# Patient Record
Sex: FEMALE | Race: WHITE | NOT HISPANIC OR LATINO | Employment: OTHER | URBAN - METROPOLITAN AREA
[De-identification: names, ages, dates, MRNs, and addresses within clinical notes are randomized per-mention and may not be internally consistent; named-entity substitution may affect disease eponyms.]

---

## 2017-01-27 ENCOUNTER — COMMUNICATION - HEALTHEAST (OUTPATIENT)
Dept: INTERNAL MEDICINE | Facility: CLINIC | Age: 82
End: 2017-01-27

## 2017-02-09 ENCOUNTER — RECORDS - HEALTHEAST (OUTPATIENT)
Dept: ADMINISTRATIVE | Facility: OTHER | Age: 82
End: 2017-02-09

## 2017-02-27 ENCOUNTER — COMMUNICATION - HEALTHEAST (OUTPATIENT)
Dept: INTERNAL MEDICINE | Facility: CLINIC | Age: 82
End: 2017-02-27

## 2017-03-21 ENCOUNTER — COMMUNICATION - HEALTHEAST (OUTPATIENT)
Dept: INTERNAL MEDICINE | Facility: CLINIC | Age: 82
End: 2017-03-21

## 2017-04-27 ENCOUNTER — COMMUNICATION - HEALTHEAST (OUTPATIENT)
Dept: INTERNAL MEDICINE | Facility: CLINIC | Age: 82
End: 2017-04-27

## 2017-06-29 ENCOUNTER — COMMUNICATION - HEALTHEAST (OUTPATIENT)
Dept: INTERNAL MEDICINE | Facility: CLINIC | Age: 82
End: 2017-06-29

## 2017-07-03 ENCOUNTER — OFFICE VISIT - HEALTHEAST (OUTPATIENT)
Dept: INTERNAL MEDICINE | Facility: CLINIC | Age: 82
End: 2017-07-03

## 2017-07-03 ENCOUNTER — COMMUNICATION - HEALTHEAST (OUTPATIENT)
Dept: INTERNAL MEDICINE | Facility: CLINIC | Age: 82
End: 2017-07-03

## 2017-07-03 DIAGNOSIS — R35.0 URINARY FREQUENCY: ICD-10-CM

## 2017-07-03 DIAGNOSIS — Z00.01 ENCOUNTER FOR GENERAL ADULT MEDICAL EXAMINATION WITH ABNORMAL FINDINGS: ICD-10-CM

## 2017-07-03 DIAGNOSIS — M81.0 OSTEOPOROSIS: ICD-10-CM

## 2017-07-03 DIAGNOSIS — K21.9 GASTROESOPHAGEAL REFLUX DISEASE WITHOUT ESOPHAGITIS: ICD-10-CM

## 2017-07-03 DIAGNOSIS — Z00.00 ROUTINE GENERAL MEDICAL EXAMINATION AT A HEALTH CARE FACILITY: ICD-10-CM

## 2017-07-03 DIAGNOSIS — F41.9 ANXIETY: ICD-10-CM

## 2017-07-03 DIAGNOSIS — Z79.899 MEDICATION MANAGEMENT: ICD-10-CM

## 2017-07-03 DIAGNOSIS — I10 SYSTOLIC HYPERTENSION: ICD-10-CM

## 2017-07-03 ASSESSMENT — MIFFLIN-ST. JEOR: SCORE: 809.99

## 2017-07-07 ENCOUNTER — AMBULATORY - HEALTHEAST (OUTPATIENT)
Dept: LAB | Facility: CLINIC | Age: 82
End: 2017-07-07

## 2017-07-07 DIAGNOSIS — Z00.00 ROUTINE GENERAL MEDICAL EXAMINATION AT A HEALTH CARE FACILITY: ICD-10-CM

## 2017-07-07 DIAGNOSIS — M81.0 OSTEOPOROSIS: ICD-10-CM

## 2017-07-07 DIAGNOSIS — Z79.899 MEDICATION MANAGEMENT: ICD-10-CM

## 2017-07-07 LAB
CHOLEST SERPL-MCNC: 178 MG/DL
FASTING STATUS PATIENT QL REPORTED: YES
HDLC SERPL-MCNC: 68 MG/DL
LDLC SERPL CALC-MCNC: 96 MG/DL
TRIGL SERPL-MCNC: 71 MG/DL

## 2017-07-12 ENCOUNTER — RECORDS - HEALTHEAST (OUTPATIENT)
Dept: ADMINISTRATIVE | Facility: OTHER | Age: 82
End: 2017-07-12

## 2017-07-18 ENCOUNTER — COMMUNICATION - HEALTHEAST (OUTPATIENT)
Dept: INTERNAL MEDICINE | Facility: CLINIC | Age: 82
End: 2017-07-18

## 2017-07-24 ENCOUNTER — COMMUNICATION - HEALTHEAST (OUTPATIENT)
Dept: INTERNAL MEDICINE | Facility: CLINIC | Age: 82
End: 2017-07-24

## 2017-08-10 ENCOUNTER — RECORDS - HEALTHEAST (OUTPATIENT)
Dept: ADMINISTRATIVE | Facility: OTHER | Age: 82
End: 2017-08-10

## 2017-08-19 ENCOUNTER — COMMUNICATION - HEALTHEAST (OUTPATIENT)
Dept: INTERNAL MEDICINE | Facility: CLINIC | Age: 82
End: 2017-08-19

## 2017-08-19 DIAGNOSIS — K21.9 GERD (GASTROESOPHAGEAL REFLUX DISEASE): ICD-10-CM

## 2017-09-18 ENCOUNTER — COMMUNICATION - HEALTHEAST (OUTPATIENT)
Dept: INTERNAL MEDICINE | Facility: CLINIC | Age: 82
End: 2017-09-18

## 2017-10-05 ENCOUNTER — RECORDS - HEALTHEAST (OUTPATIENT)
Dept: ADMINISTRATIVE | Facility: OTHER | Age: 82
End: 2017-10-05

## 2017-10-22 ENCOUNTER — RECORDS - HEALTHEAST (OUTPATIENT)
Dept: ADMINISTRATIVE | Facility: OTHER | Age: 82
End: 2017-10-22

## 2017-11-20 ENCOUNTER — COMMUNICATION - HEALTHEAST (OUTPATIENT)
Dept: INTERNAL MEDICINE | Facility: CLINIC | Age: 82
End: 2017-11-20

## 2017-11-20 DIAGNOSIS — I10 SYSTOLIC HYPERTENSION: ICD-10-CM

## 2018-01-22 ENCOUNTER — COMMUNICATION - HEALTHEAST (OUTPATIENT)
Dept: INTERNAL MEDICINE | Facility: CLINIC | Age: 83
End: 2018-01-22

## 2018-02-07 ENCOUNTER — COMMUNICATION - HEALTHEAST (OUTPATIENT)
Dept: INTERNAL MEDICINE | Facility: CLINIC | Age: 83
End: 2018-02-07

## 2018-02-08 ENCOUNTER — RECORDS - HEALTHEAST (OUTPATIENT)
Dept: ADMINISTRATIVE | Facility: OTHER | Age: 83
End: 2018-02-08

## 2018-02-08 ENCOUNTER — OFFICE VISIT - HEALTHEAST (OUTPATIENT)
Dept: INTERNAL MEDICINE | Facility: CLINIC | Age: 83
End: 2018-02-08

## 2018-02-08 ENCOUNTER — AMBULATORY - HEALTHEAST (OUTPATIENT)
Dept: INTERNAL MEDICINE | Facility: CLINIC | Age: 83
End: 2018-02-08

## 2018-02-08 ENCOUNTER — RECORDS - HEALTHEAST (OUTPATIENT)
Dept: GENERAL RADIOLOGY | Facility: CLINIC | Age: 83
End: 2018-02-08

## 2018-02-08 DIAGNOSIS — J43.9 MILD EMPHYSEMA (H): ICD-10-CM

## 2018-02-08 DIAGNOSIS — R06.00 DYSPNEA: ICD-10-CM

## 2018-02-08 DIAGNOSIS — I10 SYSTOLIC HYPERTENSION: ICD-10-CM

## 2018-02-08 DIAGNOSIS — R60.9 EDEMA: ICD-10-CM

## 2018-02-08 DIAGNOSIS — J43.9 EMPHYSEMA, UNSPECIFIED (H): ICD-10-CM

## 2018-02-08 LAB
ALBUMIN SERPL-MCNC: 3.7 G/DL (ref 3.5–5)
ALP SERPL-CCNC: 98 U/L (ref 45–120)
ALT SERPL W P-5'-P-CCNC: 12 U/L (ref 0–45)
ANION GAP SERPL CALCULATED.3IONS-SCNC: 9 MMOL/L (ref 5–18)
AST SERPL W P-5'-P-CCNC: 21 U/L (ref 0–40)
ATRIAL RATE - MUSE: 89 BPM
BASOPHILS # BLD AUTO: 0 THOU/UL (ref 0–0.2)
BASOPHILS NFR BLD AUTO: 1 % (ref 0–2)
BILIRUB DIRECT SERPL-MCNC: 0.1 MG/DL
BILIRUB SERPL-MCNC: 0.4 MG/DL (ref 0–1)
BNP SERPL-MCNC: 196 PG/ML (ref 0–167)
BUN SERPL-MCNC: 20 MG/DL (ref 8–28)
CALCIUM SERPL-MCNC: 9.7 MG/DL (ref 8.5–10.5)
CHLORIDE BLD-SCNC: 104 MMOL/L (ref 98–107)
CO2 SERPL-SCNC: 25 MMOL/L (ref 22–31)
CREAT SERPL-MCNC: 0.76 MG/DL (ref 0.6–1.1)
DIASTOLIC BLOOD PRESSURE - MUSE: NORMAL MMHG
EOSINOPHIL # BLD AUTO: 0.1 THOU/UL (ref 0–0.4)
EOSINOPHIL NFR BLD AUTO: 2 % (ref 0–6)
ERYTHROCYTE [DISTWIDTH] IN BLOOD BY AUTOMATED COUNT: 12 % (ref 11–14.5)
GFR SERPL CREATININE-BSD FRML MDRD: >60 ML/MIN/1.73M2
GLUCOSE BLD-MCNC: 89 MG/DL (ref 70–125)
HCT VFR BLD AUTO: 37.8 % (ref 35–47)
HGB BLD-MCNC: 12.6 G/DL (ref 12–16)
INTERPRETATION ECG - MUSE: NORMAL
LYMPHOCYTES # BLD AUTO: 1.9 THOU/UL (ref 0.8–4.4)
LYMPHOCYTES NFR BLD AUTO: 33 % (ref 20–40)
MCH RBC QN AUTO: 31.5 PG (ref 27–34)
MCHC RBC AUTO-ENTMCNC: 33.4 G/DL (ref 32–36)
MCV RBC AUTO: 94 FL (ref 80–100)
MONOCYTES # BLD AUTO: 0.7 THOU/UL (ref 0–0.9)
MONOCYTES NFR BLD AUTO: 12 % (ref 2–10)
NEUTROPHILS # BLD AUTO: 2.9 THOU/UL (ref 2–7.7)
NEUTROPHILS NFR BLD AUTO: 52 % (ref 50–70)
P AXIS - MUSE: 57 DEGREES
PLATELET # BLD AUTO: 151 THOU/UL (ref 140–440)
PMV BLD AUTO: 8.2 FL (ref 7–10)
POTASSIUM BLD-SCNC: 4.5 MMOL/L (ref 3.5–5)
PR INTERVAL - MUSE: 196 MS
PROT SERPL-MCNC: 7.1 G/DL (ref 6–8)
QRS DURATION - MUSE: 136 MS
QT - MUSE: 398 MS
QTC - MUSE: 484 MS
R AXIS - MUSE: -20 DEGREES
RBC # BLD AUTO: 4 MILL/UL (ref 3.8–5.4)
SODIUM SERPL-SCNC: 138 MMOL/L (ref 136–145)
SYSTOLIC BLOOD PRESSURE - MUSE: NORMAL MMHG
T AXIS - MUSE: 83 DEGREES
TSH SERPL DL<=0.005 MIU/L-ACNC: 3.34 UIU/ML (ref 0.3–5)
VENTRICULAR RATE- MUSE: 89 BPM
WBC: 5.6 THOU/UL (ref 4–11)

## 2018-02-08 ASSESSMENT — MIFFLIN-ST. JEOR: SCORE: 819.06

## 2018-02-21 ENCOUNTER — OFFICE VISIT - HEALTHEAST (OUTPATIENT)
Dept: INTERNAL MEDICINE | Facility: CLINIC | Age: 83
End: 2018-02-21

## 2018-02-21 DIAGNOSIS — I50.32 CHRONIC DIASTOLIC CONGESTIVE HEART FAILURE (H): ICD-10-CM

## 2018-02-21 DIAGNOSIS — I10 SYSTOLIC HYPERTENSION: ICD-10-CM

## 2018-02-21 ASSESSMENT — MIFFLIN-ST. JEOR: SCORE: 819.06

## 2018-03-16 ENCOUNTER — OFFICE VISIT - HEALTHEAST (OUTPATIENT)
Dept: INTERNAL MEDICINE | Facility: CLINIC | Age: 83
End: 2018-03-16

## 2018-03-16 DIAGNOSIS — F41.9 ANXIETY: ICD-10-CM

## 2018-03-16 DIAGNOSIS — I10 SYSTOLIC HYPERTENSION: ICD-10-CM

## 2018-03-16 DIAGNOSIS — I50.32 CHRONIC DIASTOLIC CONGESTIVE HEART FAILURE (H): ICD-10-CM

## 2018-03-16 ASSESSMENT — MIFFLIN-ST. JEOR: SCORE: 814.53

## 2018-03-22 ENCOUNTER — COMMUNICATION - HEALTHEAST (OUTPATIENT)
Dept: INTERNAL MEDICINE | Facility: CLINIC | Age: 83
End: 2018-03-22

## 2018-05-18 ENCOUNTER — COMMUNICATION - HEALTHEAST (OUTPATIENT)
Dept: INTERNAL MEDICINE | Facility: CLINIC | Age: 83
End: 2018-05-18

## 2018-05-18 DIAGNOSIS — I10 SYSTOLIC HYPERTENSION: ICD-10-CM

## 2018-06-06 ENCOUNTER — COMMUNICATION - HEALTHEAST (OUTPATIENT)
Dept: CARE COORDINATION | Facility: CLINIC | Age: 83
End: 2018-06-06

## 2018-06-22 ENCOUNTER — COMMUNICATION - HEALTHEAST (OUTPATIENT)
Dept: CARE COORDINATION | Facility: CLINIC | Age: 83
End: 2018-06-22

## 2018-06-22 DIAGNOSIS — I50.30 DIASTOLIC CONGESTIVE HEART FAILURE (H): ICD-10-CM

## 2018-07-16 ENCOUNTER — OFFICE VISIT - HEALTHEAST (OUTPATIENT)
Dept: INTERNAL MEDICINE | Facility: CLINIC | Age: 83
End: 2018-07-16

## 2018-07-16 DIAGNOSIS — R10.13 ABDOMINAL PAIN, EPIGASTRIC: ICD-10-CM

## 2018-07-16 DIAGNOSIS — M81.0 OSTEOPOROSIS: ICD-10-CM

## 2018-07-16 DIAGNOSIS — I10 SYSTOLIC HYPERTENSION: ICD-10-CM

## 2018-07-16 LAB
ANION GAP SERPL CALCULATED.3IONS-SCNC: 10 MMOL/L (ref 5–18)
BUN SERPL-MCNC: 17 MG/DL (ref 8–28)
CALCIUM SERPL-MCNC: 9.9 MG/DL (ref 8.5–10.5)
CHLORIDE BLD-SCNC: 104 MMOL/L (ref 98–107)
CO2 SERPL-SCNC: 24 MMOL/L (ref 22–31)
CREAT SERPL-MCNC: 0.72 MG/DL (ref 0.6–1.1)
ERYTHROCYTE [DISTWIDTH] IN BLOOD BY AUTOMATED COUNT: 11.8 % (ref 11–14.5)
GFR SERPL CREATININE-BSD FRML MDRD: >60 ML/MIN/1.73M2
GLUCOSE BLD-MCNC: 73 MG/DL (ref 70–125)
HCT VFR BLD AUTO: 38.9 % (ref 35–47)
HGB BLD-MCNC: 13.3 G/DL (ref 12–16)
MCH RBC QN AUTO: 32.4 PG (ref 27–34)
MCHC RBC AUTO-ENTMCNC: 34.2 G/DL (ref 32–36)
MCV RBC AUTO: 95 FL (ref 80–100)
PLATELET # BLD AUTO: 168 THOU/UL (ref 140–440)
PMV BLD AUTO: 8.3 FL (ref 7–10)
POTASSIUM BLD-SCNC: 5 MMOL/L (ref 3.5–5)
RBC # BLD AUTO: 4.1 MILL/UL (ref 3.8–5.4)
SODIUM SERPL-SCNC: 138 MMOL/L (ref 136–145)
WBC: 4.8 THOU/UL (ref 4–11)

## 2018-07-16 ASSESSMENT — MIFFLIN-ST. JEOR: SCORE: 819.06

## 2018-07-17 ENCOUNTER — COMMUNICATION - HEALTHEAST (OUTPATIENT)
Dept: INTERNAL MEDICINE | Facility: CLINIC | Age: 83
End: 2018-07-17

## 2018-07-18 ENCOUNTER — COMMUNICATION - HEALTHEAST (OUTPATIENT)
Dept: INTERNAL MEDICINE | Facility: CLINIC | Age: 83
End: 2018-07-18

## 2018-07-30 ENCOUNTER — RECORDS - HEALTHEAST (OUTPATIENT)
Dept: ADMINISTRATIVE | Facility: OTHER | Age: 83
End: 2018-07-30

## 2018-08-16 ENCOUNTER — RECORDS - HEALTHEAST (OUTPATIENT)
Dept: ADMINISTRATIVE | Facility: OTHER | Age: 83
End: 2018-08-16

## 2018-08-20 ENCOUNTER — COMMUNICATION - HEALTHEAST (OUTPATIENT)
Dept: INTERNAL MEDICINE | Facility: CLINIC | Age: 83
End: 2018-08-20

## 2018-08-20 DIAGNOSIS — I10 SYSTOLIC HYPERTENSION: ICD-10-CM

## 2018-08-23 ENCOUNTER — RECORDS - HEALTHEAST (OUTPATIENT)
Dept: ADMINISTRATIVE | Facility: OTHER | Age: 83
End: 2018-08-23

## 2018-08-24 ENCOUNTER — RECORDS - HEALTHEAST (OUTPATIENT)
Dept: ADMINISTRATIVE | Facility: OTHER | Age: 83
End: 2018-08-24

## 2018-08-27 ENCOUNTER — RECORDS - HEALTHEAST (OUTPATIENT)
Dept: ADMINISTRATIVE | Facility: OTHER | Age: 83
End: 2018-08-27

## 2018-08-28 ENCOUNTER — RECORDS - HEALTHEAST (OUTPATIENT)
Dept: ADMINISTRATIVE | Facility: OTHER | Age: 83
End: 2018-08-28

## 2018-08-29 ENCOUNTER — RECORDS - HEALTHEAST (OUTPATIENT)
Dept: ADMINISTRATIVE | Facility: OTHER | Age: 83
End: 2018-08-29

## 2018-08-30 ENCOUNTER — RECORDS - HEALTHEAST (OUTPATIENT)
Dept: ADMINISTRATIVE | Facility: OTHER | Age: 83
End: 2018-08-30

## 2018-08-31 ENCOUNTER — RECORDS - HEALTHEAST (OUTPATIENT)
Dept: ADMINISTRATIVE | Facility: OTHER | Age: 83
End: 2018-08-31

## 2018-09-04 ENCOUNTER — RECORDS - HEALTHEAST (OUTPATIENT)
Dept: ADMINISTRATIVE | Facility: OTHER | Age: 83
End: 2018-09-04

## 2018-09-05 ENCOUNTER — RECORDS - HEALTHEAST (OUTPATIENT)
Dept: ADMINISTRATIVE | Facility: OTHER | Age: 83
End: 2018-09-05

## 2018-09-07 ENCOUNTER — RECORDS - HEALTHEAST (OUTPATIENT)
Dept: ADMINISTRATIVE | Facility: OTHER | Age: 83
End: 2018-09-07

## 2018-09-14 ENCOUNTER — RECORDS - HEALTHEAST (OUTPATIENT)
Dept: ADMINISTRATIVE | Facility: OTHER | Age: 83
End: 2018-09-14

## 2018-09-18 ENCOUNTER — COMMUNICATION - HEALTHEAST (OUTPATIENT)
Dept: INTERNAL MEDICINE | Facility: CLINIC | Age: 83
End: 2018-09-18

## 2018-09-21 ENCOUNTER — RECORDS - HEALTHEAST (OUTPATIENT)
Dept: ADMINISTRATIVE | Facility: OTHER | Age: 83
End: 2018-09-21

## 2018-09-28 ENCOUNTER — RECORDS - HEALTHEAST (OUTPATIENT)
Dept: ADMINISTRATIVE | Facility: OTHER | Age: 83
End: 2018-09-28

## 2018-10-19 ENCOUNTER — COMMUNICATION - HEALTHEAST (OUTPATIENT)
Dept: CARE COORDINATION | Facility: CLINIC | Age: 83
End: 2018-10-19

## 2018-10-22 ENCOUNTER — RECORDS - HEALTHEAST (OUTPATIENT)
Dept: ADMINISTRATIVE | Facility: OTHER | Age: 83
End: 2018-10-22

## 2018-11-13 ENCOUNTER — COMMUNICATION - HEALTHEAST (OUTPATIENT)
Dept: INTERNAL MEDICINE | Facility: CLINIC | Age: 83
End: 2018-11-13

## 2018-11-26 ENCOUNTER — OFFICE VISIT - HEALTHEAST (OUTPATIENT)
Dept: INTERNAL MEDICINE | Facility: CLINIC | Age: 83
End: 2018-11-26

## 2018-11-26 DIAGNOSIS — M81.0 AGE-RELATED OSTEOPOROSIS WITHOUT CURRENT PATHOLOGICAL FRACTURE: ICD-10-CM

## 2018-11-26 DIAGNOSIS — M54.6 CHRONIC MIDLINE THORACIC BACK PAIN: ICD-10-CM

## 2018-11-26 DIAGNOSIS — I10 SYSTOLIC HYPERTENSION: ICD-10-CM

## 2018-11-26 DIAGNOSIS — G89.29 CHRONIC MIDLINE THORACIC BACK PAIN: ICD-10-CM

## 2018-11-26 ASSESSMENT — MIFFLIN-ST. JEOR: SCORE: 809.99

## 2019-01-11 ENCOUNTER — COMMUNICATION - HEALTHEAST (OUTPATIENT)
Dept: INTERNAL MEDICINE | Facility: CLINIC | Age: 84
End: 2019-01-11

## 2019-01-16 ENCOUNTER — AMBULATORY - HEALTHEAST (OUTPATIENT)
Dept: CARE COORDINATION | Facility: CLINIC | Age: 84
End: 2019-01-16

## 2019-03-14 ENCOUNTER — COMMUNICATION - HEALTHEAST (OUTPATIENT)
Dept: INTERNAL MEDICINE | Facility: CLINIC | Age: 84
End: 2019-03-14

## 2019-03-17 ENCOUNTER — COMMUNICATION - HEALTHEAST (OUTPATIENT)
Dept: INTERNAL MEDICINE | Facility: CLINIC | Age: 84
End: 2019-03-17

## 2019-04-29 ENCOUNTER — OFFICE VISIT - HEALTHEAST (OUTPATIENT)
Dept: INTERNAL MEDICINE | Facility: CLINIC | Age: 84
End: 2019-04-29

## 2019-04-29 DIAGNOSIS — I10 SYSTOLIC HYPERTENSION: ICD-10-CM

## 2019-04-29 DIAGNOSIS — F41.9 CHRONIC ANXIETY: ICD-10-CM

## 2019-04-29 LAB
ALBUMIN SERPL-MCNC: 3.8 G/DL (ref 3.5–5)
ALP SERPL-CCNC: 80 U/L (ref 45–120)
ALT SERPL W P-5'-P-CCNC: 15 U/L (ref 0–45)
ANION GAP SERPL CALCULATED.3IONS-SCNC: 12 MMOL/L (ref 5–18)
AST SERPL W P-5'-P-CCNC: 25 U/L (ref 0–40)
BASOPHILS # BLD AUTO: 0 THOU/UL (ref 0–0.2)
BASOPHILS NFR BLD AUTO: 1 % (ref 0–2)
BILIRUB SERPL-MCNC: 0.4 MG/DL (ref 0–1)
BUN SERPL-MCNC: 21 MG/DL (ref 8–28)
CALCIUM SERPL-MCNC: 9.9 MG/DL (ref 8.5–10.5)
CHLORIDE BLD-SCNC: 104 MMOL/L (ref 98–107)
CO2 SERPL-SCNC: 21 MMOL/L (ref 22–31)
CREAT SERPL-MCNC: 0.78 MG/DL (ref 0.6–1.1)
EOSINOPHIL # BLD AUTO: 0.1 THOU/UL (ref 0–0.4)
EOSINOPHIL NFR BLD AUTO: 2 % (ref 0–6)
ERYTHROCYTE [DISTWIDTH] IN BLOOD BY AUTOMATED COUNT: 12.4 % (ref 11–14.5)
GFR SERPL CREATININE-BSD FRML MDRD: >60 ML/MIN/1.73M2
GLUCOSE BLD-MCNC: 95 MG/DL (ref 70–125)
HCT VFR BLD AUTO: 39.2 % (ref 35–47)
HGB BLD-MCNC: 13.2 G/DL (ref 12–16)
LYMPHOCYTES # BLD AUTO: 1.4 THOU/UL (ref 0.8–4.4)
LYMPHOCYTES NFR BLD AUTO: 29 % (ref 20–40)
MCH RBC QN AUTO: 31.7 PG (ref 27–34)
MCHC RBC AUTO-ENTMCNC: 33.8 G/DL (ref 32–36)
MCV RBC AUTO: 94 FL (ref 80–100)
MONOCYTES # BLD AUTO: 0.6 THOU/UL (ref 0–0.9)
MONOCYTES NFR BLD AUTO: 12 % (ref 2–10)
NEUTROPHILS # BLD AUTO: 2.7 THOU/UL (ref 2–7.7)
NEUTROPHILS NFR BLD AUTO: 57 % (ref 50–70)
PLATELET # BLD AUTO: 145 THOU/UL (ref 140–440)
PMV BLD AUTO: 8.5 FL (ref 7–10)
POTASSIUM BLD-SCNC: 4.7 MMOL/L (ref 3.5–5)
PROT SERPL-MCNC: 6.7 G/DL (ref 6–8)
RBC # BLD AUTO: 4.18 MILL/UL (ref 3.8–5.4)
SODIUM SERPL-SCNC: 137 MMOL/L (ref 136–145)
TSH SERPL DL<=0.005 MIU/L-ACNC: 2.69 UIU/ML (ref 0.3–5)
WBC: 4.8 THOU/UL (ref 4–11)

## 2019-04-29 ASSESSMENT — MIFFLIN-ST. JEOR: SCORE: 789.58

## 2019-04-30 ENCOUNTER — COMMUNICATION - HEALTHEAST (OUTPATIENT)
Dept: INTERNAL MEDICINE | Facility: CLINIC | Age: 84
End: 2019-04-30

## 2019-05-20 ENCOUNTER — COMMUNICATION - HEALTHEAST (OUTPATIENT)
Dept: INTERNAL MEDICINE | Facility: CLINIC | Age: 84
End: 2019-05-20

## 2019-05-20 DIAGNOSIS — F41.9 CHRONIC ANXIETY: ICD-10-CM

## 2019-07-08 ENCOUNTER — OFFICE VISIT - HEALTHEAST (OUTPATIENT)
Dept: GERIATRICS | Facility: CLINIC | Age: 84
End: 2019-07-08

## 2019-07-08 DIAGNOSIS — R53.81 PHYSICAL DECONDITIONING: ICD-10-CM

## 2019-07-08 DIAGNOSIS — K59.00 CONSTIPATION, UNSPECIFIED CONSTIPATION TYPE: ICD-10-CM

## 2019-07-08 DIAGNOSIS — I10 SYSTOLIC HYPERTENSION: ICD-10-CM
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
## 2019-07-08 DIAGNOSIS — F41.9 CHRONIC ANXIETY: ICD-10-CM

## 2019-07-08 DIAGNOSIS — K21.9 GASTROESOPHAGEAL REFLUX DISEASE WITHOUT ESOPHAGITIS: ICD-10-CM

## 2019-07-08 DIAGNOSIS — S42.201A CLOSED FRACTURE OF PROXIMAL END OF RIGHT HUMERUS, UNSPECIFIED FRACTURE MORPHOLOGY, INITIAL ENCOUNTER: ICD-10-CM

## 2019-07-08 DIAGNOSIS — G47.00 INSOMNIA, UNSPECIFIED TYPE: ICD-10-CM

## 2019-07-08 DIAGNOSIS — I50.30 DIASTOLIC CONGESTIVE HEART FAILURE, UNSPECIFIED HF CHRONICITY (H): ICD-10-CM

## 2019-07-09 ENCOUNTER — RECORDS - HEALTHEAST (OUTPATIENT)
Dept: LAB | Facility: CLINIC | Age: 84
End: 2019-07-09

## 2019-07-10 LAB
25(OH)D3 SERPL-MCNC: 45 NG/ML (ref 30–80)
ANION GAP SERPL CALCULATED.3IONS-SCNC: 11 MMOL/L (ref 5–18)
BASOPHILS # BLD AUTO: 0 THOU/UL (ref 0–0.2)
BASOPHILS NFR BLD AUTO: 1 % (ref 0–2)
BUN SERPL-MCNC: 21 MG/DL (ref 8–28)
CALCIUM SERPL-MCNC: 9.4 MG/DL (ref 8.5–10.5)
CHLORIDE BLD-SCNC: 99 MMOL/L (ref 98–107)
CO2 SERPL-SCNC: 26 MMOL/L (ref 22–31)
CREAT SERPL-MCNC: 0.78 MG/DL (ref 0.6–1.1)
EOSINOPHIL # BLD AUTO: 0.1 THOU/UL (ref 0–0.4)
EOSINOPHIL NFR BLD AUTO: 3 % (ref 0–6)
ERYTHROCYTE [DISTWIDTH] IN BLOOD BY AUTOMATED COUNT: 13.2 % (ref 11–14.5)
GFR SERPL CREATININE-BSD FRML MDRD: >60 ML/MIN/1.73M2
GLUCOSE BLD-MCNC: 83 MG/DL (ref 70–125)
HCT VFR BLD AUTO: 31.1 % (ref 35–47)
HGB BLD-MCNC: 10.6 G/DL (ref 12–16)
LYMPHOCYTES # BLD AUTO: 1.4 THOU/UL (ref 0.8–4.4)
LYMPHOCYTES NFR BLD AUTO: 32 % (ref 20–40)
MCH RBC QN AUTO: 32.5 PG (ref 27–34)
MCHC RBC AUTO-ENTMCNC: 34.1 G/DL (ref 32–36)
MCV RBC AUTO: 95 FL (ref 80–100)
MONOCYTES # BLD AUTO: 0.8 THOU/UL (ref 0–0.9)
MONOCYTES NFR BLD AUTO: 17 % (ref 2–10)
NEUTROPHILS # BLD AUTO: 2.1 THOU/UL (ref 2–7.7)
NEUTROPHILS NFR BLD AUTO: 47 % (ref 50–70)
PLATELET # BLD AUTO: 177 THOU/UL (ref 140–440)
PMV BLD AUTO: 10.8 FL (ref 8.5–12.5)
POTASSIUM BLD-SCNC: 3.6 MMOL/L (ref 3.5–5)
RBC # BLD AUTO: 3.26 MILL/UL (ref 3.8–5.4)
SODIUM SERPL-SCNC: 136 MMOL/L (ref 136–145)
WBC: 4.4 THOU/UL (ref 4–11)

## 2019-07-11 ENCOUNTER — OFFICE VISIT - HEALTHEAST (OUTPATIENT)
Dept: GERIATRICS | Facility: CLINIC | Age: 84
End: 2019-07-11

## 2019-07-11 DIAGNOSIS — F41.9 ANXIETY: ICD-10-CM

## 2019-07-11 DIAGNOSIS — I10 SYSTOLIC HYPERTENSION: ICD-10-CM

## 2019-07-11 DIAGNOSIS — R53.81 PHYSICAL DECONDITIONING: ICD-10-CM

## 2019-07-11 DIAGNOSIS — I50.30 DIASTOLIC CONGESTIVE HEART FAILURE, UNSPECIFIED HF CHRONICITY (H): ICD-10-CM

## 2019-07-11 DIAGNOSIS — K21.9 GASTROESOPHAGEAL REFLUX DISEASE WITHOUT ESOPHAGITIS: ICD-10-CM

## 2019-07-11 DIAGNOSIS — F41.9 CHRONIC ANXIETY: ICD-10-CM

## 2019-07-15 ENCOUNTER — OFFICE VISIT - HEALTHEAST (OUTPATIENT)
Dept: GERIATRICS | Facility: CLINIC | Age: 84
End: 2019-07-15

## 2019-07-15 ENCOUNTER — RECORDS - HEALTHEAST (OUTPATIENT)
Dept: LAB | Facility: CLINIC | Age: 84
End: 2019-07-15

## 2019-07-15 DIAGNOSIS — K21.9 GASTROESOPHAGEAL REFLUX DISEASE WITHOUT ESOPHAGITIS: ICD-10-CM

## 2019-07-15 DIAGNOSIS — F41.9 CHRONIC ANXIETY: ICD-10-CM

## 2019-07-15 DIAGNOSIS — M48.50XA VERTEBRAL COMPRESSION FRACTURE (H): ICD-10-CM

## 2019-07-15 DIAGNOSIS — S42.201A CLOSED FRACTURE OF PROXIMAL END OF RIGHT HUMERUS, UNSPECIFIED FRACTURE MORPHOLOGY, INITIAL ENCOUNTER: ICD-10-CM
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
## 2019-07-15 DIAGNOSIS — I50.30 DIASTOLIC CONGESTIVE HEART FAILURE, UNSPECIFIED HF CHRONICITY (H): ICD-10-CM

## 2019-07-15 DIAGNOSIS — R53.81 PHYSICAL DECONDITIONING: ICD-10-CM

## 2019-07-15 DIAGNOSIS — N30.00 ACUTE CYSTITIS WITHOUT HEMATURIA: ICD-10-CM

## 2019-07-15 DIAGNOSIS — I10 SYSTOLIC HYPERTENSION: ICD-10-CM

## 2019-07-15 LAB
ALBUMIN UR-MCNC: NEGATIVE MG/DL
APPEARANCE UR: CLEAR
BILIRUB UR QL STRIP: NEGATIVE
COLOR UR AUTO: COLORLESS
GLUCOSE UR STRIP-MCNC: NEGATIVE MG/DL
HGB UR QL STRIP: NEGATIVE
KETONES UR STRIP-MCNC: NEGATIVE MG/DL
LEUKOCYTE ESTERASE UR QL STRIP: NEGATIVE
NITRATE UR QL: NEGATIVE
PH UR STRIP: 6.5 [PH] (ref 4.5–8)
SP GR UR STRIP: 1 (ref 1–1.03)
UROBILINOGEN UR STRIP-ACNC: NORMAL

## 2019-07-16 ENCOUNTER — OFFICE VISIT - HEALTHEAST (OUTPATIENT)
Dept: GERIATRICS | Facility: CLINIC | Age: 84
End: 2019-07-16

## 2019-07-16 DIAGNOSIS — M81.0 AGE-RELATED OSTEOPOROSIS WITHOUT CURRENT PATHOLOGICAL FRACTURE: ICD-10-CM
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
## 2019-07-16 DIAGNOSIS — S42.201D CLOSED FRACTURE OF PROXIMAL END OF RIGHT HUMERUS WITH ROUTINE HEALING, UNSPECIFIED FRACTURE MORPHOLOGY, SUBSEQUENT ENCOUNTER: ICD-10-CM

## 2019-07-16 DIAGNOSIS — R53.81 PHYSICAL DECONDITIONING: ICD-10-CM

## 2019-07-16 LAB — BACTERIA SPEC CULT: NO GROWTH

## 2019-07-19 ENCOUNTER — RECORDS - HEALTHEAST (OUTPATIENT)
Dept: ADMINISTRATIVE | Facility: OTHER | Age: 84
End: 2019-07-19

## 2019-07-19 ENCOUNTER — OFFICE VISIT - HEALTHEAST (OUTPATIENT)
Dept: GERIATRICS | Facility: CLINIC | Age: 84
End: 2019-07-19

## 2019-07-19 DIAGNOSIS — N32.81 OAB (OVERACTIVE BLADDER): ICD-10-CM

## 2019-07-19 DIAGNOSIS — F41.9 CHRONIC ANXIETY: ICD-10-CM

## 2019-07-19 DIAGNOSIS — I10 SYSTOLIC HYPERTENSION: ICD-10-CM

## 2019-07-19 DIAGNOSIS — I50.30 DIASTOLIC CONGESTIVE HEART FAILURE, UNSPECIFIED HF CHRONICITY (H): ICD-10-CM

## 2019-07-19 DIAGNOSIS — R53.81 PHYSICAL DECONDITIONING: ICD-10-CM
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
## 2019-07-19 DIAGNOSIS — S42.201A CLOSED FRACTURE OF PROXIMAL END OF RIGHT HUMERUS, UNSPECIFIED FRACTURE MORPHOLOGY, INITIAL ENCOUNTER: ICD-10-CM

## 2019-07-22 ENCOUNTER — OFFICE VISIT - HEALTHEAST (OUTPATIENT)
Dept: GERIATRICS | Facility: CLINIC | Age: 84
End: 2019-07-22

## 2019-07-22 DIAGNOSIS — K59.00 CONSTIPATION, UNSPECIFIED CONSTIPATION TYPE: ICD-10-CM
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
## 2019-07-22 DIAGNOSIS — I50.30 DIASTOLIC CONGESTIVE HEART FAILURE, UNSPECIFIED HF CHRONICITY (H): ICD-10-CM

## 2019-07-22 DIAGNOSIS — F41.9 CHRONIC ANXIETY: ICD-10-CM

## 2019-07-22 DIAGNOSIS — S42.201A CLOSED FRACTURE OF PROXIMAL END OF RIGHT HUMERUS, UNSPECIFIED FRACTURE MORPHOLOGY, INITIAL ENCOUNTER: ICD-10-CM

## 2019-07-22 DIAGNOSIS — I10 SYSTOLIC HYPERTENSION: ICD-10-CM

## 2019-07-22 DIAGNOSIS — R53.81 PHYSICAL DECONDITIONING: ICD-10-CM

## 2019-07-25 ENCOUNTER — OFFICE VISIT - HEALTHEAST (OUTPATIENT)
Dept: GERIATRICS | Facility: CLINIC | Age: 84
End: 2019-07-25
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
## 2019-07-25 DIAGNOSIS — F41.9 ANXIETY: ICD-10-CM

## 2019-07-25 DIAGNOSIS — I50.30 DIASTOLIC CONGESTIVE HEART FAILURE, UNSPECIFIED HF CHRONICITY (H): ICD-10-CM

## 2019-07-25 DIAGNOSIS — I10 SYSTOLIC HYPERTENSION: ICD-10-CM

## 2019-07-25 DIAGNOSIS — N32.81 OAB (OVERACTIVE BLADDER): ICD-10-CM

## 2019-07-25 DIAGNOSIS — R53.81 PHYSICAL DECONDITIONING: ICD-10-CM

## 2019-07-25 DIAGNOSIS — K59.00 CONSTIPATION, UNSPECIFIED CONSTIPATION TYPE: ICD-10-CM

## 2019-07-25 DIAGNOSIS — S42.201A CLOSED FRACTURE OF PROXIMAL END OF RIGHT HUMERUS, UNSPECIFIED FRACTURE MORPHOLOGY, INITIAL ENCOUNTER: ICD-10-CM

## 2019-07-25 DIAGNOSIS — K21.9 GASTROESOPHAGEAL REFLUX DISEASE WITHOUT ESOPHAGITIS: ICD-10-CM

## 2019-07-29 ENCOUNTER — OFFICE VISIT - HEALTHEAST (OUTPATIENT)
Dept: GERIATRICS | Facility: CLINIC | Age: 84
End: 2019-07-29

## 2019-07-29 DIAGNOSIS — M48.50XA VERTEBRAL COMPRESSION FRACTURE (H): ICD-10-CM

## 2019-07-29 DIAGNOSIS — K59.00 CONSTIPATION, UNSPECIFIED CONSTIPATION TYPE: ICD-10-CM

## 2019-07-29 DIAGNOSIS — S42.201A CLOSED FRACTURE OF PROXIMAL END OF RIGHT HUMERUS, UNSPECIFIED FRACTURE MORPHOLOGY, INITIAL ENCOUNTER: ICD-10-CM

## 2019-07-29 DIAGNOSIS — I50.30 DIASTOLIC CONGESTIVE HEART FAILURE, UNSPECIFIED HF CHRONICITY (H): ICD-10-CM
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
## 2019-07-29 DIAGNOSIS — F41.9 CHRONIC ANXIETY: ICD-10-CM

## 2019-07-30 ENCOUNTER — OFFICE VISIT - HEALTHEAST (OUTPATIENT)
Dept: GERIATRICS | Facility: CLINIC | Age: 84
End: 2019-07-30

## 2019-07-30 DIAGNOSIS — I50.30 DIASTOLIC CONGESTIVE HEART FAILURE, UNSPECIFIED HF CHRONICITY (H): ICD-10-CM
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
## 2019-07-30 DIAGNOSIS — S42.201D CLOSED FRACTURE OF PROXIMAL END OF RIGHT HUMERUS WITH ROUTINE HEALING, UNSPECIFIED FRACTURE MORPHOLOGY, SUBSEQUENT ENCOUNTER: ICD-10-CM

## 2019-07-30 DIAGNOSIS — R53.81 PHYSICAL DECONDITIONING: ICD-10-CM

## 2019-08-01 ENCOUNTER — OFFICE VISIT - HEALTHEAST (OUTPATIENT)
Dept: GERIATRICS | Facility: CLINIC | Age: 84
End: 2019-08-01

## 2019-08-01 DIAGNOSIS — R53.81 PHYSICAL DECONDITIONING: ICD-10-CM

## 2019-08-01 DIAGNOSIS — F41.9 ANXIETY: ICD-10-CM

## 2019-08-01 DIAGNOSIS — S42.201A CLOSED FRACTURE OF PROXIMAL END OF RIGHT HUMERUS, UNSPECIFIED FRACTURE MORPHOLOGY, INITIAL ENCOUNTER: ICD-10-CM

## 2019-08-01 DIAGNOSIS — G47.00 INSOMNIA, UNSPECIFIED TYPE: ICD-10-CM

## 2019-08-01 DIAGNOSIS — N32.81 OAB (OVERACTIVE BLADDER): ICD-10-CM

## 2019-08-01 DIAGNOSIS — K59.00 CONSTIPATION, UNSPECIFIED CONSTIPATION TYPE: ICD-10-CM

## 2019-08-01 DIAGNOSIS — I10 SYSTOLIC HYPERTENSION: ICD-10-CM

## 2019-08-05 ENCOUNTER — OFFICE VISIT - HEALTHEAST (OUTPATIENT)
Dept: GERIATRICS | Facility: CLINIC | Age: 84
End: 2019-08-05

## 2019-08-05 DIAGNOSIS — S42.201A CLOSED FRACTURE OF PROXIMAL END OF RIGHT HUMERUS, UNSPECIFIED FRACTURE MORPHOLOGY, INITIAL ENCOUNTER: ICD-10-CM

## 2019-08-05 DIAGNOSIS — F41.9 CHRONIC ANXIETY: ICD-10-CM

## 2019-08-05 DIAGNOSIS — M81.0 AGE-RELATED OSTEOPOROSIS WITHOUT CURRENT PATHOLOGICAL FRACTURE: ICD-10-CM

## 2019-08-05 DIAGNOSIS — G47.00 INSOMNIA, UNSPECIFIED TYPE: ICD-10-CM

## 2019-08-05 DIAGNOSIS — R53.81 PHYSICAL DECONDITIONING: ICD-10-CM

## 2019-08-05 DIAGNOSIS — K59.00 CONSTIPATION, UNSPECIFIED CONSTIPATION TYPE: ICD-10-CM

## 2019-08-05 DIAGNOSIS — I50.30 DIASTOLIC CONGESTIVE HEART FAILURE, UNSPECIFIED HF CHRONICITY (H): ICD-10-CM

## 2019-08-08 ENCOUNTER — OFFICE VISIT - HEALTHEAST (OUTPATIENT)
Dept: GERIATRICS | Facility: CLINIC | Age: 84
End: 2019-08-08

## 2019-08-08 DIAGNOSIS — I50.30 DIASTOLIC CONGESTIVE HEART FAILURE, UNSPECIFIED HF CHRONICITY (H): ICD-10-CM

## 2019-08-08 DIAGNOSIS — I10 SYSTOLIC HYPERTENSION: ICD-10-CM

## 2019-08-08 DIAGNOSIS — K59.00 CONSTIPATION, UNSPECIFIED CONSTIPATION TYPE: ICD-10-CM

## 2019-08-08 DIAGNOSIS — S42.201A CLOSED FRACTURE OF PROXIMAL END OF RIGHT HUMERUS, UNSPECIFIED FRACTURE MORPHOLOGY, INITIAL ENCOUNTER: ICD-10-CM

## 2019-08-08 DIAGNOSIS — N32.81 OAB (OVERACTIVE BLADDER): ICD-10-CM

## 2019-08-08 DIAGNOSIS — F41.9 CHRONIC ANXIETY: ICD-10-CM

## 2019-08-08 DIAGNOSIS — L65.9 ALOPECIA: ICD-10-CM

## 2019-08-12 ENCOUNTER — OFFICE VISIT - HEALTHEAST (OUTPATIENT)
Dept: GERIATRICS | Facility: CLINIC | Age: 84
End: 2019-08-12

## 2019-08-12 ENCOUNTER — RECORDS - HEALTHEAST (OUTPATIENT)
Dept: LAB | Facility: CLINIC | Age: 84
End: 2019-08-12

## 2019-08-12 DIAGNOSIS — I50.30 DIASTOLIC CONGESTIVE HEART FAILURE, UNSPECIFIED HF CHRONICITY (H): ICD-10-CM

## 2019-08-12 DIAGNOSIS — K59.00 CONSTIPATION, UNSPECIFIED CONSTIPATION TYPE: ICD-10-CM

## 2019-08-12 DIAGNOSIS — K21.9 GASTROESOPHAGEAL REFLUX DISEASE WITHOUT ESOPHAGITIS: ICD-10-CM

## 2019-08-12 DIAGNOSIS — I10 SYSTOLIC HYPERTENSION: ICD-10-CM

## 2019-08-12 DIAGNOSIS — R53.81 PHYSICAL DECONDITIONING: ICD-10-CM

## 2019-08-12 DIAGNOSIS — M81.0 AGE-RELATED OSTEOPOROSIS WITHOUT CURRENT PATHOLOGICAL FRACTURE: ICD-10-CM

## 2019-08-12 DIAGNOSIS — F41.9 ANXIETY: ICD-10-CM

## 2019-08-12 DIAGNOSIS — S42.201A CLOSED FRACTURE OF PROXIMAL END OF RIGHT HUMERUS, UNSPECIFIED FRACTURE MORPHOLOGY, INITIAL ENCOUNTER: ICD-10-CM

## 2019-08-12 LAB
25(OH)D3 SERPL-MCNC: 49 NG/ML (ref 30–80)
ALBUMIN SERPL-MCNC: 3.1 G/DL (ref 3.5–5)
ALP SERPL-CCNC: 82 U/L (ref 45–120)
ALT SERPL W P-5'-P-CCNC: 11 U/L (ref 0–45)
ANION GAP SERPL CALCULATED.3IONS-SCNC: 5 MMOL/L (ref 5–18)
AST SERPL W P-5'-P-CCNC: 17 U/L (ref 0–40)
BASOPHILS # BLD AUTO: 0.1 THOU/UL (ref 0–0.2)
BASOPHILS NFR BLD AUTO: 1 % (ref 0–2)
BILIRUB SERPL-MCNC: 0.4 MG/DL (ref 0–1)
BUN SERPL-MCNC: 18 MG/DL (ref 8–28)
CALCIUM SERPL-MCNC: 9.5 MG/DL (ref 8.5–10.5)
CHLORIDE BLD-SCNC: 106 MMOL/L (ref 98–107)
CO2 SERPL-SCNC: 27 MMOL/L (ref 22–31)
CREAT SERPL-MCNC: 0.74 MG/DL (ref 0.6–1.1)
EOSINOPHIL # BLD AUTO: 0.2 THOU/UL (ref 0–0.4)
EOSINOPHIL NFR BLD AUTO: 3 % (ref 0–6)
ERYTHROCYTE [DISTWIDTH] IN BLOOD BY AUTOMATED COUNT: 13.9 % (ref 11–14.5)
FERRITIN SERPL-MCNC: 136 NG/ML (ref 10–130)
GFR SERPL CREATININE-BSD FRML MDRD: >60 ML/MIN/1.73M2
GLUCOSE BLD-MCNC: 79 MG/DL (ref 70–125)
HCT VFR BLD AUTO: 37 % (ref 35–47)
HGB BLD-MCNC: 12 G/DL (ref 12–16)
IRON SERPL-MCNC: 61 UG/DL (ref 42–175)
LYMPHOCYTES # BLD AUTO: 1.4 THOU/UL (ref 0.8–4.4)
LYMPHOCYTES NFR BLD AUTO: 27 % (ref 20–40)
MCH RBC QN AUTO: 32.3 PG (ref 27–34)
MCHC RBC AUTO-ENTMCNC: 32.4 G/DL (ref 32–36)
MCV RBC AUTO: 100 FL (ref 80–100)
MONOCYTES # BLD AUTO: 0.7 THOU/UL (ref 0–0.9)
MONOCYTES NFR BLD AUTO: 12 % (ref 2–10)
NEUTROPHILS # BLD AUTO: 3.1 THOU/UL (ref 2–7.7)
NEUTROPHILS NFR BLD AUTO: 57 % (ref 50–70)
PLATELET # BLD AUTO: 171 THOU/UL (ref 140–440)
PMV BLD AUTO: 11.4 FL (ref 8.5–12.5)
POTASSIUM BLD-SCNC: 4 MMOL/L (ref 3.5–5)
PROT SERPL-MCNC: 5.8 G/DL (ref 6–8)
RBC # BLD AUTO: 3.72 MILL/UL (ref 3.8–5.4)
SODIUM SERPL-SCNC: 138 MMOL/L (ref 136–145)
TSH SERPL DL<=0.005 MIU/L-ACNC: 2.4 UIU/ML (ref 0.3–5)
VIT B12 SERPL-MCNC: 320 PG/ML (ref 213–816)
WBC: 5.3 THOU/UL (ref 4–11)

## 2019-08-13 ENCOUNTER — OFFICE VISIT - HEALTHEAST (OUTPATIENT)
Dept: GERIATRICS | Facility: CLINIC | Age: 84
End: 2019-08-13

## 2019-08-13 DIAGNOSIS — I10 SYSTOLIC HYPERTENSION: ICD-10-CM

## 2019-08-13 DIAGNOSIS — F41.9 ANXIETY: ICD-10-CM

## 2019-08-13 DIAGNOSIS — R53.81 PHYSICAL DECONDITIONING: ICD-10-CM

## 2019-08-13 DIAGNOSIS — S42.201A CLOSED FRACTURE OF PROXIMAL END OF RIGHT HUMERUS, UNSPECIFIED FRACTURE MORPHOLOGY, INITIAL ENCOUNTER: ICD-10-CM

## 2019-08-13 DIAGNOSIS — F41.9 CHRONIC ANXIETY: ICD-10-CM

## 2019-08-13 DIAGNOSIS — I50.30 DIASTOLIC CONGESTIVE HEART FAILURE, UNSPECIFIED HF CHRONICITY (H): ICD-10-CM

## 2019-08-13 DIAGNOSIS — R21 MACULAR RASH: ICD-10-CM

## 2019-08-13 DIAGNOSIS — M81.0 AGE-RELATED OSTEOPOROSIS WITHOUT CURRENT PATHOLOGICAL FRACTURE: ICD-10-CM

## 2019-08-13 DIAGNOSIS — G47.00 INSOMNIA, UNSPECIFIED TYPE: ICD-10-CM

## 2019-08-15 ENCOUNTER — OFFICE VISIT - HEALTHEAST (OUTPATIENT)
Dept: GERIATRICS | Facility: CLINIC | Age: 84
End: 2019-08-15

## 2019-08-15 DIAGNOSIS — L29.9 PRURITUS: ICD-10-CM

## 2019-08-15 DIAGNOSIS — F41.9 CHRONIC ANXIETY: ICD-10-CM

## 2019-08-15 DIAGNOSIS — I10 SYSTOLIC HYPERTENSION: ICD-10-CM

## 2019-08-15 DIAGNOSIS — K59.00 CONSTIPATION, UNSPECIFIED CONSTIPATION TYPE: ICD-10-CM

## 2019-08-15 DIAGNOSIS — R21 MACULAR RASH: ICD-10-CM

## 2019-08-15 DIAGNOSIS — S42.201A CLOSED FRACTURE OF PROXIMAL END OF RIGHT HUMERUS, UNSPECIFIED FRACTURE MORPHOLOGY, INITIAL ENCOUNTER: ICD-10-CM

## 2019-08-15 DIAGNOSIS — I50.30 DIASTOLIC CONGESTIVE HEART FAILURE, UNSPECIFIED HF CHRONICITY (H): ICD-10-CM

## 2019-08-15 DIAGNOSIS — H10.11 ALLERGIC CONJUNCTIVITIS OF RIGHT EYE: ICD-10-CM

## 2019-08-19 ENCOUNTER — OFFICE VISIT - HEALTHEAST (OUTPATIENT)
Dept: GERIATRICS | Facility: CLINIC | Age: 84
End: 2019-08-19

## 2019-08-19 DIAGNOSIS — R19.5 LOOSE STOOLS: ICD-10-CM

## 2019-08-19 DIAGNOSIS — H10.11 ALLERGIC CONJUNCTIVITIS OF RIGHT EYE: ICD-10-CM

## 2019-08-19 DIAGNOSIS — R53.81 PHYSICAL DECONDITIONING: ICD-10-CM

## 2019-08-19 DIAGNOSIS — I50.30 DIASTOLIC CONGESTIVE HEART FAILURE, UNSPECIFIED HF CHRONICITY (H): ICD-10-CM

## 2019-08-19 DIAGNOSIS — S42.201A CLOSED FRACTURE OF PROXIMAL END OF RIGHT HUMERUS, UNSPECIFIED FRACTURE MORPHOLOGY, INITIAL ENCOUNTER: ICD-10-CM

## 2019-08-19 DIAGNOSIS — K21.9 GASTROESOPHAGEAL REFLUX DISEASE WITHOUT ESOPHAGITIS: ICD-10-CM

## 2019-08-19 DIAGNOSIS — I10 SYSTOLIC HYPERTENSION: ICD-10-CM

## 2019-08-22 ENCOUNTER — OFFICE VISIT - HEALTHEAST (OUTPATIENT)
Dept: GERIATRICS | Facility: CLINIC | Age: 84
End: 2019-08-22

## 2019-08-22 DIAGNOSIS — I50.30 DIASTOLIC CONGESTIVE HEART FAILURE, UNSPECIFIED HF CHRONICITY (H): ICD-10-CM

## 2019-08-22 DIAGNOSIS — L65.9 ALOPECIA: ICD-10-CM

## 2019-08-22 DIAGNOSIS — R53.81 PHYSICAL DECONDITIONING: ICD-10-CM

## 2019-08-22 DIAGNOSIS — F41.9 CHRONIC ANXIETY: ICD-10-CM

## 2019-08-22 DIAGNOSIS — I10 SYSTOLIC HYPERTENSION: ICD-10-CM

## 2019-08-22 DIAGNOSIS — K21.9 GASTROESOPHAGEAL REFLUX DISEASE WITHOUT ESOPHAGITIS: ICD-10-CM

## 2019-08-22 DIAGNOSIS — S42.201A CLOSED FRACTURE OF PROXIMAL END OF RIGHT HUMERUS, UNSPECIFIED FRACTURE MORPHOLOGY, INITIAL ENCOUNTER: ICD-10-CM

## 2019-08-22 DIAGNOSIS — F41.9 ANXIETY: ICD-10-CM

## 2019-08-22 DIAGNOSIS — G47.00 INSOMNIA, UNSPECIFIED TYPE: ICD-10-CM

## 2019-08-29 ENCOUNTER — OFFICE VISIT - HEALTHEAST (OUTPATIENT)
Dept: GERIATRICS | Facility: CLINIC | Age: 84
End: 2019-08-29

## 2019-08-29 DIAGNOSIS — R53.81 PHYSICAL DECONDITIONING: ICD-10-CM

## 2019-08-29 DIAGNOSIS — I50.30 DIASTOLIC CONGESTIVE HEART FAILURE, UNSPECIFIED HF CHRONICITY (H): ICD-10-CM

## 2019-08-29 DIAGNOSIS — I10 SYSTOLIC HYPERTENSION: ICD-10-CM

## 2019-08-29 DIAGNOSIS — K59.00 CONSTIPATION, UNSPECIFIED CONSTIPATION TYPE: ICD-10-CM

## 2019-08-29 DIAGNOSIS — F41.9 ANXIETY: ICD-10-CM

## 2019-08-29 DIAGNOSIS — S42.201A CLOSED FRACTURE OF PROXIMAL END OF RIGHT HUMERUS, UNSPECIFIED FRACTURE MORPHOLOGY, INITIAL ENCOUNTER: ICD-10-CM

## 2019-08-29 DIAGNOSIS — K21.9 GASTROESOPHAGEAL REFLUX DISEASE WITHOUT ESOPHAGITIS: ICD-10-CM

## 2019-08-30 ENCOUNTER — OFFICE VISIT - HEALTHEAST (OUTPATIENT)
Dept: GERIATRICS | Facility: CLINIC | Age: 84
End: 2019-08-30

## 2019-08-30 DIAGNOSIS — I10 SYSTOLIC HYPERTENSION: ICD-10-CM

## 2019-08-30 DIAGNOSIS — L65.9 ALOPECIA: ICD-10-CM

## 2019-08-30 DIAGNOSIS — F41.9 CHRONIC ANXIETY: ICD-10-CM

## 2019-08-30 DIAGNOSIS — R53.81 PHYSICAL DECONDITIONING: ICD-10-CM

## 2019-08-30 DIAGNOSIS — I50.30 DIASTOLIC CONGESTIVE HEART FAILURE, UNSPECIFIED HF CHRONICITY (H): ICD-10-CM

## 2019-08-30 DIAGNOSIS — S42.201A CLOSED FRACTURE OF PROXIMAL END OF RIGHT HUMERUS, UNSPECIFIED FRACTURE MORPHOLOGY, INITIAL ENCOUNTER: ICD-10-CM

## 2019-09-03 ENCOUNTER — COMMUNICATION - HEALTHEAST (OUTPATIENT)
Dept: INTERNAL MEDICINE | Facility: CLINIC | Age: 84
End: 2019-09-03

## 2019-09-03 ENCOUNTER — AMBULATORY - HEALTHEAST (OUTPATIENT)
Dept: GERIATRICS | Facility: CLINIC | Age: 84
End: 2019-09-03

## 2019-09-03 ENCOUNTER — COMMUNICATION - HEALTHEAST (OUTPATIENT)
Dept: GERIATRICS | Facility: CLINIC | Age: 84
End: 2019-09-03

## 2019-09-04 ENCOUNTER — RECORDS - HEALTHEAST (OUTPATIENT)
Dept: ADMINISTRATIVE | Facility: OTHER | Age: 84
End: 2019-09-04

## 2019-09-06 ENCOUNTER — OFFICE VISIT - HEALTHEAST (OUTPATIENT)
Dept: INTERNAL MEDICINE | Facility: CLINIC | Age: 84
End: 2019-09-06

## 2019-09-06 DIAGNOSIS — G47.00 INSOMNIA, UNSPECIFIED TYPE: ICD-10-CM

## 2019-09-06 DIAGNOSIS — I10 SYSTOLIC HYPERTENSION: ICD-10-CM

## 2019-09-06 DIAGNOSIS — S42.201A CLOSED FRACTURE OF PROXIMAL END OF RIGHT HUMERUS, UNSPECIFIED FRACTURE MORPHOLOGY, INITIAL ENCOUNTER: ICD-10-CM

## 2019-09-06 DIAGNOSIS — K59.01 SLOW TRANSIT CONSTIPATION: ICD-10-CM

## 2019-09-06 RX ORDER — LANOLIN ALCOHOL/MO/W.PET/CERES
3-6 CREAM (GRAM) TOPICAL
Status: SHIPPED | COMMUNITY
Start: 2019-09-06

## 2019-09-06 ASSESSMENT — MIFFLIN-ST. JEOR: SCORE: 764.63

## 2019-09-16 ENCOUNTER — RECORDS - HEALTHEAST (OUTPATIENT)
Dept: ADMINISTRATIVE | Facility: OTHER | Age: 84
End: 2019-09-16

## 2019-11-22 ENCOUNTER — HOME CARE/HOSPICE - HEALTHEAST (OUTPATIENT)
Dept: HOME HEALTH SERVICES | Facility: HOME HEALTH | Age: 84
End: 2019-11-22

## 2019-11-27 ENCOUNTER — OFFICE VISIT - HEALTHEAST (OUTPATIENT)
Dept: GERIATRICS | Facility: CLINIC | Age: 84
End: 2019-11-27

## 2019-11-27 DIAGNOSIS — I49.3 PVC'S (PREMATURE VENTRICULAR CONTRACTIONS): ICD-10-CM

## 2019-11-27 DIAGNOSIS — S92.115D CLOSED NONDISPLACED FRACTURE OF NECK OF LEFT TALUS WITH ROUTINE HEALING, SUBSEQUENT ENCOUNTER: ICD-10-CM

## 2019-11-27 DIAGNOSIS — I21.4 NSTEMI (NON-ST ELEVATED MYOCARDIAL INFARCTION) (H): ICD-10-CM

## 2019-11-27 DIAGNOSIS — K59.00 CONSTIPATION, UNSPECIFIED CONSTIPATION TYPE: ICD-10-CM

## 2019-11-29 ENCOUNTER — OFFICE VISIT - HEALTHEAST (OUTPATIENT)
Dept: GERIATRICS | Facility: CLINIC | Age: 84
End: 2019-11-29

## 2019-11-29 DIAGNOSIS — I47.29 NSVT (NONSUSTAINED VENTRICULAR TACHYCARDIA) (H): ICD-10-CM

## 2019-11-29 DIAGNOSIS — S92.115A CLOSED NONDISPLACED FRACTURE OF NECK OF LEFT TALUS, INITIAL ENCOUNTER: ICD-10-CM

## 2019-11-29 DIAGNOSIS — M81.0 AGE-RELATED OSTEOPOROSIS WITHOUT CURRENT PATHOLOGICAL FRACTURE: ICD-10-CM

## 2019-11-29 DIAGNOSIS — I10 SYSTOLIC HYPERTENSION: ICD-10-CM

## 2019-11-29 DIAGNOSIS — I21.4 NSTEMI (NON-ST ELEVATED MYOCARDIAL INFARCTION) (H): ICD-10-CM

## 2019-11-29 DIAGNOSIS — S82.892A ANKLE FRACTURE, LEFT, CLOSED, INITIAL ENCOUNTER: ICD-10-CM

## 2019-11-29 DIAGNOSIS — E78.5 HYPERLIPIDEMIA LDL GOAL <100: ICD-10-CM
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
## 2019-12-02 ENCOUNTER — OFFICE VISIT - HEALTHEAST (OUTPATIENT)
Dept: GERIATRICS | Facility: CLINIC | Age: 84
End: 2019-12-02

## 2019-12-02 ENCOUNTER — RECORDS - HEALTHEAST (OUTPATIENT)
Dept: LAB | Facility: CLINIC | Age: 84
End: 2019-12-02

## 2019-12-02 DIAGNOSIS — I10 BENIGN ESSENTIAL HYPERTENSION: ICD-10-CM

## 2019-12-02 DIAGNOSIS — S92.115D CLOSED NONDISPLACED FRACTURE OF NECK OF LEFT TALUS WITH ROUTINE HEALING, SUBSEQUENT ENCOUNTER: ICD-10-CM

## 2019-12-02 DIAGNOSIS — I49.3 PVC'S (PREMATURE VENTRICULAR CONTRACTIONS): ICD-10-CM

## 2019-12-02 DIAGNOSIS — I21.4 NSTEMI (NON-ST ELEVATED MYOCARDIAL INFARCTION) (H): ICD-10-CM

## 2019-12-02 LAB
CREAT SERPL-MCNC: 0.77 MG/DL (ref 0.6–1.1)
GFR SERPL CREATININE-BSD FRML MDRD: >60 ML/MIN/1.73M2
MAGNESIUM SERPL-MCNC: 1.8 MG/DL (ref 1.8–2.6)
POTASSIUM BLD-SCNC: 4.2 MMOL/L (ref 3.5–5)

## 2019-12-03 ENCOUNTER — RECORDS - HEALTHEAST (OUTPATIENT)
Dept: LAB | Facility: CLINIC | Age: 84
End: 2019-12-03

## 2019-12-03 ENCOUNTER — COMMUNICATION - HEALTHEAST (OUTPATIENT)
Dept: GERIATRICS | Facility: CLINIC | Age: 84
End: 2019-12-03

## 2019-12-03 LAB
ALBUMIN UR-MCNC: NEGATIVE MG/DL
AMORPH CRY #/AREA URNS HPF: ABNORMAL /[HPF]
APPEARANCE UR: CLEAR
BACTERIA #/AREA URNS HPF: ABNORMAL HPF
BILIRUB UR QL STRIP: NEGATIVE
COLOR UR AUTO: YELLOW
GLUCOSE UR STRIP-MCNC: NEGATIVE MG/DL
HGB UR QL STRIP: NEGATIVE
KETONES UR STRIP-MCNC: NEGATIVE MG/DL
LEUKOCYTE ESTERASE UR QL STRIP: ABNORMAL
MUCOUS THREADS #/AREA URNS LPF: ABNORMAL LPF
NITRATE UR QL: NEGATIVE
PH UR STRIP: 5.5 [PH] (ref 4.5–8)
RBC #/AREA URNS AUTO: ABNORMAL HPF
SP GR UR STRIP: 1.01 (ref 1–1.03)
SQUAMOUS #/AREA URNS AUTO: ABNORMAL LPF
TRANS CELLS #/AREA URNS HPF: ABNORMAL LPF
UROBILINOGEN UR STRIP-ACNC: ABNORMAL
WBC #/AREA URNS AUTO: ABNORMAL HPF

## 2019-12-04 ENCOUNTER — OFFICE VISIT - HEALTHEAST (OUTPATIENT)
Dept: GERIATRICS | Facility: CLINIC | Age: 84
End: 2019-12-04

## 2019-12-04 DIAGNOSIS — I10 BENIGN ESSENTIAL HYPERTENSION: ICD-10-CM

## 2019-12-04 DIAGNOSIS — R53.81 PHYSICAL DECONDITIONING: ICD-10-CM

## 2019-12-04 DIAGNOSIS — M81.0 AGE-RELATED OSTEOPOROSIS WITHOUT CURRENT PATHOLOGICAL FRACTURE: ICD-10-CM

## 2019-12-04 DIAGNOSIS — S92.115D CLOSED NONDISPLACED FRACTURE OF NECK OF LEFT TALUS WITH ROUTINE HEALING, SUBSEQUENT ENCOUNTER: ICD-10-CM

## 2019-12-06 ENCOUNTER — AMBULATORY - HEALTHEAST (OUTPATIENT)
Dept: GERIATRICS | Facility: CLINIC | Age: 84
End: 2019-12-06

## 2019-12-06 LAB — BACTERIA SPEC CULT: ABNORMAL

## 2019-12-09 ENCOUNTER — OFFICE VISIT - HEALTHEAST (OUTPATIENT)
Dept: PODIATRY | Facility: CLINIC | Age: 84
End: 2019-12-09

## 2019-12-09 DIAGNOSIS — S82.892D AVULSION FRACTURE OF ANKLE, LEFT, CLOSED, WITH ROUTINE HEALING, SUBSEQUENT ENCOUNTER: ICD-10-CM

## 2019-12-09 ASSESSMENT — MIFFLIN-ST. JEOR: SCORE: 805.45

## 2019-12-12 ENCOUNTER — COMMUNICATION - HEALTHEAST (OUTPATIENT)
Dept: GERIATRICS | Facility: CLINIC | Age: 84
End: 2019-12-12

## 2019-12-12 ENCOUNTER — OFFICE VISIT - HEALTHEAST (OUTPATIENT)
Dept: GERIATRICS | Facility: CLINIC | Age: 84
End: 2019-12-12

## 2019-12-12 DIAGNOSIS — R63.0 POOR APPETITE: ICD-10-CM

## 2019-12-12 DIAGNOSIS — M81.0 AGE-RELATED OSTEOPOROSIS WITHOUT CURRENT PATHOLOGICAL FRACTURE: ICD-10-CM

## 2019-12-12 DIAGNOSIS — S92.115D CLOSED NONDISPLACED FRACTURE OF NECK OF LEFT TALUS WITH ROUTINE HEALING, SUBSEQUENT ENCOUNTER: ICD-10-CM

## 2019-12-12 DIAGNOSIS — I10 BENIGN ESSENTIAL HYPERTENSION: ICD-10-CM

## 2019-12-16 ENCOUNTER — OFFICE VISIT - HEALTHEAST (OUTPATIENT)
Dept: GERIATRICS | Facility: CLINIC | Age: 84
End: 2019-12-16

## 2019-12-16 DIAGNOSIS — I10 BENIGN ESSENTIAL HYPERTENSION: ICD-10-CM

## 2019-12-16 DIAGNOSIS — S92.115D CLOSED NONDISPLACED FRACTURE OF NECK OF LEFT TALUS WITH ROUTINE HEALING, SUBSEQUENT ENCOUNTER: ICD-10-CM

## 2019-12-16 DIAGNOSIS — G47.01 INSOMNIA DUE TO MEDICAL CONDITION: ICD-10-CM

## 2019-12-16 DIAGNOSIS — R63.0 POOR APPETITE: ICD-10-CM

## 2019-12-16 DIAGNOSIS — R53.81 PHYSICAL DECONDITIONING: ICD-10-CM

## 2019-12-22 ENCOUNTER — RECORDS - HEALTHEAST (OUTPATIENT)
Dept: ADMINISTRATIVE | Facility: OTHER | Age: 84
End: 2019-12-22

## 2019-12-23 ENCOUNTER — COMMUNICATION - HEALTHEAST (OUTPATIENT)
Dept: INTERNAL MEDICINE | Facility: CLINIC | Age: 84
End: 2019-12-23

## 2019-12-23 ENCOUNTER — COMMUNICATION - HEALTHEAST (OUTPATIENT)
Dept: GERIATRICS | Facility: CLINIC | Age: 84
End: 2019-12-23

## 2019-12-23 ENCOUNTER — AMBULATORY - HEALTHEAST (OUTPATIENT)
Dept: GERIATRICS | Facility: CLINIC | Age: 84
End: 2019-12-23

## 2019-12-27 ENCOUNTER — OFFICE VISIT - HEALTHEAST (OUTPATIENT)
Dept: INTERNAL MEDICINE | Facility: CLINIC | Age: 84
End: 2019-12-27

## 2019-12-27 DIAGNOSIS — I10 BENIGN ESSENTIAL HYPERTENSION: ICD-10-CM

## 2019-12-27 DIAGNOSIS — D64.9 ANEMIA, UNSPECIFIED TYPE: ICD-10-CM

## 2019-12-27 DIAGNOSIS — I49.3 PVC'S (PREMATURE VENTRICULAR CONTRACTIONS): ICD-10-CM

## 2019-12-27 LAB
ALBUMIN SERPL-MCNC: 4 G/DL (ref 3.5–5)
ALP SERPL-CCNC: 107 U/L (ref 45–120)
ALT SERPL W P-5'-P-CCNC: 23 U/L (ref 0–45)
ANION GAP SERPL CALCULATED.3IONS-SCNC: 9 MMOL/L (ref 5–18)
AST SERPL W P-5'-P-CCNC: 28 U/L (ref 0–40)
BILIRUB SERPL-MCNC: 0.5 MG/DL (ref 0–1)
BUN SERPL-MCNC: 15 MG/DL (ref 8–28)
CALCIUM SERPL-MCNC: 9.6 MG/DL (ref 8.5–10.5)
CHLORIDE BLD-SCNC: 101 MMOL/L (ref 98–107)
CO2 SERPL-SCNC: 26 MMOL/L (ref 22–31)
CREAT SERPL-MCNC: 0.83 MG/DL (ref 0.6–1.1)
ERYTHROCYTE [DISTWIDTH] IN BLOOD BY AUTOMATED COUNT: 12.3 % (ref 11–14.5)
FERRITIN SERPL-MCNC: 77 NG/ML (ref 10–130)
GFR SERPL CREATININE-BSD FRML MDRD: >60 ML/MIN/1.73M2
GLUCOSE BLD-MCNC: 98 MG/DL (ref 70–125)
HCT VFR BLD AUTO: 36.7 % (ref 35–47)
HGB BLD-MCNC: 12.4 G/DL (ref 12–16)
MAGNESIUM SERPL-MCNC: 1.9 MG/DL (ref 1.8–2.6)
MCH RBC QN AUTO: 32.1 PG (ref 27–34)
MCHC RBC AUTO-ENTMCNC: 33.7 G/DL (ref 32–36)
MCV RBC AUTO: 95 FL (ref 80–100)
PLATELET # BLD AUTO: 172 THOU/UL (ref 140–440)
PMV BLD AUTO: 7.9 FL (ref 7–10)
POTASSIUM BLD-SCNC: 4.2 MMOL/L (ref 3.5–5)
PROT SERPL-MCNC: 7.1 G/DL (ref 6–8)
RBC # BLD AUTO: 3.85 MILL/UL (ref 3.8–5.4)
SODIUM SERPL-SCNC: 136 MMOL/L (ref 136–145)
VIT B12 SERPL-MCNC: 685 PG/ML (ref 213–816)
WBC: 6.6 THOU/UL (ref 4–11)

## 2019-12-27 ASSESSMENT — MIFFLIN-ST. JEOR: SCORE: 791.85

## 2020-01-06 ENCOUNTER — RECORDS - HEALTHEAST (OUTPATIENT)
Dept: ADMINISTRATIVE | Facility: OTHER | Age: 85
End: 2020-01-06

## 2020-01-07 ENCOUNTER — OFFICE VISIT - HEALTHEAST (OUTPATIENT)
Dept: CARDIOLOGY | Facility: CLINIC | Age: 85
End: 2020-01-07

## 2020-01-07 DIAGNOSIS — I10 BENIGN ESSENTIAL HYPERTENSION: ICD-10-CM

## 2020-01-07 DIAGNOSIS — I49.3 PVC'S (PREMATURE VENTRICULAR CONTRACTIONS): ICD-10-CM

## 2020-01-07 RX ORDER — CALCIUM CARBONATE 500(1250)
1 TABLET ORAL DAILY
Status: SHIPPED | COMMUNITY
Start: 2020-01-07 | End: 2021-07-21

## 2020-01-07 ASSESSMENT — MIFFLIN-ST. JEOR: SCORE: 791.85

## 2020-01-13 ENCOUNTER — COMMUNICATION - HEALTHEAST (OUTPATIENT)
Dept: CARDIOLOGY | Facility: CLINIC | Age: 85
End: 2020-01-13

## 2020-01-13 DIAGNOSIS — I21.4 NSTEMI (NON-ST ELEVATED MYOCARDIAL INFARCTION) (H): ICD-10-CM

## 2020-01-13 DIAGNOSIS — I49.3 PVC'S (PREMATURE VENTRICULAR CONTRACTIONS): ICD-10-CM

## 2020-01-30 ENCOUNTER — OFFICE VISIT - HEALTHEAST (OUTPATIENT)
Dept: INTERNAL MEDICINE | Facility: CLINIC | Age: 85
End: 2020-01-30

## 2020-01-30 DIAGNOSIS — I49.3 PVC'S (PREMATURE VENTRICULAR CONTRACTIONS): ICD-10-CM

## 2020-01-30 DIAGNOSIS — I10 BENIGN ESSENTIAL HYPERTENSION: ICD-10-CM

## 2020-01-30 ASSESSMENT — MIFFLIN-ST. JEOR: SCORE: 800.92

## 2020-02-27 ENCOUNTER — OFFICE VISIT - HEALTHEAST (OUTPATIENT)
Dept: FAMILY MEDICINE | Facility: CLINIC | Age: 85
End: 2020-02-27

## 2020-02-27 ENCOUNTER — COMMUNICATION - HEALTHEAST (OUTPATIENT)
Dept: FAMILY MEDICINE | Facility: CLINIC | Age: 85
End: 2020-02-27

## 2020-02-27 ENCOUNTER — COMMUNICATION - HEALTHEAST (OUTPATIENT)
Dept: SCHEDULING | Facility: CLINIC | Age: 85
End: 2020-02-27

## 2020-02-27 DIAGNOSIS — M54.9 ACUTE BILATERAL BACK PAIN, UNSPECIFIED BACK LOCATION: ICD-10-CM

## 2020-02-27 DIAGNOSIS — N30.01 ACUTE CYSTITIS WITH HEMATURIA: ICD-10-CM

## 2020-02-27 DIAGNOSIS — M81.0 SENILE OSTEOPOROSIS: ICD-10-CM

## 2020-02-27 LAB
ALBUMIN UR-MCNC: ABNORMAL MG/DL
APPEARANCE UR: CLEAR
BACTERIA #/AREA URNS HPF: ABNORMAL /[HPF]
BILIRUB UR QL STRIP: NEGATIVE
COLOR UR AUTO: YELLOW
GLUCOSE UR STRIP-MCNC: NEGATIVE MG/DL
HGB UR QL STRIP: ABNORMAL
KETONES UR STRIP-MCNC: NEGATIVE MG/DL
LEUKOCYTE ESTERASE UR QL STRIP: ABNORMAL
NITRATE UR QL: NEGATIVE
PH UR STRIP: 5.5 [PH] (ref 5–8)
RBC #/AREA URNS AUTO: ABNORMAL /[HPF]
SP GR UR STRIP: 1.02 (ref 1–1.03)
SQUAMOUS #/AREA URNS AUTO: ABNORMAL /[HPF]
UROBILINOGEN UR STRIP-ACNC: ABNORMAL
WBC #/AREA URNS AUTO: ABNORMAL /[HPF]

## 2020-02-27 ASSESSMENT — MIFFLIN-ST. JEOR: SCORE: 801.77

## 2020-03-02 ENCOUNTER — COMMUNICATION - HEALTHEAST (OUTPATIENT)
Dept: SCHEDULING | Facility: CLINIC | Age: 85
End: 2020-03-02

## 2020-03-02 ENCOUNTER — OFFICE VISIT - HEALTHEAST (OUTPATIENT)
Dept: FAMILY MEDICINE | Facility: CLINIC | Age: 85
End: 2020-03-02

## 2020-03-02 DIAGNOSIS — R10.84 ABDOMINAL PAIN, GENERALIZED: ICD-10-CM

## 2020-03-02 DIAGNOSIS — K59.01 SLOW TRANSIT CONSTIPATION: ICD-10-CM

## 2020-03-02 DIAGNOSIS — M54.50 ACUTE LEFT-SIDED LOW BACK PAIN WITHOUT SCIATICA: ICD-10-CM

## 2020-03-02 LAB
ALBUMIN UR-MCNC: NEGATIVE MG/DL
APPEARANCE UR: CLEAR
BACTERIA #/AREA URNS HPF: ABNORMAL HPF
BASOPHILS # BLD AUTO: 0.1 THOU/UL (ref 0–0.2)
BASOPHILS NFR BLD AUTO: 1 % (ref 0–2)
BILIRUB UR QL STRIP: NEGATIVE
COLOR UR AUTO: YELLOW
EOSINOPHIL # BLD AUTO: 0.2 THOU/UL (ref 0–0.4)
EOSINOPHIL NFR BLD AUTO: 2 % (ref 0–6)
ERYTHROCYTE [DISTWIDTH] IN BLOOD BY AUTOMATED COUNT: 11.8 % (ref 11–14.5)
GLUCOSE UR STRIP-MCNC: NEGATIVE MG/DL
HCT VFR BLD AUTO: 37.8 % (ref 35–47)
HGB BLD-MCNC: 12.8 G/DL (ref 12–16)
HGB UR QL STRIP: ABNORMAL
KETONES UR STRIP-MCNC: NEGATIVE MG/DL
LEUKOCYTE ESTERASE UR QL STRIP: NEGATIVE
LYMPHOCYTES # BLD AUTO: 1.5 THOU/UL (ref 0.8–4.4)
LYMPHOCYTES NFR BLD AUTO: 17 % (ref 20–40)
MCH RBC QN AUTO: 32.4 PG (ref 27–34)
MCHC RBC AUTO-ENTMCNC: 33.9 G/DL (ref 32–36)
MCV RBC AUTO: 96 FL (ref 80–100)
MONOCYTES # BLD AUTO: 0.8 THOU/UL (ref 0–0.9)
MONOCYTES NFR BLD AUTO: 9 % (ref 2–10)
NEUTROPHILS # BLD AUTO: 6.2 THOU/UL (ref 2–7.7)
NEUTROPHILS NFR BLD AUTO: 71 % (ref 50–70)
NITRATE UR QL: NEGATIVE
PH UR STRIP: 6 [PH] (ref 5–8)
PLATELET # BLD AUTO: 221 THOU/UL (ref 140–440)
PMV BLD AUTO: 8.1 FL (ref 7–10)
RBC # BLD AUTO: 3.95 MILL/UL (ref 3.8–5.4)
RBC #/AREA URNS AUTO: ABNORMAL HPF
SP GR UR STRIP: 1.02 (ref 1–1.03)
SQUAMOUS #/AREA URNS AUTO: ABNORMAL LPF
UROBILINOGEN UR STRIP-ACNC: ABNORMAL
WBC #/AREA URNS AUTO: ABNORMAL HPF
WBC: 8.7 THOU/UL (ref 4–11)

## 2020-03-05 ENCOUNTER — COMMUNICATION - HEALTHEAST (OUTPATIENT)
Dept: INTERNAL MEDICINE | Facility: CLINIC | Age: 85
End: 2020-03-05

## 2020-03-05 ENCOUNTER — OFFICE VISIT - HEALTHEAST (OUTPATIENT)
Dept: INTERNAL MEDICINE | Facility: CLINIC | Age: 85
End: 2020-03-05

## 2020-03-05 DIAGNOSIS — K59.01 SLOW TRANSIT CONSTIPATION: ICD-10-CM

## 2020-03-05 DIAGNOSIS — I10 BENIGN ESSENTIAL HYPERTENSION: ICD-10-CM

## 2020-03-05 DIAGNOSIS — R60.9 EDEMA, UNSPECIFIED TYPE: ICD-10-CM

## 2020-03-05 DIAGNOSIS — S22.000S COMPRESSION FRACTURE OF THORACIC VERTEBRA, UNSPECIFIED THORACIC VERTEBRAL LEVEL, SEQUELA: ICD-10-CM

## 2020-03-05 ASSESSMENT — MIFFLIN-ST. JEOR: SCORE: 828.13

## 2020-03-06 ENCOUNTER — AMBULATORY - HEALTHEAST (OUTPATIENT)
Dept: INTERNAL MEDICINE | Facility: CLINIC | Age: 85
End: 2020-03-06

## 2020-03-06 DIAGNOSIS — K59.01 SLOW TRANSIT CONSTIPATION: ICD-10-CM

## 2020-03-06 DIAGNOSIS — R60.9 EDEMA, UNSPECIFIED TYPE: ICD-10-CM

## 2020-03-06 DIAGNOSIS — I50.32 CHRONIC DIASTOLIC CONGESTIVE HEART FAILURE (H): ICD-10-CM

## 2020-03-06 DIAGNOSIS — S22.000S COMPRESSION FRACTURE OF THORACIC VERTEBRA, UNSPECIFIED THORACIC VERTEBRAL LEVEL, SEQUELA: ICD-10-CM

## 2020-03-09 ENCOUNTER — COMMUNICATION - HEALTHEAST (OUTPATIENT)
Dept: INTERNAL MEDICINE | Facility: CLINIC | Age: 85
End: 2020-03-09

## 2020-03-13 ENCOUNTER — COMMUNICATION - HEALTHEAST (OUTPATIENT)
Dept: INTERNAL MEDICINE | Facility: CLINIC | Age: 85
End: 2020-03-13

## 2020-03-13 ENCOUNTER — OFFICE VISIT - HEALTHEAST (OUTPATIENT)
Dept: INTERNAL MEDICINE | Facility: CLINIC | Age: 85
End: 2020-03-13

## 2020-03-13 DIAGNOSIS — R14.0 ABDOMINAL BLOATING: ICD-10-CM

## 2020-03-13 DIAGNOSIS — I50.21 ACUTE SYSTOLIC HEART FAILURE (H): ICD-10-CM

## 2020-03-13 DIAGNOSIS — R60.9 EDEMA, UNSPECIFIED TYPE: ICD-10-CM

## 2020-03-13 LAB
ALBUMIN SERPL-MCNC: 3.6 G/DL (ref 3.5–5)
ALBUMIN UR-MCNC: NEGATIVE MG/DL
ALP SERPL-CCNC: 170 U/L (ref 45–120)
ALT SERPL W P-5'-P-CCNC: 33 U/L (ref 0–45)
ANION GAP SERPL CALCULATED.3IONS-SCNC: 7 MMOL/L (ref 5–18)
APPEARANCE UR: CLEAR
AST SERPL W P-5'-P-CCNC: 39 U/L (ref 0–40)
BACTERIA #/AREA URNS HPF: ABNORMAL HPF
BILIRUB SERPL-MCNC: 0.3 MG/DL (ref 0–1)
BILIRUB UR QL STRIP: NEGATIVE
BUN SERPL-MCNC: 23 MG/DL (ref 8–28)
CALCIUM SERPL-MCNC: 9.1 MG/DL (ref 8.5–10.5)
CHLORIDE BLD-SCNC: 98 MMOL/L (ref 98–107)
CO2 SERPL-SCNC: 28 MMOL/L (ref 22–31)
COLOR UR AUTO: YELLOW
CREAT SERPL-MCNC: 0.98 MG/DL (ref 0.6–1.1)
ERYTHROCYTE [DISTWIDTH] IN BLOOD BY AUTOMATED COUNT: 13.1 % (ref 11–14.5)
GFR SERPL CREATININE-BSD FRML MDRD: 53 ML/MIN/1.73M2
GLUCOSE BLD-MCNC: 104 MG/DL (ref 70–125)
GLUCOSE UR STRIP-MCNC: NEGATIVE MG/DL
HCT VFR BLD AUTO: 35.7 % (ref 35–47)
HGB BLD-MCNC: 11.9 G/DL (ref 12–16)
HGB UR QL STRIP: ABNORMAL
HYALINE CASTS #/AREA URNS LPF: ABNORMAL LPF
KETONES UR STRIP-MCNC: NEGATIVE MG/DL
LEUKOCYTE ESTERASE UR QL STRIP: NEGATIVE
MCH RBC QN AUTO: 31.8 PG (ref 27–34)
MCHC RBC AUTO-ENTMCNC: 33.3 G/DL (ref 32–36)
MCV RBC AUTO: 95 FL (ref 80–100)
NITRATE UR QL: NEGATIVE
PH UR STRIP: 5.5 [PH] (ref 5–8)
PLATELET # BLD AUTO: 159 THOU/UL (ref 140–440)
PMV BLD AUTO: 8.8 FL (ref 7–10)
POTASSIUM BLD-SCNC: 4.2 MMOL/L (ref 3.5–5)
PROT SERPL-MCNC: 7 G/DL (ref 6–8)
RBC # BLD AUTO: 3.74 MILL/UL (ref 3.8–5.4)
RBC #/AREA URNS AUTO: ABNORMAL HPF
SODIUM SERPL-SCNC: 133 MMOL/L (ref 136–145)
SP GR UR STRIP: 1.01 (ref 1–1.03)
SQUAMOUS #/AREA URNS AUTO: ABNORMAL LPF
UROBILINOGEN UR STRIP-ACNC: ABNORMAL
WBC #/AREA URNS AUTO: ABNORMAL HPF
WBC: 6.1 THOU/UL (ref 4–11)

## 2020-03-13 ASSESSMENT — MIFFLIN-ST. JEOR: SCORE: 832.67

## 2020-03-16 LAB — BNP SERPL-MCNC: 372 PG/ML (ref 0–167)

## 2020-03-17 ENCOUNTER — RECORDS - HEALTHEAST (OUTPATIENT)
Dept: ADMINISTRATIVE | Facility: OTHER | Age: 85
End: 2020-03-17

## 2020-03-18 ENCOUNTER — COMMUNICATION - HEALTHEAST (OUTPATIENT)
Dept: INTERNAL MEDICINE | Facility: CLINIC | Age: 85
End: 2020-03-18

## 2020-03-19 ENCOUNTER — HOSPITAL ENCOUNTER (OUTPATIENT)
Dept: CT IMAGING | Facility: CLINIC | Age: 85
Discharge: HOME | End: 2020-03-19

## 2020-03-19 DIAGNOSIS — R60.9 EDEMA, UNSPECIFIED TYPE: ICD-10-CM

## 2020-03-19 DIAGNOSIS — R14.0 ABDOMINAL BLOATING: ICD-10-CM

## 2020-03-20 ENCOUNTER — RECORDS - HEALTHEAST (OUTPATIENT)
Dept: ADMINISTRATIVE | Facility: OTHER | Age: 85
End: 2020-03-20

## 2020-03-23 ENCOUNTER — RECORDS - HEALTHEAST (OUTPATIENT)
Dept: ADMINISTRATIVE | Facility: OTHER | Age: 85
End: 2020-03-23

## 2020-03-26 ENCOUNTER — OFFICE VISIT - HEALTHEAST (OUTPATIENT)
Dept: INTERNAL MEDICINE | Facility: CLINIC | Age: 85
End: 2020-03-26

## 2020-03-26 DIAGNOSIS — R60.9 EDEMA, UNSPECIFIED TYPE: ICD-10-CM

## 2020-04-01 ENCOUNTER — AMBULATORY - HEALTHEAST (OUTPATIENT)
Dept: INTERNAL MEDICINE | Facility: CLINIC | Age: 85
End: 2020-04-01

## 2020-04-01 ENCOUNTER — COMMUNICATION - HEALTHEAST (OUTPATIENT)
Dept: INTERNAL MEDICINE | Facility: CLINIC | Age: 85
End: 2020-04-01

## 2020-04-02 ENCOUNTER — COMMUNICATION - HEALTHEAST (OUTPATIENT)
Dept: INTERNAL MEDICINE | Facility: CLINIC | Age: 85
End: 2020-04-02

## 2020-04-02 ENCOUNTER — COMMUNICATION - HEALTHEAST (OUTPATIENT)
Dept: SCHEDULING | Facility: CLINIC | Age: 85
End: 2020-04-02

## 2020-04-03 ENCOUNTER — AMBULATORY - HEALTHEAST (OUTPATIENT)
Dept: INTERNAL MEDICINE | Facility: CLINIC | Age: 85
End: 2020-04-03

## 2020-04-03 DIAGNOSIS — M48.50XA VERTEBRAL COMPRESSION FRACTURE (H): ICD-10-CM

## 2020-04-03 DIAGNOSIS — I10 BENIGN ESSENTIAL HYPERTENSION: ICD-10-CM

## 2020-04-03 DIAGNOSIS — M81.0 OSTEOPOROSIS: ICD-10-CM

## 2020-04-04 ENCOUNTER — RECORDS - HEALTHEAST (OUTPATIENT)
Dept: ADMINISTRATIVE | Facility: OTHER | Age: 85
End: 2020-04-04

## 2020-04-09 ENCOUNTER — AMBULATORY - HEALTHEAST (OUTPATIENT)
Dept: OTHER | Facility: CLINIC | Age: 85
End: 2020-04-09

## 2020-04-09 ENCOUNTER — AMBULATORY - HEALTHEAST (OUTPATIENT)
Dept: INTERNAL MEDICINE | Facility: CLINIC | Age: 85
End: 2020-04-09

## 2020-04-09 ENCOUNTER — RECORDS - HEALTHEAST (OUTPATIENT)
Dept: ADMINISTRATIVE | Facility: OTHER | Age: 85
End: 2020-04-09

## 2020-04-09 DIAGNOSIS — R60.0 LOWER EXTREMITY EDEMA: ICD-10-CM

## 2020-04-09 DIAGNOSIS — R60.9 EDEMA, UNSPECIFIED TYPE: ICD-10-CM

## 2020-04-13 ENCOUNTER — RECORDS - HEALTHEAST (OUTPATIENT)
Dept: ADMINISTRATIVE | Facility: OTHER | Age: 85
End: 2020-04-13

## 2020-04-16 ENCOUNTER — RECORDS - HEALTHEAST (OUTPATIENT)
Dept: ADMINISTRATIVE | Facility: OTHER | Age: 85
End: 2020-04-16

## 2020-04-16 ENCOUNTER — COMMUNICATION - HEALTHEAST (OUTPATIENT)
Dept: INTERNAL MEDICINE | Facility: CLINIC | Age: 85
End: 2020-04-16

## 2020-04-17 ENCOUNTER — RECORDS - HEALTHEAST (OUTPATIENT)
Dept: ADMINISTRATIVE | Facility: OTHER | Age: 85
End: 2020-04-17

## 2020-04-23 ENCOUNTER — OFFICE VISIT - HEALTHEAST (OUTPATIENT)
Dept: INTERNAL MEDICINE | Facility: CLINIC | Age: 85
End: 2020-04-23

## 2020-04-23 DIAGNOSIS — I10 BENIGN ESSENTIAL HYPERTENSION: ICD-10-CM

## 2020-04-23 DIAGNOSIS — R60.0 LEG EDEMA: ICD-10-CM

## 2020-04-29 ENCOUNTER — COMMUNICATION - HEALTHEAST (OUTPATIENT)
Dept: INTERNAL MEDICINE | Facility: CLINIC | Age: 85
End: 2020-04-29

## 2020-05-05 ENCOUNTER — RECORDS - HEALTHEAST (OUTPATIENT)
Dept: ADMINISTRATIVE | Facility: OTHER | Age: 85
End: 2020-05-05

## 2020-05-07 ENCOUNTER — AMBULATORY - HEALTHEAST (OUTPATIENT)
Dept: CARE COORDINATION | Facility: CLINIC | Age: 85
End: 2020-05-07

## 2020-05-07 DIAGNOSIS — I10 BENIGN ESSENTIAL HYPERTENSION: ICD-10-CM

## 2020-05-08 ENCOUNTER — COMMUNICATION - HEALTHEAST (OUTPATIENT)
Dept: NURSING | Facility: CLINIC | Age: 85
End: 2020-05-08

## 2020-05-08 ENCOUNTER — COMMUNICATION - HEALTHEAST (OUTPATIENT)
Dept: INTERNAL MEDICINE | Facility: CLINIC | Age: 85
End: 2020-05-08

## 2020-05-13 ENCOUNTER — COMMUNICATION - HEALTHEAST (OUTPATIENT)
Dept: NURSING | Facility: CLINIC | Age: 85
End: 2020-05-13

## 2020-05-14 ENCOUNTER — COMMUNICATION - HEALTHEAST (OUTPATIENT)
Dept: CARE COORDINATION | Facility: CLINIC | Age: 85
End: 2020-05-14

## 2020-05-15 ENCOUNTER — COMMUNICATION - HEALTHEAST (OUTPATIENT)
Dept: CARE COORDINATION | Facility: CLINIC | Age: 85
End: 2020-05-15

## 2020-05-18 ENCOUNTER — COMMUNICATION - HEALTHEAST (OUTPATIENT)
Dept: NURSING | Facility: CLINIC | Age: 85
End: 2020-05-18

## 2020-05-26 ENCOUNTER — COMMUNICATION - HEALTHEAST (OUTPATIENT)
Dept: NURSING | Facility: CLINIC | Age: 85
End: 2020-05-26

## 2020-06-03 ENCOUNTER — AMBULATORY - HEALTHEAST (OUTPATIENT)
Dept: INTERNAL MEDICINE | Facility: CLINIC | Age: 85
End: 2020-06-03

## 2020-06-03 ENCOUNTER — RECORDS - HEALTHEAST (OUTPATIENT)
Dept: ADMINISTRATIVE | Facility: OTHER | Age: 85
End: 2020-06-03

## 2020-06-03 DIAGNOSIS — I50.30 DIASTOLIC CONGESTIVE HEART FAILURE, UNSPECIFIED HF CHRONICITY (H): ICD-10-CM

## 2020-06-03 DIAGNOSIS — R60.0 LEG EDEMA: ICD-10-CM

## 2020-06-03 DIAGNOSIS — M48.50XA VERTEBRAL COMPRESSION FRACTURE (H): ICD-10-CM

## 2020-06-03 DIAGNOSIS — I50.21 ACUTE SYSTOLIC HEART FAILURE (H): ICD-10-CM

## 2020-06-12 ENCOUNTER — COMMUNICATION - HEALTHEAST (OUTPATIENT)
Dept: NURSING | Facility: CLINIC | Age: 85
End: 2020-06-12

## 2020-06-30 ENCOUNTER — COMMUNICATION - HEALTHEAST (OUTPATIENT)
Dept: CARDIOLOGY | Facility: CLINIC | Age: 85
End: 2020-06-30

## 2020-06-30 DIAGNOSIS — I49.3 PVC'S (PREMATURE VENTRICULAR CONTRACTIONS): ICD-10-CM

## 2020-07-01 ENCOUNTER — AMBULATORY - HEALTHEAST (OUTPATIENT)
Dept: CARDIOLOGY | Facility: CLINIC | Age: 85
End: 2020-07-01

## 2020-07-01 ENCOUNTER — COMMUNICATION - HEALTHEAST (OUTPATIENT)
Dept: INTERNAL MEDICINE | Facility: CLINIC | Age: 85
End: 2020-07-01

## 2020-07-01 DIAGNOSIS — Z79.899 LONG TERM USE OF DRUG: ICD-10-CM

## 2020-07-02 ENCOUNTER — COMMUNICATION - HEALTHEAST (OUTPATIENT)
Dept: INTERNAL MEDICINE | Facility: CLINIC | Age: 85
End: 2020-07-02

## 2020-07-13 ENCOUNTER — COMMUNICATION - HEALTHEAST (OUTPATIENT)
Dept: NURSING | Facility: CLINIC | Age: 85
End: 2020-07-13

## 2020-07-13 ENCOUNTER — OFFICE VISIT - HEALTHEAST (OUTPATIENT)
Dept: INTERNAL MEDICINE | Facility: CLINIC | Age: 85
End: 2020-07-13

## 2020-07-13 DIAGNOSIS — Z00.00 WELLNESS EXAMINATION: ICD-10-CM

## 2020-07-13 DIAGNOSIS — I10 SYSTOLIC HYPERTENSION: ICD-10-CM

## 2020-07-13 DIAGNOSIS — Z79.899 LONG TERM USE OF DRUG: ICD-10-CM

## 2020-07-13 DIAGNOSIS — I49.3 PVC'S (PREMATURE VENTRICULAR CONTRACTIONS): ICD-10-CM

## 2020-07-13 LAB
ALBUMIN SERPL-MCNC: 3.9 G/DL (ref 3.5–5)
ALP SERPL-CCNC: 115 U/L (ref 45–120)
ALT SERPL W P-5'-P-CCNC: 20 U/L (ref 0–45)
ANION GAP SERPL CALCULATED.3IONS-SCNC: 7 MMOL/L (ref 5–18)
AST SERPL W P-5'-P-CCNC: 27 U/L (ref 0–40)
BILIRUB SERPL-MCNC: 0.5 MG/DL (ref 0–1)
BUN SERPL-MCNC: 23 MG/DL (ref 8–28)
CALCIUM SERPL-MCNC: 9.4 MG/DL (ref 8.5–10.5)
CHLORIDE BLD-SCNC: 99 MMOL/L (ref 98–107)
CO2 SERPL-SCNC: 31 MMOL/L (ref 22–31)
CREAT SERPL-MCNC: 0.92 MG/DL (ref 0.6–1.1)
ERYTHROCYTE [DISTWIDTH] IN BLOOD BY AUTOMATED COUNT: 11.5 % (ref 11–14.5)
GFR SERPL CREATININE-BSD FRML MDRD: 57 ML/MIN/1.73M2
GLUCOSE BLD-MCNC: 84 MG/DL (ref 70–125)
HCT VFR BLD AUTO: 36.2 % (ref 35–47)
HGB BLD-MCNC: 12.3 G/DL (ref 12–16)
MAGNESIUM SERPL-MCNC: 1.9 MG/DL (ref 1.8–2.6)
MCH RBC QN AUTO: 33.6 PG (ref 27–34)
MCHC RBC AUTO-ENTMCNC: 34.1 G/DL (ref 32–36)
MCV RBC AUTO: 99 FL (ref 80–100)
PLATELET # BLD AUTO: 172 THOU/UL (ref 140–440)
PMV BLD AUTO: 7.6 FL (ref 7–10)
POTASSIUM BLD-SCNC: 4.4 MMOL/L (ref 3.5–5)
PROT SERPL-MCNC: 6.9 G/DL (ref 6–8)
RBC # BLD AUTO: 3.67 MILL/UL (ref 3.8–5.4)
SODIUM SERPL-SCNC: 137 MMOL/L (ref 136–145)
TSH SERPL DL<=0.005 MIU/L-ACNC: 10.41 UIU/ML (ref 0.3–5)
WBC: 5.4 THOU/UL (ref 4–11)

## 2020-07-13 ASSESSMENT — MIFFLIN-ST. JEOR: SCORE: 814.53

## 2020-07-14 ENCOUNTER — AMBULATORY - HEALTHEAST (OUTPATIENT)
Dept: CARDIOLOGY | Facility: CLINIC | Age: 85
End: 2020-07-14

## 2020-07-15 ENCOUNTER — COMMUNICATION - HEALTHEAST (OUTPATIENT)
Dept: CARE COORDINATION | Facility: CLINIC | Age: 85
End: 2020-07-15

## 2020-07-15 ENCOUNTER — COMMUNICATION - HEALTHEAST (OUTPATIENT)
Dept: INTERNAL MEDICINE | Facility: CLINIC | Age: 85
End: 2020-07-15

## 2020-07-15 ENCOUNTER — COMMUNICATION - HEALTHEAST (OUTPATIENT)
Dept: CARDIOLOGY | Facility: CLINIC | Age: 85
End: 2020-07-15

## 2020-07-15 DIAGNOSIS — R79.89 ABNORMAL TSH: ICD-10-CM

## 2020-07-15 DIAGNOSIS — E78.5 HYPERLIPIDEMIA LDL GOAL <100: ICD-10-CM

## 2020-07-16 LAB
AMIODARONE SERPL-MCNC: 0.6 UG/ML (ref 0.5–2)
DESETHYLAMIODARONE SERPL-MCNC: 0.6 UG/ML

## 2020-07-18 ENCOUNTER — COMMUNICATION - HEALTHEAST (OUTPATIENT)
Dept: CARDIOLOGY | Facility: CLINIC | Age: 85
End: 2020-07-18

## 2020-07-18 DIAGNOSIS — I21.4 NSTEMI (NON-ST ELEVATED MYOCARDIAL INFARCTION) (H): ICD-10-CM

## 2020-07-20 ENCOUNTER — RECORDS - HEALTHEAST (OUTPATIENT)
Dept: ADMINISTRATIVE | Facility: OTHER | Age: 85
End: 2020-07-20

## 2020-07-20 ENCOUNTER — COMMUNICATION - HEALTHEAST (OUTPATIENT)
Dept: INTERNAL MEDICINE | Facility: CLINIC | Age: 85
End: 2020-07-20

## 2020-07-22 ENCOUNTER — OFFICE VISIT - HEALTHEAST (OUTPATIENT)
Dept: CARDIOLOGY | Facility: CLINIC | Age: 85
End: 2020-07-22

## 2020-07-22 DIAGNOSIS — I89.0 LYMPHEDEMA: ICD-10-CM

## 2020-07-22 DIAGNOSIS — E03.9 ACQUIRED HYPOTHYROIDISM: ICD-10-CM

## 2020-07-22 DIAGNOSIS — I49.3 PVC'S (PREMATURE VENTRICULAR CONTRACTIONS): ICD-10-CM

## 2020-07-22 DIAGNOSIS — R79.89 ELEVATED TSH: ICD-10-CM

## 2020-07-23 ENCOUNTER — AMBULATORY - HEALTHEAST (OUTPATIENT)
Dept: LAB | Facility: CLINIC | Age: 85
End: 2020-07-23

## 2020-07-23 DIAGNOSIS — R79.89 ABNORMAL TSH: ICD-10-CM

## 2020-07-23 DIAGNOSIS — E78.5 HYPERLIPIDEMIA LDL GOAL <100: ICD-10-CM

## 2020-07-23 LAB
CHOLEST SERPL-MCNC: 153 MG/DL
FASTING STATUS PATIENT QL REPORTED: NO
HDLC SERPL-MCNC: 60 MG/DL
LDLC SERPL CALC-MCNC: 82 MG/DL
T4 FREE SERPL-MCNC: 0.6 NG/DL (ref 0.7–1.8)
TRIGL SERPL-MCNC: 53 MG/DL
TSH SERPL DL<=0.005 MIU/L-ACNC: 14.13 UIU/ML (ref 0.3–5)

## 2020-08-04 ENCOUNTER — COMMUNICATION - HEALTHEAST (OUTPATIENT)
Dept: INTERNAL MEDICINE | Facility: CLINIC | Age: 85
End: 2020-08-04

## 2020-08-11 ENCOUNTER — COMMUNICATION - HEALTHEAST (OUTPATIENT)
Dept: INTERNAL MEDICINE | Facility: CLINIC | Age: 85
End: 2020-08-11

## 2020-08-11 DIAGNOSIS — B35.1 ONYCHOMYCOSIS: ICD-10-CM

## 2020-08-20 ENCOUNTER — COMMUNICATION - HEALTHEAST (OUTPATIENT)
Dept: NURSING | Facility: CLINIC | Age: 85
End: 2020-08-20

## 2020-10-05 ENCOUNTER — COMMUNICATION - HEALTHEAST (OUTPATIENT)
Dept: NURSING | Facility: CLINIC | Age: 85
End: 2020-10-05

## 2020-10-08 ENCOUNTER — COMMUNICATION - HEALTHEAST (OUTPATIENT)
Dept: CARDIOLOGY | Facility: CLINIC | Age: 85
End: 2020-10-08

## 2020-10-08 DIAGNOSIS — I21.4 NSTEMI (NON-ST ELEVATED MYOCARDIAL INFARCTION) (H): ICD-10-CM

## 2020-10-12 ENCOUNTER — HOSPITAL ENCOUNTER (OUTPATIENT)
Dept: CARDIOLOGY | Facility: HOSPITAL | Age: 85
Discharge: HOME | End: 2020-10-12
Attending: INTERNAL MEDICINE

## 2020-10-12 DIAGNOSIS — I49.3 PVC'S (PREMATURE VENTRICULAR CONTRACTIONS): ICD-10-CM

## 2020-10-15 ENCOUNTER — RECORDS - HEALTHEAST (OUTPATIENT)
Dept: ADMINISTRATIVE | Facility: OTHER | Age: 85
End: 2020-10-15

## 2020-10-15 ENCOUNTER — OFFICE VISIT - HEALTHEAST (OUTPATIENT)
Dept: INTERNAL MEDICINE | Facility: CLINIC | Age: 85
End: 2020-10-15

## 2020-10-15 DIAGNOSIS — F41.9 ANXIETY: ICD-10-CM

## 2020-10-15 DIAGNOSIS — I10 BENIGN ESSENTIAL HYPERTENSION: ICD-10-CM

## 2020-10-15 LAB
T4 FREE SERPL-MCNC: 0.6 NG/DL (ref 0.7–1.8)
TSH SERPL DL<=0.005 MIU/L-ACNC: 10.34 UIU/ML (ref 0.3–5)

## 2020-10-15 ASSESSMENT — MIFFLIN-ST. JEOR: SCORE: 805.45

## 2020-10-16 ENCOUNTER — COMMUNICATION - HEALTHEAST (OUTPATIENT)
Dept: INTERNAL MEDICINE | Facility: CLINIC | Age: 85
End: 2020-10-16

## 2020-10-16 ENCOUNTER — COMMUNICATION - HEALTHEAST (OUTPATIENT)
Dept: NURSING | Facility: CLINIC | Age: 85
End: 2020-10-16

## 2020-10-16 DIAGNOSIS — E03.9 HYPOTHYROIDISM, UNSPECIFIED TYPE: ICD-10-CM

## 2020-10-16 RX ORDER — LEVOTHYROXINE SODIUM 50 UG/1
50 TABLET ORAL DAILY
Qty: 90 TABLET | Refills: 3 | Status: SHIPPED | OUTPATIENT
Start: 2020-10-16 | End: 2021-01-01

## 2020-10-21 ENCOUNTER — COMMUNICATION - HEALTHEAST (OUTPATIENT)
Dept: CARDIOLOGY | Facility: CLINIC | Age: 85
End: 2020-10-21

## 2020-10-22 ENCOUNTER — OFFICE VISIT - HEALTHEAST (OUTPATIENT)
Dept: CARDIOLOGY | Facility: CLINIC | Age: 85
End: 2020-10-22

## 2020-10-22 DIAGNOSIS — F41.9 ANXIETY: ICD-10-CM

## 2020-10-22 DIAGNOSIS — E03.9 ACQUIRED HYPOTHYROIDISM: ICD-10-CM

## 2020-10-22 DIAGNOSIS — I49.3 PVC'S (PREMATURE VENTRICULAR CONTRACTIONS): ICD-10-CM

## 2020-10-22 ASSESSMENT — MIFFLIN-ST. JEOR: SCORE: 814.53

## 2020-10-27 ENCOUNTER — RECORDS - HEALTHEAST (OUTPATIENT)
Dept: ADMINISTRATIVE | Facility: OTHER | Age: 85
End: 2020-10-27

## 2020-10-27 ENCOUNTER — AMBULATORY - HEALTHEAST (OUTPATIENT)
Dept: CARDIOLOGY | Facility: CLINIC | Age: 85
End: 2020-10-27

## 2020-11-04 ENCOUNTER — COMMUNICATION - HEALTHEAST (OUTPATIENT)
Dept: SCHEDULING | Facility: CLINIC | Age: 85
End: 2020-11-04

## 2020-11-16 ENCOUNTER — OFFICE VISIT - HEALTHEAST (OUTPATIENT)
Dept: INTERNAL MEDICINE | Facility: CLINIC | Age: 85
End: 2020-11-16

## 2020-11-16 DIAGNOSIS — E03.9 ACQUIRED HYPOTHYROIDISM: ICD-10-CM

## 2020-11-16 DIAGNOSIS — F41.9 ANXIETY: ICD-10-CM

## 2020-11-16 ASSESSMENT — MIFFLIN-ST. JEOR: SCORE: 819.06

## 2020-11-23 ENCOUNTER — COMMUNICATION - HEALTHEAST (OUTPATIENT)
Dept: NURSING | Facility: CLINIC | Age: 85
End: 2020-11-23

## 2020-11-27 ENCOUNTER — COMMUNICATION - HEALTHEAST (OUTPATIENT)
Dept: NURSING | Facility: CLINIC | Age: 85
End: 2020-11-27

## 2020-12-28 ENCOUNTER — OFFICE VISIT - HEALTHEAST (OUTPATIENT)
Dept: INTERNAL MEDICINE | Facility: CLINIC | Age: 85
End: 2020-12-28

## 2020-12-28 DIAGNOSIS — R10.13 DYSPEPSIA: ICD-10-CM

## 2020-12-28 DIAGNOSIS — E03.9 ACQUIRED HYPOTHYROIDISM: ICD-10-CM

## 2020-12-28 DIAGNOSIS — I10 SYSTOLIC HYPERTENSION: ICD-10-CM

## 2020-12-28 LAB — TSH SERPL DL<=0.005 MIU/L-ACNC: 1.41 UIU/ML (ref 0.3–5)

## 2020-12-28 RX ORDER — FAMOTIDINE 20 MG/1
20 TABLET, FILM COATED ORAL 2 TIMES DAILY PRN
Qty: 60 TABLET | Refills: 10 | Status: SHIPPED
Start: 2020-12-28 | End: 2021-07-21

## 2020-12-28 RX ORDER — LISINOPRIL 20 MG/1
20 TABLET ORAL DAILY
Qty: 90 TABLET | Refills: 3 | Status: SHIPPED | OUTPATIENT
Start: 2020-12-28 | End: 2021-01-01

## 2020-12-28 ASSESSMENT — MIFFLIN-ST. JEOR: SCORE: 814.53

## 2021-01-01 ENCOUNTER — MEDICAL CORRESPONDENCE (OUTPATIENT)
Dept: HEALTH INFORMATION MANAGEMENT | Facility: CLINIC | Age: 86
End: 2021-01-01
Payer: MEDICARE

## 2021-01-01 ENCOUNTER — TELEPHONE (OUTPATIENT)
Dept: INTERNAL MEDICINE | Facility: CLINIC | Age: 86
End: 2021-01-01

## 2021-01-01 ENCOUNTER — TELEPHONE (OUTPATIENT)
Dept: INTERNAL MEDICINE | Facility: CLINIC | Age: 86
End: 2021-01-01
Payer: MEDICARE

## 2021-01-01 ENCOUNTER — OFFICE VISIT (OUTPATIENT)
Dept: INTERNAL MEDICINE | Facility: CLINIC | Age: 86
End: 2021-01-01
Payer: MEDICARE

## 2021-01-01 ENCOUNTER — MYC MEDICAL ADVICE (OUTPATIENT)
Dept: INTERNAL MEDICINE | Facility: CLINIC | Age: 86
End: 2021-01-01

## 2021-01-01 ENCOUNTER — E-VISIT (OUTPATIENT)
Dept: INTERNAL MEDICINE | Facility: CLINIC | Age: 86
End: 2021-01-01
Payer: COMMERCIAL

## 2021-01-01 ENCOUNTER — MEDICAL CORRESPONDENCE (OUTPATIENT)
Dept: HEALTH INFORMATION MANAGEMENT | Facility: CLINIC | Age: 86
End: 2021-01-01

## 2021-01-01 VITALS
HEART RATE: 77 BPM | BODY MASS INDEX: 17.44 KG/M2 | OXYGEN SATURATION: 97 % | DIASTOLIC BLOOD PRESSURE: 68 MMHG | WEIGHT: 101.6 LBS | SYSTOLIC BLOOD PRESSURE: 128 MMHG

## 2021-01-01 VITALS
HEART RATE: 83 BPM | BODY MASS INDEX: 18.71 KG/M2 | DIASTOLIC BLOOD PRESSURE: 66 MMHG | OXYGEN SATURATION: 97 % | WEIGHT: 109 LBS | SYSTOLIC BLOOD PRESSURE: 148 MMHG

## 2021-01-01 VITALS
BODY MASS INDEX: 18.19 KG/M2 | OXYGEN SATURATION: 98 % | HEART RATE: 76 BPM | DIASTOLIC BLOOD PRESSURE: 70 MMHG | WEIGHT: 106 LBS | SYSTOLIC BLOOD PRESSURE: 150 MMHG

## 2021-01-01 VITALS
DIASTOLIC BLOOD PRESSURE: 60 MMHG | HEART RATE: 74 BPM | OXYGEN SATURATION: 97 % | WEIGHT: 99 LBS | BODY MASS INDEX: 16.99 KG/M2 | SYSTOLIC BLOOD PRESSURE: 170 MMHG

## 2021-01-01 DIAGNOSIS — E03.9 HYPOTHYROIDISM, UNSPECIFIED TYPE: ICD-10-CM

## 2021-01-01 DIAGNOSIS — D50.0 IRON DEFICIENCY ANEMIA DUE TO CHRONIC BLOOD LOSS: ICD-10-CM

## 2021-01-01 DIAGNOSIS — I10 ESSENTIAL HYPERTENSION, BENIGN: ICD-10-CM

## 2021-01-01 DIAGNOSIS — M54.50 ACUTE MIDLINE LOW BACK PAIN WITHOUT SCIATICA: Primary | ICD-10-CM

## 2021-01-01 DIAGNOSIS — N18.30 STAGE 3 CHRONIC KIDNEY DISEASE, UNSPECIFIED WHETHER STAGE 3A OR 3B CKD (H): ICD-10-CM

## 2021-01-01 DIAGNOSIS — I89.0 LYMPHEDEMA OF BOTH LOWER EXTREMITIES: Primary | ICD-10-CM

## 2021-01-01 DIAGNOSIS — I10 SYSTOLIC HYPERTENSION: Primary | ICD-10-CM

## 2021-01-01 DIAGNOSIS — E03.9 ACQUIRED HYPOTHYROIDISM: ICD-10-CM

## 2021-01-01 DIAGNOSIS — N18.31 STAGE 3A CHRONIC KIDNEY DISEASE (H): ICD-10-CM

## 2021-01-01 DIAGNOSIS — K59.01 SLOW TRANSIT CONSTIPATION: ICD-10-CM

## 2021-01-01 DIAGNOSIS — I89.0 LYMPHEDEMA OF BOTH LOWER EXTREMITIES: ICD-10-CM

## 2021-01-01 DIAGNOSIS — Z23 NEED FOR VACCINATION: ICD-10-CM

## 2021-01-01 DIAGNOSIS — I10 SYSTOLIC HYPERTENSION: ICD-10-CM

## 2021-01-01 DIAGNOSIS — E87.1 HYPONATREMIA: ICD-10-CM

## 2021-01-01 LAB
ALBUMIN SERPL-MCNC: 4.1 G/DL (ref 3.5–5)
ALP SERPL-CCNC: 119 U/L (ref 45–120)
ALT SERPL W P-5'-P-CCNC: 12 U/L (ref 0–45)
ANION GAP SERPL CALCULATED.3IONS-SCNC: 12 MMOL/L (ref 5–18)
ANION GAP SERPL CALCULATED.3IONS-SCNC: 13 MMOL/L (ref 5–18)
ANION GAP SERPL CALCULATED.3IONS-SCNC: 13 MMOL/L (ref 5–18)
AST SERPL W P-5'-P-CCNC: 23 U/L (ref 0–40)
BASOPHILS # BLD AUTO: 0 10E3/UL (ref 0–0.2)
BASOPHILS # BLD AUTO: 0 10E3/UL (ref 0–0.2)
BASOPHILS NFR BLD AUTO: 1 %
BASOPHILS NFR BLD AUTO: 1 %
BILIRUB SERPL-MCNC: 0.4 MG/DL (ref 0–1)
BUN SERPL-MCNC: 24 MG/DL (ref 8–28)
BUN SERPL-MCNC: 25 MG/DL (ref 8–28)
BUN SERPL-MCNC: 25 MG/DL (ref 8–28)
CALCIUM SERPL-MCNC: 10 MG/DL (ref 8.5–10.5)
CALCIUM SERPL-MCNC: 9.9 MG/DL (ref 8.5–10.5)
CALCIUM SERPL-MCNC: 9.9 MG/DL (ref 8.5–10.5)
CHLORIDE BLD-SCNC: 100 MMOL/L (ref 98–107)
CHLORIDE BLD-SCNC: 99 MMOL/L (ref 98–107)
CHLORIDE BLD-SCNC: 99 MMOL/L (ref 98–107)
CO2 SERPL-SCNC: 23 MMOL/L (ref 22–31)
CO2 SERPL-SCNC: 23 MMOL/L (ref 22–31)
CO2 SERPL-SCNC: 25 MMOL/L (ref 22–31)
CREAT SERPL-MCNC: 0.85 MG/DL (ref 0.6–1.1)
CREAT SERPL-MCNC: 0.89 MG/DL (ref 0.6–1.1)
CREAT SERPL-MCNC: 0.89 MG/DL (ref 0.6–1.1)
EOSINOPHIL # BLD AUTO: 0.1 10E3/UL (ref 0–0.7)
EOSINOPHIL # BLD AUTO: 0.1 10E3/UL (ref 0–0.7)
EOSINOPHIL NFR BLD AUTO: 2 %
EOSINOPHIL NFR BLD AUTO: 2 %
ERYTHROCYTE [DISTWIDTH] IN BLOOD BY AUTOMATED COUNT: 12.8 % (ref 10–15)
ERYTHROCYTE [DISTWIDTH] IN BLOOD BY AUTOMATED COUNT: 14.3 % (ref 10–15)
GFR SERPL CREATININE-BSD FRML MDRD: 54 ML/MIN/1.73M2
GFR SERPL CREATININE-BSD FRML MDRD: 54 ML/MIN/1.73M2
GFR SERPL CREATININE-BSD FRML MDRD: 58 ML/MIN/1.73M2
GLUCOSE BLD-MCNC: 72 MG/DL (ref 70–125)
GLUCOSE BLD-MCNC: 74 MG/DL (ref 70–125)
GLUCOSE BLD-MCNC: 83 MG/DL (ref 70–125)
HCT VFR BLD AUTO: 33.6 % (ref 35–47)
HCT VFR BLD AUTO: 35.5 % (ref 35–47)
HGB BLD-MCNC: 10.8 G/DL (ref 11.7–15.7)
HGB BLD-MCNC: 11.6 G/DL (ref 11.7–15.7)
IMM GRANULOCYTES # BLD: 0 10E3/UL
IMM GRANULOCYTES # BLD: 0 10E3/UL
IMM GRANULOCYTES NFR BLD: 0 %
IMM GRANULOCYTES NFR BLD: 0 %
LYMPHOCYTES # BLD AUTO: 1.4 10E3/UL (ref 0.8–5.3)
LYMPHOCYTES # BLD AUTO: 1.6 10E3/UL (ref 0.8–5.3)
LYMPHOCYTES NFR BLD AUTO: 26 %
LYMPHOCYTES NFR BLD AUTO: 28 %
MCH RBC QN AUTO: 32.5 PG (ref 26.5–33)
MCH RBC QN AUTO: 33.6 PG (ref 26.5–33)
MCHC RBC AUTO-ENTMCNC: 32.1 G/DL (ref 31.5–36.5)
MCHC RBC AUTO-ENTMCNC: 32.7 G/DL (ref 31.5–36.5)
MCV RBC AUTO: 101 FL (ref 78–100)
MCV RBC AUTO: 103 FL (ref 78–100)
MONOCYTES # BLD AUTO: 0.7 10E3/UL (ref 0–1.3)
MONOCYTES # BLD AUTO: 0.7 10E3/UL (ref 0–1.3)
MONOCYTES NFR BLD AUTO: 11 %
MONOCYTES NFR BLD AUTO: 13 %
NEUTROPHILS # BLD AUTO: 3.2 10E3/UL (ref 1.6–8.3)
NEUTROPHILS # BLD AUTO: 3.4 10E3/UL (ref 1.6–8.3)
NEUTROPHILS NFR BLD AUTO: 58 %
NEUTROPHILS NFR BLD AUTO: 59 %
PLATELET # BLD AUTO: 160 10E3/UL (ref 150–450)
PLATELET # BLD AUTO: 178 10E3/UL (ref 150–450)
POTASSIUM BLD-SCNC: 4.3 MMOL/L (ref 3.5–5)
POTASSIUM BLD-SCNC: 4.3 MMOL/L (ref 3.5–5)
POTASSIUM BLD-SCNC: 4.4 MMOL/L (ref 3.5–5)
PROT SERPL-MCNC: 7 G/DL (ref 6–8)
RBC # BLD AUTO: 3.32 10E6/UL (ref 3.8–5.2)
RBC # BLD AUTO: 3.45 10E6/UL (ref 3.8–5.2)
SODIUM SERPL-SCNC: 135 MMOL/L (ref 136–145)
SODIUM SERPL-SCNC: 136 MMOL/L (ref 136–145)
SODIUM SERPL-SCNC: 136 MMOL/L (ref 136–145)
TSH SERPL DL<=0.005 MIU/L-ACNC: 1.18 UIU/ML (ref 0.3–5)
WBC # BLD AUTO: 5.5 10E3/UL (ref 4–11)
WBC # BLD AUTO: 5.8 10E3/UL (ref 4–11)

## 2021-01-01 PROCEDURE — 0004A COVID-19,PF,PFIZER (12+ YRS): CPT | Performed by: INTERNAL MEDICINE

## 2021-01-01 PROCEDURE — 36415 COLL VENOUS BLD VENIPUNCTURE: CPT | Performed by: NURSE PRACTITIONER

## 2021-01-01 PROCEDURE — 80048 BASIC METABOLIC PNL TOTAL CA: CPT | Performed by: INTERNAL MEDICINE

## 2021-01-01 PROCEDURE — 91300 COVID-19,PF,PFIZER (12+ YRS): CPT | Performed by: INTERNAL MEDICINE

## 2021-01-01 PROCEDURE — 80053 COMPREHEN METABOLIC PANEL: CPT | Performed by: NURSE PRACTITIONER

## 2021-01-01 PROCEDURE — 85025 COMPLETE CBC W/AUTO DIFF WBC: CPT | Performed by: NURSE PRACTITIONER

## 2021-01-01 PROCEDURE — 84443 ASSAY THYROID STIM HORMONE: CPT | Performed by: NURSE PRACTITIONER

## 2021-01-01 PROCEDURE — 99421 OL DIG E/M SVC 5-10 MIN: CPT | Performed by: NURSE PRACTITIONER

## 2021-01-01 PROCEDURE — 99214 OFFICE O/P EST MOD 30 MIN: CPT | Mod: 25 | Performed by: NURSE PRACTITIONER

## 2021-01-01 PROCEDURE — 85025 COMPLETE CBC W/AUTO DIFF WBC: CPT | Performed by: INTERNAL MEDICINE

## 2021-01-01 PROCEDURE — 99214 OFFICE O/P EST MOD 30 MIN: CPT | Performed by: NURSE PRACTITIONER

## 2021-01-01 PROCEDURE — 90686 IIV4 VACC NO PRSV 0.5 ML IM: CPT | Performed by: NURSE PRACTITIONER

## 2021-01-01 PROCEDURE — G0008 ADMIN INFLUENZA VIRUS VAC: HCPCS | Performed by: NURSE PRACTITIONER

## 2021-01-01 PROCEDURE — 36415 COLL VENOUS BLD VENIPUNCTURE: CPT | Performed by: INTERNAL MEDICINE

## 2021-01-01 PROCEDURE — 80048 BASIC METABOLIC PNL TOTAL CA: CPT | Performed by: NURSE PRACTITIONER

## 2021-01-01 PROCEDURE — 99215 OFFICE O/P EST HI 40 MIN: CPT | Mod: 25 | Performed by: INTERNAL MEDICINE

## 2021-01-01 RX ORDER — LISINOPRIL 20 MG/1
20 TABLET ORAL DAILY
Qty: 90 TABLET | Refills: 3 | Status: ON HOLD | OUTPATIENT
Start: 2021-01-01 | End: 2022-01-01

## 2021-01-01 RX ORDER — DOCUSATE SODIUM 100 MG/1
100 CAPSULE, LIQUID FILLED ORAL DAILY
Qty: 90 CAPSULE | Refills: 1 | Status: SHIPPED | OUTPATIENT
Start: 2021-01-01 | End: 2022-01-01

## 2021-01-01 RX ORDER — TRAMADOL HYDROCHLORIDE 50 MG/1
50 TABLET ORAL EVERY 6 HOURS PRN
Qty: 21 TABLET | Refills: 0 | Status: SHIPPED | OUTPATIENT
Start: 2021-01-01 | End: 2021-01-01

## 2021-01-01 RX ORDER — LEVOTHYROXINE SODIUM 50 UG/1
50 TABLET ORAL DAILY
Qty: 90 TABLET | Refills: 3 | Status: SHIPPED | OUTPATIENT
Start: 2021-01-01 | End: 2022-01-01

## 2021-01-05 ENCOUNTER — COMMUNICATION - HEALTHEAST (OUTPATIENT)
Dept: NURSING | Facility: CLINIC | Age: 86
End: 2021-01-05

## 2021-01-09 ENCOUNTER — COMMUNICATION - HEALTHEAST (OUTPATIENT)
Dept: CARDIOLOGY | Facility: CLINIC | Age: 86
End: 2021-01-09

## 2021-01-09 DIAGNOSIS — I21.4 NSTEMI (NON-ST ELEVATED MYOCARDIAL INFARCTION) (H): ICD-10-CM

## 2021-01-11 RX ORDER — METOPROLOL SUCCINATE 25 MG/1
TABLET, EXTENDED RELEASE ORAL
Qty: 90 TABLET | Refills: 1 | Status: SHIPPED | OUTPATIENT
Start: 2021-01-11 | End: 2021-07-07

## 2021-01-20 ENCOUNTER — COMMUNICATION - HEALTHEAST (OUTPATIENT)
Dept: NURSING | Facility: CLINIC | Age: 86
End: 2021-01-20

## 2021-02-01 ENCOUNTER — COMMUNICATION - HEALTHEAST (OUTPATIENT)
Dept: INTERNAL MEDICINE | Facility: CLINIC | Age: 86
End: 2021-02-01

## 2021-02-04 ENCOUNTER — COMMUNICATION - HEALTHEAST (OUTPATIENT)
Dept: NURSING | Facility: CLINIC | Age: 86
End: 2021-02-04

## 2021-02-21 ENCOUNTER — OFFICE VISIT - HEALTHEAST (OUTPATIENT)
Dept: INTERNAL MEDICINE | Facility: CLINIC | Age: 86
End: 2021-02-21

## 2021-02-21 DIAGNOSIS — M54.9 BACK PAIN: ICD-10-CM

## 2021-02-22 ENCOUNTER — AMBULATORY - HEALTHEAST (OUTPATIENT)
Dept: INTERNAL MEDICINE | Facility: CLINIC | Age: 86
End: 2021-02-22

## 2021-02-22 DIAGNOSIS — M81.0 AGE-RELATED OSTEOPOROSIS WITHOUT CURRENT PATHOLOGICAL FRACTURE: ICD-10-CM

## 2021-02-22 DIAGNOSIS — M54.50 ACUTE LOW BACK PAIN WITHOUT SCIATICA, UNSPECIFIED BACK PAIN LATERALITY: ICD-10-CM

## 2021-03-04 ENCOUNTER — HOME CARE/HOSPICE - HEALTHEAST (OUTPATIENT)
Dept: HOME HEALTH SERVICES | Facility: HOME HEALTH | Age: 86
End: 2021-03-04

## 2021-03-04 ENCOUNTER — COMMUNICATION - HEALTHEAST (OUTPATIENT)
Dept: INTERNAL MEDICINE | Facility: CLINIC | Age: 86
End: 2021-03-04

## 2021-03-04 ENCOUNTER — COMMUNICATION - HEALTHEAST (OUTPATIENT)
Dept: FAMILY MEDICINE | Facility: CLINIC | Age: 86
End: 2021-03-04

## 2021-03-04 DIAGNOSIS — M81.0 AGE-RELATED OSTEOPOROSIS WITHOUT CURRENT PATHOLOGICAL FRACTURE: ICD-10-CM

## 2021-03-04 DIAGNOSIS — M54.50 ACUTE LOW BACK PAIN: ICD-10-CM

## 2021-03-11 ENCOUNTER — COMMUNICATION - HEALTHEAST (OUTPATIENT)
Dept: NURSING | Facility: CLINIC | Age: 86
End: 2021-03-11

## 2021-03-12 ENCOUNTER — COMMUNICATION - HEALTHEAST (OUTPATIENT)
Dept: INTERNAL MEDICINE | Facility: CLINIC | Age: 86
End: 2021-03-12

## 2021-03-12 ENCOUNTER — COMMUNICATION - HEALTHEAST (OUTPATIENT)
Dept: NURSING | Facility: CLINIC | Age: 86
End: 2021-03-12

## 2021-03-19 ENCOUNTER — OFFICE VISIT - HEALTHEAST (OUTPATIENT)
Dept: INTERNAL MEDICINE | Facility: CLINIC | Age: 86
End: 2021-03-19

## 2021-03-19 DIAGNOSIS — E03.9 ACQUIRED HYPOTHYROIDISM: ICD-10-CM

## 2021-03-19 DIAGNOSIS — R10.9 ABDOMINAL DISCOMFORT: ICD-10-CM

## 2021-03-19 DIAGNOSIS — I10 BENIGN ESSENTIAL HYPERTENSION: ICD-10-CM

## 2021-03-19 LAB
ALBUMIN SERPL-MCNC: 4.2 G/DL (ref 3.5–5)
ALBUMIN UR-MCNC: NEGATIVE G/DL
ALP SERPL-CCNC: 106 U/L (ref 45–120)
ALT SERPL W P-5'-P-CCNC: 13 U/L (ref 0–45)
ANION GAP SERPL CALCULATED.3IONS-SCNC: 10 MMOL/L (ref 5–18)
APPEARANCE UR: CLEAR
AST SERPL W P-5'-P-CCNC: 24 U/L (ref 0–40)
BACTERIA #/AREA URNS HPF: ABNORMAL /[HPF]
BILIRUB SERPL-MCNC: 0.5 MG/DL (ref 0–1)
BILIRUB UR QL STRIP: NEGATIVE
BUN SERPL-MCNC: 24 MG/DL (ref 8–28)
CALCIUM SERPL-MCNC: 9.7 MG/DL (ref 8.5–10.5)
CHLORIDE BLD-SCNC: 96 MMOL/L (ref 98–107)
CO2 SERPL-SCNC: 28 MMOL/L (ref 22–31)
COLOR UR AUTO: YELLOW
CREAT SERPL-MCNC: 1.16 MG/DL (ref 0.6–1.1)
ERYTHROCYTE [DISTWIDTH] IN BLOOD BY AUTOMATED COUNT: 12.7 % (ref 11–14.5)
GFR SERPL CREATININE-BSD FRML MDRD: 43 ML/MIN/1.73M2
GLUCOSE BLD-MCNC: 96 MG/DL (ref 70–125)
GLUCOSE UR STRIP-MCNC: NEGATIVE MG/DL
HCT VFR BLD AUTO: 34.4 % (ref 35–47)
HGB BLD-MCNC: 11.1 G/DL (ref 12–16)
HGB UR QL STRIP: ABNORMAL
KETONES UR STRIP-MCNC: NEGATIVE MG/DL
LEUKOCYTE ESTERASE UR QL STRIP: NEGATIVE
MCH RBC QN AUTO: 31.9 PG (ref 27–34)
MCHC RBC AUTO-ENTMCNC: 32.3 G/DL (ref 32–36)
MCV RBC AUTO: 99 FL (ref 80–100)
NITRATE UR QL: NEGATIVE
PH UR STRIP: 7 [PH] (ref 5–8)
PLATELET # BLD AUTO: 201 THOU/UL (ref 140–440)
PMV BLD AUTO: 9.6 FL (ref 7–10)
POTASSIUM BLD-SCNC: 4.5 MMOL/L (ref 3.5–5)
PROT SERPL-MCNC: 7.1 G/DL (ref 6–8)
RBC # BLD AUTO: 3.48 MILL/UL (ref 3.8–5.4)
RBC #/AREA URNS AUTO: ABNORMAL HPF
SODIUM SERPL-SCNC: 134 MMOL/L (ref 136–145)
SP GR UR STRIP: 1.01 (ref 1–1.03)
SQUAMOUS #/AREA URNS AUTO: ABNORMAL LPF
TSH SERPL DL<=0.005 MIU/L-ACNC: 0.53 UIU/ML (ref 0.3–5)
UROBILINOGEN UR STRIP-ACNC: ABNORMAL
WBC #/AREA URNS AUTO: ABNORMAL HPF
WBC: 5.8 THOU/UL (ref 4–11)

## 2021-03-19 ASSESSMENT — MIFFLIN-ST. JEOR: SCORE: 799.56

## 2021-03-26 ENCOUNTER — COMMUNICATION - HEALTHEAST (OUTPATIENT)
Dept: ADMINISTRATIVE | Facility: CLINIC | Age: 86
End: 2021-03-26

## 2021-03-29 ENCOUNTER — COMMUNICATION - HEALTHEAST (OUTPATIENT)
Dept: INTERNAL MEDICINE | Facility: CLINIC | Age: 86
End: 2021-03-29

## 2021-03-31 ENCOUNTER — COMMUNICATION - HEALTHEAST (OUTPATIENT)
Dept: INTERNAL MEDICINE | Facility: CLINIC | Age: 86
End: 2021-03-31

## 2021-04-06 ENCOUNTER — OFFICE VISIT - HEALTHEAST (OUTPATIENT)
Dept: INTERNAL MEDICINE | Facility: CLINIC | Age: 86
End: 2021-04-06

## 2021-04-06 DIAGNOSIS — R07.81 RIB PAIN: ICD-10-CM

## 2021-04-06 DIAGNOSIS — I10 BENIGN ESSENTIAL HYPERTENSION: ICD-10-CM

## 2021-04-06 LAB
ALBUMIN SERPL-MCNC: 3.9 G/DL (ref 3.5–5)
ALP SERPL-CCNC: 98 U/L (ref 45–120)
ALT SERPL W P-5'-P-CCNC: 15 U/L (ref 0–45)
ANION GAP SERPL CALCULATED.3IONS-SCNC: 8 MMOL/L (ref 5–18)
AST SERPL W P-5'-P-CCNC: 26 U/L (ref 0–40)
BILIRUB SERPL-MCNC: 0.5 MG/DL (ref 0–1)
BUN SERPL-MCNC: 22 MG/DL (ref 8–28)
CALCIUM SERPL-MCNC: 9.2 MG/DL (ref 8.5–10.5)
CHLORIDE BLD-SCNC: 96 MMOL/L (ref 98–107)
CO2 SERPL-SCNC: 27 MMOL/L (ref 22–31)
CREAT SERPL-MCNC: 1.07 MG/DL (ref 0.6–1.1)
ERYTHROCYTE [DISTWIDTH] IN BLOOD BY AUTOMATED COUNT: 13.2 % (ref 11–14.5)
GFR SERPL CREATININE-BSD FRML MDRD: 47 ML/MIN/1.73M2
GLUCOSE BLD-MCNC: 96 MG/DL (ref 70–125)
HCT VFR BLD AUTO: 30.7 % (ref 35–47)
HGB BLD-MCNC: 10 G/DL (ref 12–16)
MCH RBC QN AUTO: 32.2 PG (ref 27–34)
MCHC RBC AUTO-ENTMCNC: 32.6 G/DL (ref 32–36)
MCV RBC AUTO: 99 FL (ref 80–100)
PLATELET # BLD AUTO: 186 THOU/UL (ref 140–440)
PMV BLD AUTO: 9.4 FL (ref 7–10)
POTASSIUM BLD-SCNC: 4.3 MMOL/L (ref 3.5–5)
PROT SERPL-MCNC: 6.7 G/DL (ref 6–8)
RBC # BLD AUTO: 3.11 MILL/UL (ref 3.8–5.4)
SODIUM SERPL-SCNC: 131 MMOL/L (ref 136–145)
WBC: 5.8 THOU/UL (ref 4–11)

## 2021-04-14 ENCOUNTER — AMBULATORY - HEALTHEAST (OUTPATIENT)
Dept: NURSING | Facility: CLINIC | Age: 86
End: 2021-04-14

## 2021-04-19 ENCOUNTER — COMMUNICATION - HEALTHEAST (OUTPATIENT)
Dept: CARDIOLOGY | Facility: CLINIC | Age: 86
End: 2021-04-19

## 2021-04-22 ENCOUNTER — COMMUNICATION - HEALTHEAST (OUTPATIENT)
Dept: NURSING | Facility: CLINIC | Age: 86
End: 2021-04-22

## 2021-05-03 ENCOUNTER — RECORDS - HEALTHEAST (OUTPATIENT)
Dept: ADMINISTRATIVE | Facility: OTHER | Age: 86
End: 2021-05-03

## 2021-05-05 ENCOUNTER — AMBULATORY - HEALTHEAST (OUTPATIENT)
Dept: NURSING | Facility: CLINIC | Age: 86
End: 2021-05-05

## 2021-05-19 ENCOUNTER — OFFICE VISIT - HEALTHEAST (OUTPATIENT)
Dept: INTERNAL MEDICINE | Facility: CLINIC | Age: 86
End: 2021-05-19

## 2021-05-19 ENCOUNTER — RECORDS - HEALTHEAST (OUTPATIENT)
Dept: ADMINISTRATIVE | Facility: OTHER | Age: 86
End: 2021-05-19

## 2021-05-19 DIAGNOSIS — E78.5 HYPERLIPIDEMIA LDL GOAL <100: ICD-10-CM

## 2021-05-19 DIAGNOSIS — E03.9 ACQUIRED HYPOTHYROIDISM: ICD-10-CM

## 2021-05-19 DIAGNOSIS — I89.0 LYMPHEDEMA OF BOTH LOWER EXTREMITIES: ICD-10-CM

## 2021-05-19 DIAGNOSIS — R79.9 ABNORMAL FINDING OF BLOOD CHEMISTRY, UNSPECIFIED: ICD-10-CM

## 2021-05-19 DIAGNOSIS — M81.0 AGE-RELATED OSTEOPOROSIS WITHOUT CURRENT PATHOLOGICAL FRACTURE: ICD-10-CM

## 2021-05-19 DIAGNOSIS — D64.89 ANEMIA DUE TO OTHER CAUSE, NOT CLASSIFIED: ICD-10-CM

## 2021-05-19 DIAGNOSIS — I10 BENIGN ESSENTIAL HYPERTENSION: ICD-10-CM

## 2021-05-19 DIAGNOSIS — K59.01 SLOW TRANSIT CONSTIPATION: ICD-10-CM

## 2021-05-19 DIAGNOSIS — R60.9 EDEMA, UNSPECIFIED TYPE: ICD-10-CM

## 2021-05-19 DIAGNOSIS — K74.69 OTHER CIRRHOSIS OF LIVER (H): ICD-10-CM

## 2021-05-19 DIAGNOSIS — R07.89 RIGHT-SIDED CHEST WALL PAIN: ICD-10-CM

## 2021-05-19 DIAGNOSIS — E87.1 HYPONATREMIA: ICD-10-CM

## 2021-05-19 DIAGNOSIS — R18.8 OTHER ASCITES: ICD-10-CM

## 2021-05-19 DIAGNOSIS — R19.00 ABDOMINAL SWELLING: ICD-10-CM

## 2021-05-19 DIAGNOSIS — S22.000S COMPRESSION FRACTURE OF THORACIC VERTEBRA, UNSPECIFIED THORACIC VERTEBRAL LEVEL, SEQUELA: ICD-10-CM

## 2021-05-19 LAB
ALBUMIN SERPL-MCNC: 4 G/DL (ref 3.5–5)
ALP SERPL-CCNC: 101 U/L (ref 45–120)
ALT SERPL W P-5'-P-CCNC: 11 U/L (ref 0–45)
ANION GAP SERPL CALCULATED.3IONS-SCNC: 12 MMOL/L (ref 5–18)
AST SERPL W P-5'-P-CCNC: 23 U/L (ref 0–40)
BASOPHILS # BLD AUTO: 0.1 THOU/UL (ref 0–0.2)
BASOPHILS NFR BLD AUTO: 1 % (ref 0–2)
BILIRUB DIRECT SERPL-MCNC: 0.2 MG/DL
BILIRUB SERPL-MCNC: 0.4 MG/DL (ref 0–1)
BUN SERPL-MCNC: 32 MG/DL (ref 8–28)
CALCIUM SERPL-MCNC: 9.3 MG/DL (ref 8.5–10.5)
CHLORIDE BLD-SCNC: 99 MMOL/L (ref 98–107)
CO2 SERPL-SCNC: 23 MMOL/L (ref 22–31)
CREAT SERPL-MCNC: 0.97 MG/DL (ref 0.6–1.1)
EOSINOPHIL # BLD AUTO: 0.1 THOU/UL (ref 0–0.4)
EOSINOPHIL NFR BLD AUTO: 2 % (ref 0–6)
ERYTHROCYTE [DISTWIDTH] IN BLOOD BY AUTOMATED COUNT: 12.7 % (ref 11–14.5)
FERRITIN SERPL-MCNC: 18 NG/ML (ref 10–130)
FOLATE SERPL-MCNC: 13.4 NG/ML
GFR SERPL CREATININE-BSD FRML MDRD: 53 ML/MIN/1.73M2
GLUCOSE BLD-MCNC: 95 MG/DL (ref 70–125)
HCT VFR BLD AUTO: 26.2 % (ref 35–47)
HGB BLD-MCNC: 8.6 G/DL (ref 12–16)
IMM GRANULOCYTES # BLD: 0 THOU/UL
IMM GRANULOCYTES NFR BLD: 0 %
IRON SATN MFR SERPL: 5 % (ref 20–50)
IRON SERPL-MCNC: 20 UG/DL (ref 42–175)
LYMPHOCYTES # BLD AUTO: 2.1 THOU/UL (ref 0.8–4.4)
LYMPHOCYTES NFR BLD AUTO: 29 % (ref 20–40)
MCH RBC QN AUTO: 31.6 PG (ref 27–34)
MCHC RBC AUTO-ENTMCNC: 32.8 G/DL (ref 32–36)
MCV RBC AUTO: 96 FL (ref 80–100)
MONOCYTES # BLD AUTO: 0.9 THOU/UL (ref 0–0.9)
MONOCYTES NFR BLD AUTO: 13 % (ref 2–10)
NEUTROPHILS # BLD AUTO: 4 THOU/UL (ref 2–7.7)
NEUTROPHILS NFR BLD AUTO: 55 % (ref 50–70)
OSMOLALITY SERPL: 278 MOSM/KG (ref 270–300)
OSMOLALITY UR: 223 MOSM/KG (ref 300–900)
PLATELET # BLD AUTO: 221 THOU/UL (ref 140–440)
PMV BLD AUTO: 9.6 FL (ref 7–10)
POTASSIUM BLD-SCNC: 4.6 MMOL/L (ref 3.5–5)
PROT SERPL-MCNC: 6.8 G/DL (ref 6–8)
RBC # BLD AUTO: 2.72 MILL/UL (ref 3.8–5.4)
SODIUM SERPL-SCNC: 134 MMOL/L (ref 136–145)
SODIUM UR-SCNC: 46 MMOL/L
TIBC SERPL-MCNC: 425 UG/DL (ref 313–563)
TRANSFERRIN SERPL-MCNC: 340 MG/DL (ref 212–360)
TRANSFERRIN SERPL-MCNC: 340 MG/DL (ref 212–360)
TSH SERPL DL<=0.005 MIU/L-ACNC: 0.7 UIU/ML (ref 0.3–5)
VIT B12 SERPL-MCNC: 361 PG/ML (ref 213–816)
WBC: 7.3 THOU/UL (ref 4–11)

## 2021-05-19 RX ORDER — ACETAMINOPHEN 500 MG
1000 TABLET ORAL 2 TIMES DAILY
Refills: 0 | Status: SHIPPED | COMMUNITY
Start: 2021-05-19

## 2021-05-19 RX ORDER — POLYETHYLENE GLYCOL 3350 17 G/17G
17 POWDER, FOR SOLUTION ORAL DAILY PRN
Qty: 235 G | Refills: 11 | Status: ON HOLD
Start: 2021-05-19 | End: 2022-01-01

## 2021-05-19 RX ORDER — FUROSEMIDE 20 MG
20 TABLET ORAL DAILY
Qty: 90 TABLET | Refills: 1 | Status: SHIPPED
Start: 2021-05-19 | End: 2021-07-07

## 2021-05-19 ASSESSMENT — MIFFLIN-ST. JEOR: SCORE: 739.69

## 2021-05-20 LAB
RETICS # AUTO: 0.04 MILL/UL (ref 0.01–0.11)
RETICS/RBC NFR AUTO: 1.52 % (ref 0.8–2.7)

## 2021-05-21 ENCOUNTER — COMMUNICATION - HEALTHEAST (OUTPATIENT)
Dept: INTERNAL MEDICINE | Facility: CLINIC | Age: 86
End: 2021-05-21

## 2021-05-24 ENCOUNTER — MEDICAL CORRESPONDENCE (OUTPATIENT)
Dept: HEALTH INFORMATION MANAGEMENT | Facility: CLINIC | Age: 86
End: 2021-05-24

## 2021-05-24 ENCOUNTER — RECORDS - HEALTHEAST (OUTPATIENT)
Dept: ADMINISTRATIVE | Facility: CLINIC | Age: 86
End: 2021-05-24

## 2021-05-24 LAB
ALBUMIN PERCENT: 63.5 % (ref 51–67)
ALBUMIN SERPL ELPH-MCNC: 4.3 G/DL (ref 3.2–4.7)
ALPHA 1 PERCENT: 2.9 % (ref 2–4)
ALPHA 2 PERCENT: 9.8 % (ref 5–13)
ALPHA1 GLOB SERPL ELPH-MCNC: 0.2 G/DL (ref 0.1–0.3)
ALPHA2 GLOB SERPL ELPH-MCNC: 0.7 G/DL (ref 0.4–0.9)
B-GLOBULIN SERPL ELPH-MCNC: 0.8 G/DL (ref 0.7–1.2)
BETA PERCENT: 12.2 % (ref 10–17)
GAMMA GLOB SERPL ELPH-MCNC: 0.8 G/DL (ref 0.6–1.4)
GAMMA GLOBULIN PERCENT: 11.6 % (ref 9–20)
PATH ICD:: NORMAL
PROT PATTERN SERPL ELPH-IMP: NORMAL
PROT SERPL-MCNC: 6.8 G/DL (ref 6–8)
REVIEWING PATHOLOGIST: NORMAL

## 2021-05-25 ENCOUNTER — RECORDS - HEALTHEAST (OUTPATIENT)
Dept: ADMINISTRATIVE | Facility: CLINIC | Age: 86
End: 2021-05-25

## 2021-05-26 ENCOUNTER — RECORDS - HEALTHEAST (OUTPATIENT)
Dept: ADMINISTRATIVE | Facility: CLINIC | Age: 86
End: 2021-05-26

## 2021-05-27 ENCOUNTER — RECORDS - HEALTHEAST (OUTPATIENT)
Dept: ADMINISTRATIVE | Facility: CLINIC | Age: 86
End: 2021-05-27

## 2021-05-27 ENCOUNTER — COMMUNICATION - HEALTHEAST (OUTPATIENT)
Dept: ADMINISTRATIVE | Facility: CLINIC | Age: 86
End: 2021-05-27

## 2021-05-27 VITALS — BODY MASS INDEX: 20 KG/M2 | WEIGHT: 99 LBS

## 2021-05-27 VITALS
HEART RATE: 72 BPM | TEMPERATURE: 98.2 F | DIASTOLIC BLOOD PRESSURE: 60 MMHG | SYSTOLIC BLOOD PRESSURE: 120 MMHG | RESPIRATION RATE: 14 BRPM

## 2021-05-27 VITALS — HEIGHT: 59 IN

## 2021-05-28 ENCOUNTER — RECORDS - HEALTHEAST (OUTPATIENT)
Dept: ADMINISTRATIVE | Facility: CLINIC | Age: 86
End: 2021-05-28

## 2021-05-28 ENCOUNTER — OFFICE VISIT - HEALTHEAST (OUTPATIENT)
Dept: CARDIOLOGY | Facility: CLINIC | Age: 86
End: 2021-05-28

## 2021-05-28 DIAGNOSIS — I49.3 PVC'S (PREMATURE VENTRICULAR CONTRACTIONS): ICD-10-CM

## 2021-05-28 DIAGNOSIS — E44.1 MILD MALNUTRITION (H): ICD-10-CM

## 2021-05-28 DIAGNOSIS — I89.0 LYMPHEDEMA: ICD-10-CM

## 2021-05-28 ASSESSMENT — MIFFLIN-ST. JEOR: SCORE: 730.62

## 2021-05-28 NOTE — PROGRESS NOTES
OFFICE VISIT NOTE    Subjective:   Chief Complaint:  No chief complaint on file.    89-year-old woman with a history of hypertension, osteoporosis, osteoarthritis.  Also is bothered by chronic anxiety and some minor depression.  She continues to have difficulty with insomnia and feeling anxious.  She has an irritable bowel with frequent complaints of abdominal discomfort and bloating.  No melena.  No significant weight loss.  Appetite is fair to good.    Current Outpatient Medications   Medication Sig     cholecalciferol, vitamin D3, 1,000 unit tablet Take 2,000 Units by mouth daily.     diazePAM (VALIUM) 2 MG tablet TAKE 1 TABLET(2 MG) BY MOUTH TWICE DAILY     furosemide (LASIX) 20 MG tablet 1 tablet by mouth every Monday Wednesday and Friday morning     lisinopril (PRINIVIL,ZESTRIL) 10 MG tablet TAKE 1 TABLET(10 MG) BY MOUTH DAILY     ranitidine (ZANTAC) 150 MG tablet Take 150 mg by mouth as needed for heartburn.       PSFHx: Tobacco Status:  She  reports that she has never smoked. She has never used smokeless tobacco.    Review of Systems:  A comprehensive review of systems is negative except for the comments above    Objective:    There were no vitals taken for this visit.  GENERAL: No acute distress.  Weight is stable.  Today's blood pressure 148/78.  Pulse is 84.  Oxygen saturations 96%.  No jaundice.  No JVD.  Still has 1-2+ edema of her feet.  No significant leg edema.  Lungs seem clear.  Heart shows a sinus rhythm.  There is a grade 2/6 midsystolic murmur sounded like mild mitral regurgitation.  Neurologic exam is normal for age 89.    Assessment & Plan   Kristal Cox is a 89 y.o. female.    She thinks furosemide is too harsh on her.  I will discontinue that medication.  Switch her to hydrochlorothiazide, 25 mg daily.  Check electrolytes BUN/creatinine.  Check thyroid function since she is so anxious.  She can continue to take diazepam 2 mg p.o. twice daily.  She does not sleep well;  avoid a  sleeping pill in this 89-year-old.    Diagnoses and all orders for this visit:    Systolic hypertension  -     HM1(CBC and Differential)  -     Comprehensive Metabolic Panel  -     HM1 (CBC with Diff)    Chronic anxiety  -     Thyroid [place]            Tera Iglesias MD  Transcription using voice recognition software, may contain typographical errors.

## 2021-05-29 ENCOUNTER — RECORDS - HEALTHEAST (OUTPATIENT)
Dept: ADMINISTRATIVE | Facility: CLINIC | Age: 86
End: 2021-05-29

## 2021-05-29 NOTE — TELEPHONE ENCOUNTER
Controlled Substance Refill Request  Medication:   Requested Prescriptions     Pending Prescriptions Disp Refills     diazePAM (VALIUM) 2 MG tablet [Pharmacy Med Name: DIAZEPAM 2MG TABLETS] 60 tablet 0     Sig: TAKE 1 TABLET(2 MG) BY MOUTH TWICE DAILY     Date Last Fill: 3/18/19  Ph[facility]: [facility]   Submit electronically to pharmacy    Controlled Substance Agreement on File:   Encounter-Level CSA Scan Date:    There are no encounter-level csa scan date.       Last office visit with primary: 4/29/2019

## 2021-05-30 ENCOUNTER — RECORDS - HEALTHEAST (OUTPATIENT)
Dept: ADMINISTRATIVE | Facility: CLINIC | Age: 86
End: 2021-05-30

## 2021-05-30 NOTE — PROGRESS NOTES
[facility]    DATE: 2019    NAME:  Kristal Cox             :  1924  MRN: [id number]  CODE STATUS:  DNR    VISIT TYPE: Review Of Multiple Medical Conditions     FACILITY:  [facility] [[id number]]       CHIEF COMPLAIN/REASON FOR VISIT:    Chief Complaint   Patient presents with     Review Of Multiple Medical Conditions               HISTORY OF PRESENT ILLNESS: Kristal Cox is a 89 y.o. female who was admitted - for fall, right humerus fracture. She tripped and fell while ambulating to the bathroom at home. Ortho recommended non operative treatment. She was place din sling and made NWB. She was discharged to TCU for further rehab. She has PMH of HTN, osteoporosis, anxiety, HLD, IBS. Prior to this she lived at home in a townhouse alone.     Today Ms. Cox states she is not always sleeping well. She is tired today and asked to lay down after her therapies. She does not want anything to help her sleep but is not sure if she is on something. She says her bowels are moving fine but they keep trying to give her more stool softeners. She does not want them and has been refusing. She thinks they are moving fine without them. She thinks her pain is doing fine and not asking for pain meds that she can recall. On review of chart she has not used tylenol at all recently other than scheduled dose at bedtime. She says the swelling in her arm and legs is much better today. She is not having to urinate as often being off the water pill and she appreciates this. She denies any dizziness or other concerns today. Per staff has been consistently refusing bowel meds last day or two.     REVIEW OF SYSTEMS:  PROBLEMS AND REVIEW OF SYSTEMS:   Today on ROS:   Currently, no fever, chills, or rigors. Decreased vision and hearing. Denies any chest pain, headaches, palpitations, lightheadedness, dizziness, shortness of breath, or cough. Appetite is  good. Denies any GERD symptoms. Denies any difficulty with swallowing, nausea, or vomiting. No insomnia. Positive for right arm pain, right arm sling, leg swelling, difficulty swallowing pills, urinary incontinence, hesitancy improved      Allergies   Allergen Reactions     Evista [Raloxifene]      Caused blood clot     Fosamax [Alendronate] Nausea And Vomiting     Current Outpatient Medications   Medication Sig     acetaminophen (TYLENOL) 500 MG tablet Take 1,000 mg by mouth at bedtime. And three times a day prn     cholecalciferol, vitamin D3, 1,000 unit tablet Take 2,000 Units by mouth daily.     dorzolamide (TRUSOPT) 2 % ophthalmic solution Administer 1 drop to both eyes 2 (two) times a day.            lisinopril (PRINIVIL,ZESTRIL) 10 MG tablet Take 15 mg by mouth daily.            melatonin 3 mg Tab tablet Take 1 tablet (3 mg total) by mouth at bedtime as needed. (Patient taking differently: Take 3 mg by mouth at bedtime.       )     polyethylene glycol (MIRALAX) 17 gram packet Take 1 packet (17 g total) by mouth daily. (Patient taking differently: Take 17 g by mouth daily as needed.       )     ranitidine (ZANTAC) 150 MG tablet Take 150 mg by mouth as needed for heartburn.     senna-docusate (SENNOSIDES-DOCUSATE SODIUM) 8.6-50 mg tablet Take 1 tablet by mouth 2 (two) times a day as needed. And at bedtime prn            Past Medical History:    Past Medical History:   Diagnosis Date     Anxiety      Hyperlipidemia      IBS (irritable bowel syndrome)      Osteoporosis      Other abnormal clinical finding     evidence of old septal scar on EKG     Systolic hypertension            PHYSICAL EXAMINATION  Vitals:    07/10/19 2216   BP: 118/67   Pulse: 89   Resp: 16   Temp: 97.7  F (36.5  C)   SpO2: 96%   Weight: (!) 98 lb (44.5 kg)       Today on physical exam:     GENERAL: Awake, Alert, oriented x3, not in any form of acute distress, answers questions appropriately, follows simple commands, conversant  HEENT: Head  is normocephalic with normal hair distribution. No evidence of trauma. Ears: No acute purulent discharge. Eyes: Conjunctivae pink with no scleral jaundice. Nose: Normal mucosa and septum. NECK: Supple with no cervical or supraclavicular lymphadenopathy. Trachea is midline. Glasses, [place]  CHEST: No tenderness or deformity, no crepitus  LUNG: dim to auscultation with good chest expansion. There are no crackles or wheezes, normal AP diameter.  BACK: No kyphosis of the thoracic spine. Symmetric, no curvature, ROM normal, no CVA tenderness, no spinal tenderness   CVS: There is good S1  S2, regular rhythm, there are no murmurs, rubs, gallops, or heaves,  2+ pulses symmetric in all extremities.  ABDOMEN: Rounded and soft, nontender to palpation, non distended, no masses, no organomegaly, good bowel sounds, no rebound or guarding, no peritoneal signs.   EXTREMITIES: Right arm sling, right shoulder trace edema, trace in fingers, no numb/tingling, cap refill <3 sec, trace ble nonpitting edema  SKIN: Warm and dry, no erythema noted.  Skin color, texture, no rashes or lesions.  NEUROLOGICAL: The patient is oriented to person, place and time. Anxious at times but calm and pleasant today            LABS:   Recent Results (from the past 168 hour(s))   Basic Metabolic Panel   Result Value Ref Range    Sodium 136 136 - 145 mmol/L    Potassium 3.6 3.5 - 5.0 mmol/L    Chloride 99 98 - 107 mmol/L    CO2 26 22 - 31 mmol/L    Anion Gap, Calculation 11 5 - 18 mmol/L    Glucose 83 70 - 125 mg/dL    Calcium 9.4 8.5 - 10.5 mg/dL    BUN 21 8 - 28 mg/dL    Creatinine 0.78 0.60 - 1.10 mg/dL    GFR MDRD Af Amer >60 >60 mL/min/1.73m2    GFR MDRD Non Af Amer >60 >60 mL/min/1.73m2   Vitamin D, Total (25-Hydroxy)   Result Value Ref Range    Vitamin D, Total (25-Hydroxy) 45.0 30.0 - 80.0 ng/mL   HM1 (CBC with Diff)   Result Value Ref Range    WBC 4.4 4.0 - 11.0 thou/uL    RBC 3.26 (L) 3.80 - 5.40 mill/uL    Hemoglobin 10.6 (L) 12.0 - 16.0 g/dL     Hematocrit 31.1 (L) 35.0 - 47.0 %    MCV 95 80 - 100 fL    MCH 32.5 27.0 - 34.0 pg    MCHC 34.1 32.0 - 36.0 g/dL    RDW 13.2 11.0 - 14.5 %    Platelets 177 140 - 440 thou/uL    MPV 10.8 8.5 - 12.5 fL    Neutrophils % 47 (L) 50 - 70 %    Lymphocytes % 32 20 - 40 %    Monocytes % 17 (H) 2 - 10 %    Eosinophils % 3 0 - 6 %    Basophils % 1 0 - 2 %    Neutrophils Absolute 2.1 2.0 - 7.7 thou/uL    Lymphocytes Absolute 1.4 0.8 - 4.4 thou/uL    Monocytes Absolute 0.8 0.0 - 0.9 thou/uL    Eosinophils Absolute 0.1 0.0 - 0.4 thou/uL    Basophils Absolute 0.0 0.0 - 0.2 thou/uL     Results for orders placed or performed in visit on 07/10/19   Basic Metabolic Panel   Result Value Ref Range    Sodium 136 136 - 145 mmol/L    Potassium 3.6 3.5 - 5.0 mmol/L    Chloride 99 98 - 107 mmol/L    CO2 26 22 - 31 mmol/L    Anion Gap, Calculation 11 5 - 18 mmol/L    Glucose 83 70 - 125 mg/dL    Calcium 9.4 8.5 - 10.5 mg/dL    BUN 21 8 - 28 mg/dL    Creatinine 0.78 0.60 - 1.10 mg/dL    GFR MDRD Af Amer >60 >60 mL/min/1.73m2    GFR MDRD Non Af Amer >60 >60 mL/min/1.73m2         Lab Results   Component Value Date    WBC 4.4 07/10/2019    HGB 10.6 (L) 07/10/2019    HCT 31.1 (L) 07/10/2019    MCV 95 07/10/2019     07/10/2019       No results found for: [id number]  No results found for: HGBA1C  Lab Results   Component Value Date    INR 1.01 03/04/2016     Vitamin D, Total (25-Hydroxy)   Date Value Ref Range Status   07/10/2019 45.0 30.0 - 80.0 ng/mL Final     Lab Results   Component Value Date    TSH 2.69 04/29/2019           ASSESSMENT/PLAN:    1. Fall, Right humerus fracture: Nonoperative treatment. Right shoulder trace edema, trace in fingers, no numb/tingling, cap refill < 3 sec. Sling in place, NWB. F/u with ortho in 2 weeks. Minimal to no pain, on tylenol 1000 at bedtime and three times a day prn. Not using prn dose.   2. HTN: SBP 110s. On lisinopril 15mg at bedtime. Hold parameters.  Stable off hctz.   3. Constipation: On miralax  daily, senna doc daily and at bedtime prn. Will change miralax and senna to prn.    4. GERD: On ranitidine daily prn. Not recently used.   5. Insomnia: melatonin at bedtime  6. Vitamin d deficiency: on vitamin d. Vit d 45 on 7/10.   7. Diastolic CHF: weights 3 times weekly-104-104-98lbs. trace ble, analilia casas. dc'd hctz per her request. Will monitor. No shortness of breath today. Unsure if weight accurate, significant weight loss. Stopped hctz and edema is improved today.       Per therapy eval this week    Electronically signed by: Su Scott NP

## 2021-05-30 NOTE — PROGRESS NOTES
[facility]    DATE: 2019    NAME:  Kristal Cox             :  1924  MRN: [id number]  CODE STATUS:  DNR    VISIT TYPE: Review Of Multiple Medical Conditions     FACILITY:  [facility] [[id number]]       CHIEF COMPLAIN/REASON FOR VISIT:    Chief Complaint   Patient presents with     Review Of Multiple Medical Conditions               HISTORY OF PRESENT ILLNESS: Kristal Cox is a 89 y.o. female who was admitted - for fall, right humerus fracture. She tripped and fell while ambulating to the bathroom at home. Ortho recommended non operative treatment. She was place din sling and made NWB. She was discharged to TCU for further rehab. She has PMH of HTN, osteoporosis, anxiety, HLD, IBS. Prior to this she lived at home in a townhouse alone.     Today Ms. Cox is seen for review of systems. She reports having some issues with phlegm. She doesn't really feel like she is having allergy issues but this cough and mucous started a few days ago. She is not really able to blow her nose and feels like it drains some into her throat and then she tries to cough it up but has a hard time doing this. She does not feel short of breath and has no sore throat or headache. She denies fevers, chills, sneezing, or other associated symptoms. She says other than this she started having some aching pain when she takes a deep breath on her left side. She told therapy about it and they were working on it today. She just noticed this yesterday. She is not sure what it is or what it is from. She denies any issues with bowels and had one yesterday. She is not having any chest pain or stomach upset. She denies nausea or other concerns. Her appetite is about the same.     REVIEW OF SYSTEMS:  PROBLEMS AND REVIEW OF SYSTEMS:   Today on ROS:   Currently, no fever, chills, or rigors. Decreased vision and hearing. Denies any chest pain, headaches, palpitations,  lightheadedness, dizziness, shortness of breath. Appetite is good. Denies any GERD symptoms. Denies any difficulty with swallowing, nausea, or vomiting. No insomnia. Positive for minimal right arm pain, right arm sling, leg swelling stable, difficulty swallowing pills, urinary incontinence at times, congested cough      Allergies   Allergen Reactions     Evista [Raloxifene]      Caused blood clot     Fosamax [Alendronate] Nausea And Vomiting     Current Outpatient Medications   Medication Sig     acetaminophen (TYLENOL) 500 MG tablet Take 1,000 mg by mouth 3 (three) times a day as needed.            cholecalciferol, vitamin D3, 1,000 unit tablet Take 2,000 Units by mouth daily.     dorzolamide (TRUSOPT) 2 % ophthalmic solution Administer 1 drop to both eyes 2 (two) times a day.            lisinopril (PRINIVIL,ZESTRIL) 10 MG tablet Take 20 mg by mouth daily.            melatonin 3 mg Tab tablet Take 1 tablet (3 mg total) by mouth at bedtime as needed. (Patient taking differently: Take 6 mg by mouth at bedtime.       )     senna-docusate (SENNOSIDES-DOCUSATE SODIUM) 8.6-50 mg tablet Take 1 tablet by mouth 2 (two) times a day as needed. And at bedtime prn            Past Medical History:    Past Medical History:   Diagnosis Date     Anxiety      Hyperlipidemia      IBS (irritable bowel syndrome)      Osteoporosis      Other abnormal clinical finding     evidence of old septal scar on EKG     Systolic hypertension            PHYSICAL EXAMINATION  Vitals:    07/24/19 2242   BP: 117/63   Pulse: (!) 104   Resp: 18   Temp: 98.5  F (36.9  C)   SpO2: 95%   Weight: (!) 95 lb (43.1 kg)       Today on physical exam:     GENERAL: Awake, Alert, oriented x3, not in any form of acute distress, answers questions appropriately, follows simple commands, conversant  HEENT: Head is normocephalic with normal hair distribution. No evidence of trauma. Ears: No acute purulent discharge. Eyes: Conjunctivae pink with no scleral jaundice. Nose:  Normal mucosa and septum. NECK: Supple with no cervical or supraclavicular lymphadenopathy. Trachea is midline. Glasses, [place]  CHEST: No tenderness or deformity, no crepitus  LUNG: dim to auscultation with good chest expansion. There are no crackles or wheezes, normal AP diameter. Congested cough at times, white to clear phlegm at times, tender to touch over left flank/intercostal spaces with deep breathing, reproducible on exam to palpation  BACK: No kyphosis of the thoracic spine. Symmetric, no curvature, ROM normal, no CVA tenderness, no spinal tenderness   CVS: There is good S1  S2, regular rhythm, there are no murmurs, rubs, gallops, or heaves,  2+ pulses symmetric in all extremities.  ABDOMEN: Rounded and soft, nontender to palpation, non distended, no masses, no organomegaly, good bowel sounds, no rebound or guarding, no peritoneal signs.   EXTREMITIES: Right arm sling, right shoulder trace edema, no edema in fingers, no numb/tingling, cap refill <3 sec, trace ble nonpitting edema  SKIN: Warm and dry, no erythema noted.  Skin color, texture, no rashes or lesions.  NEUROLOGICAL: The patient is oriented to person, place and time. Anxious at times but calm and pleasant today            LABS:   No results found for this or any previous visit (from the past 168 hour(s)).  Results for orders placed or performed in visit on 07/10/19   Basic Metabolic Panel   Result Value Ref Range    Sodium 136 136 - 145 mmol/L    Potassium 3.6 3.5 - 5.0 mmol/L    Chloride 99 98 - 107 mmol/L    CO2 26 22 - 31 mmol/L    Anion Gap, Calculation 11 5 - 18 mmol/L    Glucose 83 70 - 125 mg/dL    Calcium 9.4 8.5 - 10.5 mg/dL    BUN 21 8 - 28 mg/dL    Creatinine 0.78 0.60 - 1.10 mg/dL    GFR MDRD Af Amer >60 >60 mL/min/1.73m2    GFR MDRD Non Af Amer >60 >60 mL/min/1.73m2         Lab Results   Component Value Date    WBC 4.4 07/10/2019    HGB 10.6 (L) 07/10/2019    HCT 31.1 (L) 07/10/2019    MCV 95 07/10/2019     07/10/2019       No  results found for: [id number]  No results found for: HGBA1C  Lab Results   Component Value Date    INR 1.01 03/04/2016     Vitamin D, Total (25-Hydroxy)   Date Value Ref Range Status   07/10/2019 45.0 30.0 - 80.0 ng/mL Final     Lab Results   Component Value Date    TSH 2.69 04/29/2019           ASSESSMENT/PLAN:    1. Fall, Right humerus fracture: Nonoperative treatment. Right shoulder trace edema, no edema in fingers, no numb/tingling, cap refill < 3 sec. Sling in place, NWB. F/u with ortho on 7/19-PROM, work towards AROM, wean from sling, hold off on pressure for 2 weeks, f/u in 6 weeks. Minimal to no pain, on tylenol 1000 three times a day prn. No concerns today.   2. HTN: SBP 110s. On lisinopril 20mg at bedtime. Hold parameters.  Stable today, previously increased lisinopril dose.   3. Constipation: On miralax daily prn, senna doc prn.   Stable. bm yesterday.   4. OAB: improved, urinary incontinence at times.   5. Insomnia: melatonin at bedtime, improved since increasing dose.   6. Vitamin d deficiency: on vitamin d. Vit d 45 on 7/10.   7. Diastolic CHF: weights 3 times weekly-104-104-93-04--95lbs. trace ble, analilia hose. dc'd hctz. No shortness of breath today. Appetite good, stable.   8. Viral uri v allergic rhinitis v sinusitis: No shortness of breath, sore throat, fevers, chills, sneezing, other symptoms. Only having some nasal drainage and cough. Will order mucinex two times a day x 5 days. Declines allergy medicine at this time.       Per therapy PT - iliana 8/28, SEC 100ft min A LOB x 2, t/f's SBA/Malinda, bed mob mod A , stairs Min A OT -  UB min A, LB CGA, toileting Min A. Recommending 1-2 weeks, red tag, Lives alone in town home with 1 step    Electronically signed by: Su Scott NP

## 2021-05-30 NOTE — PROGRESS NOTES
[facility]    DATE: 7/15/2019    NAME:  Kristal Cox             :  1924  MRN: [id number]  CODE STATUS:  DNR    VISIT TYPE: Problem Visit (hypertension)     FACILITY:  [facility] [[id number]]       CHIEF COMPLAIN/REASON FOR VISIT:    Chief Complaint   Patient presents with     Problem Visit     hypertension               HISTORY OF PRESENT ILLNESS: Kristal Cox is a 89 y.o. female who was admitted - for fall, right humerus fracture. She tripped and fell while ambulating to the bathroom at home. Ortho recommended non operative treatment. She was place din sling and made NWB. She was discharged to TCU for further rehab. She has PMH of HTN, osteoporosis, anxiety, HLD, IBS. Prior to this she lived at home in a townhouse alone.     Today Ms. Cox is seen for concerns of hypertension. She is concerned about this because her blood pressure is usually well controlled but she does not want to go back on the water pill. She is ok with increasing her lisinopril. She is also asking if she should be on a low salt diet. She generally tries to eat this way and did not eat the holder at breakfast today but is wondering if her diet could be changed so they will not give her things high in salt. She says they were concerned she had some weight loss but she does not think the initial weight here was accurate as she usually runs 98lbs at home. She is sleeping well and has no pain, even if she does have pain it is pretty minimal. She did not ask for anything for pain today was just going to try an ice pack. She is not having any fevers but does have chills at times because she tends to feel cold. She is concerned because she is having a lot of urinary frequency, urgency, and some discomfort when she urinates. She says it was bothersome yesterday and overnight but then she thought she was better this morning but she just went again and nearly had an  accident. She says this is abnormal for her and wondering if she has a urinary tract infection. She is also only going very small amounts and not sure she is completely emptying or not. She denies any dizziness or concerns with bowels today. She does not want diarrhea because that wipes her out but she is not strong enough to strain. She did go a little today so she thinks she is ok right now.     REVIEW OF SYSTEMS:  PROBLEMS AND REVIEW OF SYSTEMS:   Today on ROS:   Currently, no fever, chills, or rigors. Decreased vision and hearing. Denies any chest pain, headaches, palpitations, lightheadedness, dizziness, shortness of breath, or cough. Appetite is good. Denies any GERD symptoms. Denies any difficulty with swallowing, nausea, or vomiting. No insomnia. Positive for right arm pain, right arm sling, leg swelling, difficulty swallowing pills, urinary incontinence, urgency, frequency, small amounts, dysuria worse      Allergies   Allergen Reactions     Evista [Raloxifene]      Caused blood clot     Fosamax [Alendronate] Nausea And Vomiting     Current Outpatient Medications   Medication Sig     acetaminophen (TYLENOL) 500 MG tablet Take 1,000 mg by mouth at bedtime. And three times a day prn     cholecalciferol, vitamin D3, 1,000 unit tablet Take 2,000 Units by mouth daily.     dorzolamide (TRUSOPT) 2 % ophthalmic solution Administer 1 drop to both eyes 2 (two) times a day.            lisinopril (PRINIVIL,ZESTRIL) 10 MG tablet Take 20 mg by mouth daily.            melatonin 3 mg Tab tablet Take 1 tablet (3 mg total) by mouth at bedtime as needed. (Patient taking differently: Take 3 mg by mouth at bedtime.       )     polyethylene glycol (MIRALAX) 17 gram packet Take 1 packet (17 g total) by mouth daily. (Patient taking differently: Take 17 g by mouth daily as needed.       )     ranitidine (ZANTAC) 150 MG tablet Take 150 mg by mouth as needed for heartburn.     senna-docusate (SENNOSIDES-DOCUSATE SODIUM) 8.6-50 mg  tablet Take 1 tablet by mouth 2 (two) times a day as needed. And at bedtime prn            Past Medical History:    Past Medical History:   Diagnosis Date     Anxiety      Hyperlipidemia      IBS (irritable bowel syndrome)      Osteoporosis      Other abnormal clinical finding     evidence of old septal scar on EKG     Systolic hypertension            PHYSICAL EXAMINATION  Vitals:    07/14/19 2116   BP: 170/84   Pulse: (!) 101   Resp: 18   Temp: 97.9  F (36.6  C)   SpO2: 95%   Weight: (!) 98 lb (44.5 kg)       Today on physical exam:     GENERAL: Awake, Alert, oriented x3, not in any form of acute distress, answers questions appropriately, follows simple commands, conversant  HEENT: Head is normocephalic with normal hair distribution. No evidence of trauma. Ears: No acute purulent discharge. Eyes: Conjunctivae pink with no scleral jaundice. Nose: Normal mucosa and septum. NECK: Supple with no cervical or supraclavicular lymphadenopathy. Trachea is midline. Glasses, [place]  CHEST: No tenderness or deformity, no crepitus  LUNG: dim to auscultation with good chest expansion. There are no crackles or wheezes, normal AP diameter.  BACK: No kyphosis of the thoracic spine. Symmetric, no curvature, ROM normal, no CVA tenderness, no spinal tenderness   CVS: There is good S1  S2, regular rhythm, there are no murmurs, rubs, gallops, or heaves,  2+ pulses symmetric in all extremities.  ABDOMEN: Rounded and soft, nontender to palpation, non distended, no masses, no organomegaly, good bowel sounds, no rebound or guarding, no peritoneal signs.   EXTREMITIES: Right arm sling, right shoulder trace edema, trace in fingers, no numb/tingling, cap refill <3 sec, trace ble nonpitting edema  SKIN: Warm and dry, no erythema noted.  Skin color, texture, no rashes or lesions.  NEUROLOGICAL: The patient is oriented to person, place and time. Anxious at times but calm and pleasant today            LABS:   Recent Results (from the past 168  hour(s))   Basic Metabolic Panel   Result Value Ref Range    Sodium 136 136 - 145 mmol/L    Potassium 3.6 3.5 - 5.0 mmol/L    Chloride 99 98 - 107 mmol/L    CO2 26 22 - 31 mmol/L    Anion Gap, Calculation 11 5 - 18 mmol/L    Glucose 83 70 - 125 mg/dL    Calcium 9.4 8.5 - 10.5 mg/dL    BUN 21 8 - 28 mg/dL    Creatinine 0.78 0.60 - 1.10 mg/dL    GFR MDRD Af Amer >60 >60 mL/min/1.73m2    GFR MDRD Non Af Amer >60 >60 mL/min/1.73m2   Vitamin D, Total (25-Hydroxy)   Result Value Ref Range    Vitamin D, Total (25-Hydroxy) 45.0 30.0 - 80.0 ng/mL   HM1 (CBC with Diff)   Result Value Ref Range    WBC 4.4 4.0 - 11.0 thou/uL    RBC 3.26 (L) 3.80 - 5.40 mill/uL    Hemoglobin 10.6 (L) 12.0 - 16.0 g/dL    Hematocrit 31.1 (L) 35.0 - 47.0 %    MCV 95 80 - 100 fL    MCH 32.5 27.0 - 34.0 pg    MCHC 34.1 32.0 - 36.0 g/dL    RDW 13.2 11.0 - 14.5 %    Platelets 177 140 - 440 thou/uL    MPV 10.8 8.5 - 12.5 fL    Neutrophils % 47 (L) 50 - 70 %    Lymphocytes % 32 20 - 40 %    Monocytes % 17 (H) 2 - 10 %    Eosinophils % 3 0 - 6 %    Basophils % 1 0 - 2 %    Neutrophils Absolute 2.1 2.0 - 7.7 thou/uL    Lymphocytes Absolute 1.4 0.8 - 4.4 thou/uL    Monocytes Absolute 0.8 0.0 - 0.9 thou/uL    Eosinophils Absolute 0.1 0.0 - 0.4 thou/uL    Basophils Absolute 0.0 0.0 - 0.2 thou/uL     Results for orders placed or performed in visit on 07/10/19   Basic Metabolic Panel   Result Value Ref Range    Sodium 136 136 - 145 mmol/L    Potassium 3.6 3.5 - 5.0 mmol/L    Chloride 99 98 - 107 mmol/L    CO2 26 22 - 31 mmol/L    Anion Gap, Calculation 11 5 - 18 mmol/L    Glucose 83 70 - 125 mg/dL    Calcium 9.4 8.5 - 10.5 mg/dL    BUN 21 8 - 28 mg/dL    Creatinine 0.78 0.60 - 1.10 mg/dL    GFR MDRD Af Amer >60 >60 mL/min/1.73m2    GFR MDRD Non Af Amer >60 >60 mL/min/1.73m2         Lab Results   Component Value Date    WBC 4.4 07/10/2019    HGB 10.6 (L) 07/10/2019    HCT 31.1 (L) 07/10/2019    MCV 95 07/10/2019     07/10/2019       No results found for:  [id number]  No results found for: HGBA1C  Lab Results   Component Value Date    INR 1.01 03/04/2016     Vitamin D, Total (25-Hydroxy)   Date Value Ref Range Status   07/10/2019 45.0 30.0 - 80.0 ng/mL Final     Lab Results   Component Value Date    TSH 2.69 04/29/2019           ASSESSMENT/PLAN:    1. Fall, Right humerus fracture: Nonoperative treatment. Right shoulder trace edema, trace in fingers, no numb/tingling, cap refill < 3 sec. Sling in place, NWB. F/u with ortho on 7/19. Minimal to no pain, on tylenol 1000 at bedtime and three times a day prn. Not using prn dose. Today just used ice packs.   2. HTN: -180s. On lisinopril 15mg at bedtime. Hold parameters.  Off HCTZ. Increase to 20mg daily, low sodium diet.   3. Constipation: On miralax daily prn, senna doc prn.     4. GERD: On ranitidine daily prn. Not recently used.   5. Insomnia: melatonin at bedtime  6. Vitamin d deficiency: on vitamin d. Vit d 45 on 7/10.   7. Diastolic CHF: weights 3 times weekly-104-104-98-98lbs. trace ble, anlailia casas. dc'd hctz per her request. Will monitor. No shortness of breath today. Reports thinking first weight inaccurate, runs 98lbs at home. Stable. Intake good. Monitor.   8. UTI: dysuria, urinary frequency, small amounts, urgency, incontinence. UA per straight cath x 1 today.       Per therapy PT - iliana 8/28, SEC 100ft min A LOB x 2, t/f's SBA/Malinda, bed mob mod A OT -  UB min/mod A, LB min A, toileting Min A. Red tag, lives in [facility] with one step. Pt, ot 2 weeks.     Electronically signed by: Su Scott NP

## 2021-05-30 NOTE — PROGRESS NOTES
[facility]    DATE: 2019    NAME:  Kristal Cox             :  1924  MRN: [id number]  CODE STATUS:  DNR    VISIT TYPE: Review Of Multiple Medical Conditions     FACILITY:  [facility] [[id number]]       CHIEF COMPLAIN/REASON FOR VISIT:    Chief Complaint   Patient presents with     Review Of Multiple Medical Conditions               HISTORY OF PRESENT ILLNESS: Kristal Cox is a 89 y.o. female who was admitted - for fall, right humerus fracture. She tripped and fell while ambulating to the bathroom at home. Ortho recommended non operative treatment. She was place din sling and made NWB. She was discharged to TCU for further rehab. She has PMH of HTN, osteoporosis, anxiety, HLD, IBS. Prior to this she lived at home in a townhouse alone.     Today Ms. Cox is seen for follow up visit today. She says she thinks she may need a stool softener today but not completely sure. She says she is willing to see how things go on her own today before deciding later today to take one. She doesn't want to get the runs but says she doesn't have the strength to push it out. She says her appetite is good for her and she has never been a big eater. She denies trouble sleeping or concerns in therapy. Her arm is sore but nothing she cannot handle or tolerate. She denies issues with bowels.     REVIEW OF SYSTEMS:  PROBLEMS AND REVIEW OF SYSTEMS:   Today on ROS:   Currently, no fever, chills, or rigors. Decreased vision and hearing. Denies any chest pain, headaches, palpitations, lightheadedness, dizziness, shortness of breath. Appetite is good. Denies any GERD symptoms. Denies any difficulty with swallowing, nausea, or vomiting. No insomnia. Positive for minimal right arm pain, right arm sling, leg swelling stable, difficulty swallowing pills, urinary incontinence at times, constipation      Allergies   Allergen Reactions     Evista [Raloxifene]       Caused blood clot     Fosamax [Alendronate] Nausea And Vomiting     Current Outpatient Medications   Medication Sig     acetaminophen (TYLENOL) 500 MG tablet Take 1,000 mg by mouth 3 (three) times a day as needed.            cholecalciferol, vitamin D3, 1,000 unit tablet Take 2,000 Units by mouth daily.     dorzolamide (TRUSOPT) 2 % ophthalmic solution Administer 1 drop to both eyes 2 (two) times a day.            lisinopril (PRINIVIL,ZESTRIL) 10 MG tablet Take 20 mg by mouth daily.            melatonin 3 mg Tab tablet Take 1 tablet (3 mg total) by mouth at bedtime as needed. (Patient taking differently: Take 6 mg by mouth at bedtime.       )     senna-docusate (SENNOSIDES-DOCUSATE SODIUM) 8.6-50 mg tablet Take 1 tablet by mouth 2 (two) times a day as needed. And at bedtime prn            Past Medical History:    Past Medical History:   Diagnosis Date     Anxiety      Hyperlipidemia      IBS (irritable bowel syndrome)      Osteoporosis      Other abnormal clinical finding     evidence of old septal scar on EKG     Systolic hypertension            PHYSICAL EXAMINATION  Vitals:    07/28/19 1927   BP: 134/78   Pulse: 91   Resp: 18   Temp: 98.8  F (37.1  C)   SpO2: 94%   Weight: (!) 96 lb (43.5 kg)       Today on physical exam:     GENERAL: Awake, Alert, oriented x3, not in any form of acute distress, answers questions appropriately, follows simple commands, conversant  HEENT: Head is normocephalic with normal hair distribution. No evidence of trauma. Ears: No acute purulent discharge. Eyes: Conjunctivae pink with no scleral jaundice. Nose: Normal mucosa and septum. NECK: Supple with no cervical or supraclavicular lymphadenopathy. Trachea is midline. Glasses, [place]  CHEST: No tenderness or deformity, no crepitus  LUNG: dim to auscultation with good chest expansion. There are no crackles or wheezes, normal AP diameter. Cough improved  BACK: No kyphosis of the thoracic spine. Symmetric, no curvature, ROM normal, no CVA  tenderness, no spinal tenderness   CVS: There is good S1  S2, regular rhythm, there are no murmurs, rubs, gallops, or heaves,  2+ pulses symmetric in all extremities.  ABDOMEN: Rounded and soft, nontender to palpation, non distended, no masses, no organomegaly, good bowel sounds, no rebound or guarding, no peritoneal signs.   EXTREMITIES: Right arm sling, right shoulder trace edema, no edema in fingers, no numb/tingling, cap refill <3 sec, trace ble nonpitting edema  SKIN: Warm and dry, no erythema noted.  Skin color, texture, no rashes or lesions.  NEUROLOGICAL: The patient is oriented to person, place and time. Anxious at times but calm and pleasant today            LABS:   No results found for this or any previous visit (from the past 168 hour(s)).  Results for orders placed or performed in visit on 07/10/19   Basic Metabolic Panel   Result Value Ref Range    Sodium 136 136 - 145 mmol/L    Potassium 3.6 3.5 - 5.0 mmol/L    Chloride 99 98 - 107 mmol/L    CO2 26 22 - 31 mmol/L    Anion Gap, Calculation 11 5 - 18 mmol/L    Glucose 83 70 - 125 mg/dL    Calcium 9.4 8.5 - 10.5 mg/dL    BUN 21 8 - 28 mg/dL    Creatinine 0.78 0.60 - 1.10 mg/dL    GFR MDRD Af Amer >60 >60 mL/min/1.73m2    GFR MDRD Non Af Amer >60 >60 mL/min/1.73m2         Lab Results   Component Value Date    WBC 4.4 07/10/2019    HGB 10.6 (L) 07/10/2019    HCT 31.1 (L) 07/10/2019    MCV 95 07/10/2019     07/10/2019       No results found for: [id number]  No results found for: HGBA1C  Lab Results   Component Value Date    INR 1.01 03/04/2016     Vitamin D, Total (25-Hydroxy)   Date Value Ref Range Status   07/10/2019 45.0 30.0 - 80.0 ng/mL Final     Lab Results   Component Value Date    TSH 2.69 04/29/2019           ASSESSMENT/PLAN:    1. Fall, Right humerus fracture: Nonoperative treatment. Right shoulder trace edema, no edema in fingers, no numb/tingling, cap refill < 3 sec. Sling in place, NWB. F/u with ortho on 7/19-PROM, work towards AROM,  wean from sling, hold off on pressure for 2 weeks, f/u in 6 weeks. Minimal to no pain, on tylenol 1000 three times a day prn. No concerns today. No recent use of tylenol per chart review.   2. HTN: SBP 130s. On lisinopril 20mg at bedtime. Hold parameters.  Stable today, previously increased lisinopril dose.   3. Constipation: On miralax daily prn, senna doc prn.   Reports possibly feeling a little constipated today. Will wait until later today to decide on prn senna.   4. OAB: improved, urinary incontinence at times.   5. Insomnia: melatonin at bedtime, improved since increasing dose.   6. Vitamin d deficiency: on vitamin d. Vit d 45 on 7/10.   7. Diastolic CHF: weights 3 times weekly-104-104-09-40--95lbs. trace ble, analilia casas. dc'd hctz. No shortness of breath today. Appetite good, stable.   8. Viral uri v allergic rhinitis v sinusitis: No shortness of breath, sore throat, fevers, chills, sneezing, other symptoms but now resolving. On mucinex. Declines allergy medicine at this time.       Per therapy PT - iliana 8/28, SEC 100ft CGA/min A, sit to stand CGA/Mod A,  t/f's SBA/Malinda, bed mob mod A , stairs CGA/Min A OT -  UB SBA/min A, LB SBA, toileting CGA/Min A. Red tag. Recommending 2 weeks. Lives alone in town house with one step    Electronically signed by: Su Scott NP

## 2021-05-30 NOTE — PROGRESS NOTES
[facility]    DATE: 2019    NAME:  Kristal Cox             :  1924  MRN: [id number]  CODE STATUS:  DNR    VISIT TYPE: Review Of Multiple Medical Conditions     FACILITY:  [facility] [[id number]]       CHIEF COMPLAIN/REASON FOR VISIT:    Chief Complaint   Patient presents with     Review Of Multiple Medical Conditions               HISTORY OF PRESENT ILLNESS: Kristal Cox is a 89 y.o. female who was admitted - for fall, right humerus fracture. She tripped and fell while ambulating to the bathroom at home. Ortho recommended non operative treatment. She was place din sling and made NWB. She was discharged to TCU for further rehab. She has PMH of HTN, osteoporosis, anxiety, HLD, IBS. Prior to this she lived at home in a townhouse alone.     Today Ms. Cox is seen for follow up visit today. She says her pain has been doing pretty well. She did wake up in pain this morning but she thinks it was how she slept. She says overall it has been controlled with ice and tylenol on occasion. She did see the ortho person on Friday and they said she could start some movement and range of motion but they haven't started that yet. She was also told she could wean out of the sling but she does not know how to go about this and hopes someone will tell her. She says she could use a stool softener today because she is a little constipated. Otherwise she slept pretty well and no concerns with urination. She is not having any fevers, chills, shortness of breath, cough. Her leg swelling is better and appetite is good.     REVIEW OF SYSTEMS:  PROBLEMS AND REVIEW OF SYSTEMS:   Today on ROS:   Currently, no fever, chills, or rigors. Decreased vision and hearing. Denies any chest pain, headaches, palpitations, lightheadedness, dizziness, shortness of breath, or cough. Appetite is good. Denies any GERD symptoms. Denies any difficulty with swallowing,  nausea, or vomiting. No insomnia. Positive for minimal right arm pain, right arm sling, leg swelling improved, difficulty swallowing pills, urinary incontinence, urgency, frequency improved, constipation      Allergies   Allergen Reactions     Evista [Raloxifene]      Caused blood clot     Fosamax [Alendronate] Nausea And Vomiting     Current Outpatient Medications   Medication Sig     acetaminophen (TYLENOL) 500 MG tablet Take 1,000 mg by mouth 3 (three) times a day as needed.            cholecalciferol, vitamin D3, 1,000 unit tablet Take 2,000 Units by mouth daily.     dorzolamide (TRUSOPT) 2 % ophthalmic solution Administer 1 drop to both eyes 2 (two) times a day.            lisinopril (PRINIVIL,ZESTRIL) 10 MG tablet Take 20 mg by mouth daily.            melatonin 3 mg Tab tablet Take 1 tablet (3 mg total) by mouth at bedtime as needed. (Patient taking differently: Take 6 mg by mouth at bedtime.       )     senna-docusate (SENNOSIDES-DOCUSATE SODIUM) 8.6-50 mg tablet Take 1 tablet by mouth 2 (two) times a day as needed. And at bedtime prn            Past Medical History:    Past Medical History:   Diagnosis Date     Anxiety      Hyperlipidemia      IBS (irritable bowel syndrome)      Osteoporosis      Other abnormal clinical finding     evidence of old septal scar on EKG     Systolic hypertension            PHYSICAL EXAMINATION  Vitals:    07/21/19 1841   BP: 124/70   Pulse: (!) 106   Resp: 18   Temp: 98.9  F (37.2  C)   SpO2: 94%   Weight: 100 lb (45.4 kg)       Today on physical exam:     GENERAL: Awake, Alert, oriented x3, not in any form of acute distress, answers questions appropriately, follows simple commands, conversant  HEENT: Head is normocephalic with normal hair distribution. No evidence of trauma. Ears: No acute purulent discharge. Eyes: Conjunctivae pink with no scleral jaundice. Nose: Normal mucosa and septum. NECK: Supple with no cervical or supraclavicular lymphadenopathy. Trachea is midline.  Glasses, [place]  CHEST: No tenderness or deformity, no crepitus  LUNG: dim to auscultation with good chest expansion. There are no crackles or wheezes, normal AP diameter.  BACK: No kyphosis of the thoracic spine. Symmetric, no curvature, ROM normal, no CVA tenderness, no spinal tenderness   CVS: There is good S1  S2, regular rhythm, there are no murmurs, rubs, gallops, or heaves,  2+ pulses symmetric in all extremities.  ABDOMEN: Rounded and soft, nontender to palpation, non distended, no masses, no organomegaly, good bowel sounds, no rebound or guarding, no peritoneal signs.   EXTREMITIES: Right arm sling, right shoulder trace edema, no edema in fingers, no numb/tingling, cap refill <3 sec, trace ble nonpitting edema  SKIN: Warm and dry, no erythema noted.  Skin color, texture, no rashes or lesions.  NEUROLOGICAL: The patient is oriented to person, place and time. Anxious at times but calm and pleasant today            LABS:   Recent Results (from the past 168 hour(s))   Urinalysis   Result Value Ref Range    Color, UA Colorless Colorless, Yellow, Straw, Light Yellow    Clarity, UA Clear Clear    Glucose, UA Negative Negative    Bilirubin, UA Negative Negative    Ketones, UA Negative Negative    Specific Gravity, UA 1.003 1.001 - 1.030    Blood, UA Negative Negative    pH, UA 6.5 4.5 - 8.0    Protein, UA Negative Negative mg/dL    Urobilinogen, UA <2.0 E.U./dL <2.0 E.U./dL, 2.0 E.U./dL    Nitrite, UA Negative Negative    Leukocytes, UA Negative Negative   Culture, Urine   Result Value Ref Range    Culture No Growth      Results for orders placed or performed in visit on 07/10/19   Basic Metabolic Panel   Result Value Ref Range    Sodium 136 136 - 145 mmol/L    Potassium 3.6 3.5 - 5.0 mmol/L    Chloride 99 98 - 107 mmol/L    CO2 26 22 - 31 mmol/L    Anion Gap, Calculation 11 5 - 18 mmol/L    Glucose 83 70 - 125 mg/dL    Calcium 9.4 8.5 - 10.5 mg/dL    BUN 21 8 - 28 mg/dL    Creatinine 0.78 0.60 - 1.10 mg/dL    GFR  MDRD Af Amer >60 >60 mL/min/1.73m2    GFR MDRD Non Af Amer >60 >60 mL/min/1.73m2         Lab Results   Component Value Date    WBC 4.4 07/10/2019    HGB 10.6 (L) 07/10/2019    HCT 31.1 (L) 07/10/2019    MCV 95 07/10/2019     07/10/2019       No results found for: [id number]  No results found for: HGBA1C  Lab Results   Component Value Date    INR 1.01 03/04/2016     Vitamin D, Total (25-Hydroxy)   Date Value Ref Range Status   07/10/2019 45.0 30.0 - 80.0 ng/mL Final     Lab Results   Component Value Date    TSH 2.69 04/29/2019           ASSESSMENT/PLAN:    1. Fall, Right humerus fracture: Nonoperative treatment. Right shoulder trace edema, no edema in fingers, no numb/tingling, cap refill < 3 sec. Sling in place, NWB. F/u with ortho on 7/19-PROM, work towards AROM, wean from sling, hold off on pressure for 2 weeks, f/u in 6 weeks. Minimal to no pain, on tylenol 1000 three times a day prn. No concerns today.   2. HTN: SBP 120s. On lisinopril 20mg at bedtime. Hold parameters.  Off HCTZ. low sodium diet. Much improved with increasing lisinopril.   3. Constipation: On miralax daily prn, senna doc prn.   Stool softener today.   4. OAB: dysuria, urinary frequency, small amounts, urgency, incontinence. Urine culture negative, mild improvement in symptoms. Reports improved today, no meds needed.   5. Insomnia: melatonin at bedtime, improved since increasing dose.   6. Vitamin d deficiency: on vitamin d. Vit d 45 on 7/10.   7. Diastolic CHF: weights 3 times weekly-104-104-43-28-51-100lbs. trace ble, analilia hose. dc'd hctz. No shortness of breath today. Appetite good, stable.       Per therapy PT - iliana 8/28, SEC 100ft min A LOB x 2, t/f's SBA/Malinda, bed mob mod A , stairs Min A OT -  UB min A, LB CGA, toileting Min A. Recommending 1-2 weeks, red tag, Lives alone in town home with 1 step    Electronically signed by: Su Scott NP

## 2021-05-30 NOTE — PROGRESS NOTES
[facility]    DATE: 2019    NAME:  Kristal Cox             :  1924  MRN: [id number]  CODE STATUS:  DNR    VISIT TYPE: Review Of Multiple Medical Conditions     FACILITY:  [facility] [[id number]]       CHIEF COMPLAIN/REASON FOR VISIT:    Chief Complaint   Patient presents with     Review Of Multiple Medical Conditions               HISTORY OF PRESENT ILLNESS: Kristal Cox is a 89 y.o. female who was admitted - for fall, right humerus fracture. She tripped and fell while ambulating to the bathroom at home. Ortho recommended non operative treatment. She was place din sling and made NWB. She was discharged to TCU for further rehab. She has PMH of HTN, osteoporosis, anxiety, HLD, IBS. Prior to this she lived at home in a townhouse alone.     Today Ms. Cox is seen for follow up visit today. She says her pain has been doing well and she doesn't think she needs the tylenol at bedtime at night anymore. She just would like it as needed. She says her bowels are moving well and doesn't need the stool softeners. She says she is still having the urinary frequency and gets up often at night to go to the bathroom. However she says it was better last night than before. She does not want to try any meds at this time and is relieved to hear it is not an infection. She says she is not sleeping well and would like her melatonin increased if possible. She does not like to take meds though. She is not having any stomach upset and not used the ranitidine at all. Her weights are stable and vitals are good. Her blood pressures are better controlled now. She denies any other concerns today.     REVIEW OF SYSTEMS:  PROBLEMS AND REVIEW OF SYSTEMS:   Today on ROS:   Currently, no fever, chills, or rigors. Decreased vision and hearing. Denies any chest pain, headaches, palpitations, lightheadedness, dizziness, shortness of breath, or cough. Appetite  is good. Denies any GERD symptoms. Denies any difficulty with swallowing, nausea, or vomiting. No insomnia. Positive for minimal right arm pain, right arm sling, leg swelling improved, difficulty swallowing pills, urinary incontinence, urgency, frequency, small amounts, dysuria mildly improved      Allergies   Allergen Reactions     Evista [Raloxifene]      Caused blood clot     Fosamax [Alendronate] Nausea And Vomiting     Current Outpatient Medications   Medication Sig     acetaminophen (TYLENOL) 500 MG tablet Take 1,000 mg by mouth 3 (three) times a day as needed.            cholecalciferol, vitamin D3, 1,000 unit tablet Take 2,000 Units by mouth daily.     dorzolamide (TRUSOPT) 2 % ophthalmic solution Administer 1 drop to both eyes 2 (two) times a day.            lisinopril (PRINIVIL,ZESTRIL) 10 MG tablet Take 20 mg by mouth daily.            melatonin 3 mg Tab tablet Take 1 tablet (3 mg total) by mouth at bedtime as needed. (Patient taking differently: Take 6 mg by mouth at bedtime.       )     senna-docusate (SENNOSIDES-DOCUSATE SODIUM) 8.6-50 mg tablet Take 1 tablet by mouth 2 (two) times a day as needed. And at bedtime prn            Past Medical History:    Past Medical History:   Diagnosis Date     Anxiety      Hyperlipidemia      IBS (irritable bowel syndrome)      Osteoporosis      Other abnormal clinical finding     evidence of old septal scar on EKG     Systolic hypertension            PHYSICAL EXAMINATION  Vitals:    07/18/19 2224   BP: 119/76   Pulse: (!) 101   Resp: 18   Temp: 98.3  F (36.8  C)   SpO2: 94%   Weight: (!) 95 lb (43.1 kg)       Today on physical exam:     GENERAL: Awake, Alert, oriented x3, not in any form of acute distress, answers questions appropriately, follows simple commands, conversant  HEENT: Head is normocephalic with normal hair distribution. No evidence of trauma. Ears: No acute purulent discharge. Eyes: Conjunctivae pink with no scleral jaundice. Nose: Normal mucosa and  septum. NECK: Supple with no cervical or supraclavicular lymphadenopathy. Trachea is midline. Glasses, [place]  CHEST: No tenderness or deformity, no crepitus  LUNG: dim to auscultation with good chest expansion. There are no crackles or wheezes, normal AP diameter.  BACK: No kyphosis of the thoracic spine. Symmetric, no curvature, ROM normal, no CVA tenderness, no spinal tenderness   CVS: There is good S1  S2, regular rhythm, there are no murmurs, rubs, gallops, or heaves,  2+ pulses symmetric in all extremities.  ABDOMEN: Rounded and soft, nontender to palpation, non distended, no masses, no organomegaly, good bowel sounds, no rebound or guarding, no peritoneal signs.   EXTREMITIES: Right arm sling, right shoulder trace edema, trace in fingers, no numb/tingling, cap refill <3 sec, trace ble nonpitting edema  SKIN: Warm and dry, no erythema noted.  Skin color, texture, no rashes or lesions.  NEUROLOGICAL: The patient is oriented to person, place and time. Anxious at times but calm and pleasant today            LABS:   Recent Results (from the past 168 hour(s))   Urinalysis   Result Value Ref Range    Color, UA Colorless Colorless, Yellow, Straw, Light Yellow    Clarity, UA Clear Clear    Glucose, UA Negative Negative    Bilirubin, UA Negative Negative    Ketones, UA Negative Negative    Specific Gravity, UA 1.003 1.001 - 1.030    Blood, UA Negative Negative    pH, UA 6.5 4.5 - 8.0    Protein, UA Negative Negative mg/dL    Urobilinogen, UA <2.0 E.U./dL <2.0 E.U./dL, 2.0 E.U./dL    Nitrite, UA Negative Negative    Leukocytes, UA Negative Negative   Culture, Urine   Result Value Ref Range    Culture No Growth      Results for orders placed or performed in visit on 07/10/19   Basic Metabolic Panel   Result Value Ref Range    Sodium 136 136 - 145 mmol/L    Potassium 3.6 3.5 - 5.0 mmol/L    Chloride 99 98 - 107 mmol/L    CO2 26 22 - 31 mmol/L    Anion Gap, Calculation 11 5 - 18 mmol/L    Glucose 83 70 - 125 mg/dL    Calcium  9.4 8.5 - 10.5 mg/dL    BUN 21 8 - 28 mg/dL    Creatinine 0.78 0.60 - 1.10 mg/dL    GFR MDRD Af Amer >60 >60 mL/min/1.73m2    GFR MDRD Non Af Amer >60 >60 mL/min/1.73m2         Lab Results   Component Value Date    WBC 4.4 07/10/2019    HGB 10.6 (L) 07/10/2019    HCT 31.1 (L) 07/10/2019    MCV 95 07/10/2019     07/10/2019       No results found for: [id number]  No results found for: HGBA1C  Lab Results   Component Value Date    INR 1.01 03/04/2016     Vitamin D, Total (25-Hydroxy)   Date Value Ref Range Status   07/10/2019 45.0 30.0 - 80.0 ng/mL Final     Lab Results   Component Value Date    TSH 2.69 04/29/2019           ASSESSMENT/PLAN:    1. Fall, Right humerus fracture: Nonoperative treatment. Right shoulder trace edema, trace in fingers, no numb/tingling, cap refill < 3 sec. Sling in place, NWB. F/u with ortho on 7/19. Minimal to no pain, on tylenol 1000 at bedtime and three times a day prn. No tylenol use several day, discussed and will change to just three times a day prn. No scheduled doses. Much improved. Ortho this afternoon.   2. HTN: SBP 110s. On lisinopril 20mg at bedtime. Hold parameters.  Off HCTZ. low sodium diet. Much improved with increasing lisinopril.   3. Constipation: On miralax daily prn, senna doc prn.   No stool softeners needed, moving much better now. Will dc miralax.   4. GERD: dc ranitidine as not using.  5. Insomnia: melatonin at bedtime  6. Vitamin d deficiency: on vitamin d. Vit d 45 on 7/10.   7. Diastolic CHF: weights 3 times weekly-104-104-98-98-95lbs. trace ble, analilia hose. dc'd hctz. No shortness of breath today. Reports thinking first weight inaccurate, runs 98lbs at home. Intake is good but says rushed at meals at times.   8. OAB: dysuria, urinary frequency, small amounts, urgency, incontinence. Urine culture negative, mild improvement in symptoms. Discussed med options and declines at this time.       Per therapy PT - iliana 8/28, SEC 100ft min A LOB x 2, t/f's SBA/Malinda,  bed mob mod A OT -  UB min/mod A, LB min A, toileting Min A. Red tag, lives in [facility] with one step. Pt, ot 2 weeks.     Electronically signed by: Su Scott NP      Total floor/unit time spent 35 min with 25 min spent on counseling and coordination of care. Counseling regarding med changes for gerd, bowel regimen, pain, insomnia. Counseling regarding weight loss, supplement recs, dietary changes. Discussed therapy and ortho restrictions, ortho follow up. Coordinated care with nursing for management of pain, insomnia, gerd, bowels, ortho follow up, skilled therapy needs.

## 2021-05-30 NOTE — PROGRESS NOTES
[facility]    DATE: 2019    NAME:  Kristal Cox             :  1924  MRN: [id number]  CODE STATUS:  DNR    VISIT TYPE: Problem Visit (hospital f/u)     FACILITY:  [facility] [[id number]]       CHIEF COMPLAIN/REASON FOR VISIT:    Chief Complaint   Patient presents with     Problem Visit     hospital f/u               HISTORY OF PRESENT ILLNESS: Kristal Cox is a 89 y.o. female who was admitted - for fall, right humerus fracture. She tripped and fell while ambulating to the bathroom at home. Ortho recommended non operative treatment. She was place din sling and made NWB. She was discharged to TCU for further rehab. She has PMH of HTN, osteoporosis, anxiety, HLD, IBS. Prior to this she lived at home in a townhouse alone.     Today Ms. Cox states she is not having any dizziness and is wearing her arm sling. Her appetite has been fair but doesn't feel she is doing enough to be hungry. It is also difficult for her to be learning to feed herself with her left hand. She is not having any trouble urinating but later says she does have hesitancy and incontinence and wears incontinent pads at home. She had felt her pain was controlled until last night. She had a very rough night and she had tylenol prior to bed. She doesn't feel like she needs the tylenol all the time but definitely at night. She does feel constipated and drank some prune juice today. She has had problems with this before at home too. She would like something else ordered for constipation. She uses colace at times at home so something similar to this should work. She is not having any nausea or stomach upset but feels uncomfortable from the constipation. She has trouble swallowing pills and has to take them in applesauce. She wears glasses and hearing aids. She has had some swelling in her legs but thinks it has gone down recently. Her ankles were before the fall.  She is not having any shortness of breath or cough. She did not sleep well last night because of pain but before this was sleeping fine. She is wondering if her water pill can be stopped because she thinks it makes her urinate too frequently and contribute to her incontinence. She says she always has been an anxious person and worried a lot. She feels nervous easily but declines ACP consult at this time. She says she lives alone. When she fell she tripped while going to the bathroom. She had to drag herself back across the room to get to her phone to call 911. Then none of her doors were open so they had to break in to get to her. She lives in a house with a downstairs. She had a walker and cane before but did not use them much. She used the cane more than the walker. She has one son who helps do some of her cooking but otherwise she uses the microwave a lot. She never drove so her kids always drive her to appointments. She has 4 children and all live in [place]. Clarified code status and she does want to be DNR.     REVIEW OF SYSTEMS:  PROBLEMS AND REVIEW OF SYSTEMS:   Today on ROS:   Currently, no fever, chills, or rigors. Decreased vision and hearing. Denies any chest pain, headaches, palpitations, lightheadedness, dizziness, shortness of breath, or cough. Appetite is good. Denies any GERD symptoms. Denies any difficulty with swallowing, nausea, or vomiting. No insomnia. Positive for weakness, right arm pain, right arm sling, constipation, leg swelling, difficulty swallowing pills, urinary incontinence, hesitancy      Allergies   Allergen Reactions     Evista [Raloxifene]      Caused blood clot     Fosamax [Alendronate] Nausea And Vomiting     Current Outpatient Medications   Medication Sig     acetaminophen (TYLENOL) 500 MG tablet Take 1,000 mg by mouth at bedtime. And three times a day prn     senna-docusate (SENNOSIDES-DOCUSATE SODIUM) 8.6-50 mg tablet Take 1 tablet by mouth daily. And at bedtime prn      cholecalciferol, vitamin D3, 1,000 unit tablet Take 2,000 Units by mouth daily.     dorzolamide (TRUSOPT) 2 % ophthalmic solution Administer 1 drop to both eyes 2 (two) times a day.            lisinopril (PRINIVIL,ZESTRIL) 10 MG tablet Take 15 mg by mouth daily.            melatonin 3 mg Tab tablet Take 1 tablet (3 mg total) by mouth at bedtime as needed. (Patient taking differently: Take 3 mg by mouth at bedtime.       )     polyethylene glycol (MIRALAX) 17 gram packet Take 1 packet (17 g total) by mouth daily.     ranitidine (ZANTAC) 150 MG tablet Take 150 mg by mouth as needed for heartburn.     Past Medical History:    Past Medical History:   Diagnosis Date     Anxiety      Hyperlipidemia      IBS (irritable bowel syndrome)      Osteoporosis      Other abnormal clinical finding     evidence of old septal scar on EKG     Systolic hypertension            PHYSICAL EXAMINATION  Vitals:    07/07/19 2027   BP: 128/81   Pulse: 90   Resp: 18   Temp: 96.9  F (36.1  C)   SpO2: 97%   Weight: 104 lb (47.2 kg)       Today on physical exam:     GENERAL: Awake, Alert, oriented x3, not in any form of acute distress, answers questions appropriately, follows simple commands, conversant  HEENT: Head is normocephalic with normal hair distribution. No evidence of trauma. Ears: No acute purulent discharge. Eyes: Conjunctivae pink with no scleral jaundice. Nose: Normal mucosa and septum. NECK: Supple with no cervical or supraclavicular lymphadenopathy. Trachea is midline. Glasses, [place]  CHEST: No tenderness or deformity, no crepitus  LUNG: dim to auscultation with good chest expansion. There are no crackles or wheezes, normal AP diameter.  BACK: No kyphosis of the thoracic spine. Symmetric, no curvature, ROM normal, no CVA tenderness, no spinal tenderness   CVS: There is good S1  S2, regular rhythm, there are no murmurs, rubs, gallops, or heaves,  2+ pulses symmetric in all extremities.  ABDOMEN: Rounded and soft, nontender to  palpation, non distended, no masses, no organomegaly, good bowel sounds, no rebound or guarding, no peritoneal signs.   EXTREMITIES: Right arm sling, right shoulder 2+ edema, trace in fingers, no numb/tingling, cap refill <3 sec, 1+ ble nonpitting edema  SKIN: Warm and dry, no erythema noted.  Skin color, texture, no rashes or lesions.  NEUROLOGICAL: The patient is oriented to person, place and time. Anxious at times but calm and pleasant today            LABS:   Recent Results (from the past 168 hour(s))   ECG 12 lead nursing unit performed   Result Value Ref Range    SYSTOLIC BLOOD PRESSURE 137 mmHg    DIASTOLIC BLOOD PRESSURE 94 mmHg    VENTRICULAR RATE 104 BPM    ATRIAL RATE 104 BPM    P-R INTERVAL 206 ms    QRS DURATION 116 ms    Q-T INTERVAL 356 ms    QTC CALCULATION (BEZET) 468 ms    P Axis 78 degrees    R AXIS -26 degrees    T AXIS 103 degrees    MUSE DIAGNOSIS       Sinus tachycardia  Anteroseptal infarct , possibly acute  T wave abnormality, consider lateral ischemia  Abnormal ECG  When compared with ECG of 08-FEB-2018 12:43,  Left bundle branch block is no longer Present  Anteroseptal infarct is now Present  Confirmed by TRACIE HAUSER, LES LOC: ([id number]) on 7/4/2019 3:43:06 PM     Basic Metabolic Panel   Result Value Ref Range    Sodium 137 136 - 145 mmol/L    Potassium 3.8 3.5 - 5.0 mmol/L    Chloride 102 98 - 107 mmol/L    CO2 25 22 - 31 mmol/L    Anion Gap, Calculation 10 5 - 18 mmol/L    Glucose 109 70 - 125 mg/dL    Calcium 10.1 8.5 - 10.5 mg/dL    BUN 16 8 - 28 mg/dL    Creatinine 0.86 0.60 - 1.10 mg/dL    GFR MDRD Af Amer >60 >60 mL/min/1.73m2    GFR MDRD Non Af Amer >60 >60 mL/min/1.73m2   HM1 (CBC with Diff)   Result Value Ref Range    WBC 8.8 4.0 - 11.0 thou/uL    RBC 4.17 3.80 - 5.40 mill/uL    Hemoglobin 13.1 12.0 - 16.0 g/dL    Hematocrit 39.2 35.0 - 47.0 %    MCV 94 80 - 100 fL    MCH 31.4 27.0 - 34.0 pg    MCHC 33.4 32.0 - 36.0 g/dL    RDW 13.2 11.0 - 14.5 %    Platelets 172 140 - 440 thou/uL     MPV 10.3 8.5 - 12.5 fL    Neutrophils % 70 50 - 70 %    Lymphocytes % 19 (L) 20 - 40 %    Monocytes % 9 2 - 10 %    Eosinophils % 1 0 - 6 %    Basophils % 1 0 - 2 %    Neutrophils Absolute 6.2 2.0 - 7.7 thou/uL    Lymphocytes Absolute 1.7 0.8 - 4.4 thou/uL    Monocytes Absolute 0.8 0.0 - 0.9 thou/uL    Eosinophils Absolute 0.1 0.0 - 0.4 thou/uL    Basophils Absolute 0.1 0.0 - 0.2 thou/uL     Results for orders placed or performed during the hospital encounter of 07/04/19   Basic Metabolic Panel   Result Value Ref Range    Sodium 137 136 - 145 mmol/L    Potassium 3.8 3.5 - 5.0 mmol/L    Chloride 102 98 - 107 mmol/L    CO2 25 22 - 31 mmol/L    Anion Gap, Calculation 10 5 - 18 mmol/L    Glucose 109 70 - 125 mg/dL    Calcium 10.1 8.5 - 10.5 mg/dL    BUN 16 8 - 28 mg/dL    Creatinine 0.86 0.60 - 1.10 mg/dL    GFR MDRD Af Amer >60 >60 mL/min/1.73m2    GFR MDRD Non Af Amer >60 >60 mL/min/1.73m2         Lab Results   Component Value Date    WBC 8.8 07/04/2019    HGB 13.1 07/04/2019    HCT 39.2 07/04/2019    MCV 94 07/04/2019     07/04/2019       No results found for: EDUPZINT20  No results found for: HGBA1C  Lab Results   Component Value Date    INR 1.01 03/04/2016     Vitamin D, Total (25-Hydroxy)   Date Value Ref Range Status   07/07/2017 57.2 30.0 - 80.0 ng/mL Final     Lab Results   Component Value Date    TSH 2.69 04/29/2019           ASSESSMENT/PLAN:    1. Fall, Right humerus fracture: Nonoperative treatment. Right shoulder 2+ edema, trace in fingers, no numb/tingling, cap refill < 3 sec. Sling in place, NWB. F/u with ortho in 2 weeks. Pain not well controlled, will change to tylenol 1000 at bedtime and three times a day prn.   2. HTN: SBP 120s. On HCTZ and lisinopril. Will dc hctz, increase lisinopril to 15mg at bedtime. Hold parameters.   3. Constipation: On miralax daily, will add senna doc daily.   4. GERD: On ranitidine daily prn. Not recently used.   5. Insomnia: melatonin at bedtime prn, will change  to scheduled.   6. Vitamin d deficiency: on vitamin d.   7. Diastolic CHF: weights 3 times weekly-104-104lbs. 1+ ble, ordered analilia hose. dc'd hctz per her request. Will monitor. No shortness of breath today. Monitor closely.     Bmp, hm1, vit d on 7/10    Per therapy eval today    Electronically signed by: Su Scott NP    Total floor/unit time spent 35 with 25 time spent on counseling and coordination of care. Counseling was done regarding hospital course, med changes, pain management, hypertension management, skilled therapy needs, ortho restrictions, specialty care follow up, lab monitoring. Coordinated care with nursing for management of hypertension, pain, lab monitoring, ortho follow up.

## 2021-05-30 NOTE — PROGRESS NOTES
[facility]    Facility:   [facility] [[id number]]   Code Status: DNR/DNI      CHIEF COMPLAINT/REASON FOR VISIT:  Chief Complaint   Patient presents with     Review Of Multiple Medical Conditions       HISTORY:      HPI: Kristal is a 89 y.o. female who had a fall that resulted in proximal right humeral fracture.  Orthopedic consultation recommended conservative measures of using a sling.  She does have underlying medical conditions which include hypertension, osteoporosis, anxiety, irritable bowel syndrome, and hyperlipidemia.    Upon current review of systems the orthopedic consultant has allowed more activity with the arm and a weaning away from the sling.  Therapy department mentions that she has had some pain and they are going to look into some other modalities that may help out such as ultrasound.  She is not having other problems such as fevers or chills or cough or shortness of breath or chest pain or new bowel or bladder problems.    Past Medical History:   Diagnosis Date     Anxiety      Hyperlipidemia      IBS (irritable bowel syndrome)      Osteoporosis      Other abnormal clinical finding     evidence of old septal scar on EKG     Systolic hypertension              Family History   Problem Relation Age of Onset     Stroke Mother         passed age 75     Crohn's disease Father         passed in his 20's from bowel obstructions     Social History     Socioeconomic History     Marital status:      Spouse name: Not on file     Number of children: Not on file     Years of education: Not on file     Highest education level: Not on file   Occupational History     Not on file   Social Needs     Financial resource strain: Not on file     Food insecurity:     Worry: Not on file     Inability: Not on file     Transportation needs:     Medical: Not on file     Non-medical: Not on file   Tobacco Use     Smoking status: Never Smoker     Smokeless tobacco: Never Used    Substance and Sexual Activity     Alcohol use: No     Comment: rarely drinks wine, and drinks wine watered down with ice.     Drug use: Yes     Sexual activity: Not Currently   Lifestyle     Physical activity:     Days per week: Not on file     Minutes per session: Not on file     Stress: Not on file   Relationships     Social connections:     Talks on phone: Not on file     Gets together: Not on file     Attends [employer] service: Not on file     Active member of club or organization: Not on file     Attends meetings of clubs or organizations: Not on file     Relationship status: Not on file     Intimate partner violence:     Fear of current or ex partner: Not on file     Emotionally abused: Not on file     Physically abused: Not on file     Forced sexual activity: Not on file   Other Topics Concern     Not on file   Social History Narrative     since 2009, has children         Review of Systems    .  Vitals:    07/30/19 1632   BP: 158/65   Pulse: 89   Temp: 97.8  F (36.6  C)   SpO2: 96%       Physical Exam   Constitutional: No distress.   HENT:   Nose: Nose normal.   Eyes: Right eye exhibits no discharge. Left eye exhibits no discharge.   Neck: No JVD present.   Cardiovascular: Normal heart sounds.   Pulmonary/Chest: Breath sounds normal. No respiratory distress.   Neurological: She is alert.   Psychiatric: She has a normal mood and affect.   Nursing note and vitals reviewed.        LABS:   No new laboratory testing.    ASSESSMENT:      ICD-10-CM    1. Closed fracture of proximal end of right humerus with routine healing, unspecified fracture morphology, subsequent encounter S42.201D    2. Fall, subsequent encounter W19.XXXD    3. Diastolic congestive heart failure, unspecified HF chronicity (H) I50.30    4. Physical deconditioning R53.81        PLAN:    I offered encouragement of doing as much activity as therapy allows to decrease the risk of a frozen shoulder.  Continue with current  cares.    Electronically signed by: Vasu Guerra MD

## 2021-05-30 NOTE — PROGRESS NOTES
[facility]      Facility:    [facility] [[id number]]  Code Status: DNR/DNI      Chief Complaint/Reason for Visit:  Chief Complaint   Patient presents with     H & P       HPI:   Kristal is a 89 y.o. female who had a fall that resulted in proximal right humeral fracture.  Orthopedic consultation recommended conservative measures of using a sling.  She does have underlying medical conditions which include hypertension, osteoporosis, anxiety, irritable bowel syndrome, and hyperlipidemia.    Upon current review of systems she has had some urinary frequency.  She is not experiencing fevers or chills.  She does not have sore throat or nasal congestion.  She does not have chest pain or palpitations of the heart.  She does not have abdominal pain or nausea.    Past Medical History:  Past Medical History:   Diagnosis Date     Anxiety      Hyperlipidemia      IBS (irritable bowel syndrome)      Osteoporosis      Other abnormal clinical finding     evidence of old septal scar on EKG     Systolic hypertension            Surgical History:  Past Surgical History:   Procedure Laterality Date     BREAST BIOPSY      for benign disease     CATARACT EXTRACTION, BILATERAL        SECTION      X 4     CHOLECYSTECTOMY       TONSILLECTOMY AND ADENOIDECTOMY         Family History:   Family History   Problem Relation Age of Onset     Stroke Mother         passed age 75     Crohn's disease Father         passed in his 20's from bowel obstructions       Social History:    Social History     Socioeconomic History     Marital status:      Spouse name: Not on file     Number of children: Not on file     Years of education: Not on file     Highest education level: Not on file   Occupational History     Not on file   Social Needs     Financial resource strain: Not on file     Food insecurity:     Worry: Not on file     Inability: Not on file     Transportation needs:     Medical: Not on  file     Non-medical: Not on file   Tobacco Use     Smoking status: Never Smoker     Smokeless tobacco: Never Used   Substance and Sexual Activity     Alcohol use: No     Comment: rarely drinks wine, and drinks wine watered down with ice.     Drug use: Yes     Sexual activity: Not Currently   Lifestyle     Physical activity:     Days per week: Not on file     Minutes per session: Not on file     Stress: Not on file   Relationships     Social connections:     Talks on phone: Not on file     Gets together: Not on file     Attends Taoism service: Not on file     Active member of club or organization: Not on file     Attends meetings of clubs or organizations: Not on file     Relationship status: Not on file     Intimate partner violence:     Fear of current or ex partner: Not on file     Emotionally abused: Not on file     Physically abused: Not on file     Forced sexual activity: Not on file   Other Topics Concern     Not on file   Social History Narrative     since 2009, has children          Review of Systems   All other systems reviewed and are negative.      Vitals:    07/17/19 1423   BP: 112/68   Pulse: 83   Temp: 98.9  F (37.2  C)   SpO2: 95%       Physical Exam   Constitutional: No distress.   HENT:   Mouth/Throat: Oropharynx is clear and moist.   Eyes: Right eye exhibits no discharge. Left eye exhibits no discharge.   Neck: No JVD present. No thyromegaly present.   Cardiovascular: Normal heart sounds.   Pulmonary/Chest: Breath sounds normal. No respiratory distress.   Abdominal: Soft. Bowel sounds are normal. There is no tenderness.   Musculoskeletal:   Right arm is in a sling.  Distal CMS normal.   Lymphadenopathy:     She has no cervical adenopathy.   Neurological: She is alert.   Skin: Skin is warm and dry.   Psychiatric: She has a normal mood and affect.   Nursing note and vitals reviewed.      Medication List:  Current Outpatient Medications   Medication Sig     acetaminophen (TYLENOL) 500 MG  tablet Take 1,000 mg by mouth at bedtime. And three times a day prn     cholecalciferol, vitamin D3, 1,000 unit tablet Take 2,000 Units by mouth daily.     dorzolamide (TRUSOPT) 2 % ophthalmic solution Administer 1 drop to both eyes 2 (two) times a day.            lisinopril (PRINIVIL,ZESTRIL) 10 MG tablet Take 20 mg by mouth daily.            melatonin 3 mg Tab tablet Take 1 tablet (3 mg total) by mouth at bedtime as needed. (Patient taking differently: Take 3 mg by mouth at bedtime.       )     polyethylene glycol (MIRALAX) 17 gram packet Take 1 packet (17 g total) by mouth daily. (Patient taking differently: Take 17 g by mouth daily as needed.       )     ranitidine (ZANTAC) 150 MG tablet Take 150 mg by mouth as needed for heartburn.     senna-docusate (SENNOSIDES-DOCUSATE SODIUM) 8.6-50 mg tablet Take 1 tablet by mouth 2 (two) times a day as needed. And at bedtime prn              Labs:  Urinalysis is negative.    Assessment:    ICD-10-CM    1. Closed fracture of proximal end of right humerus with routine healing, unspecified fracture morphology, subsequent encounter S42.201D    2. Fall, subsequent encounter W19.XXXD    3. Physical deconditioning R53.81    4. Age-related osteoporosis without current pathological fracture M81.0        Plan:  Continue with evaluation and treatment by occupational and physical therapies regarding strength, gait, and independence of activities of daily living.  Continue to monitor medical conditions.      Electronically signed by: Vasu Guerra MD

## 2021-05-31 ENCOUNTER — RECORDS - HEALTHEAST (OUTPATIENT)
Dept: ADMINISTRATIVE | Facility: CLINIC | Age: 86
End: 2021-05-31

## 2021-05-31 VITALS — WEIGHT: 104 LBS | BODY MASS INDEX: 19.14 KG/M2 | HEIGHT: 62 IN

## 2021-05-31 VITALS — WEIGHT: 106 LBS | BODY MASS INDEX: 19.51 KG/M2 | HEIGHT: 62 IN

## 2021-05-31 NOTE — PROGRESS NOTES
[facility]    DATE: 8/15/2019    NAME:  Kristal Cox             :  1924  MRN: [id number]  CODE STATUS:  DNR    VISIT TYPE: Review Of Multiple Medical Conditions     FACILITY:  [facility] [[id number]]       CHIEF COMPLAIN/REASON FOR VISIT:    Chief Complaint   Patient presents with     Review Of Multiple Medical Conditions               HISTORY OF PRESENT ILLNESS: Kristal Cox is a 89 y.o. female who was admitted - for fall, right humerus fracture. She tripped and fell while ambulating to the bathroom at home. Ortho recommended non operative treatment. She was place din sling and made NWB. She was discharged to TCU for further rehab. She has PMH of HTN, osteoporosis, anxiety, HLD, IBS. Prior to this she lived at home in a townhouse alone.     Today Ms. Cox is seen for follow up visit. Staff do note she broke out into rash and itching again yesterday and had to change her clothes. She did use the sarna lotion because hydrocortisone cream had not come from pharmacy yet and this did help some. Today she has not been complaining of itching. They are concerned this may be from the laundry detergent on her clothes and they notified her daughter again. Kristal says she is not itching but not sure if she has a rash still today. She is wondering if her daughter uses more laundry detergent than what she does at home. It is under the clothes material that she itches and not anywhere else. She feels better when she changes out of her clothes. She says she did not sleep well because of itching last night. She feels her daughter has a lot going on and is afraid this is going to add more stress to her plate since she was kind enough to already be washing her clothes for her. She says the lotion yesterday did help some. She is wondering if she had a stool softener this morning because she had a bm and doesn't think she needs one. We  discussed that she has really struggled with constipation and should take it every day to keep regular. She says she has no breathing issues and appetite is fine. She is concerned about her right eye itching and irritated. There was a little bit of drainage on it this morning too and wonders if she is coming down with something.     REVIEW OF SYSTEMS:  PROBLEMS AND REVIEW OF SYSTEMS:   Today on ROS:   Currently, no fever, chills, or rigors. Decreased vision and hearing. Denies any chest pain, headaches, palpitations, lightheadedness, dizziness, shortness of breath. Appetite is good. Denies any GERD symptoms. Denies any difficulty with swallowing, nausea, or vomiting. No insomnia. Positive for minimal to no right arm pain, leg swelling improved, urinary incontinence at times,  hair thinning and loss, rash and itching periodically, right eye itching, irritation, drainage, bowels moving better      Allergies   Allergen Reactions     Evista [Raloxifene]      Caused blood clot     Fosamax [Alendronate] Nausea And Vomiting     Current Outpatient Medications   Medication Sig     acetaminophen (TYLENOL) 500 MG tablet Take 1,000 mg by mouth 3 (three) times a day as needed.            camphor-menthol (SARNA) lotion Apply 1 application topically every 6 (six) hours as needed for itching.     cholecalciferol, vitamin D3, 1,000 unit tablet Take 2,000 Units by mouth daily.     dorzolamide (TRUSOPT) 2 % ophthalmic solution Administer 1 drop to both eyes 2 (two) times a day.            hydrocortisone 1 % cream Apply 1 application topically every 6 (six) hours as needed.     lisinopril (PRINIVIL,ZESTRIL) 10 MG tablet Take 20 mg by mouth daily.            melatonin 3 mg Tab tablet Take 1 tablet (3 mg total) by mouth at bedtime as needed. (Patient taking differently: Take 6 mg by mouth at bedtime.       )     polyethylene glycol (MIRALAX) 17 gram packet Take 17 g by mouth daily.     senna-docusate (SENNOSIDES-DOCUSATE SODIUM) 8.6-50  mg tablet Take 1 tablet by mouth daily. And at bedtime prn            Past Medical History:    Past Medical History:   Diagnosis Date     Anxiety      Hyperlipidemia      IBS (irritable bowel syndrome)      Osteoporosis      Other abnormal clinical finding     evidence of old septal scar on EKG     Systolic hypertension            PHYSICAL EXAMINATION  Vitals:    08/14/19 2132   BP: 113/61   Pulse: 75   Resp: 18   Temp: 98.6  F (37  C)   SpO2: 95%   Weight: (!) 94 lb (42.6 kg)       Today on physical exam:     GENERAL: Awake, Alert, oriented x3, not in any form of acute distress, answers questions appropriately, follows simple commands, conversant  HEENT: Head is normocephalic with normal hair distribution. No evidence of trauma. Ears: No acute purulent discharge. Eyes: Conjunctivae pink with no scleral jaundice. Nose: Normal mucosa and septum. NECK: Supple with no cervical or supraclavicular lymphadenopathy. Trachea is midline. Glasses, [place], hair thinning, loss, right eye mild erythema, small amount of clear to yellow stringy drainage, itching present  CHEST: No tenderness or deformity, no crepitus  LUNG: dim to auscultation with good chest expansion. There are no crackles or wheezes, normal AP diameter.   BACK: No kyphosis of the thoracic spine. Symmetric, no curvature, ROM normal, no CVA tenderness, no spinal tenderness   CVS: There is good S1  S2, regular rhythm, there are no murmurs, rubs, gallops, or heaves,  2+ pulses symmetric in all extremities.  ABDOMEN: Rounded and soft, nontender to palpation, non distended, no masses, no organomegaly, good bowel sounds, no rebound or guarding, no peritoneal signs.   EXTREMITIES: right shoulder no edema, no edema in fingers, no numb/tingling, cap refill <3 sec, trace ble nonpitting edema, analilia hose  SKIN: Warm and dry, no erythema noted.  Skin color, texture, no rashes or lesions.  NEUROLOGICAL: The patient is oriented to person, place and time. Calm and  pleasant            LABS:   Recent Results (from the past 168 hour(s))   Thyroid Stimulating Hormone (TSH)   Result Value Ref Range    TSH 2.40 0.30 - 5.00 uIU/mL   Vitamin D, Total (25-Hydroxy)   Result Value Ref Range    Vitamin D, Total (25-Hydroxy) 49.0 30.0 - 80.0 ng/mL   Comprehensive Metabolic Panel   Result Value Ref Range    Sodium 138 136 - 145 mmol/L    Potassium 4.0 3.5 - 5.0 mmol/L    Chloride 106 98 - 107 mmol/L    CO2 27 22 - 31 mmol/L    Anion Gap, Calculation 5 5 - 18 mmol/L    Glucose 79 70 - 125 mg/dL    BUN 18 8 - 28 mg/dL    Creatinine 0.74 0.60 - 1.10 mg/dL    GFR MDRD Af Amer >60 >60 mL/min/1.73m2    GFR MDRD Non Af Amer >60 >60 mL/min/1.73m2    Bilirubin, Total 0.4 0.0 - 1.0 mg/dL    Calcium 9.5 8.5 - 10.5 mg/dL    Protein, Total 5.8 (L) 6.0 - 8.0 g/dL    Albumin 3.1 (L) 3.5 - 5.0 g/dL    Alkaline Phosphatase 82 45 - 120 U/L    AST 17 0 - 40 U/L    ALT 11 0 - 45 U/L   Vitamin B12   Result Value Ref Range    Vitamin B-12 320 213 - 816 pg/mL   Iron   Result Value Ref Range    Iron 61 42 - 175 ug/dL   Ferritin   Result Value Ref Range    Ferritin 136 (H) 10 - 130 ng/mL   HM1 (CBC with Diff)   Result Value Ref Range    WBC 5.3 4.0 - 11.0 thou/uL    RBC 3.72 (L) 3.80 - 5.40 mill/uL    Hemoglobin 12.0 12.0 - 16.0 g/dL    Hematocrit 37.0 35.0 - 47.0 %     80 - 100 fL    MCH 32.3 27.0 - 34.0 pg    MCHC 32.4 32.0 - 36.0 g/dL    RDW 13.9 11.0 - 14.5 %    Platelets 171 140 - 440 thou/uL    MPV 11.4 8.5 - 12.5 fL    Neutrophils % 57 50 - 70 %    Lymphocytes % 27 20 - 40 %    Monocytes % 12 (H) 2 - 10 %    Eosinophils % 3 0 - 6 %    Basophils % 1 0 - 2 %    Neutrophils Absolute 3.1 2.0 - 7.7 thou/uL    Lymphocytes Absolute 1.4 0.8 - 4.4 thou/uL    Monocytes Absolute 0.7 0.0 - 0.9 thou/uL    Eosinophils Absolute 0.2 0.0 - 0.4 thou/uL    Basophils Absolute 0.1 0.0 - 0.2 thou/uL     Results for orders placed or performed in visit on 07/10/19   Basic Metabolic Panel   Result Value Ref Range    Sodium  136 136 - 145 mmol/L    Potassium 3.6 3.5 - 5.0 mmol/L    Chloride 99 98 - 107 mmol/L    CO2 26 22 - 31 mmol/L    Anion Gap, Calculation 11 5 - 18 mmol/L    Glucose 83 70 - 125 mg/dL    Calcium 9.4 8.5 - 10.5 mg/dL    BUN 21 8 - 28 mg/dL    Creatinine 0.78 0.60 - 1.10 mg/dL    GFR MDRD Af Amer >60 >60 mL/min/1.73m2    GFR MDRD Non Af Amer >60 >60 mL/min/1.73m2         Lab Results   Component Value Date    WBC 5.3 08/12/2019    HGB 12.0 08/12/2019    HCT 37.0 08/12/2019     08/12/2019     08/12/2019       Lab Results   Component Value Date    [id number] 320 08/12/2019     No results found for: HGBA1C  Lab Results   Component Value Date    INR 1.01 03/04/2016     Vitamin D, Total (25-Hydroxy)   Date Value Ref Range Status   08/12/2019 49.0 30.0 - 80.0 ng/mL Final     Lab Results   Component Value Date    TSH 2.40 08/12/2019           ASSESSMENT/PLAN:    1. Fall, Right humerus fracture: Nonoperative treatment. Right shoulder trace edema, no edema in fingers, no numb/tingling, cap refill < 3 sec. F/u with ortho on 7/19-PROM, work towards AROM, wean from sling, hold off on pressure for 2 weeks, f/u in 6 weeks on 8/30. Minimal to no pain, on tylenol 1000 three times a day prn. No concerns today.   2. HTN: SBP 110s. On lisinopril 20mg at bedtime. Hold parameters.   3. Constipation: On miralax daily, senna doc daily and at bedtime prn.  Eating prunes, managing diet. Wanting to refuse stool softener today because she went and discussed she probably went because had stool softener and has struggled with constipation. Recommended she take the stool softeners regularly so has bowel movements regularly.   4. OAB: stable, nocturia, incontinence intermittent.   5. Insomnia: melatonin at bedtime. No concerns, did have some trouble sleeping last night related to pruritus.   6. Vitamin d deficiency: on vitamin d. Vit d 45 on 7/10.   7. Diastolic CHF: weights 3 times weekly-104-104-65-77--19-05-44-59-84-94lbs.  trace ble, analilia hose. dc'd hctz. No shortness of breath today or recently. Appetite fair-good.   8. Alopecia: unknown cause at this time, no associated symptoms, rashes, pruritus. checked cmp, hm1, iron, ferritin, vit d, vit b12, tsh 8/12 and all stable, ferritin slightly elevated but iron levels normal. Will defer checking for hormonal abnormality prolactin level, progesterone, cortisol to pcp for referral to endocrine if needed. Follow up with pcp regarding this after discharge. May pursue endocrine workup if she is concerned as outpatient.   9. Macular rash, pruritus: two episodes now, both relating to clothing. Daughter doing laundry and suspecting related to laundry detergent, felt better and resolved after changing clothes. Using sarna lotion as needed. HC cream came from pharmacy today also prn. No rash today on skin check.   10. Right eye allergic conjunctivitis: stringly, yellow drainage, mild irritation and itching. Ordered alaway two times a day x 3 days. Also ordered to cleanse with mild soap and wash daily due to drainage.     Per therapy  PT - iliana 19/28, [id number] SBA, sit to stand CGA,  t/f's CGA, bed mob S , stairs CGA OT -  UB set up/SBA, LB SBA, toileting SBA. Red tag. Lives alone in town home with 1 step. PT/OT- 1-2 depending on the home assessment. Recommending lifeline    Electronically signed by: Su Scott NP

## 2021-05-31 NOTE — PROGRESS NOTES
[facility]    DATE: 2019    NAME:  Kristal Cox             :  1924  MRN: [id number]  CODE STATUS:  DNR    VISIT TYPE: Problem Visit (lab results)     FACILITY:  [facility] [[id number]]       CHIEF COMPLAIN/REASON FOR VISIT:    Chief Complaint   Patient presents with     Problem Visit     lab results               HISTORY OF PRESENT ILLNESS: Kristal Cox is a 89 y.o. female who was admitted - for fall, right humerus fracture. She tripped and fell while ambulating to the bathroom at home. Ortho recommended non operative treatment. She was place din sling and made NWB. She was discharged to TCU for further rehab. She has PMH of HTN, osteoporosis, anxiety, HLD, IBS. Prior to this she lived at home in a townhouse alone.     Today Ms. Cox is seen for concerns of lab results and rash that developed overnight. Staff reported when put her pajamas on last night she developed a rash and itching under arms, on chest, upper arms, and back. They removed her pajamas and put her in a gown and it did go away after some time. This morning she has no rash but is still complaining of itching. They were going to notify her daughter today of the rash since she does her laundry. On exam Kristal is seen in her room with daughter present. She says she had a rough night and did not sleep because of the rash and itching. She says the rash did go away she thinks but she is still itching this morning. Her daughter in room reports her sister who does the laundry said she didn't use any different laundry detergent this time. She also notes her mom was complaining of itching yesterday afternoon when she was here around 2pm. Kristal says she has not used any new lotions or soaps and has not had a shower since last Thursday. She denies any new foods or anything else that may have caused the rash. She has never had this happen before. She denies any  other concerns overnight. Discussed and reviewed lab results with her regarding the workup for hair thinning and loss and she acknowledged understanding and verbalized appreciation for update on this. Discussed that hormone levels were not checked because if abnormal would require an endocrine referral which would need to come from primary and this workup can occur after discharge. She agreed with plan of care.     REVIEW OF SYSTEMS:  PROBLEMS AND REVIEW OF SYSTEMS:   Today on ROS:   Currently, no fever, chills, or rigors. Decreased vision and hearing. Denies any chest pain, headaches, palpitations, lightheadedness, dizziness, shortness of breath. Appetite is good. Denies any GERD symptoms. Denies any difficulty with swallowing, nausea, or vomiting. No insomnia. Positive for minimal to no right arm pain, leg swelling improved, urinary incontinence at times, constipation, hair thinning and loss, rash overnight, itching      Allergies   Allergen Reactions     Evista [Raloxifene]      Caused blood clot     Fosamax [Alendronate] Nausea And Vomiting     Current Outpatient Medications   Medication Sig     camphor-menthol (SARNA) lotion Apply 1 application topically every 6 (six) hours as needed for itching.     hydrocortisone 1 % cream Apply 1 application topically every 6 (six) hours as needed.     acetaminophen (TYLENOL) 500 MG tablet Take 1,000 mg by mouth 3 (three) times a day as needed.            cholecalciferol, vitamin D3, 1,000 unit tablet Take 2,000 Units by mouth daily.     dorzolamide (TRUSOPT) 2 % ophthalmic solution Administer 1 drop to both eyes 2 (two) times a day.            lisinopril (PRINIVIL,ZESTRIL) 10 MG tablet Take 20 mg by mouth daily.            melatonin 3 mg Tab tablet Take 1 tablet (3 mg total) by mouth at bedtime as needed. (Patient taking differently: Take 6 mg by mouth at bedtime.       )     polyethylene glycol (MIRALAX) 17 gram packet Take 17 g by mouth daily.     senna-docusate  (SENNOSIDES-DOCUSATE SODIUM) 8.6-50 mg tablet Take 1 tablet by mouth daily. And at bedtime prn            Past Medical History:    Past Medical History:   Diagnosis Date     Anxiety      Hyperlipidemia      IBS (irritable bowel syndrome)      Osteoporosis      Other abnormal clinical finding     evidence of old septal scar on EKG     Systolic hypertension            PHYSICAL EXAMINATION  Vitals:    08/12/19 2205   BP: 126/61   Pulse: 74   Resp: 18   Temp: 98.1  F (36.7  C)   SpO2: 95%   Weight: (!) 92 lb (41.7 kg)       Today on physical exam:     GENERAL: Awake, Alert, oriented x3, not in any form of acute distress, answers questions appropriately, follows simple commands, conversant  HEENT: Head is normocephalic with normal hair distribution. No evidence of trauma. Ears: No acute purulent discharge. Eyes: Conjunctivae pink with no scleral jaundice. Nose: Normal mucosa and septum. NECK: Supple with no cervical or supraclavicular lymphadenopathy. Trachea is midline. Glasses, Teller, hair thinning, loss  CHEST: No tenderness or deformity, no crepitus  LUNG: dim to auscultation with good chest expansion. There are no crackles or wheezes, normal AP diameter.   BACK: No kyphosis of the thoracic spine. Symmetric, no curvature, ROM normal, no CVA tenderness, no spinal tenderness   CVS: There is good S1  S2, regular rhythm, there are no murmurs, rubs, gallops, or heaves,  2+ pulses symmetric in all extremities.  ABDOMEN: Rounded and soft, nontender to palpation, non distended, no masses, no organomegaly, good bowel sounds, no rebound or guarding, no peritoneal signs.   EXTREMITIES: right shoulder no edema, no edema in fingers, no numb/tingling, cap refill <3 sec, trace ble nonpitting edema, analilia hose  SKIN: Warm and dry, no erythema noted.  Skin color, texture, no rashes or lesions.  NEUROLOGICAL: The patient is oriented to person, place and time. Calm and pleasant            LABS:   Recent Results (from the past 168 hour(s))    Thyroid Stimulating Hormone (TSH)   Result Value Ref Range    TSH 2.40 0.30 - 5.00 uIU/mL   Vitamin D, Total (25-Hydroxy)   Result Value Ref Range    Vitamin D, Total (25-Hydroxy) 49.0 30.0 - 80.0 ng/mL   Comprehensive Metabolic Panel   Result Value Ref Range    Sodium 138 136 - 145 mmol/L    Potassium 4.0 3.5 - 5.0 mmol/L    Chloride 106 98 - 107 mmol/L    CO2 27 22 - 31 mmol/L    Anion Gap, Calculation 5 5 - 18 mmol/L    Glucose 79 70 - 125 mg/dL    BUN 18 8 - 28 mg/dL    Creatinine 0.74 0.60 - 1.10 mg/dL    GFR MDRD Af Amer >60 >60 mL/min/1.73m2    GFR MDRD Non Af Amer >60 >60 mL/min/1.73m2    Bilirubin, Total 0.4 0.0 - 1.0 mg/dL    Calcium 9.5 8.5 - 10.5 mg/dL    Protein, Total 5.8 (L) 6.0 - 8.0 g/dL    Albumin 3.1 (L) 3.5 - 5.0 g/dL    Alkaline Phosphatase 82 45 - 120 U/L    AST 17 0 - 40 U/L    ALT 11 0 - 45 U/L   Vitamin B12   Result Value Ref Range    Vitamin B-12 320 213 - 816 pg/mL   Iron   Result Value Ref Range    Iron 61 42 - 175 ug/dL   Ferritin   Result Value Ref Range    Ferritin 136 (H) 10 - 130 ng/mL   HM1 (CBC with Diff)   Result Value Ref Range    WBC 5.3 4.0 - 11.0 thou/uL    RBC 3.72 (L) 3.80 - 5.40 mill/uL    Hemoglobin 12.0 12.0 - 16.0 g/dL    Hematocrit 37.0 35.0 - 47.0 %     80 - 100 fL    MCH 32.3 27.0 - 34.0 pg    MCHC 32.4 32.0 - 36.0 g/dL    RDW 13.9 11.0 - 14.5 %    Platelets 171 140 - 440 thou/uL    MPV 11.4 8.5 - 12.5 fL    Neutrophils % 57 50 - 70 %    Lymphocytes % 27 20 - 40 %    Monocytes % 12 (H) 2 - 10 %    Eosinophils % 3 0 - 6 %    Basophils % 1 0 - 2 %    Neutrophils Absolute 3.1 2.0 - 7.7 thou/uL    Lymphocytes Absolute 1.4 0.8 - 4.4 thou/uL    Monocytes Absolute 0.7 0.0 - 0.9 thou/uL    Eosinophils Absolute 0.2 0.0 - 0.4 thou/uL    Basophils Absolute 0.1 0.0 - 0.2 thou/uL     Results for orders placed or performed in visit on 07/10/19   Basic Metabolic Panel   Result Value Ref Range    Sodium 136 136 - 145 mmol/L    Potassium 3.6 3.5 - 5.0 mmol/L    Chloride 99 98  - 107 mmol/L    CO2 26 22 - 31 mmol/L    Anion Gap, Calculation 11 5 - 18 mmol/L    Glucose 83 70 - 125 mg/dL    Calcium 9.4 8.5 - 10.5 mg/dL    BUN 21 8 - 28 mg/dL    Creatinine 0.78 0.60 - 1.10 mg/dL    GFR MDRD Af Amer >60 >60 mL/min/1.73m2    GFR MDRD Non Af Amer >60 >60 mL/min/1.73m2         Lab Results   Component Value Date    WBC 5.3 08/12/2019    HGB 12.0 08/12/2019    HCT 37.0 08/12/2019     08/12/2019     08/12/2019       Lab Results   Component Value Date    [id number] 320 08/12/2019     No results found for: HGBA1C  Lab Results   Component Value Date    INR 1.01 03/04/2016     Vitamin D, Total (25-Hydroxy)   Date Value Ref Range Status   08/12/2019 49.0 30.0 - 80.0 ng/mL Final     Lab Results   Component Value Date    TSH 2.40 08/12/2019           ASSESSMENT/PLAN:    1. Fall, Right humerus fracture: Nonoperative treatment. Right shoulder trace edema, no edema in fingers, no numb/tingling, cap refill < 3 sec. F/u with ortho on 7/19-PROM, work towards AROM, wean from sling, hold off on pressure for 2 weeks, f/u in 6 weeks on 8/30. Minimal to no pain, on tylenol 1000 three times a day prn. No concerns today.   2. HTN: SBP 120s. On lisinopril 20mg at bedtime. Hold parameters.   3. Constipation: On miralax daily, senna doc daily and at bedtime prn.  Eating prunes, managing diet. Did have some relief with suppository last week.   4. OAB: stable, nocturia, incontinence intermittent. Explained changes with age today.   5. Insomnia: melatonin at bedtime, improved since increasing dose.   6. Vitamin d deficiency: on vitamin d. Vit d 45 on 7/10.   7. Diastolic CHF: weights 3 times weekly-104-104-91-32--13-54-94-92-92lbs. trace ble, analilia casas. dc'd hctz. No shortness of breath today or recently. Appetite fair-good.   8. Alopecia: unknown cause at this time, no associated symptoms, rashes, pruritus. checked cmp, hm1, iron, ferritin, vit d, vit b12, tsh 8/12 and all stable, ferritin slightly  elevated but iron levels normal. Will defer checking for hormonal abnormality prolactin level, progesterone, cortisol to pcp for referral to endocrine if needed. Follow up with pcp regarding this after discharge. May pursue endocrine workup if she is concerned as outpatient.   9. Macular rash, pruritus: Developed last evening after wearing pajamas. Resolved after changing clothes. Daughter reports was present prior in the afternoon yesterday. No rash noted today on skin check, some pruritus. Will order sarna lotion prn, hydrocortisone cream prn.     Per therapy  PT - iliana 19/28, [id number] SBA, sit to stand CGA,  t/f's CGA, bed mob S , stairs CGA OT -  UB set up/SBA, LB SBA, toileting SBA. Red tag. Lives alone in town home with 1 step. PT/OT- 1-2 depending on the home assessment. Recommending lifeline    Electronically signed by: Su Scott NP

## 2021-05-31 NOTE — PROGRESS NOTES
[facility]    DATE: 2019    NAME:  Kristal Cox             :  1924  MRN: [id number]  CODE STATUS:  DNR    VISIT TYPE: Problem Visit (constipation)     FACILITY:  [facility] [[id number]]       CHIEF COMPLAIN/REASON FOR VISIT:    Chief Complaint   Patient presents with     Problem Visit     constipation               HISTORY OF PRESENT ILLNESS: Kristal Cox is a 89 y.o. female who was admitted - for fall, right humerus fracture. She tripped and fell while ambulating to the bathroom at home. Ortho recommended non operative treatment. She was place din sling and made NWB. She was discharged to TCU for further rehab. She has PMH of HTN, osteoporosis, anxiety, HLD, IBS. Prior to this she lived at home in a townhouse alone.     Today Ms. Cox is seen for constipation. She says it has been several days and is feeling bloated and uncomfortable. She is not wanting to eat anymore because of this. She is wondering what else she should do. She takes her stool softeners, drinks prune juice, eats prunes, manages this with her diet. She does admit she may not be drinking enough water because if she drinks too much she has to go to the bathroom a lot especially at night. She feels this was caused by the water pill she was on and now is used to going at night. She does not feel this is any worse than normal though. She says her therapy seems to be going well and not having much pain or soreness today. She also notes her concern is that her hair is thinning and falling out. She shows a picture from a wedding in may where her hair was thick and full. She is wondering if she is on a medication that can cause this or what it may be from. We discussed different possibilities including hormonal imbalance, endocrine disorder, vitamin deficiency. She is willing to check some labs to see if can evaluate the source of this. She is also willing to  take a suppository today to get going on her bowels.     REVIEW OF SYSTEMS:  PROBLEMS AND REVIEW OF SYSTEMS:   Today on ROS:   Currently, no fever, chills, or rigors. Decreased vision and hearing. Denies any chest pain, headaches, palpitations, lightheadedness, dizziness, shortness of breath. Appetite is good. Denies any GERD symptoms. Denies any difficulty with swallowing, nausea, or vomiting. No insomnia. Positive for minimal to no right arm pain, leg swelling improved, urinary incontinence at times, constipation, hair thinning and loss      Allergies   Allergen Reactions     Evista [Raloxifene]      Caused blood clot     Fosamax [Alendronate] Nausea And Vomiting     Current Outpatient Medications   Medication Sig     acetaminophen (TYLENOL) 500 MG tablet Take 1,000 mg by mouth 3 (three) times a day as needed.            cholecalciferol, vitamin D3, 1,000 unit tablet Take 2,000 Units by mouth daily.     dorzolamide (TRUSOPT) 2 % ophthalmic solution Administer 1 drop to both eyes 2 (two) times a day.            lisinopril (PRINIVIL,ZESTRIL) 10 MG tablet Take 20 mg by mouth daily.            melatonin 3 mg Tab tablet Take 1 tablet (3 mg total) by mouth at bedtime as needed. (Patient taking differently: Take 6 mg by mouth at bedtime.       )     senna-docusate (SENNOSIDES-DOCUSATE SODIUM) 8.6-50 mg tablet Take 1 tablet by mouth daily. And at bedtime prn            Past Medical History:    Past Medical History:   Diagnosis Date     Anxiety      Hyperlipidemia      IBS (irritable bowel syndrome)      Osteoporosis      Other abnormal clinical finding     evidence of old septal scar on EKG     Systolic hypertension            PHYSICAL EXAMINATION  Vitals:    08/07/19 2153   BP: 134/73   Pulse: 82   Resp: 18   Temp: 97.7  F (36.5  C)   SpO2: 94%   Weight: (!) 94 lb (42.6 kg)       Today on physical exam:     GENERAL: Awake, Alert, oriented x3, not in any form of acute distress, answers questions appropriately, follows  simple commands, conversant  HEENT: Head is normocephalic with normal hair distribution. No evidence of trauma. Ears: No acute purulent discharge. Eyes: Conjunctivae pink with no scleral jaundice. Nose: Normal mucosa and septum. NECK: Supple with no cervical or supraclavicular lymphadenopathy. Trachea is midline. Glasses, [place]  CHEST: No tenderness or deformity, no crepitus  LUNG: dim to auscultation with good chest expansion. There are no crackles or wheezes, normal AP diameter.   BACK: No kyphosis of the thoracic spine. Symmetric, no curvature, ROM normal, no CVA tenderness, no spinal tenderness   CVS: There is good S1  S2, regular rhythm, there are no murmurs, rubs, gallops, or heaves,  2+ pulses symmetric in all extremities.  ABDOMEN: Rounded and soft, nontender to palpation, non distended, no masses, no organomegaly, good bowel sounds, no rebound or guarding, no peritoneal signs.   EXTREMITIES: Right arm sling, right shoulder no edema, no edema in fingers, no numb/tingling, cap refill <3 sec, trace ble nonpitting edema, analilia hose  SKIN: Warm and dry, no erythema noted.  Skin color, texture, no rashes or lesions.  NEUROLOGICAL: The patient is oriented to person, place and time. Calm and pleasant            LABS:   No results found for this or any previous visit (from the past 168 hour(s)).  Results for orders placed or performed in visit on 07/10/19   Basic Metabolic Panel   Result Value Ref Range    Sodium 136 136 - 145 mmol/L    Potassium 3.6 3.5 - 5.0 mmol/L    Chloride 99 98 - 107 mmol/L    CO2 26 22 - 31 mmol/L    Anion Gap, Calculation 11 5 - 18 mmol/L    Glucose 83 70 - 125 mg/dL    Calcium 9.4 8.5 - 10.5 mg/dL    BUN 21 8 - 28 mg/dL    Creatinine 0.78 0.60 - 1.10 mg/dL    GFR MDRD Af Amer >60 >60 mL/min/1.73m2    GFR MDRD Non Af Amer >60 >60 mL/min/1.73m2         Lab Results   Component Value Date    WBC 4.4 07/10/2019    HGB 10.6 (L) 07/10/2019    HCT 31.1 (L) 07/10/2019    MCV 95 07/10/2019      07/10/2019       No results found for: [id number]  No results found for: HGBA1C  Lab Results   Component Value Date    INR 1.01 03/04/2016     Vitamin D, Total (25-Hydroxy)   Date Value Ref Range Status   07/10/2019 45.0 30.0 - 80.0 ng/mL Final     Lab Results   Component Value Date    TSH 2.69 04/29/2019           ASSESSMENT/PLAN:    1. Fall, Right humerus fracture: Nonoperative treatment. Right shoulder trace edema, no edema in fingers, no numb/tingling, cap refill < 3 sec. NWB. F/u with ortho on 7/19-PROM, work towards AROM, wean from sling, hold off on pressure for 2 weeks, f/u in 6 weeks on 8/30. Minimal to no pain, on tylenol 1000 three times a day prn. No recent tylenol use. Doing well in therapy.   2. HTN: SBP 130s. On lisinopril 20mg at bedtime. Hold parameters.   3. Constipation: On miralax daily prn, senna doc prn.     Eating prunes, managing with diet. Will order bisacodyl supp today and see how does overnight. Plan to change senna doc to daily.   4. OAB: stable, nocturia, incontinence intermittent. Explained changes with age today.   5. Insomnia: melatonin at bedtime, improved since increasing dose.   6. Vitamin d deficiency: on vitamin d. Vit d 45 on 7/10.   7. Diastolic CHF: weights 3 times weekly-104-104-28-06--71-21-94lbs. trace ble, analilia masseye. dc'd hctz. No shortness of breath today or recently. Appetite good, stable.   8. Alopecia: unknown cause at this time, no associated symptoms, rashes, pruritus. Will order cmp, hm1, iron, ferritin, vit d, vit b12, tsh for 8/12. Will defer checking for hormonal abnormality prolactin level, progesterone, cortisol to pcp for referral to endocrine if needed.     Per therapy PT - iliana 8/28, SEC 200ft CGA, sit to stand CGA,  t/f's CGA, bed mob SBA , stairs CGA/Min A OT -  UB set up/SBA, LB SBA, toileting CGA. Red tag. Lives alone in town home with 1 step. Recommending 2-3 weeks.     Electronically signed by: Su Scott NP    Total floor/unit time spent  35 min with 25 min spent on counseling and coordination of care. Counseling regarding alopecia, potential causes, differential diagnoses, lab workup, potential referrals. Counseling regarding constipation management. Coordinated care with nursing for management of labs, alopecia, constipation, humerus fracture.

## 2021-05-31 NOTE — PROGRESS NOTES
[facility]    DATE: 2019    NAME:  Kristal Cox             :  1924  MRN: [id number]  CODE STATUS:  DNR    VISIT TYPE: Review Of Multiple Medical Conditions     FACILITY:  [facility] [[id number]]       CHIEF COMPLAIN/REASON FOR VISIT:    Chief Complaint   Patient presents with     Review Of Multiple Medical Conditions               HISTORY OF PRESENT ILLNESS: Kristal Cox is a 89 y.o. female who was admitted - for fall, right humerus fracture. She tripped and fell while ambulating to the bathroom at home. Ortho recommended non operative treatment. She was place din sling and made NWB. She was discharged to TCU for further rehab. She has PMH of HTN, osteoporosis, anxiety, HLD, IBS. Prior to this she lived at home in a townhouse alone.     Today Ms. Cox is seen for follow up visit today. She is seen laying in bed as she just finished physical therapy. She is tired after doing some exercises. She says her appetite is ok but didn't eat much of her breakfast this morning. She says she is not having any dizziness recently and no issues with her bowels as long as she takes the stool softeners every once in a while. She says otherwise she is sleeping ok and no concerns with anything else. She is not having any headaches or visual changes. She says that her sling is off today and has been trying to wean out of this.     REVIEW OF SYSTEMS:  PROBLEMS AND REVIEW OF SYSTEMS:   Today on ROS:   Currently, no fever, chills, or rigors. Decreased vision and hearing. Denies any chest pain, headaches, palpitations, lightheadedness, dizziness, shortness of breath. Appetite is good. Denies any GERD symptoms. Denies any difficulty with swallowing, nausea, or vomiting. No insomnia. Positive for minimal right arm pain, leg swelling stable, urinary incontinence at times, constipation intermittent      Allergies   Allergen Reactions     Evista  [Raloxifene]      Caused blood clot     Fosamax [Alendronate] Nausea And Vomiting     Current Outpatient Medications   Medication Sig     acetaminophen (TYLENOL) 500 MG tablet Take 1,000 mg by mouth 3 (three) times a day as needed.            cholecalciferol, vitamin D3, 1,000 unit tablet Take 2,000 Units by mouth daily.     dorzolamide (TRUSOPT) 2 % ophthalmic solution Administer 1 drop to both eyes 2 (two) times a day.            lisinopril (PRINIVIL,ZESTRIL) 10 MG tablet Take 20 mg by mouth daily.            melatonin 3 mg Tab tablet Take 1 tablet (3 mg total) by mouth at bedtime as needed. (Patient taking differently: Take 6 mg by mouth at bedtime.       )     senna-docusate (SENNOSIDES-DOCUSATE SODIUM) 8.6-50 mg tablet Take 1 tablet by mouth 2 (two) times a day as needed. And at bedtime prn            Past Medical History:    Past Medical History:   Diagnosis Date     Anxiety      Hyperlipidemia      IBS (irritable bowel syndrome)      Osteoporosis      Other abnormal clinical finding     evidence of old septal scar on EKG     Systolic hypertension            PHYSICAL EXAMINATION  Vitals:    08/01/19 0700   BP: 125/68   Pulse: (!) 59   Resp: 18   Temp: 97  F (36.1  C)   SpO2: 94%       Today on physical exam:     GENERAL: Awake, Alert, oriented x3, not in any form of acute distress, answers questions appropriately, follows simple commands, conversant  HEENT: Head is normocephalic with normal hair distribution. No evidence of trauma. Ears: No acute purulent discharge. Eyes: Conjunctivae pink with no scleral jaundice. Nose: Normal mucosa and septum. NECK: Supple with no cervical or supraclavicular lymphadenopathy. Trachea is midline. Glasses, [place]  CHEST: No tenderness or deformity, no crepitus  LUNG: dim to auscultation with good chest expansion. There are no crackles or wheezes, normal AP diameter.   BACK: No kyphosis of the thoracic spine. Symmetric, no curvature, ROM normal, no CVA tenderness, no spinal  tenderness   CVS: There is good S1  S2, regular rhythm, there are no murmurs, rubs, gallops, or heaves,  2+ pulses symmetric in all extremities.  ABDOMEN: Rounded and soft, nontender to palpation, non distended, no masses, no organomegaly, good bowel sounds, no rebound or guarding, no peritoneal signs.   EXTREMITIES: Right arm sling, right shoulder trace edema, no edema in fingers, no numb/tingling, cap refill <3 sec, trace ble nonpitting edema  SKIN: Warm and dry, no erythema noted.  Skin color, texture, no rashes or lesions.  NEUROLOGICAL: The patient is oriented to person, place and time. Anxious at times but calm and pleasant today            LABS:   No results found for this or any previous visit (from the past 168 hour(s)).  Results for orders placed or performed in visit on 07/10/19   Basic Metabolic Panel   Result Value Ref Range    Sodium 136 136 - 145 mmol/L    Potassium 3.6 3.5 - 5.0 mmol/L    Chloride 99 98 - 107 mmol/L    CO2 26 22 - 31 mmol/L    Anion Gap, Calculation 11 5 - 18 mmol/L    Glucose 83 70 - 125 mg/dL    Calcium 9.4 8.5 - 10.5 mg/dL    BUN 21 8 - 28 mg/dL    Creatinine 0.78 0.60 - 1.10 mg/dL    GFR MDRD Af Amer >60 >60 mL/min/1.73m2    GFR MDRD Non Af Amer >60 >60 mL/min/1.73m2         Lab Results   Component Value Date    WBC 4.4 07/10/2019    HGB 10.6 (L) 07/10/2019    HCT 31.1 (L) 07/10/2019    MCV 95 07/10/2019     07/10/2019       No results found for: [id number]  No results found for: HGBA1C  Lab Results   Component Value Date    INR 1.01 03/04/2016     Vitamin D, Total (25-Hydroxy)   Date Value Ref Range Status   07/10/2019 45.0 30.0 - 80.0 ng/mL Final     Lab Results   Component Value Date    TSH 2.69 04/29/2019           ASSESSMENT/PLAN:    1. Fall, Right humerus fracture: Nonoperative treatment. Right shoulder trace edema, no edema in fingers, no numb/tingling, cap refill < 3 sec. NWB. F/u with ortho on 7/19-PROM, work towards AROM, wean from sling, hold off on pressure for  2 weeks, f/u in 6 weeks on 8/30. Minimal to no pain, on tylenol 1000 three times a day prn. Not using tylenol recently. Weaning out of sling.   2. HTN: SBP 120s. On lisinopril 20mg at bedtime. Hold parameters.   3. Constipation: On miralax daily prn, senna doc prn.     Controlled, intermittent constipation but controlled when uses stool softeners.   4. OAB: improved, urinary incontinence at times.   5. Insomnia: melatonin at bedtime, improved since increasing dose.   6. Vitamin d deficiency: on vitamin d. Vit d 45 on 7/10.   7. Diastolic CHF: weights 3 times weekly-104-104-76-45--06-44vsa. trace ble, analilia casas. dc'd hctz. No shortness of breath today. Appetite good, stable.   8. Viral uri v allergic rhinitis v sinusitis: resolved today.      Per therapy PT - iliana 8/28, SEC 100ft CGA/min A, sit to stand CGA/Mod A,  t/f's SBA/[place], bed mob mod A , stairs CGA/Min A OT -  UB SBA/min A, LB SBA, toileting CGA/Min A. Red tag. Recommending 2 weeks. Lives alone in town house with one step    Electronically signed by: Su Scott NP

## 2021-05-31 NOTE — PROGRESS NOTES
[facility] Patient Outreach:     Discharge Date::  8/31/19      Reason for TCU stay (discharge diagnosis)::  Fall with right humerus fx      Are you feeling better, the same or worse since your discharge?:  Patient is feeling better          As part of your discharge plan, did they discuss home care with you?: Yes        Have your seen them yet, or are they scheduled to visit?: Yes                Do you have any follow up visits scheduled with your PCP or Specialist?:  Yes, with PCP      (RN) Is it scheduled soon enough (3-5 days)?: No        (RN) Is the patient okay with moving appointment up (if RN feels appropriate)?: No

## 2021-05-31 NOTE — PROGRESS NOTES
[facility]    DATE: 2019    NAME:  Kristal Cox             :  1924  MRN: [id number]  CODE STATUS:  DNR    VISIT TYPE: Discharge Summary     FACILITY:  [facility] [[id number]]       CHIEF COMPLAIN/REASON FOR VISIT:    Chief Complaint   Patient presents with     Discharge Summary               HISTORY OF PRESENT ILLNESS: Kristal Cox is a 89 y.o. female who was admitted - for fall, right humerus fracture. She tripped and fell while ambulating to the bathroom at home. Ortho recommended non operative treatment. She was place din sling and made NWB. She was discharged to TCU for further rehab. She has PMH of HTN, osteoporosis, anxiety, HLD, IBS. Prior to this she lived at home in a townhouse alone.     TCU course:   Ms. Cox has made progress with therapy and is ambulating 250 feet with walker and supervision. She is using no assistive device and stand by to contact guard for ADLs. She scored 21/28 on tinnetti. She had home eval that went well. During her stay her pain was well controlled and was weaned off all pain meds except tylenol prn. She had f/u with ortho on  and ordered to work towards active range of motion and wean from sling. She has another follow up on . Her vitals have been stable. Her weights were stable 98-95lbs during stay. Her chf was stable and did not require treatment for exacerbation. She did have a few episodes of macular rash and was thought to be from laundry detergent on clothing. This has now resolved. She did have some concerns for hair thinning and loss and discussed aging process versus pathologic cause. Labs were ordered and were all stable but did not check hormone levels (prolactin, progesterone, cortisol) as discussed this could be discussed with pcp and determine if necessary and if so then would need endocrine referral if abnormal. She was agreeable to discussing with her pcp. She  will be returning home on 8/31 with  PT, OT, HHA, RN. She will f/u with PCP on 9/6.     REVIEW OF SYSTEMS:  PROBLEMS AND REVIEW OF SYSTEMS:   Today on ROS:   Currently, no fever, chills, or rigors. Decreased vision and hearing. Denies any chest pain, headaches, palpitations, lightheadedness, dizziness, shortness of breath. Appetite is good. Denies any GERD symptoms. Denies any difficulty with swallowing, nausea, or vomiting. No insomnia. Positive for minimal to no right arm pain, leg swelling improved, urinary incontinence at times,  hair thinning and loss      Allergies   Allergen Reactions     Evista [Raloxifene]      Caused blood clot     Fosamax [Alendronate] Nausea And Vomiting     Current Outpatient Medications   Medication Sig     acetaminophen (TYLENOL) 500 MG tablet Take 1,000 mg by mouth 3 (three) times a day as needed.            camphor-menthol (SARNA) lotion Apply 1 application topically every 6 (six) hours as needed for itching.     cholecalciferol, vitamin D3, 1,000 unit tablet Take 2,000 Units by mouth daily.     dorzolamide (TRUSOPT) 2 % ophthalmic solution Administer 1 drop to both eyes 2 (two) times a day.            hydrocortisone 1 % cream Apply 1 application topically every 6 (six) hours as needed.     lisinopril (PRINIVIL,ZESTRIL) 10 MG tablet Take 20 mg by mouth daily.            melatonin 3 mg Tab tablet Take 1 tablet (3 mg total) by mouth at bedtime as needed. (Patient taking differently: Take 6 mg by mouth at bedtime.       )     polyethylene glycol (MIRALAX) 17 gram packet Take 17 g by mouth every other day.            senna-docusate (SENNOSIDES-DOCUSATE SODIUM) 8.6-50 mg tablet Take 1 tablet by mouth daily. And at bedtime prn            Past Medical History:    Past Medical History:   Diagnosis Date     Anxiety      Hyperlipidemia      IBS (irritable bowel syndrome)      Osteoporosis      Other abnormal clinical finding     evidence of old septal scar on EKG     Systolic hypertension             PHYSICAL EXAMINATION  Vitals:    08/29/19 1629   BP: 120/67   Pulse: 71   Resp: 14   Temp: 97  F (36.1  C)   SpO2: 94%   Weight: (!) 95 lb (43.1 kg)       Today on physical exam:     GENERAL: Awake, Alert, oriented x3, not in any form of acute distress, answers questions appropriately, follows simple commands, conversant  HEENT: Head is normocephalic with normal hair distribution. No evidence of trauma. Ears: No acute purulent discharge. Eyes: Conjunctivae pink with no scleral jaundice. Nose: Normal mucosa and septum. NECK: Supple with no cervical or supraclavicular lymphadenopathy. Trachea is midline. Glasses, [place], hair thinning, loss  CHEST: No tenderness or deformity, no crepitus  LUNG: dim to auscultation with good chest expansion. There are no crackles or wheezes, normal AP diameter.   BACK: No kyphosis of the thoracic spine. Symmetric, no curvature, ROM normal, no CVA tenderness, no spinal tenderness   CVS: There is good S1  S2, regular rhythm, there are no murmurs, rubs, gallops, or heaves,  2+ pulses symmetric in all extremities.  ABDOMEN: Rounded and soft, nontender to palpation, non distended, no masses, no organomegaly, good bowel sounds, no rebound or guarding, no peritoneal signs.   EXTREMITIES: right shoulder no edema, no edema in fingers, no numb/tingling, cap refill <3 sec, no ble nonpitting edema, analilia hose  SKIN: Warm and dry, no erythema noted.  Skin color, texture, no rashes or lesions.  NEUROLOGICAL: The patient is oriented to person, place and time. Calm and pleasant            LABS:   No results found for this or any previous visit (from the past 168 hour(s)).  Results for orders placed or performed in visit on 07/10/19   Basic Metabolic Panel   Result Value Ref Range    Sodium 136 136 - 145 mmol/L    Potassium 3.6 3.5 - 5.0 mmol/L    Chloride 99 98 - 107 mmol/L    CO2 26 22 - 31 mmol/L    Anion Gap, Calculation 11 5 - 18 mmol/L    Glucose 83 70 - 125 mg/dL    Calcium 9.4 8.5 - 10.5  mg/dL    BUN 21 8 - 28 mg/dL    Creatinine 0.78 0.60 - 1.10 mg/dL    GFR MDRD Af Amer >60 >60 mL/min/1.73m2    GFR MDRD Non Af Amer >60 >60 mL/min/1.73m2         Lab Results   Component Value Date    WBC 5.3 08/12/2019    HGB 12.0 08/12/2019    HCT 37.0 08/12/2019     08/12/2019     08/12/2019       Lab Results   Component Value Date    [id number] 320 08/12/2019     No results found for: HGBA1C  Lab Results   Component Value Date    INR 1.01 03/04/2016     Vitamin D, Total (25-Hydroxy)   Date Value Ref Range Status   08/12/2019 49.0 30.0 - 80.0 ng/mL Final     Lab Results   Component Value Date    TSH 2.40 08/12/2019           ASSESSMENT/PLAN:    1. Fall, Right humerus fracture: Nonoperative treatment. Right shoulder no edema, no edema in fingers, no numb/tingling, cap refill < 3 sec. F/u with ortho on 7/19-PROM, work towards AROM, wean from sling, hold off on pressure for 2 weeks, f/u in 6 weeks on 8/30. No recent pain. Tylenol prn. Doing well.    2. HTN: -130s. On lisinopril 20mg at bedtime. Hold parameters. Mostly stable, no changes today.   3. Constipation: On miralax every other day, senna doc daily and at bedtime prn.  Eating prunes, managing diet. Stable recently.   4. OAB: stable, nocturia, incontinence intermittent.   5. Insomnia: melatonin at bedtime. Stable. .   6. Vitamin d deficiency: on vitamin d. Vit d 45 on 7/10.   7. Diastolic CHF: weights 3 times [id number] edema today, previously stopped diuretics. Stable. No shortness of breath.   8. Alopecia: unknown cause at this time, no associated symptoms, rashes, pruritus. checked cmp, hm1, iron, ferritin, vit d, vit b12, tsh 8/12 and all stable, ferritin slightly elevated but iron levels normal. Will defer checking for hormonal abnormality prolactin level, progesterone, cortisol to pcp for referral to endocrine if needed. Follow up with pcp regarding this after discharge. May pursue  "endocrine workup if she is concerned as outpatient.       Electronically signed by: Su Scott NP    Total floor/unit time spent 35 min with 25 min spent on counseling and coordination of care. Counseling regarding tcu course, med changes, lab results, weight monitoring, humerus fracture course, discharge planning. Coordinated care with nursing, therapy,  for discharge planning, services for discharge, med orders for discharge, primary care follow up, ortho follow up, evaluation of equipment needs.     Please evaluate Kristal Cox for admission to Home Health.    Face to Face Attestation and Initial Plan of Care    The face-to-face encounter occurred on date: 8/30/19  Face to Face encounter was with: Su Scott    Please provide brief clinical summary of reason for visit and need for home care. deconditionig after hospital stay for humerus fracture    Please identify which of the following home health disciplines the patient will need AND describe the skilled services that you would like the home health agency to perform: SKILLED NURSING (RN): complex med management, PHYSICAL THERAPY: strength training and gait training, OCCUPATIONAL THERAPY: ADLs and home safety and HOME HEALTH AIDE    Homebound Status (describe the functional limitations that support this patient is confined to his/her home. Medicaid recipients are not required to be homebound.):assistive device needed:  2WW    Name of physician who will be responsible for the ongoing home health plan of care (CMS requires the referring physician to provide the specific name of the community physician instead of a title, such as \"PCP\"): Tera Iglesias MD    Requested Start of Care Date: Within 48 hours    Other information to assist the home health agency in developing the initial Plan of Care:    I certify that services are/were furnished while this patient was under the care of a physician and that a physician or an allowed " non-physician practitioner (NPP), had a face-to-face encounter that meets the physician face-to-face encounter requirements. The encounter was in whole, or in part, related to the primary reason for home health. The patient is confined to his/her home and needs intermittent skilled nursing, physical therapy, speech-language pathology, or the continued need for occupational therapy. A plan of care has been established by a physician and is periodically reviewed by a physician.      Post Discharge Medication Reconciliation Status: discharge medications reconciled, continue medications without change

## 2021-05-31 NOTE — TELEPHONE ENCOUNTER
Orders being requested: Start of care orders for Home Health including Physical Therapy, Occupational Therapy, Home Health Aid and Skilled Nursing Assesment.  Reason service is needed/diagnosis: Patient was discharged from TCU on 8/31/19 after fall and fractured right humerus.  When are orders needed by: Today, if possible, assessments are scheduled for tomorrow.  Where to send Orders: Phone:  [phone]  Okay to leave detailed message?  Yes, Jenny from [facility]

## 2021-05-31 NOTE — TELEPHONE ENCOUNTER
[facility] and relayed message below from Dr. Iglesias.  She verbalized understanding and had no further questions at this time.  Bridget CADE CMA/BENJAMIN....................1:09 PM

## 2021-05-31 NOTE — PROGRESS NOTES
[facility]    DATE: 2019    NAME:  Kristal Cox             :  1924  MRN: [id number]  CODE STATUS:  DNR    VISIT TYPE: Review Of Multiple Medical Conditions     FACILITY:  [facility] [[id number]]       CHIEF COMPLAIN/REASON FOR VISIT:    Chief Complaint   Patient presents with     Review Of Multiple Medical Conditions               HISTORY OF PRESENT ILLNESS: Kristal Cox is a 89 y.o. female who was admitted - for fall, right humerus fracture. She tripped and fell while ambulating to the bathroom at home. Ortho recommended non operative treatment. She was place din sling and made NWB. She was discharged to TCU for further rehab. She has PMH of HTN, osteoporosis, anxiety, HLD, IBS. Prior to this she lived at home in a townhouse alone.     Today Ms. Cox is seen for follow up visit today. She says her bowels are still not moving well. She did go last week after they gave her the suppository but has not gone since. She is willing to take whatever she needs to in order to get going more regularly. She says otherwise she has been sleeping well and her appetite is fair. She denies nausea or stomach upset. Therapy seems to be going well. She did have some soreness in her arm overnight and needed some tylenol. It does not hurt this morning but she has not done any therapy yet. She denies uncontrolled pain. She is not having any dizziness, lightheadedness, shortness of breath, or cough. Per staff had toenails clipped on  and has some ingrown toenails and was recommended to f/u with podiatry.     REVIEW OF SYSTEMS:  PROBLEMS AND REVIEW OF SYSTEMS:   Today on ROS:   Currently, no fever, chills, or rigors. Decreased vision and hearing. Denies any chest pain, headaches, palpitations, lightheadedness, dizziness, shortness of breath. Appetite is good. Denies any GERD symptoms. Denies any difficulty with swallowing, nausea, or  vomiting. No insomnia. Positive for minimal to no right arm pain, leg swelling improved, urinary incontinence at times, constipation, hair thinning and loss      Allergies   Allergen Reactions     Evista [Raloxifene]      Caused blood clot     Fosamax [Alendronate] Nausea And Vomiting     Current Outpatient Medications   Medication Sig     polyethylene glycol (MIRALAX) 17 gram packet Take 17 g by mouth daily.     acetaminophen (TYLENOL) 500 MG tablet Take 1,000 mg by mouth 3 (three) times a day as needed.            cholecalciferol, vitamin D3, 1,000 unit tablet Take 2,000 Units by mouth daily.     dorzolamide (TRUSOPT) 2 % ophthalmic solution Administer 1 drop to both eyes 2 (two) times a day.            lisinopril (PRINIVIL,ZESTRIL) 10 MG tablet Take 20 mg by mouth daily.            melatonin 3 mg Tab tablet Take 1 tablet (3 mg total) by mouth at bedtime as needed. (Patient taking differently: Take 6 mg by mouth at bedtime.       )     senna-docusate (SENNOSIDES-DOCUSATE SODIUM) 8.6-50 mg tablet Take 1 tablet by mouth daily. And at bedtime prn            Past Medical History:    Past Medical History:   Diagnosis Date     Anxiety      Hyperlipidemia      IBS (irritable bowel syndrome)      Osteoporosis      Other abnormal clinical finding     evidence of old septal scar on EKG     Systolic hypertension            PHYSICAL EXAMINATION  Vitals:    08/11/19 2118   BP: 148/74   Pulse: 84   Resp: 18   Temp: 97.6  F (36.4  C)   SpO2: 97%   Weight: (!) 92 lb 9.6 oz (42 kg)       Today on physical exam:     GENERAL: Awake, Alert, oriented x3, not in any form of acute distress, answers questions appropriately, follows simple commands, conversant  HEENT: Head is normocephalic with normal hair distribution. No evidence of trauma. Ears: No acute purulent discharge. Eyes: Conjunctivae pink with no scleral jaundice. Nose: Normal mucosa and septum. NECK: Supple with no cervical or supraclavicular lymphadenopathy. Trachea is  midline. Glasses, [place], hair thinning, loss  CHEST: No tenderness or deformity, no crepitus  LUNG: dim to auscultation with good chest expansion. There are no crackles or wheezes, normal AP diameter.   BACK: No kyphosis of the thoracic spine. Symmetric, no curvature, ROM normal, no CVA tenderness, no spinal tenderness   CVS: There is good S1  S2, regular rhythm, there are no murmurs, rubs, gallops, or heaves,  2+ pulses symmetric in all extremities.  ABDOMEN: Rounded and soft, nontender to palpation, non distended, no masses, no organomegaly, good bowel sounds, no rebound or guarding, no peritoneal signs.   EXTREMITIES: right shoulder no edema, no edema in fingers, no numb/tingling, cap refill <3 sec, trace ble nonpitting edema, analilia hose  SKIN: Warm and dry, no erythema noted.  Skin color, texture, no rashes or lesions.  NEUROLOGICAL: The patient is oriented to person, place and time. Calm and pleasant            LABS:   Recent Results (from the past 168 hour(s))   Thyroid Stimulating Hormone (TSH)   Result Value Ref Range    TSH 2.40 0.30 - 5.00 uIU/mL   Vitamin D, Total (25-Hydroxy)   Result Value Ref Range    Vitamin D, Total (25-Hydroxy) 49.0 30.0 - 80.0 ng/mL   Comprehensive Metabolic Panel   Result Value Ref Range    Sodium 138 136 - 145 mmol/L    Potassium 4.0 3.5 - 5.0 mmol/L    Chloride 106 98 - 107 mmol/L    CO2 27 22 - 31 mmol/L    Anion Gap, Calculation 5 5 - 18 mmol/L    Glucose 79 70 - 125 mg/dL    BUN 18 8 - 28 mg/dL    Creatinine 0.74 0.60 - 1.10 mg/dL    GFR MDRD Af Amer >60 >60 mL/min/1.73m2    GFR MDRD Non Af Amer >60 >60 mL/min/1.73m2    Bilirubin, Total 0.4 0.0 - 1.0 mg/dL    Calcium 9.5 8.5 - 10.5 mg/dL    Protein, Total 5.8 (L) 6.0 - 8.0 g/dL    Albumin 3.1 (L) 3.5 - 5.0 g/dL    Alkaline Phosphatase 82 45 - 120 U/L    AST 17 0 - 40 U/L    ALT 11 0 - 45 U/L   Vitamin B12   Result Value Ref Range    Vitamin B-12 320 213 - 816 pg/mL   Iron   Result Value Ref Range    Iron 61 42 - 175 ug/dL    Ferritin   Result Value Ref Range    Ferritin 136 (H) 10 - 130 ng/mL   HM1 (CBC with Diff)   Result Value Ref Range    WBC 5.3 4.0 - 11.0 thou/uL    RBC 3.72 (L) 3.80 - 5.40 mill/uL    Hemoglobin 12.0 12.0 - 16.0 g/dL    Hematocrit 37.0 35.0 - 47.0 %     80 - 100 fL    MCH 32.3 27.0 - 34.0 pg    MCHC 32.4 32.0 - 36.0 g/dL    RDW 13.9 11.0 - 14.5 %    Platelets 171 140 - 440 thou/uL    MPV 11.4 8.5 - 12.5 fL    Neutrophils % 57 50 - 70 %    Lymphocytes % 27 20 - 40 %    Monocytes % 12 (H) 2 - 10 %    Eosinophils % 3 0 - 6 %    Basophils % 1 0 - 2 %    Neutrophils Absolute 3.1 2.0 - 7.7 thou/uL    Lymphocytes Absolute 1.4 0.8 - 4.4 thou/uL    Monocytes Absolute 0.7 0.0 - 0.9 thou/uL    Eosinophils Absolute 0.2 0.0 - 0.4 thou/uL    Basophils Absolute 0.1 0.0 - 0.2 thou/uL     Results for orders placed or performed in visit on 07/10/19   Basic Metabolic Panel   Result Value Ref Range    Sodium 136 136 - 145 mmol/L    Potassium 3.6 3.5 - 5.0 mmol/L    Chloride 99 98 - 107 mmol/L    CO2 26 22 - 31 mmol/L    Anion Gap, Calculation 11 5 - 18 mmol/L    Glucose 83 70 - 125 mg/dL    Calcium 9.4 8.5 - 10.5 mg/dL    BUN 21 8 - 28 mg/dL    Creatinine 0.78 0.60 - 1.10 mg/dL    GFR MDRD Af Amer >60 >60 mL/min/1.73m2    GFR MDRD Non Af Amer >60 >60 mL/min/1.73m2         Lab Results   Component Value Date    WBC 5.3 08/12/2019    HGB 12.0 08/12/2019    HCT 37.0 08/12/2019     08/12/2019     08/12/2019       Lab Results   Component Value Date    [id number] 320 08/12/2019     No results found for: HGBA1C  Lab Results   Component Value Date    INR 1.01 03/04/2016     Vitamin D, Total (25-Hydroxy)   Date Value Ref Range Status   08/12/2019 49.0 30.0 - 80.0 ng/mL Final     Lab Results   Component Value Date    TSH 2.40 08/12/2019           ASSESSMENT/PLAN:    1. Fall, Right humerus fracture: Nonoperative treatment. Right shoulder trace edema, no edema in fingers, no numb/tingling, cap refill < 3 sec. F/u with ortho  on 7/19-PROM, work towards AROM, wean from sling, hold off on pressure for 2 weeks, f/u in 6 weeks on 8/30. Minimal to no pain, on tylenol 1000 three times a day prn. Tylenol overnight. Stable today.    2. HTN: SBP 140s. On lisinopril 20mg at bedtime. Hold parameters.   3. Constipation: On miralax daily prn, senna doc daily and at bedtime prn.  Eating prunes, managing diet. Did have some relief with suppository last week. Will change miralax to daily scheduled.   4. OAB: stable, nocturia, incontinence intermittent. Explained changes with age today.   5. Insomnia: melatonin at bedtime, improved since increasing dose.   6. Vitamin d deficiency: on vitamin d. Vit d 45 on 7/10.   7. Diastolic CHF: weights 3 times weekly-104-104-78-53--62-07-94-92lbs. trace ble, analilia casas. dc'd hctz. No shortness of breath today or recently. Appetite fair-good.   8. Alopecia: unknown cause at this time, no associated symptoms, rashes, pruritus. Will order cmp, hm1, iron, ferritin, vit d, vit b12, tsh for 8/12. Will defer checking for hormonal abnormality prolactin level, progesterone, cortisol to pcp for referral to endocrine if needed. Labs today-pending.     Per therapy  PT - iliana 19/28, 9iw000rh SBA, sit to stand CGA,  t/f's CGA, bed mob S , stairs CGA OT -  UB set up/SBA, LB SBA, toileting SBA. Red tag. Lives alone in town home with 1 step. PT/OT- 1-2 depending on the home assessment. Recommending lif[facility]    Electronically signed by: Su Scott NP

## 2021-05-31 NOTE — TELEPHONE ENCOUNTER
Okay to start physical therapy, occupational therapy, home health aide, and skilled nurse to assess her as recommended.

## 2021-05-31 NOTE — PROGRESS NOTES
[facility] [place]    DATE: 2019    NAME:  Kristal Cox             :  1924  MRN: [id number]  CODE STATUS:  DNR    VISIT TYPE: Review Of Multiple Medical Conditions     FACILITY:  [facility] [[id number]]       CHIEF COMPLAIN/REASON FOR VISIT:    Chief Complaint   Patient presents with     Review Of Multiple Medical Conditions               HISTORY OF PRESENT ILLNESS: Kristal Cox is a 89 y.o. female who was admitted - for fall, right humerus fracture. She tripped and fell while ambulating to the bathroom at home. Ortho recommended non operative treatment. She was place din sling and made NWB. She was discharged to TCU for further rehab. She has PMH of HTN, osteoporosis, anxiety, HLD, IBS. Prior to this she lived at home in a townhouse alone.     Today Ms. Cox is seen for follow up visit. She says she had a little trouble with bowels over weekend and ate some prunes with breakfast every day. She feels it is ok and doesn't want to change anything right now. Her arm is sore at times but trying to get used to not having the sling on now. She did not have therapy yesterday so this allowed her to rest her arm. She says her appetite is about the same and no nausea or stomach upset. She is sleeping ok and no concerns with heartburn or dizziness. She thinks her leg swelling is improving but is still wearing her analilia hose every day. She thinks therapy is going fine just wishes she could do more with her arm. She denies new concerns today. Per staff uneventful weekend.     REVIEW OF SYSTEMS:  PROBLEMS AND REVIEW OF SYSTEMS:   Today on ROS:   Currently, no fever, chills, or rigors. Decreased vision and hearing. Denies any chest pain, headaches, palpitations, lightheadedness, dizziness, shortness of breath. Appetite is good. Denies any GERD symptoms. Denies any difficulty with swallowing, nausea, or vomiting. No insomnia. Positive for minimal  to no right arm pain, leg swelling improved, urinary incontinence at times, constipation intermittent      Allergies   Allergen Reactions     Evista [Raloxifene]      Caused blood clot     Fosamax [Alendronate] Nausea And Vomiting     Current Outpatient Medications   Medication Sig     acetaminophen (TYLENOL) 500 MG tablet Take 1,000 mg by mouth 3 (three) times a day as needed.            cholecalciferol, vitamin D3, 1,000 unit tablet Take 2,000 Units by mouth daily.     dorzolamide (TRUSOPT) 2 % ophthalmic solution Administer 1 drop to both eyes 2 (two) times a day.            lisinopril (PRINIVIL,ZESTRIL) 10 MG tablet Take 20 mg by mouth daily.            melatonin 3 mg Tab tablet Take 1 tablet (3 mg total) by mouth at bedtime as needed. (Patient taking differently: Take 6 mg by mouth at bedtime.       )     senna-docusate (SENNOSIDES-DOCUSATE SODIUM) 8.6-50 mg tablet Take 1 tablet by mouth 2 (two) times a day as needed. And at bedtime prn            Past Medical History:    Past Medical History:   Diagnosis Date     Anxiety      Hyperlipidemia      IBS (irritable bowel syndrome)      Osteoporosis      Other abnormal clinical finding     evidence of old septal scar on EKG     Systolic hypertension            PHYSICAL EXAMINATION  Vitals:    08/04/19 1952   BP: 125/72   Pulse: 85   Resp: 20   Temp: 98.3  F (36.8  C)   SpO2: 94%   Weight: (!) 94 lb (42.6 kg)       Today on physical exam:     GENERAL: Awake, Alert, oriented x3, not in any form of acute distress, answers questions appropriately, follows simple commands, conversant  HEENT: Head is normocephalic with normal hair distribution. No evidence of trauma. Ears: No acute purulent discharge. Eyes: Conjunctivae pink with no scleral jaundice. Nose: Normal mucosa and septum. NECK: Supple with no cervical or supraclavicular lymphadenopathy. Trachea is midline. Glasses, [place]  CHEST: No tenderness or deformity, no crepitus  LUNG: dim to auscultation with good chest  expansion. There are no crackles or wheezes, normal AP diameter.   BACK: No kyphosis of the thoracic spine. Symmetric, no curvature, ROM normal, no CVA tenderness, no spinal tenderness   CVS: There is good S1  S2, regular rhythm, there are no murmurs, rubs, gallops, or heaves,  2+ pulses symmetric in all extremities.  ABDOMEN: Rounded and soft, nontender to palpation, non distended, no masses, no organomegaly, good bowel sounds, no rebound or guarding, no peritoneal signs.   EXTREMITIES: Right arm sling, right shoulder no edema, no edema in fingers, no numb/tingling, cap refill <3 sec, trace ble nonpitting edema, analilia hose  SKIN: Warm and dry, no erythema noted.  Skin color, texture, no rashes or lesions.  NEUROLOGICAL: The patient is oriented to person, place and time. Calm and pleasant            LABS:   No results found for this or any previous visit (from the past 168 hour(s)).  Results for orders placed or performed in visit on 07/10/19   Basic Metabolic Panel   Result Value Ref Range    Sodium 136 136 - 145 mmol/L    Potassium 3.6 3.5 - 5.0 mmol/L    Chloride 99 98 - 107 mmol/L    CO2 26 22 - 31 mmol/L    Anion Gap, Calculation 11 5 - 18 mmol/L    Glucose 83 70 - 125 mg/dL    Calcium 9.4 8.5 - 10.5 mg/dL    BUN 21 8 - 28 mg/dL    Creatinine 0.78 0.60 - 1.10 mg/dL    GFR MDRD Af Amer >60 >60 mL/min/1.73m2    GFR MDRD Non Af Amer >60 >60 mL/min/1.73m2         Lab Results   Component Value Date    WBC 4.4 07/10/2019    HGB 10.6 (L) 07/10/2019    HCT 31.1 (L) 07/10/2019    MCV 95 07/10/2019     07/10/2019       No results found for: [id number]  No results found for: HGBA1C  Lab Results   Component Value Date    INR 1.01 03/04/2016     Vitamin D, Total (25-Hydroxy)   Date Value Ref Range Status   07/10/2019 45.0 30.0 - 80.0 ng/mL Final     Lab Results   Component Value Date    TSH 2.69 04/29/2019           ASSESSMENT/PLAN:    1. Fall, Right humerus fracture: Nonoperative treatment. Right shoulder trace edema,  no edema in fingers, no numb/tingling, cap refill < 3 sec. NWB. F/u with ortho on 7/19-PROM, work towards AROM, wean from sling, hold off on pressure for 2 weeks, f/u in 6 weeks on 8/30. Minimal to no pain, on tylenol 1000 three times a day prn. Not using tylenol recently. Sling off today, some mild soreness but no acute concerns.   2. HTN: SBP 120s. On lisinopril 20mg at bedtime. Hold parameters.   3. Constipation: On miralax daily prn, senna doc prn.     Prunes with breakfast, no concerns today. Constipation intermittent.   4. OAB: improved, urinary incontinence at times.   5. Insomnia: melatonin at bedtime, improved since increasing dose. Stable over weekend.   6. Vitamin d deficiency: on vitamin d. Vit d 45 on 7/10.   7. Diastolic CHF: weights 3 times weekly-104-104-62-97--91-98-94lbs. trace ble, analilia casas. dc'd hctz. No shortness of breath today or recently. Appetite good, stable.     Per therapy PT - iliana 8/28, SEC 200ft CGA, sit to stand CGA,  t/f's CGA, bed mob SBA , stairs CGA/Min A OT -  UB set up/SBA, LB SBA, toileting CGA. Red tag. Lives alone in town home with 1 step. Recommending 2-3 weeks.     Electronically signed by: Su Scott NP

## 2021-05-31 NOTE — PROGRESS NOTES
[facility]    DATE: 2019    NAME:  Kristal Cox             :  1924  MRN: [id number]  CODE STATUS:  DNR    VISIT TYPE: Review Of Multiple Medical Conditions     FACILITY:  [facility] [[id number]]       CHIEF COMPLAIN/REASON FOR VISIT:    Chief Complaint   Patient presents with     Review Of Multiple Medical Conditions               HISTORY OF PRESENT ILLNESS: Kristal Cox is a 89 y.o. female who was admitted - for fall, right humerus fracture. She tripped and fell while ambulating to the bathroom at home. Ortho recommended non operative treatment. She was place din sling and made NWB. She was discharged to TCU for further rehab. She has PMH of HTN, osteoporosis, anxiety, HLD, IBS. Prior to this she lived at home in a townhouse alone.     Today Ms. Cox is seen for follow up visit. She reports that she has been doing well the last few days. She thinks her bowels are moving fine and no shortness of breath. She denies cough or stomach upset. She is eating well and sleeping fine. She says she is tired this morning after doing therapy. She thinks her weights are good and still no return of the rash or itching. She is planning on going home on Saturday. She feels pretty good about this and had a meeting about it. She will have some help at home she thinks. She denies other concerns today. Per staff lcd set for  and she will return home on Saturday.    REVIEW OF SYSTEMS:  PROBLEMS AND REVIEW OF SYSTEMS:   Today on ROS:   Currently, no fever, chills, or rigors. Decreased vision and hearing. Denies any chest pain, headaches, palpitations, lightheadedness, dizziness, shortness of breath. Appetite is good. Denies any GERD symptoms. Denies any difficulty with swallowing, nausea, or vomiting. No insomnia. Positive for minimal to no right arm pain, leg swelling improved, urinary incontinence at times,  hair thinning and  loss      Allergies   Allergen Reactions     Evista [Raloxifene]      Caused blood clot     Fosamax [Alendronate] Nausea And Vomiting     Current Outpatient Medications   Medication Sig     acetaminophen (TYLENOL) 500 MG tablet Take 1,000 mg by mouth 3 (three) times a day as needed.            camphor-menthol (SARNA) lotion Apply 1 application topically every 6 (six) hours as needed for itching.     cholecalciferol, vitamin D3, 1,000 unit tablet Take 2,000 Units by mouth daily.     dorzolamide (TRUSOPT) 2 % ophthalmic solution Administer 1 drop to both eyes 2 (two) times a day.            hydrocortisone 1 % cream Apply 1 application topically every 6 (six) hours as needed.     lisinopril (PRINIVIL,ZESTRIL) 10 MG tablet Take 20 mg by mouth daily.            melatonin 3 mg Tab tablet Take 1 tablet (3 mg total) by mouth at bedtime as needed. (Patient taking differently: Take 6 mg by mouth at bedtime.       )     polyethylene glycol (MIRALAX) 17 gram packet Take 17 g by mouth every other day.            senna-docusate (SENNOSIDES-DOCUSATE SODIUM) 8.6-50 mg tablet Take 1 tablet by mouth daily. And at bedtime prn            Past Medical History:    Past Medical History:   Diagnosis Date     Anxiety      Hyperlipidemia      IBS (irritable bowel syndrome)      Osteoporosis      Other abnormal clinical finding     evidence of old septal scar on EKG     Systolic hypertension            PHYSICAL EXAMINATION  Vitals:    08/28/19 1921   BP: 137/65   Pulse: 83   Resp: 18   Temp: 99.6  F (37.6  C)   SpO2: 95%   Weight: (!) 94 lb (42.6 kg)       Today on physical exam:     GENERAL: Awake, Alert, oriented x3, not in any form of acute distress, answers questions appropriately, follows simple commands, conversant  HEENT: Head is normocephalic with normal hair distribution. No evidence of trauma. Ears: No acute purulent discharge. Eyes: Conjunctivae pink with no scleral jaundice. Nose: Normal mucosa and septum. NECK: Supple with no  cervical or supraclavicular lymphadenopathy. Trachea is midline. Glasses, [place], hair thinning, loss  CHEST: No tenderness or deformity, no crepitus  LUNG: dim to auscultation with good chest expansion. There are no crackles or wheezes, normal AP diameter.   BACK: No kyphosis of the thoracic spine. Symmetric, no curvature, ROM normal, no CVA tenderness, no spinal tenderness   CVS: There is good S1  S2, regular rhythm, there are no murmurs, rubs, gallops, or heaves,  2+ pulses symmetric in all extremities.  ABDOMEN: Rounded and soft, nontender to palpation, non distended, no masses, no organomegaly, good bowel sounds, no rebound or guarding, no peritoneal signs.   EXTREMITIES: right shoulder no edema, no edema in fingers, no numb/tingling, cap refill <3 sec, trace ble nonpitting edema, analilia hose  SKIN: Warm and dry, no erythema noted.  Skin color, texture, no rashes or lesions.  NEUROLOGICAL: The patient is oriented to person, place and time. Calm and pleasant            LABS:   No results found for this or any previous visit (from the past 168 hour(s)).  Results for orders placed or performed in visit on 07/10/19   Basic Metabolic Panel   Result Value Ref Range    Sodium 136 136 - 145 mmol/L    Potassium 3.6 3.5 - 5.0 mmol/L    Chloride 99 98 - 107 mmol/L    CO2 26 22 - 31 mmol/L    Anion Gap, Calculation 11 5 - 18 mmol/L    Glucose 83 70 - 125 mg/dL    Calcium 9.4 8.5 - 10.5 mg/dL    BUN 21 8 - 28 mg/dL    Creatinine 0.78 0.60 - 1.10 mg/dL    GFR MDRD Af Amer >60 >60 mL/min/1.73m2    GFR MDRD Non Af Amer >60 >60 mL/min/1.73m2         Lab Results   Component Value Date    WBC 5.3 08/12/2019    HGB 12.0 08/12/2019    HCT 37.0 08/12/2019     08/12/2019     08/12/2019       Lab Results   Component Value Date    [id number] 320 08/12/2019     No results found for: HGBA1C  Lab Results   Component Value Date    INR 1.01 03/04/2016     Vitamin D, Total (25-Hydroxy)   Date Value Ref Range Status   08/12/2019 49.0  30.0 - 80.0 ng/mL Final     Lab Results   Component Value Date    TSH 2.40 08/12/2019           ASSESSMENT/PLAN:    1. Fall, Right humerus fracture: Nonoperative treatment. Right shoulder no edema, no edema in fingers, no numb/tingling, cap refill < 3 sec. F/u with ortho on 7/19-PROM, work towards AROM, wean from sling, hold off on pressure for 2 weeks, f/u in 6 weeks on 8/30. Minimal to no pain, on tylenol 1000 three times a day prn. No concerns today.   2. HTN: SBP 130s. On lisinopril 20mg at bedtime. Hold parameters. Mostly stable, no changes today.   3. Constipation: On miralax every other day, senna doc daily and at bedtime prn.  Eating prunes, managing diet. Improved since changing miralax.   4. OAB: stable, nocturia, incontinence intermittent.   5. Insomnia: melatonin at bedtime. Stable. .   6. Vitamin d deficiency: on vitamin d. Vit d 45 on 7/10.   7. Diastolic CHF: weights 3 times eyaitd-[id number] edema today, previously stopped diuretics. Stable. No shortness of breath.   8. Alopecia: unknown cause at this time, no associated symptoms, rashes, pruritus. checked cmp, hm1, iron, ferritin, vit d, vit b12, tsh 8/12 and all stable, ferritin slightly elevated but iron levels normal. Will defer checking for hormonal abnormality prolactin level, progesterone, cortisol to pcp for referral to endocrine if needed. Follow up with pcp regarding this after discharge. May pursue endocrine workup if she is concerned as outpatient.       PT:  home eval completed and went well, iliana 21/28, ambulatig 250 feet with FWW and supervision,  no AD CGA/SBA, SEC with CGA/SBA for balance, bed mobility is mod indepednent          OT:  SBA for all dressing, toileting, SBA with shower with vc's. lcd 8/30.  pt, ot, hha, nursing    Electronically signed by: Su Scott NP

## 2021-05-31 NOTE — PROGRESS NOTES
[facility]    DATE: 2019    NAME:  Kristal Cox             :  1924  MRN: [id number]  CODE STATUS:  DNR    VISIT TYPE: Review Of Multiple Medical Conditions     FACILITY:  [facility] [[id number]]       CHIEF COMPLAIN/REASON FOR VISIT:    Chief Complaint   Patient presents with     Review Of Multiple Medical Conditions               HISTORY OF PRESENT ILLNESS: Kristal Cox is a 89 y.o. female who was admitted - for fall, right humerus fracture. She tripped and fell while ambulating to the bathroom at home. Ortho recommended non operative treatment. She was place din sling and made NWB. She was discharged to TCU for further rehab. She has PMH of HTN, osteoporosis, anxiety, HLD, IBS. Prior to this she lived at home in a townhouse alone.     Today Ms. Cox is seen for follow up visit today. She reports she had some loose stools again over the weekend and refused her stool softeners yesterday. Today she has not had any loose stools yet. She is hoping this is done now. She says she doesn't understand she gets so stopped up and then takes the stool softeners and has the runs for days. She cannot seem to find a happy medium. She says her breathing is fine and no issues with appetite. Her appetite wasn't the best with the diarrhea but is better now. She ate most of her breakfast. She says she has minimal to no pain in her arm and shoulder. Therapy is going fine. She is not sure how much longer she will be here. She hopes someone will tell her soon. She did not develop any further rash or itching over the weekend. She denies other concerns today. Per staff still had one elevated blood pressure over weekend. She has been complaining of loose stools and feeling weak and tired as result of diarrhea.     REVIEW OF SYSTEMS:  PROBLEMS AND REVIEW OF SYSTEMS:   Today on ROS:   Currently, no fever, chills, or rigors. Decreased vision and  hearing. Denies any chest pain, headaches, palpitations, lightheadedness, dizziness, shortness of breath. Appetite is good. Denies any GERD symptoms. Denies any difficulty with swallowing, nausea, or vomiting. No insomnia. Positive for minimal to no right arm pain, leg swelling improved, urinary incontinence at times,  hair thinning and loss, no rash or itching over weekend, right eye itching at times but improved, no drainange or redness today, loose stools      Allergies   Allergen Reactions     Evista [Raloxifene]      Caused blood clot     Fosamax [Alendronate] Nausea And Vomiting     Current Outpatient Medications   Medication Sig     acetaminophen (TYLENOL) 500 MG tablet Take 1,000 mg by mouth 3 (three) times a day as needed.            camphor-menthol (SARNA) lotion Apply 1 application topically every 6 (six) hours as needed for itching.     cholecalciferol, vitamin D3, 1,000 unit tablet Take 2,000 Units by mouth daily.     dorzolamide (TRUSOPT) 2 % ophthalmic solution Administer 1 drop to both eyes 2 (two) times a day.            hydrocortisone 1 % cream Apply 1 application topically every 6 (six) hours as needed.     lisinopril (PRINIVIL,ZESTRIL) 10 MG tablet Take 20 mg by mouth daily.            melatonin 3 mg Tab tablet Take 1 tablet (3 mg total) by mouth at bedtime as needed. (Patient taking differently: Take 6 mg by mouth at bedtime.       )     polyethylene glycol (MIRALAX) 17 gram packet Take 17 g by mouth every other day.            senna-docusate (SENNOSIDES-DOCUSATE SODIUM) 8.6-50 mg tablet Take 1 tablet by mouth daily. And at bedtime prn            Past Medical History:    Past Medical History:   Diagnosis Date     Anxiety      Hyperlipidemia      IBS (irritable bowel syndrome)      Osteoporosis      Other abnormal clinical finding     evidence of old septal scar on EKG     Systolic hypertension            PHYSICAL EXAMINATION  Vitals:    08/18/19 1826   BP: 147/66   Pulse: 83   Resp: 18   Temp:  97.2  F (36.2  C)   SpO2: 96%   Weight: (!) 94 lb (42.6 kg)       Today on physical exam:     GENERAL: Awake, Alert, oriented x3, not in any form of acute distress, answers questions appropriately, follows simple commands, conversant  HEENT: Head is normocephalic with normal hair distribution. No evidence of trauma. Ears: No acute purulent discharge. Eyes: Conjunctivae pink with no scleral jaundice. Nose: Normal mucosa and septum. NECK: Supple with no cervical or supraclavicular lymphadenopathy. Trachea is midline. Glasses, [place], hair thinning, loss, right eye erythema resolved, no discharge noted, mild itching reported  CHEST: No tenderness or deformity, no crepitus  LUNG: dim to auscultation with good chest expansion. There are no crackles or wheezes, normal AP diameter.   BACK: No kyphosis of the thoracic spine. Symmetric, no curvature, ROM normal, no CVA tenderness, no spinal tenderness   CVS: There is good S1  S2, regular rhythm, there are no murmurs, rubs, gallops, or heaves,  2+ pulses symmetric in all extremities.  ABDOMEN: Rounded and soft, nontender to palpation, non distended, no masses, no organomegaly, good bowel sounds, no rebound or guarding, no peritoneal signs.   EXTREMITIES: right shoulder no edema, no edema in fingers, no numb/tingling, cap refill <3 sec, trace ble nonpitting edema, analilia hose  SKIN: Warm and dry, no erythema noted.  Skin color, texture, no rashes or lesions.  NEUROLOGICAL: The patient is oriented to person, place and time. Calm and pleasant            LABS:   No results found for this or any previous visit (from the past 168 hour(s)).  Results for orders placed or performed in visit on 07/10/19   Basic Metabolic Panel   Result Value Ref Range    Sodium 136 136 - 145 mmol/L    Potassium 3.6 3.5 - 5.0 mmol/L    Chloride 99 98 - 107 mmol/L    CO2 26 22 - 31 mmol/L    Anion Gap, Calculation 11 5 - 18 mmol/L    Glucose 83 70 - 125 mg/dL    Calcium 9.4 8.5 - 10.5 mg/dL    BUN 21 8 - 28  mg/dL    Creatinine 0.78 0.60 - 1.10 mg/dL    GFR MDRD Af Amer >60 >60 mL/min/1.73m2    GFR MDRD Non Af Amer >60 >60 mL/min/1.73m2         Lab Results   Component Value Date    WBC 5.3 08/12/2019    HGB 12.0 08/12/2019    HCT 37.0 08/12/2019     08/12/2019     08/12/2019       Lab Results   Component Value Date    [id number] 320 08/12/2019     No results found for: HGBA1C  Lab Results   Component Value Date    INR 1.01 03/04/2016     Vitamin D, Total (25-Hydroxy)   Date Value Ref Range Status   08/12/2019 49.0 30.0 - 80.0 ng/mL Final     Lab Results   Component Value Date    TSH 2.40 08/12/2019           ASSESSMENT/PLAN:    1. Fall, Right humerus fracture: Nonoperative treatment. Right shoulder trace edema, no edema in fingers, no numb/tingling, cap refill < 3 sec. F/u with ortho on 7/19-PROM, work towards AROM, wean from sling, hold off on pressure for 2 weeks, f/u in 6 weeks on 8/30. Minimal to no pain, on tylenol 1000 three times a day prn. No concerns today.   2. HTN: SBP 140s. On lisinopril 20mg at bedtime. Hold parameters. One reading in 170s over weekend but resolved after meds.   3. Constipation: On miralax daily, senna doc daily and at bedtime prn.  Eating prunes, managing diet. Change miralax to every other day due to recent loose stools over weekend. Did refuse doses yesterday.   4. OAB: stable, nocturia, incontinence intermittent.   5. Insomnia: melatonin at bedtime. No concerns, did have some trouble sleeping last night related to pruritus.   6. Vitamin d deficiency: on vitamin d. Vit d 45 on 7/10.   7. Diastolic CHF: weights 3 times afyrvd-[id number]--03-50-23-11-41-94-94lbs. trace ble, analilia hose. dc'd hctz. No shortness of breath today or recently. Appetite fair-good.   8. Alopecia: unknown cause at this time, no associated symptoms, rashes, pruritus. checked cmp, hm1, iron, ferritin, vit d, vit b12, tsh 8/12 and all stable, ferritin slightly elevated but iron levels normal. Will  defer checking for hormonal abnormality prolactin level, progesterone, cortisol to pcp for referral to endocrine if needed. Follow up with pcp regarding this after discharge. May pursue endocrine workup if she is concerned as outpatient.   9. Macular rash, pruritus: two episodes now, both relating to clothing. Daughter doing laundry and suspecting related to laundry detergent, felt better and resolved after changing clothes. sarna and HC cream prn. No concerns over weekend or today.   10. Right eye allergic conjunctivitis: stringly, yellow drainage, mild irritation and itching.  [place] two times a day x 3 days. Cleanse with mild soap and warm wash cloth. Resolved today. Eye has mild itching but no other complaints.     Per therapy PT:  home eval completed and went well, iliana 21/28, ambulatig 250 feet with FWW and SBA/supervision, SEC with CGA/SBA for balance, bed mobility is mod indepednent          OT:  SBA for all dressing, toileting - need to assess a shower. Yellow tag.Lives alone in town home with 1 step.  Recommend home PT/OT, [facility], home nursing. LCD 8/30.     Electronically signed by: Su Scott NP

## 2021-05-31 NOTE — PROGRESS NOTES
[facility]    DATE: 2019    NAME:  Kristal Cox             :  1924  MRN: [id number]  CODE STATUS:  DNR    VISIT TYPE: Review Of Multiple Medical Conditions     FACILITY:  [facility] [[id number]]       CHIEF COMPLAIN/REASON FOR VISIT:    Chief Complaint   Patient presents with     Review Of Multiple Medical Conditions               HISTORY OF PRESENT ILLNESS: Kristal Cox is a 89 y.o. female who was admitted - for fall, right humerus fracture. She tripped and fell while ambulating to the bathroom at home. Ortho recommended non operative treatment. She was place din sling and made NWB. She was discharged to TCU for further rehab. She has PMH of HTN, osteoporosis, anxiety, HLD, IBS. Prior to this she lived at home in a townhouse alone.     Today Ms. Cox is seen for follow up visit. She reports she has not had any recurrence of rash and itching the last few days. She thinks therapy is going well and rarely has pain. She says she seems to be making some progress in therapy. She thinks she may be going home sometime next week but not for sure. She is sleeping pretty well for the most part and her appetite is fine. She denies nausea or stomach upset. Her bowels are moving more regularly now. She denies any dizziness or lightheadedness. Per staff no recent concerns or recurrence of rash. Her vitals have been stable and weight steady at 95lbs. She does have LCD of .    REVIEW OF SYSTEMS:  PROBLEMS AND REVIEW OF SYSTEMS:   Today on ROS:   Currently, no fever, chills, or rigors. Decreased vision and hearing. Denies any chest pain, headaches, palpitations, lightheadedness, dizziness, shortness of breath. Appetite is good. Denies any GERD symptoms. Denies any difficulty with swallowing, nausea, or vomiting. No insomnia. Positive for minimal to no right arm pain, leg swelling improved, urinary incontinence at times,  hair  thinning and loss, no rash or itching recently      Allergies   Allergen Reactions     Evista [Raloxifene]      Caused blood clot     Fosamax [Alendronate] Nausea And Vomiting     Current Outpatient Medications   Medication Sig     acetaminophen (TYLENOL) 500 MG tablet Take 1,000 mg by mouth 3 (three) times a day as needed.            camphor-menthol (SARNA) lotion Apply 1 application topically every 6 (six) hours as needed for itching.     cholecalciferol, vitamin D3, 1,000 unit tablet Take 2,000 Units by mouth daily.     dorzolamide (TRUSOPT) 2 % ophthalmic solution Administer 1 drop to both eyes 2 (two) times a day.            hydrocortisone 1 % cream Apply 1 application topically every 6 (six) hours as needed.     lisinopril (PRINIVIL,ZESTRIL) 10 MG tablet Take 20 mg by mouth daily.            melatonin 3 mg Tab tablet Take 1 tablet (3 mg total) by mouth at bedtime as needed. (Patient taking differently: Take 6 mg by mouth at bedtime.       )     polyethylene glycol (MIRALAX) 17 gram packet Take 17 g by mouth every other day.            senna-docusate (SENNOSIDES-DOCUSATE SODIUM) 8.6-50 mg tablet Take 1 tablet by mouth daily. And at bedtime prn            Past Medical History:    Past Medical History:   Diagnosis Date     Anxiety      Hyperlipidemia      IBS (irritable bowel syndrome)      Osteoporosis      Other abnormal clinical finding     evidence of old septal scar on EKG     Systolic hypertension            PHYSICAL EXAMINATION  Vitals:    08/21/19 2228   BP: 165/71   Pulse: 78   Resp: 18   Temp: 98  F (36.7  C)   SpO2: 94%   Weight: (!) 95 lb (43.1 kg)       Today on physical exam:     GENERAL: Awake, Alert, oriented x3, not in any form of acute distress, answers questions appropriately, follows simple commands, conversant  HEENT: Head is normocephalic with normal hair distribution. No evidence of trauma. Ears: No acute purulent discharge. Eyes: Conjunctivae pink with no scleral jaundice. Nose: Normal  mucosa and septum. NECK: Supple with no cervical or supraclavicular lymphadenopathy. Trachea is midline. Glasses, [place], hair thinning, loss  CHEST: No tenderness or deformity, no crepitus  LUNG: dim to auscultation with good chest expansion. There are no crackles or wheezes, normal AP diameter.   BACK: No kyphosis of the thoracic spine. Symmetric, no curvature, ROM normal, no CVA tenderness, no spinal tenderness   CVS: There is good S1  S2, regular rhythm, there are no murmurs, rubs, gallops, or heaves,  2+ pulses symmetric in all extremities.  ABDOMEN: Rounded and soft, nontender to palpation, non distended, no masses, no organomegaly, good bowel sounds, no rebound or guarding, no peritoneal signs.   EXTREMITIES: right shoulder no edema, no edema in fingers, no numb/tingling, cap refill <3 sec, trace ble nonpitting edema, analilia hose  SKIN: Warm and dry, no erythema noted.  Skin color, texture, no rashes or lesions.  NEUROLOGICAL: The patient is oriented to person, place and time. Calm and pleasant            LABS:   No results found for this or any previous visit (from the past 168 hour(s)).  Results for orders placed or performed in visit on 07/10/19   Basic Metabolic Panel   Result Value Ref Range    Sodium 136 136 - 145 mmol/L    Potassium 3.6 3.5 - 5.0 mmol/L    Chloride 99 98 - 107 mmol/L    CO2 26 22 - 31 mmol/L    Anion Gap, Calculation 11 5 - 18 mmol/L    Glucose 83 70 - 125 mg/dL    Calcium 9.4 8.5 - 10.5 mg/dL    BUN 21 8 - 28 mg/dL    Creatinine 0.78 0.60 - 1.10 mg/dL    GFR MDRD Af Amer >60 >60 mL/min/1.73m2    GFR MDRD Non Af Amer >60 >60 mL/min/1.73m2         Lab Results   Component Value Date    WBC 5.3 08/12/2019    HGB 12.0 08/12/2019    HCT 37.0 08/12/2019     08/12/2019     08/12/2019       Lab Results   Component Value Date    AHEXUXJI15 320 08/12/2019     No results found for: HGBA1C  Lab Results   Component Value Date    INR 1.01 03/04/2016     Vitamin D, Total (25-Hydroxy)   Date  Value Ref Range Status   08/12/2019 49.0 30.0 - 80.0 ng/mL Final     Lab Results   Component Value Date    TSH 2.40 08/12/2019           ASSESSMENT/PLAN:    1. Fall, Right humerus fracture: Nonoperative treatment. Right shoulder no edema, no edema in fingers, no numb/tingling, cap refill < 3 sec. F/u with ortho on 7/19-PROM, work towards AROM, wean from sling, hold off on pressure for 2 weeks, f/u in 6 weeks on 8/30. Minimal to no pain, on tylenol 1000 three times a day prn. No concerns today.   2. HTN: -160s. On lisinopril 20mg at bedtime. Hold parameters. Mostly stable, no changes today.   3. Constipation: On miralax every other day, senna doc daily and at bedtime prn.  Eating prunes, managing diet. Improved since changing miralax.   4. OAB: stable, nocturia, incontinence intermittent.   5. Insomnia: melatonin at bedtime. Stable. .   6. Vitamin d deficiency: on vitamin d. Vit d 45 on 7/10.   7. Diastolic CHF: weights 3 times [id number] edema today, previously stopped diuretics. Stable. No shortness of breath.   8. Alopecia: unknown cause at this time, no associated symptoms, rashes, pruritus. checked cmp, hm1, iron, ferritin, vit d, vit b12, tsh 8/12 and all stable, ferritin slightly elevated but iron levels normal. Will defer checking for hormonal abnormality prolactin level, progesterone, cortisol to pcp for referral to endocrine if needed. Follow up with pcp regarding this after discharge. May pursue endocrine workup if she is concerned as outpatient.   9. Macular rash, pruritus: resolved now, sarna and hc cream prn.   10. Right eye allergic conjunctivitis: resolved.     Per therapy PT:  home eval completed and went well, iliana 21/28, ambulatig 250 feet with FWW and SBA/supervision, SEC with CGA/SBA for balance, bed mobility is mod indepednent          OT:  SBA for all dressing, toileting - need to assess a shower. Yellow tag.Lives alone in town home with 1  step.  Recommend home PT/OT, [facility], home nursing. LCD 8/30.     Electronically signed by: Su Scott NP

## 2021-06-01 ENCOUNTER — COMMUNICATION - HEALTHEAST (OUTPATIENT)
Dept: NURSING | Facility: CLINIC | Age: 86
End: 2021-06-01

## 2021-06-01 VITALS — HEIGHT: 62 IN | BODY MASS INDEX: 19.51 KG/M2 | WEIGHT: 106 LBS

## 2021-06-01 VITALS — BODY MASS INDEX: 19.51 KG/M2 | HEIGHT: 62 IN | WEIGHT: 106 LBS

## 2021-06-01 VITALS — BODY MASS INDEX: 19.32 KG/M2 | WEIGHT: 105 LBS | HEIGHT: 62 IN

## 2021-06-01 NOTE — PROGRESS NOTES
Office Visit   Kristal Cox   89 y.o. female    Date of Visit: 9/6/2019    Chief Complaint   Patient presents with     Follow-up     Hospital and TCU follow up        Assessment and Plan   1. Closed fracture of proximal end of right humerus, unspecified fracture morphology, initial encounter  She is back at home.  She was in a transitional care unit until 2 days ago.  Her family is staying with her to make sure that she is safe.  She has home health and physical therapy set up.  She is doing well and is not having any significant pain.  She is walking with a walker.    2. Systolic hypertension  Pressure is well controlled and I have given her a prescription for lisinopril.  - lisinopril (PRINIVIL,ZESTRIL) 20 MG tablet; Take 1 tablet (20 mg total) by mouth daily.  Dispense: 90 tablet; Refill: 3    3. Insomnia, unspecified type  We discussed the melatonin and she will continue to use 1 to 2 tablets as needed.  - melatonin 3 mg Tab tablet; Take 1-2 tablets (3-6 mg total) by mouth at bedtime as needed.    4. Slow transit constipation  - docusate sodium (COLACE) 100 MG capsule; Take 1 capsule (100 mg total) by mouth daily as needed for constipation.  Dispense: 30 capsule; Refill: 2         Return in about 3 months (around 12/6/2019) for Recheck Htn.     History of Present Illness   This 89 y.o. old female comes in to follow-up.  She recently fell and had a humerus fracture.  She was in the hospital and then a transitional care unit.  She went back to her home 2 days ago.  Her daughter or son has been staying with her at night.  She is getting along well.  Her pain is controlled.  She just takes Tylenol for pain.  We reviewed her medications.  She is no longer on a diuretic and will keep an eye out for swelling.  She has no other acute concerns today.  She is taking melatonin at bedtime for sleep.  She does have trouble with sleep but will continue to try this.    Review of Systems: As above, systems otherwise  "reviewed and negative.     Medications, Allergies and Problem List   Patient Active Problem List   Diagnosis     Vertebral compression fracture (H)     GERD (gastroesophageal reflux disease)     Osteoporosis     Systolic hypertension     Chronic anxiety     Humeral fracture     Constipation     Insomnia     OAB (overactive bladder)     Current Outpatient Medications   Medication Sig Dispense Refill     acetaminophen (TYLENOL) 500 MG tablet Take 1,000 mg by mouth 3 (three) times a day as needed.              cholecalciferol, vitamin D3, 1,000 unit tablet Take 2,000 Units by mouth daily.       lisinopril (PRINIVIL,ZESTRIL) 20 MG tablet Take 1 tablet (20 mg total) by mouth daily. 90 tablet 3     melatonin 3 mg Tab tablet Take 1-2 tablets (3-6 mg total) by mouth at bedtime as needed.       docusate sodium (COLACE) 100 MG capsule Take 1 capsule (100 mg total) by mouth daily as needed for constipation. 30 capsule 2     No current facility-administered medications for this visit.      Allergies   Allergen Reactions     Evista [Raloxifene]      Caused blood clot     Fosamax [Alendronate] Nausea And Vomiting          Physical Exam     /80 (Patient Site: Left Arm, Patient Position: Sitting)   Pulse 95   Ht 5' 2\" (1.575 m)   Wt (!) 94 lb (42.6 kg)   SpO2 97%   BMI 17.19 kg/m      General:  Patient is alert and in no apparent distress.  Sitting comfortably in a wheelchair.  Neck:  Supple with no adenopathy or thyroid abnormality noted.  Cardiovascular:  Regular rate and rhythm, normal S1/S2, no murmurs, rubs, or gallop.  Pulmonary:  Lungs are clear to auscultation bilaterally with normal respiratory effort.           Additional Information   Social History     Tobacco Use     Smoking status: Never Smoker     Smokeless tobacco: Never Used   Substance Use Topics     Alcohol use: No     Comment: rarely drinks wine, and drinks wine watered down with ice.     Drug use: Yes          Palak Schmitz MD    "

## 2021-06-02 ENCOUNTER — AMBULATORY - HEALTHEAST (OUTPATIENT)
Dept: LAB | Facility: HOSPITAL | Age: 86
End: 2021-06-02

## 2021-06-02 ENCOUNTER — HOSPITAL ENCOUNTER (OUTPATIENT)
Dept: ULTRASOUND IMAGING | Facility: HOSPITAL | Age: 86
Discharge: HOME | End: 2021-06-02

## 2021-06-02 ENCOUNTER — RECORDS - HEALTHEAST (OUTPATIENT)
Dept: ADMINISTRATIVE | Facility: CLINIC | Age: 86
End: 2021-06-02

## 2021-06-02 VITALS — BODY MASS INDEX: 19.14 KG/M2 | HEIGHT: 62 IN | WEIGHT: 104 LBS

## 2021-06-02 DIAGNOSIS — R60.9 EDEMA, UNSPECIFIED TYPE: ICD-10-CM

## 2021-06-02 DIAGNOSIS — I89.0 LYMPHEDEMA OF BOTH LOWER EXTREMITIES: ICD-10-CM

## 2021-06-02 DIAGNOSIS — K74.69 OTHER CIRRHOSIS OF LIVER (H): ICD-10-CM

## 2021-06-02 DIAGNOSIS — D64.89 ANEMIA DUE TO OTHER CAUSE, NOT CLASSIFIED: ICD-10-CM

## 2021-06-02 DIAGNOSIS — R79.9 ABNORMAL FINDING OF BLOOD CHEMISTRY, UNSPECIFIED: ICD-10-CM

## 2021-06-02 DIAGNOSIS — R19.00 ABDOMINAL SWELLING: ICD-10-CM

## 2021-06-02 LAB
BASOPHILS # BLD AUTO: 0.1 THOU/UL (ref 0–0.2)
BASOPHILS NFR BLD AUTO: 1 % (ref 0–2)
DAT, IGG, C3D (HISTORICAL CONVERSION): NORMAL
EOSINOPHIL # BLD AUTO: 0.1 THOU/UL (ref 0–0.4)
EOSINOPHIL NFR BLD AUTO: 3 % (ref 0–6)
ERYTHROCYTE [DISTWIDTH] IN BLOOD BY AUTOMATED COUNT: 12.9 % (ref 11–14.5)
ERYTHROCYTE [SEDIMENTATION RATE] IN BLOOD BY WESTERGREN METHOD: 18 MM/HR (ref 0–20)
HAPTOGLOB SERPL-MCNC: 90 MG/DL (ref 33–171)
HCT VFR BLD AUTO: 28.9 % (ref 35–47)
HGB BLD-MCNC: 9.2 G/DL (ref 12–16)
IMM GRANULOCYTES # BLD: 0 THOU/UL
IMM GRANULOCYTES NFR BLD: 0 %
LAB AP CHARGES (HE HISTORICAL CONVERSION): NORMAL
LYMPHOCYTES # BLD AUTO: 1.5 THOU/UL (ref 0.8–4.4)
LYMPHOCYTES NFR BLD AUTO: 35 % (ref 20–40)
MCH RBC QN AUTO: 31.2 PG (ref 27–34)
MCHC RBC AUTO-ENTMCNC: 31.8 G/DL (ref 32–36)
MCV RBC AUTO: 98 FL (ref 80–100)
MONOCYTES # BLD AUTO: 0.6 THOU/UL (ref 0–0.9)
MONOCYTES NFR BLD AUTO: 13 % (ref 2–10)
NEUTROPHILS # BLD AUTO: 2.1 THOU/UL (ref 2–7.7)
NEUTROPHILS NFR BLD AUTO: 48 % (ref 50–70)
PATH REPORT.COMMENTS IMP SPEC: NORMAL
PATH REPORT.COMMENTS IMP SPEC: NORMAL
PATH REPORT.FINAL DX SPEC: NORMAL
PATH REPORT.MICROSCOPIC SPEC OTHER STN: ABNORMAL
PATH REPORT.RELEVANT HX SPEC: NORMAL
PLATELET # BLD AUTO: 183 THOU/UL (ref 140–440)
PMV BLD AUTO: 9.8 FL (ref 8.5–12.5)
RBC # BLD AUTO: 2.95 MILL/UL (ref 3.8–5.4)
WBC: 4.3 THOU/UL (ref 4–11)

## 2021-06-03 VITALS — BODY MASS INDEX: 17.19 KG/M2 | WEIGHT: 94 LBS

## 2021-06-03 VITALS — WEIGHT: 95 LBS | BODY MASS INDEX: 17.38 KG/M2

## 2021-06-03 VITALS — WEIGHT: 94 LBS | BODY MASS INDEX: 17.19 KG/M2

## 2021-06-03 VITALS — WEIGHT: 104 LBS | BODY MASS INDEX: 19.02 KG/M2

## 2021-06-03 VITALS
SYSTOLIC BLOOD PRESSURE: 120 MMHG | HEART RATE: 95 BPM | BODY MASS INDEX: 17.3 KG/M2 | OXYGEN SATURATION: 97 % | WEIGHT: 94 LBS | DIASTOLIC BLOOD PRESSURE: 80 MMHG | HEIGHT: 62 IN

## 2021-06-03 VITALS — BODY MASS INDEX: 16.83 KG/M2 | WEIGHT: 92 LBS

## 2021-06-03 VITALS — WEIGHT: 92.6 LBS | BODY MASS INDEX: 16.94 KG/M2

## 2021-06-03 VITALS — BODY MASS INDEX: 17.38 KG/M2 | WEIGHT: 95 LBS

## 2021-06-03 VITALS — BODY MASS INDEX: 19.45 KG/M2 | WEIGHT: 103 LBS | HEIGHT: 61 IN

## 2021-06-03 VITALS — WEIGHT: 96 LBS | BODY MASS INDEX: 17.56 KG/M2

## 2021-06-03 VITALS — WEIGHT: 98 LBS | BODY MASS INDEX: 17.92 KG/M2

## 2021-06-03 VITALS — WEIGHT: 100 LBS | BODY MASS INDEX: 18.29 KG/M2

## 2021-06-03 VITALS — BODY MASS INDEX: 17.92 KG/M2 | WEIGHT: 98 LBS

## 2021-06-03 NOTE — PROGRESS NOTES
[facility]      Facility:    [facility] [[id number]]  Code Status: DNR polst is reviewed with patient and signed today      Chief Complaint/Reason for Visit:  Chief Complaint   Patient presents with     H & P       HPI:   Kristal is a 89 y.o. female with past medical history of osteoporosis, hypertension, hyperlipidemia, valvular heart disease (severe MR, mild AR, TR), anxiety and insomnia on melatonin, admitted to [facility] following her recent hospital stay.    Patient was admitted to [facility] on November 21 after suffering a mechanical fall (foot got tangled and walker when she was rushing to the door to open it) and later discharged on November 25.  Work-up included x-rays of ankle showing left talus fracture.  She was seen by podiatry and nonoperative management was recommended with a cam walker.  She also had CT chest abdomen pelvis which was remarkable for incidentaloma adnexal cyst.  She also had echocardiogram showing normal LVEF with EF 55% as well as the valvular heart disease noted above severe left atrial enlargement.  Furthermore patient had frequent PVCs/bigeminy and nonsustained V. tach.  She had hypomagnesemia which was replaced.  She was seen by cardiology for this problem as well as elevated troponins which led to diagnosis of non-STEMI.  She was started on multiple new medications including amiodarone 200 mg twice daily to December 6 then 200 mg daily guarding December 7, aspirin 81 mg daily, atorvastatin 20 mg daily, magnesium chloride 64 mg daily, metoprolol 25 mg once daily in the XL form, PRN 7 nitroglycerin.    Facility course: Patient is being seen by PT, OT, and plans to return to her independent living situation patient lives in a [facility] in [place].  She has been wearing the cam walker except at night she is thankful that it can come off then.  She reports bearing weight on the cam walker with physical therapy.    Review of systems:  "Patient reports mild constipation with no bowel movement for 2 days.  She reports intermittent constipation at home for which she takes Colace.  She does have senna plus here which was called in.  She denies headaches change in vision speaking swallowing hearing chest pain shortness of breath cough orthopnea PND peripheral edema nausea vomiting diarrhea melena bright red blood per rectum abdominal pain GERD skin rash.  Patient does report concern over the new medication burden prescribed to her \"I hate taking pills\".  She feels like taking the pills is affecting her appetite negatively (dietitian consult added).  Patient was unaware of the adnexal mass noted on CT scanning in the hospital.  She had skin cancer surgery on her face.    Social update: Lives in [place] in a town home.  She never drove in her life depends on her children to drive her to medical appointments.  She has 2 sons and 2 daughters.  She says Venkat and Camilo live the closest and are usually the most involved with transport her other needs.      Past Medical History:  Past Medical History:   Diagnosis Date     Alopecia 2019     Anxiety      Back pain 3/4/2016     Diastolic congestive heart failure (H) 2018     Hyperlipidemia      IBS (irritable bowel syndrome)      Osteoporosis      Other abnormal clinical finding     evidence of old septal scar on EKG     Systolic hypertension            Surgical History:  Past Surgical History:   Procedure Laterality Date     CATARACT EXTRACTION, BILATERAL        SECTION      X 4     CHOLECYSTECTOMY       TONSILLECTOMY AND ADENOIDECTOMY         Family History:   Family History   Problem Relation Age of Onset     Stroke Mother         passed age 75     Crohn's disease Father         passed in his 20's from bowel obstructions       Social History:    Social History     Socioeconomic History     Marital status:      Spouse name: Not on file     Number of children: Not on file     Years of " education: Not on file     Highest education level: Not on file   Occupational History     Not on file   Social Needs     Financial resource strain: Not on file     Food insecurity:     Worry: Not on file     Inability: Not on file     Transportation needs:     Medical: Not on file     Non-medical: Not on file   Tobacco Use     Smoking status: Never Smoker     Smokeless tobacco: Never Used   Substance and Sexual Activity     Alcohol use: No     Comment: rarely drinks wine, and drinks wine watered down with ice.     Drug use: Yes     Sexual activity: Not Currently   Lifestyle     Physical activity:     Days per week: Not on file     Minutes per session: Not on file     Stress: Not on file   Relationships     Social connections:     Talks on phone: Not on file     Gets together: Not on file     Attends Rastafari service: Not on file     Active member of club or organization: Not on file     Attends meetings of clubs or organizations: Not on file     Relationship status: Not on file     Intimate partner violence:     Fear of current or ex partner: Not on file     Emotionally abused: Not on file     Physically abused: Not on file     Forced sexual activity: Not on file   Other Topics Concern     Not on file   Social History Narrative     since 2009, has children         Vitals:    11/28/19 2128   BP: 110/65   Pulse: 72   Resp: 16   Temp: 98.2  F (36.8  C)   SpO2: 95%   Weight: (!) 94 lb 6.4 oz (42.8 kg)       Physical Exam  Elderly female in no acute distress pleasant oriented x3 normally conversant.  Normocephalic atraumatic.  Old skin scar from previous skin cancer surgery noted in the right nose and the left forehead at the hairline.  Sclera clear nonicteric EOMI oropharynx is clear neck is supple no adenopathy or mass thyromegaly.  She is kyphotic her chest wall is otherwise unremarkable nontender with clear lungs good air movement symmetrically.  Heart is regular S1-S2 with harsh decrescendo murmur systolic,  no gallop or rub with a rate in the 70s.  I do not appreciate a diastolic murmur though she is known to have aortic regurgitation as well.  Abdomen is thin but organomegaly mass or tenderness.  Extremities show no edema.  Quick cap refill throughout.  Symmetric strength from side to side.  Gait is not tested.  Can walk is in place on the left.  Medication List:  Current Outpatient Medications   Medication Sig     acetaminophen (TYLENOL) 500 MG tablet Take 1,000 mg by mouth 3 (three) times a day as needed.            [START ON 12/7/2019] amiodarone (PACERONE) 200 MG tablet Take 1 tablet (200 mg total) by mouth daily.     amiodarone (PACERONE) 200 MG tablet Take 1 tablet (200 mg total) by mouth 2 (two) times a day for 11 days.     artificial tears,hypromellose, (GENTEAL; SYSTANE) 0.3 % Gel Administer 1 drop to both eyes as needed (dry eyes).     aspirin 81 mg chewable tablet Chew 1 tablet (81 mg total) daily.     atorvastatin (LIPITOR) 20 MG tablet Take 1 tablet (20 mg total) by mouth daily.     cholecalciferol, vitamin D3, 1,000 unit tablet Take 2,000 Units by mouth daily.     lisinopril (PRINIVIL,ZESTRIL) 20 MG tablet Take 1 tablet (20 mg total) by mouth daily.     magnesium chloride (SLOW-MAG) 64 mg TbEC delayed-release tablet Take 1 tablet (64 mg total) by mouth daily.     melatonin 3 mg Tab tablet Take 1-2 tablets (3-6 mg total) by mouth at bedtime as needed.     metoprolol succinate (TOPROL-XL) 25 MG Take 1 tablet (25 mg total) by mouth daily.     nitroglycerin (NITROSTAT) 0.4 MG SL tablet Place 1 tablet (0.4 mg total) under the tongue every 5 (five) minutes as needed for chest pain.       Labs:Results for NEIDA MEDRANO (MRN [id number]) as of 11/28/2019 21:35   Ref. Range 11/21/2019 21:43 11/22/2019 04:18 11/22/2019 05:22 11/22/2019 08:33 11/22/2019 10:09 11/22/2019 15:13   Sodium Latest Ref Range: 136 - 145 mmol/L  132 (L)       Potassium Latest Ref Range: 3.5 - 5.0 mmol/L  4.0       Chloride Latest Ref  Range: 98 - 107 mmol/L  102       CO2 Latest Ref Range: 22 - 31 mmol/L  23       Anion Gap, Calculation Latest Ref Range: 5 - 18 mmol/L  7       BUN Latest Ref Range: 8 - 28 mg/dL  20       Creatinine Latest Ref Range: 0.60 - 1.10 mg/dL  0.78       GFR MDRD Af Amer Latest Ref Range: >60 mL/min/1.73m2  >60       GFR MDRD Non Af Amer Latest Ref Range: >60 mL/min/1.73m2  >60       Calcium Latest Ref Range: 8.5 - 10.5 mg/dL  8.7       Troponin I Latest Ref Range: 0.00 - 0.29 ng/mL  1.37 (HH)   0.74 (HH)    Glucose Latest Ref Range: 70 - 125 mg/dL  130 (H)       INR Latest Ref Range: 0.90 - 1.10    1.14 (H)      PTT Latest Ref Range: 24 - 37 seconds   31      WBC Latest Ref Range: 4.0 - 11.0 thou/uL  6.8 6.4      RBC Latest Ref Range: 3.80 - 5.40 mill/uL  3.40 (L) 3.48 (L)      Hemoglobin Latest Ref Range: 12.0 - 16.0 g/dL  10.7 (L) 11.0 (L)      Hematocrit Latest Ref Range: 35.0 - 47.0 %  32.1 (L) 33.0 (L)      MCV Latest Ref Range: 80 - 100 fL  94 95      MCH Latest Ref Range: 27.0 - 34.0 pg  31.5 31.6      MCHC Latest Ref Range: 32.0 - 36.0 g/dL  33.3 33.3      RDW Latest Ref Range: 11.0 - 14.5 %  13.1 13.2      Platelets Latest Ref Range: 140 - 440 thou/uL  140 137 (L)      MPV Latest Ref Range: 8.5 - 12.5 fL  11.5 11.1      CT ANKLE WO CONTRAST LEFT Unknown      Rpt   ECG 12 LEAD NURSING UNIT PERFORMED Unknown Rpt        SYSTOLIC BLOOD PRESSURE Latest Units: mmHg 137        DIASTOLIC BLOOD PRESSURE Latest Units: mmHg 78        VENTRICULAR RATE Latest Units:         ATRIAL RATE Latest Units:         P-R INTERVAL Latest Units: ms 224        QRS DURATION Latest Units: ms 102        Q-T INTERVAL Latest Units: ms 380        QTC CALCULATION (BEZET) Latest Units: ms 504        P Axis Latest Units: degrees 61        R AXIS Latest Units: degrees 44        T AXIS Latest Units: degrees 246        MUSE DIAGNOSIS Unknown Sinus tachycardia...              Assessment:    ICD-10-CM    1. NSTEMI (non-ST elevated  myocardial infarction) (H) I21.4    2. Systolic hypertension I10    3. Hyperlipidemia LDL goal <100 E78.5    4. NSVT (nonsustained ventricular tachycardia) (H) I47.2    5. Age-related osteoporosis without current pathological fracture M81.0    6. Ankle fracture, left, closed, initial encounter S82.892A    7. Fall with injury, initial encounter W19.XXXA    8. Closed nondisplaced fracture of neck of left talus, initial encounter S92.115A      Assessment/plan    #1.  Fall with injury, talus fracture.   Plan is for nonoperative management, patient has been fit with cam walker.  She will be receiving physical occupational therapy and supportive care.  See podiatry in 2 weeks.  Continue physical therapy until goals are met.    #2.  Non-STEMI   This sounds like demand ischemia with no chest pain no ischemic EKG changes, not unexpected at 89 years of age.  She has been treated aggressively with addition of aspirin, atorvastatin, metoprolol as well as as needed nitroglycerin.  Patient says she hates taking pills.  We discussed the pros and cons of continued treatment.  After the initial 30 days one could reconsider the risk-benefit analysis of the atorvastatin for the long-term, possibly metoprolol as well depending on blood pressure needs etc.    #3.  Nonsustained particular tachycardia/frequent PVCs         Hypomagnesemia   Currently asymptomatic.  It does not look like they were suspected as part of the fall trigger.  Associated with hypomagnesemia.  Amiodarone was recommended and is been started.  Again depending on her overall care goals, one could consider monitoring her clinically after magnesium replacement, however if she tolerates the amiodarone is reasonable to continue it.  She was seen cardiology in 6 weeks.  Magnesium level ordered for December 2.  If normal we could discontinue magnesium which she would like to do, and recheck magnesium in a week or 2 to make sure that it is staying within the normal range  without supplementation    #4.  She is on lisinopril prior to the event that is continued, monitor blood pressure here/within metoprolol    #5.  Valvular heart disease medical treatment as above    6.  Insomnia continue melatonin    #7.  Severe protein/calorie malnutrition, dietitian evaluation recommended.  Notably she did have TSH checked on November 25 normal 1.29.    #8.  Adnexal cyst, incidentaloma on CT scan.  I informed patient of this result.  We discussed the pros and cons of pursuing ultrasound.  She will take it up with her primary MD on her hospital/TCU follow-up there.    #9.  Poor appetite, she blames the new medications and may be correct.  Dietitian is consulted.  We should treat her constipation as well.    #10.  CODE STATUS DNR, reviewed POLST with patient and signed it today    #11.  Constipation, reminded patient of the senna plus available to her, added MiraLAX as needed.    #12 valvular heart disease including severe MR, mild AR, TR.  Appears to be noncontributory at the moment.  Electronically signed by: Fabrice Lau MD

## 2021-06-03 NOTE — TELEPHONE ENCOUNTER
[facility] Nurse Triage Telephone Note      Provider: AMINA Ashley  Facility: [facility]    Facility Type: TCU    Caller: James  Call Back Number:  [phone]    Allergies: Evista [raloxifene] and Fosamax [alendronate]    Reason for call: Pt C/O frequency at night, was up 4-5 times last night. Afeb, no burning, not on diuretic, does have CHF with trace edema.     Verbal Order/Direction given by Provider: Str cath UA conditional UC.    Provider giving order: AMINA Ashley    Verbal order given to: James Golden RN

## 2021-06-04 VITALS
HEART RATE: 77 BPM | RESPIRATION RATE: 16 BRPM | WEIGHT: 92.6 LBS | BODY MASS INDEX: 15.89 KG/M2 | DIASTOLIC BLOOD PRESSURE: 69 MMHG | TEMPERATURE: 97.9 F | SYSTOLIC BLOOD PRESSURE: 141 MMHG | OXYGEN SATURATION: 96 %

## 2021-06-04 VITALS
DIASTOLIC BLOOD PRESSURE: 58 MMHG | BODY MASS INDEX: 15.47 KG/M2 | OXYGEN SATURATION: 98 % | SYSTOLIC BLOOD PRESSURE: 122 MMHG | HEART RATE: 60 BPM | RESPIRATION RATE: 16 BRPM | WEIGHT: 90.1 LBS | TEMPERATURE: 98.1 F

## 2021-06-04 VITALS
BODY MASS INDEX: 16.55 KG/M2 | DIASTOLIC BLOOD PRESSURE: 70 MMHG | OXYGEN SATURATION: 95 % | TEMPERATURE: 98.6 F | SYSTOLIC BLOOD PRESSURE: 141 MMHG | WEIGHT: 96.4 LBS | RESPIRATION RATE: 16 BRPM | HEART RATE: 63 BPM

## 2021-06-04 VITALS
WEIGHT: 94.4 LBS | SYSTOLIC BLOOD PRESSURE: 110 MMHG | BODY MASS INDEX: 16.2 KG/M2 | OXYGEN SATURATION: 95 % | HEART RATE: 72 BPM | DIASTOLIC BLOOD PRESSURE: 65 MMHG | RESPIRATION RATE: 16 BRPM | TEMPERATURE: 98.2 F

## 2021-06-04 VITALS
WEIGHT: 96.8 LBS | BODY MASS INDEX: 16.62 KG/M2 | DIASTOLIC BLOOD PRESSURE: 55 MMHG | TEMPERATURE: 98.6 F | RESPIRATION RATE: 18 BRPM | OXYGEN SATURATION: 95 % | SYSTOLIC BLOOD PRESSURE: 95 MMHG | HEART RATE: 66 BPM

## 2021-06-04 VITALS
BODY MASS INDEX: 16.39 KG/M2 | DIASTOLIC BLOOD PRESSURE: 80 MMHG | WEIGHT: 96 LBS | HEART RATE: 62 BPM | HEIGHT: 64 IN | SYSTOLIC BLOOD PRESSURE: 160 MMHG | OXYGEN SATURATION: 96 %

## 2021-06-04 VITALS
DIASTOLIC BLOOD PRESSURE: 80 MMHG | OXYGEN SATURATION: 97 % | HEIGHT: 64 IN | SYSTOLIC BLOOD PRESSURE: 130 MMHG | HEART RATE: 72 BPM | WEIGHT: 93 LBS | BODY MASS INDEX: 15.88 KG/M2

## 2021-06-04 VITALS
TEMPERATURE: 98.4 F | RESPIRATION RATE: 18 BRPM | WEIGHT: 96.5 LBS | HEART RATE: 73 BPM | DIASTOLIC BLOOD PRESSURE: 65 MMHG | BODY MASS INDEX: 16.56 KG/M2 | OXYGEN SATURATION: 95 % | SYSTOLIC BLOOD PRESSURE: 131 MMHG

## 2021-06-04 NOTE — PROGRESS NOTES
Office Visit   Kristal Cox   89 y.o. female    Date of Visit: 12/27/2019    Chief Complaint   Patient presents with     Follow-up     Hospital follow up- [facility]        Assessment and Plan   1. Benign essential hypertension  Her blood pressure is satisfactory today.  I will recheck her labs.  She has follow-up scheduled in cardiology in about a week and a half.  We will get back to her with her test results.  She will continue her current medications until she is seen in follow-up.  She did have an MI when she had her ankle fracture in November.  She has been doing well since then.  - HM2(CBC w/o Differential)  - Comprehensive Metabolic Panel  - Magnesium  - Ferritin  - Vitamin B12    2. PVC's (premature ventricular contractions)  Again we will check her labs and she is now on amiodarone.  She will follow-up as planned with cardiology.  - Magnesium    3. Anemia, unspecified type  I am going to recheck her CBC as well as a ferritin and B12.  She does have some difficulty with sleep and she will continue with melatonin.  - HM2(CBC w/o Differential)  - Ferritin  - Vitamin B12         Return in about 4 weeks (around 1/24/2020) for Recheck.     History of Present Illness   This 89 y.o. old female comes in to follow-up.  She was hospitalized in November after a fall and ankle fracture.  She was found to be having a heart attack.  She had numerous PVCs and is now on amiodarone, aspirin, metoprolol and lisinopril.  She is also on a statin medication.  She is back at home with home health.  She is ambulating without difficulty.  She does have some trouble with sleep.  She has been having some constipation.  She does not like taking all of her medications but has not had any significant side effects.  She has no acute concerns today.    Review of Systems: As above, systems otherwise reviewed and negative.     Medications, Allergies and Problem List   Patient Active Problem List   Diagnosis     Vertebral  compression fracture (H)     GERD (gastroesophageal reflux disease)     Osteoporosis     NSTEMI (non-ST elevated myocardial infarction) (H)     Benign essential hypertension     Hyperlipidemia LDL goal <100     PVC's (premature ventricular contractions)     Current Outpatient Medications   Medication Sig Dispense Refill     acetaminophen (TYLENOL) 500 MG tablet Take 1,000 mg by mouth 3 (three) times a day as needed.              amiodarone (PACERONE) 200 MG tablet Take 1 tablet (200 mg total) by mouth daily.  0     artificial tears,hypromellose, (GENTEAL; SYSTANE) 0.3 % Gel Administer 1 drop to both eyes as needed (dry eyes).       aspirin 81 mg chewable tablet Chew 1 tablet (81 mg total) daily.  0     atorvastatin (LIPITOR) 20 MG tablet Take 1 tablet (20 mg total) by mouth daily.  0     cholecalciferol, vitamin D3, 1,000 unit tablet Take 2,000 Units by mouth daily.       lisinopril (PRINIVIL,ZESTRIL) 20 MG tablet Take 1 tablet (20 mg total) by mouth daily. 90 tablet 3     magnesium chloride (SLOW-MAG) 64 mg TbEC delayed-release tablet Take 1 tablet (64 mg total) by mouth daily.  0     melatonin 3 mg Tab tablet Take 1-2 tablets (3-6 mg total) by mouth at bedtime as needed.       metoprolol succinate (TOPROL-XL) 25 MG Take 1 tablet (25 mg total) by mouth daily.  0     nitroglycerin (NITROSTAT) 0.4 MG SL tablet Place 1 tablet (0.4 mg total) under the tongue every 5 (five) minutes as needed for chest pain.  0     ranitidine (ZANTAC) 75 MG tablet Take 75 mg by mouth 2 (two) times a day as needed for heartburn.       senna-docusate (SENNOSIDES-DOCUSATE SODIUM) 8.6-50 mg tablet Take 1 tablet by mouth daily.       No current facility-administered medications for this visit.      Post Discharge Medication Reconciliation Status: discharge medications reconciled and changed, per note/orders (see AVS)    Allergies   Allergen Reactions     Evista [Raloxifene]      Caused blood clot     Fosamax [Alendronate] Nausea And Vomiting  "         Physical Exam     /80   Pulse 72   Ht 5' 4\" (1.626 m)   Wt (!) 93 lb (42.2 kg)   SpO2 97%   BMI 15.96 kg/m      General:  Patient is alert and in no apparent distress.  Ambulates without difficulty with a walker.  Extremities: She has bilateral ankle edema but no severe pitting edema in the lower extremities.           Additional Information   Social History     Tobacco Use     Smoking status: Never Smoker     Smokeless tobacco: Never Used   Substance Use Topics     Alcohol use: No     Comment: rarely drinks wine, and drinks wine watered down with ice.     Drug use: Yes            Palak Schmitz MD    "

## 2021-06-04 NOTE — PROGRESS NOTES
[facility]    NAME:  Kristal Cox             :  1924  MRN: [id number]  CODE STATUS:  DNR    FACILITY:  [facility] [[id number]]         Chief Complaint   Patient presents with     Problem Visit     NSTEMI with left ankle fracture     HISTORY OF PRESENT ILLNESS: Kristal Cox is a 89 y.o. female with HTN, HLD, anxiety, IBS who preseted after a fall and found to have a small avulsion fracture of the lateral neck of the talus.  Podiatry evaluated and will be treated non-operatively with a CAM boot.  Also noted to have a demand during the admission NSTEMI which was treated medically.    Ms. Cox also was found to have a high burden of PVCs which was treated with magnesium supplementation, amiodarone and beta-blocker with significant reduction in PVC burden.      Demand NSTEMI - asa, statin, b-blocker  PVCs - much improved.  Amiodarone 200mg two times a day x 2 weeks and then day.  Magenesium, lopressor  Ankle fracture - cam boot.  There was an issue with the cam boot not inflating prior to discharge.   Patient was stabilized and transferred to TCU for continued rehabilitation.    Past Medical History:   Diagnosis Date     Alopecia 2019     Anxiety      Back pain 3/4/2016     Diastolic congestive heart failure (H) 2018     Hyperlipidemia      IBS (irritable bowel syndrome)      Osteoporosis      Other abnormal clinical finding     evidence of old septal scar on EKG     Systolic hypertension      Past Surgical History:   Procedure Laterality Date     CATARACT EXTRACTION, BILATERAL        SECTION      X 4     CHOLECYSTECTOMY       TONSILLECTOMY AND ADENOIDECTOMY       Family History   Problem Relation Age of Onset     Stroke Mother         passed age 75     Crohn's disease Father         passed in his 20's from bowel obstructions     Social History     Socioeconomic History     Marital status:      Spouse name: Not on file     Number of  children: Not on file     Years of education: Not on file     Highest education level: Not on file   Occupational History     Not on file   Social Needs     Financial resource strain: Not on file     Food insecurity:     Worry: Not on file     Inability: Not on file     Transportation needs:     Medical: Not on file     Non-medical: Not on file   Tobacco Use     Smoking status: Never Smoker     Smokeless tobacco: Never Used   Substance and Sexual Activity     Alcohol use: No     Comment: rarely drinks wine, and drinks wine watered down with ice.     Drug use: Yes     Sexual activity: Not Currently   Lifestyle     Physical activity:     Days per week: Not on file     Minutes per session: Not on file     Stress: Not on file   Relationships     Social connections:     Talks on phone: Not on file     Gets together: Not on file     Attends Sabianism service: Not on file     Active member of club or organization: Not on file     Attends meetings of clubs or organizations: Not on file     Relationship status: Not on file     Intimate partner violence:     Fear of current or ex partner: Not on file     Emotionally abused: Not on file     Physically abused: Not on file     Forced sexual activity: Not on file   Other Topics Concern     Not on file   Social History Narrative     since 2009, has children     Allergies   Allergen Reactions     Evista [Raloxifene]      Caused blood clot     Fosamax [Alendronate] Nausea And Vomiting     Current Outpatient Medications   Medication Sig Dispense Refill     acetaminophen (TYLENOL) 500 MG tablet Take 1,000 mg by mouth 3 (three) times a day as needed.              amiodarone (PACERONE) 200 MG tablet Take 1 tablet (200 mg total) by mouth daily.  0     amiodarone (PACERONE) 200 MG tablet Take 1 tablet (200 mg total) by mouth 2 (two) times a day for 11 days.  0     artificial tears,hypromellose, (GENTEAL; SYSTANE) 0.3 % Gel Administer 1 drop to both eyes as needed (dry eyes).        aspirin 81 mg chewable tablet Chew 1 tablet (81 mg total) daily.  0     atorvastatin (LIPITOR) 20 MG tablet Take 1 tablet (20 mg total) by mouth daily.  0     cholecalciferol, vitamin D3, 1,000 unit tablet Take 2,000 Units by mouth daily.       lisinopril (PRINIVIL,ZESTRIL) 20 MG tablet Take 1 tablet (20 mg total) by mouth daily. 90 tablet 3     magnesium chloride (SLOW-MAG) 64 mg TbEC delayed-release tablet Take 1 tablet (64 mg total) by mouth daily.  0     melatonin 3 mg Tab tablet Take 1-2 tablets (3-6 mg total) by mouth at bedtime as needed.       metoprolol succinate (TOPROL-XL) 25 MG Take 1 tablet (25 mg total) by mouth daily.  0     nitroglycerin (NITROSTAT) 0.4 MG SL tablet Place 1 tablet (0.4 mg total) under the tongue every 5 (five) minutes as needed for chest pain.  0     No current facility-administered medications for this visit.        REVIEW OF SYSTEMS:    Currently, no fever, chills, or rigors. Does not have any visual or hearing problems. Denies any chest pain, headaches, palpitations, lightheadedness, dizziness, shortness of breath, or cough. Appetite is good. Denies any GERD symptoms. Denies any difficulty with swallowing, nausea, or vomiting.  Denies any abdominal pain, diarrhea or constipation. Denies any urinary symptoms. No insomnia. No active bleeding. No rash.       PHYSICAL EXAMINATION:  Vitals:    12/08/19 1204   BP: 131/65   Pulse: 73   Resp: 18   Temp: 98.4  F (36.9  C)   SpO2: 95%   Weight: (!) 96 lb 8 oz (43.8 kg)       GENERAL: Awake, Alert, oriented x3, not in any form of acute distress, answers questions appropriately, follows simple commands, conversant  HEENT: Head is normocephalic with normal hair distribution. No evidence of trauma. Ears: No acute purulent discharge. Eyes: Conjunctivae pink with no scleral jaundice. Nose: Normal mucosa and septum. NECK: Supple with no cervical or supraclavicular lymphadenopathy. Trachea is midline.   CHEST: No tenderness or deformity, no  crepitus  LUNG: Clear to auscultation with good chest expansion. There are no crackles or wheezes, normal AP diameter.  BACK: No kyphosis of the thoracic spine. Symmetric, no curvature, ROM normal, no CVA tenderness, no spinal tenderness   CVS: There is good S1  S2, there are no murmurs, rubs, gallops, or heaves, rhythm is regular,  2+ pulses symmetric in all extremities.  ABDOMEN: Globular and soft, nontender to palpation, non distended, no masses, no organomegaly, good bowel sounds, no rebound or guarding, no peritoneal signs.   EXTREMITIES:  Full range of motion on both upper and lower extremities, there is no tenderness to palpation, no pedal edema, no cyanosis or clubbing, no calf tenderness.  Pulses equal in all extremities, normal cap refill, no joint swelling.  SKIN: Warm and dry, no erythema noted.  Skin color, texture, no rashes or lesions.  NEUROLOGICAL: The patient is oriented to person, place and time. Strength and sensation are grossly intact. Face is symmetric.    LABS:      Lab Results   Component Value Date    WBC 6.4 11/22/2019    HGB 11.0 (L) 11/22/2019    HCT 33.0 (L) 11/22/2019    MCV 95 11/22/2019     (L) 11/22/2019     Results for orders placed or performed during the hospital encounter of 11/21/19   Basic Metabolic Panel   Result Value Ref Range    Sodium 132 (L) 136 - 145 mmol/L    Potassium 4.0 3.5 - 5.0 mmol/L    Chloride 102 98 - 107 mmol/L    CO2 23 22 - 31 mmol/L    Anion Gap, Calculation 7 5 - 18 mmol/L    Glucose 130 (H) 70 - 125 mg/dL    Calcium 8.7 8.5 - 10.5 mg/dL    BUN 20 8 - 28 mg/dL    Creatinine 0.78 0.60 - 1.10 mg/dL    GFR MDRD Af Amer >60 >60 mL/min/1.73m2    GFR MDRD Non Af Amer >60 >60 mL/min/1.73m2     Vitamin D, Total (25-Hydroxy)   Date Value Ref Range Status   08/12/2019 49.0 30.0 - 80.0 ng/mL Final     Lab Results   Component Value Date    [id number] 320 08/12/2019       ASSESSMENT/PLAN:    1. Closed nondisplaced fracture of neck of left talus with routine  healing, subsequent encounter - Evaluated by Podiatry and treated non-operatively with a CAM boot.    2. PVC's (premature ventricular contractions) - Improved, will continue Amiodarone 200mg two times a day x 2 weeks and then daily.  Magenesium, Lopressor   3. NSTEMI (non-ST elevated myocardial infarction) (H) - Continue ASA, Statin and Beta blocker   4. Constipation, unspecified constipation type - Will start Senna S            Total time spent on the unit was 40 minutes of which 25 minutes was spent in counseling safety precautions, weight bearing restrictions, and pain managment and coordination of care of the above plan with nursing staff, patient, and therapy.

## 2021-06-04 NOTE — PROGRESS NOTES
[facility]    NAME:  Kristal Cox             :  1924  MRN: [id number]  CODE STATUS:  DNR    FACILITY:  [facility] [[id number]]         Chief Complaint   Patient presents with     Problem Visit     Hypertension     HISTORY OF PRESENT ILLNESS: Kristal Cox is a 89 y.o. female with HTN, HLD, anxiety, IBS who preseted after a fall and found to have a small avulsion fracture of the lateral neck of the talus.  Podiatry evaluated and will be treated non-operatively with a CAM boot.  Also noted to have a demand during the admission NSTEMI which was treated medically.    Ms. Cox also was found to have a high burden of PVCs which was treated with magnesium supplementation, amiodarone and beta-blocker with significant reduction in PVC burden.      Demand NSTEMI - asa, statin, b-blocker  PVCs - much improved.  Amiodarone 200mg two times a day x 2 weeks and then day.  Magenesium, lopressor  Ankle fracture - cam boot.  There was an issue with the cam boot not inflating prior to discharge.   Patient was stabilized and transferred to TCU for continued rehabilitation.    Today, patient is seen at the bedside.  She is on Lopressor and Lisinopril for Hypertension. She has had some lower BP reading but has been asymptomatic.  Continues with PT/OT.    Past Medical History:   Diagnosis Date     Alopecia 2019     Anxiety      Back pain 3/4/2016     Diastolic congestive heart failure (H) 2018     Hyperlipidemia      IBS (irritable bowel syndrome)      Osteoporosis      Other abnormal clinical finding     evidence of old septal scar on EKG     Systolic hypertension      Past Surgical History:   Procedure Laterality Date     CATARACT EXTRACTION, BILATERAL        SECTION      X 4     CHOLECYSTECTOMY       TONSILLECTOMY AND ADENOIDECTOMY       Family History   Problem Relation Age of Onset     Stroke Mother         passed age 75     Crohn's disease Father          passed in his 20's from bowel obstructions     Social History     Socioeconomic History     Marital status:      Spouse name: Not on file     Number of children: Not on file     Years of education: Not on file     Highest education level: Not on file   Occupational History     Not on file   Social Needs     Financial resource strain: Not on file     Food insecurity:     Worry: Not on file     Inability: Not on file     Transportation needs:     Medical: Not on file     Non-medical: Not on file   Tobacco Use     Smoking status: Never Smoker     Smokeless tobacco: Never Used   Substance and Sexual Activity     Alcohol use: No     Comment: rarely drinks wine, and drinks wine watered down with ice.     Drug use: Yes     Sexual activity: Not Currently   Lifestyle     Physical activity:     Days per week: Not on file     Minutes per session: Not on file     Stress: Not on file   Relationships     Social connections:     Talks on phone: Not on file     Gets together: Not on file     Attends Congregational service: Not on file     Active member of club or organization: Not on file     Attends meetings of clubs or organizations: Not on file     Relationship status: Not on file     Intimate partner violence:     Fear of current or ex partner: Not on file     Emotionally abused: Not on file     Physically abused: Not on file     Forced sexual activity: Not on file   Other Topics Concern     Not on file   Social History Narrative     since 2009, has children     Allergies   Allergen Reactions     Evista [Raloxifene]      Caused blood clot     Fosamax [Alendronate] Nausea And Vomiting     Current Outpatient Medications   Medication Sig Dispense Refill     acetaminophen (TYLENOL) 500 MG tablet Take 1,000 mg by mouth 3 (three) times a day as needed.              amiodarone (PACERONE) 200 MG tablet Take 1 tablet (200 mg total) by mouth daily.  0     amiodarone (PACERONE) 200 MG tablet Take 1 tablet (200 mg total) by mouth  2 (two) times a day for 11 days.  0     artificial tears,hypromellose, (GENTEAL; SYSTANE) 0.3 % Gel Administer 1 drop to both eyes as needed (dry eyes).       aspirin 81 mg chewable tablet Chew 1 tablet (81 mg total) daily.  0     atorvastatin (LIPITOR) 20 MG tablet Take 1 tablet (20 mg total) by mouth daily.  0     cholecalciferol, vitamin D3, 1,000 unit tablet Take 2,000 Units by mouth daily.       lisinopril (PRINIVIL,ZESTRIL) 20 MG tablet Take 1 tablet (20 mg total) by mouth daily. 90 tablet 3     magnesium chloride (SLOW-MAG) 64 mg TbEC delayed-release tablet Take 1 tablet (64 mg total) by mouth daily.  0     melatonin 3 mg Tab tablet Take 1-2 tablets (3-6 mg total) by mouth at bedtime as needed.       metoprolol succinate (TOPROL-XL) 25 MG Take 1 tablet (25 mg total) by mouth daily.  0     nitroglycerin (NITROSTAT) 0.4 MG SL tablet Place 1 tablet (0.4 mg total) under the tongue every 5 (five) minutes as needed for chest pain.  0     ranitidine (ZANTAC) 75 MG tablet Take 75 mg by mouth 2 (two) times a day as needed for heartburn.       No current facility-administered medications for this visit.        REVIEW OF SYSTEMS:    Currently, no fever, chills, or rigors. Does not have any visual or hearing problems. Denies any chest pain, headaches, palpitations, lightheadedness, dizziness, shortness of breath, or cough. Appetite is good. Denies any GERD symptoms. Denies any difficulty with swallowing, nausea, or vomiting.  Denies any abdominal pain, diarrhea or constipation. Denies any urinary symptoms. No insomnia. No active bleeding. No rash.       PHYSICAL EXAMINATION:  Vitals:    12/15/19 1559   BP: 95/55   Pulse: 66   Resp: 18   Temp: 98.6  F (37  C)   SpO2: 95%   Weight: (!) 96 lb 12.8 oz (43.9 kg)       GENERAL: Awake, Alert, oriented x3, not in any form of acute distress, answers questions appropriately, follows simple commands, conversant  HEENT: Head is normocephalic with normal hair distribution. No  evidence of trauma. Ears: No acute purulent discharge. Eyes: Conjunctivae pink with no scleral jaundice. Nose: Normal mucosa and septum. NECK: Supple with no cervical or supraclavicular lymphadenopathy. Trachea is midline.   CHEST: No tenderness or deformity, no crepitus  LUNG: Clear to auscultation with good chest expansion. There are no crackles or wheezes, normal AP diameter.  BACK: No kyphosis of the thoracic spine. Symmetric, no curvature, ROM normal, no CVA tenderness, no spinal tenderness   CVS: There is good S1  S2, there are no murmurs, rubs, gallops, or heaves, rhythm is regular,  2+ pulses symmetric in all extremities.  ABDOMEN: Globular and soft, nontender to palpation, non distended, no masses, no organomegaly, good bowel sounds, no rebound or guarding, no peritoneal signs.   EXTREMITIES:  Full range of motion on both upper and lower extremities, there is no tenderness to palpation, no pedal edema, no cyanosis or clubbing, no calf tenderness.  Pulses equal in all extremities, normal cap refill, no joint swelling.  SKIN: Warm and dry, no erythema noted.  Skin color, texture, no rashes or lesions.  NEUROLOGICAL: The patient is oriented to person, place and time. Strength and sensation are grossly intact. Face is symmetric.    LABS:      Lab Results   Component Value Date    WBC 6.4 11/22/2019    HGB 11.0 (L) 11/22/2019    HCT 33.0 (L) 11/22/2019    MCV 95 11/22/2019     (L) 11/22/2019     Results for orders placed or performed during the hospital encounter of 11/21/19   Basic Metabolic Panel   Result Value Ref Range    Sodium 132 (L) 136 - 145 mmol/L    Potassium 4.0 3.5 - 5.0 mmol/L    Chloride 102 98 - 107 mmol/L    CO2 23 22 - 31 mmol/L    Anion Gap, Calculation 7 5 - 18 mmol/L    Glucose 130 (H) 70 - 125 mg/dL    Calcium 8.7 8.5 - 10.5 mg/dL    BUN 20 8 - 28 mg/dL    Creatinine 0.78 0.60 - 1.10 mg/dL    GFR MDRD Af Amer >60 >60 mL/min/1.73m2    GFR MDRD Non Af Amer >60 >60 mL/min/1.73m2      Vitamin D, Total (25-Hydroxy)   Date Value Ref Range Status   08/12/2019 49.0 30.0 - 80.0 ng/mL Final     Lab Results   Component Value Date    [id number] 320 08/12/2019       ASSESSMENT/PLAN:    1. Benign essential hypertension - Blood pressures are within target range, will continue Lopressor but hold for SBP < 100 and HR < 60 and Lisinopril   2. Closed nondisplaced fracture of neck of left talus with routine healing, subsequent encounter  - Evaluated by Podiatry and treated non-operatively with inflating CAM boot.    3. Bilateral lower extremity edema - Start RHIANNA hose on in am and off in pm   4. Physical deconditioning - Continue PT/OT                 Electronically signed by:  Jan Jason CNP

## 2021-06-04 NOTE — PROGRESS NOTES
FOOT AND ANKLE SURGERY/PODIATRY CONSULT NOTE         ASSESSMENT:   Fracture Talus left      TREATMENT:  -Patient appears to be doing well, no pain walking in the CAM boot with physical therapy. I reviewed today's x-rays which are negative for fracture, ankle mortise is intact. No pain on exam today along the talus and with ROM of the ankle and subtalar joints.    -I reviewed this with the patient and her daughter today and recommend she begin to ween out of the CAM boot over the next week with help from physical therapy. She will resume use of the CAM boot if symptoms develop.    -All questions invited and answered. She is discharged from my care and will follow-up with me as concerns develop.    Curt Sweet DPM  [facility] Podiatry/Foot & Ankle Surgery      HPI: I was asked to see Kristal KEEGAN Cox today for a left talar neck and lateral process avulsion fractures. She is known to me having seen her recently at [facility] for this same concern. Her daughter is present for today's exam. According to the patient, she has been ambulating in the CAM boot with physical therapy without discomfort. She did have some pain overnight, but otherwise is doing well.     Past Medical History:   Diagnosis Date     Alopecia 2019     Anxiety      Back pain 3/4/2016     Diastolic congestive heart failure (H) 2018     Hyperlipidemia      IBS (irritable bowel syndrome)      Osteoporosis      Other abnormal clinical finding     evidence of old septal scar on EKG     Systolic hypertension        Past Surgical History:   Procedure Laterality Date     CATARACT EXTRACTION, BILATERAL        SECTION      X 4     CHOLECYSTECTOMY       TONSILLECTOMY AND ADENOIDECTOMY         Allergies   Allergen Reactions     Evista [Raloxifene]      Caused blood clot     Fosamax [Alendronate] Nausea And Vomiting         Current Outpatient Medications:      acetaminophen (TYLENOL) 500 MG tablet, Take 1,000 mg by mouth 3 (three)  times a day as needed.    , Disp: , Rfl:      amiodarone (PACERONE) 200 MG tablet, Take 1 tablet (200 mg total) by mouth daily., Disp: , Rfl: 0     artificial tears,hypromellose, (GENTEAL; SYSTANE) 0.3 % Gel, Administer 1 drop to both eyes as needed (dry eyes)., Disp: , Rfl:      aspirin 81 mg chewable tablet, Chew 1 tablet (81 mg total) daily., Disp: , Rfl: 0     atorvastatin (LIPITOR) 20 MG tablet, Take 1 tablet (20 mg total) by mouth daily., Disp: , Rfl: 0     cholecalciferol, vitamin D3, 1,000 unit tablet, Take 2,000 Units by mouth daily., Disp: , Rfl:      lisinopril (PRINIVIL,ZESTRIL) 20 MG tablet, Take 1 tablet (20 mg total) by mouth daily., Disp: 90 tablet, Rfl: 3     magnesium chloride (SLOW-MAG) 64 mg TbEC delayed-release tablet, Take 1 tablet (64 mg total) by mouth daily., Disp: , Rfl: 0     melatonin 3 mg Tab tablet, Take 1-2 tablets (3-6 mg total) by mouth at bedtime as needed., Disp: , Rfl:      metoprolol succinate (TOPROL-XL) 25 MG, Take 1 tablet (25 mg total) by mouth daily., Disp: , Rfl: 0     nitroglycerin (NITROSTAT) 0.4 MG SL tablet, Place 1 tablet (0.4 mg total) under the tongue every 5 (five) minutes as needed for chest pain., Disp: , Rfl: 0     amiodarone (PACERONE) 200 MG tablet, Take 1 tablet (200 mg total) by mouth 2 (two) times a day for 11 days., Disp: , Rfl: 0    Family History   Problem Relation Age of Onset     Stroke Mother         passed age 75     Crohn's disease Father         passed in his 20's from bowel obstructions       Social History     Socioeconomic History     Marital status:      Spouse name: Not on file     Number of children: Not on file     Years of education: Not on file     Highest education level: Not on file   Occupational History     Not on file   Social Needs     Financial resource strain: Not on file     Food insecurity:     Worry: Not on file     Inability: Not on file     Transportation needs:     Medical: Not on file     Non-medical: Not on file    Tobacco Use     Smoking status: Never Smoker     Smokeless tobacco: Never Used   Substance and Sexual Activity     Alcohol use: No     Comment: rarely drinks wine, and drinks wine watered down with ice.     Drug use: Yes     Sexual activity: Not Currently   Lifestyle     Physical activity:     Days per week: Not on file     Minutes per session: Not on file     Stress: Not on file   Relationships     Social connections:     Talks on phone: Not on file     Gets together: Not on file     Attends Scientologist service: Not on file     Active member of club or organization: Not on file     Attends meetings of clubs or organizations: Not on file     Relationship status: Not on file     Intimate partner violence:     Fear of current or ex partner: Not on file     Emotionally abused: Not on file     Physically abused: Not on file     Forced sexual activity: Not on file   Other Topics Concern     Not on file   Social History Narrative     since 2009, has children       Review of Systems - 10 point Review of Systems is negative     OBJECTIVE:  Appearance: alert, well appearing, and in no distress.    Vitals:    12/09/19 1040   BP: 160/80   Pulse: 62   SpO2: 96%       BMI= Body mass index is 16.48 kg/m .    General appearance: Patient is alert and fully cooperative with history & exam.  No sign of distress is noted during the visit.     Psychiatric: Affect is pleasant & appropriate.  Patient appears motivated to improve health.     Respiratory: Breathing is regular & unlabored while sitting.     HEENT: Hearing is intact to spoken word.  Speech is clear.  No gross evidence of visual impairment that would impact ambulation.      Vascular: Dorsalis pedis and posterior tibial pulses are non-palpable. There is pedal hair growth left.  CFT < 3 sec from anterior tibial surface to distal digits left. There is no appreciable edema noted.  Dermatologic: Turgor and texture are within normal limits. No coloration or temperature  changes. No primary or secondary lesions noted.  Neurologic: All epicritic and proprioceptive sensations are grossly intact left.  Musculoskeletal: No pain along left ankle or subtalar joint, ROM of both joints are full without discomfort.     Imaging:     Xr Knee Left 1 Or 2 Vws    Result Date: 11/21/2019  EXAM: XR KNEE LEFT 1 OR 2 VWS LOCATION: [facility] DATE/TIME: 11/21/2019 9:03 PM INDICATION: Left knee pain after fall COMPARISON: None.     Osteopenia. Mild narrowing involving all 3 compartments. Probable small suprapatellar joint effusion. No evidence for fracture.    Xr Ankle Left 3 Or More Vws    Result Date: 11/21/2019  EXAM: XR ANKLE LEFT 3 OR MORE VWS LOCATION: [facility] DATE/TIME: 11/21/2019 9:05 PM INDICATION: Left ankle pain after fall COMPARISON: None.     Marked soft tissue swelling adjacent to the lateral malleolus. Very tiny calcification distal to the tip of the lateral malleolus may represent a small avulsion-type fracture. No other evidence for fracture. Ankle mortise preserved. Osteopenia.    Ct Ankle Without Contrast Left    Result Date: 11/22/2019  EXAM: CT ANKLE WO CONTRAST LEFT LOCATION: [facility] DATE/TIME: 11/22/2019 3:13 PM INDICATION: Pain, abnormal x-ray. COMPARISON: 11/21/2019 x-rays. TECHNIQUE: Noncontrast. Axial, sagittal and coronal thin-section reconstruction. Dose reduction techniques were used. CONTRAST: None. FINDINGS: Small acute mildly displaced cortical avulsion fracture involving the lateral aspect of the neck of the talus as seen on series 3 image 100. Tiny acute nondisplaced avulsion fracture of the lateral aspect of the lateral process of the talus as seen on series 3 image 109 and series 4.2 image 54. No evidence of additional fractures. Mild degenerative changes at the posterior subtalar joints. Moderate superficial subcutaneous edema along the medial and lateral aspects of the ankle slightly more marked laterally. No subluxation or  dislocation in the midfoot. Osteopenia.     1.  Small acute avulsion fracture lateral aspect of the neck of the talus. 2.  Tiny acute avulsion fracture lateral aspect of the lateral process of the talus. 3.  Moderate soft tissue swelling along the medial and lateral aspects of the ankle.     Ct Chest Abdomen Pelvis Without Oral With Iv Contrast    Result Date: 11/21/2019  EXAM: CT CHEST ABDOMEN PELVIS WO ORAL W IV CONTRAST LOCATION: [facility] DATE/TIME: 11/21/2019 9:33 PM INDICATION: Chest-abdomen-pelvis trauma, blunt Chest wall and abdominal pain after trauma COMPARISON: CTA chest exam 03/04/2016 TECHNIQUE: CT scan of the chest, abdomen, and pelvis was performed following injection of IV contrast. Multiplanar reformats were obtained. Dose reduction techniques were used. CONTRAST: Iohexol (Omni) 75mL FINDINGS: LUNGS AND PLEURA: Dependent atelectasis both lower lobes and trace pleural fluid bilaterally. Septal thickening both lungs may represent edema. No evidence for pneumothorax 6 mm pulmonary nodule right middle lobe on image 54 is unchanged. MEDIASTINUM/AXILLAE: No evidence for mediastinal hemorrhage. No adenopathy. Atherosclerotic disease thoracic aorta. HEPATOBILIARY: Fatty infiltration of the liver. PANCREAS: Dilatation of the distal pancreatic duct is unchanged. No inflammatory changes SPLEEN: Normal. ADRENAL GLANDS: Normal. KIDNEYS/BLADDER: Small right renal cyst. BOWEL: Normal. LYMPH NODES: Normal. VASCULATURE: Atherosclerotic disease of the aorta. PELVIC ORGANS: 2.6 x 2.3 cm left adnexal cyst with adjacent calcifications OTHER: None no free air, hemorrhage, or ascites. MUSCULOSKELETAL: Marked thoracic kyphosis with anterior wedging several midthoracic vertebral bodies that have progressed from the prior exam.     1.  No evidence for acute traumatic injury within the chest, abdomen or pelvis. 2.  Thoracic kyphosis. Mid thoracic vertebral body compression fractures have progressed. 3.  Very small  bilateral pleural effusions and bibasilar atelectasis. Septal thickening both lungs may represent edema. 4.  Fatty liver. 5.  Small right renal cyst. 6.  Left adnexal cyst with adjacent calcifications, indeterminate. This could be further characterized with pelvic ultrasound.

## 2021-06-04 NOTE — TELEPHONE ENCOUNTER
Orders being requested: Verbal order needed for Home Care Services. Specifically, Strengthening, balance, gait training, and fall prevention 2 x's a week for 3 weeks and 1 x a week for 2 weeks   Reason service is needed/diagnosis: She was just hospitalized after a fall. She broke her left ankle   When are orders needed by: 12/24/19 if possible   Where to send Orders: Verbal okay needed ASAP  Okay to leave detailed message?  Yes

## 2021-06-04 NOTE — TELEPHONE ENCOUNTER
1) Orders being requested: skill nurse - 2 times a week for 2 weeks , 1 time a week for 4 weeks   Reason service is needed/diagnosis: CHS and medication management         2) Orders being requested: Home health aid 1 time for 5 weeks   Reason service is needed/diagnosis: showering       3) Orders being requested: Occupational therapy for 1 week   Reason service is needed/diagnosis: to evaluation    When are orders needed by: as soon as possible   Where to send Orders: Phone:  [phone]  Okay to leave detailed message?  Yes

## 2021-06-04 NOTE — TELEPHONE ENCOUNTER
I have not seen her in follow up yet and my last visit with her was in September.  Did they not give her parameters with her discharge?  I don't see a discharge from the nursing home and would not be able to give that information without seeing her.  She sees me later this week.

## 2021-06-04 NOTE — TELEPHONE ENCOUNTER
Spoke with Jannette at [facility] and relayed message below from Dr. Schmitz.  She verbalized understanding and had no further questions at this time.  Bridget CADE, XIN/CMT....................10:37 AM

## 2021-06-04 NOTE — PROGRESS NOTES
[facility]    NAME:  Kristal Cox             :  1924  MRN: [id number]  CODE STATUS:  DNR    FACILITY:  [facility] [[id number]]         Chief Complaint   Patient presents with     Problem Visit     Poor appetite with weight loss and insomnia     HISTORY OF PRESENT ILLNESS: Kristal Cox is a 89 y.o. female with HTN, HLD, anxiety, IBS who preseted after a fall and found to have a small avulsion fracture of the lateral neck of the talus.  Podiatry evaluated and will be treated non-operatively with a CAM boot.  Also noted to have a demand during the admission NSTEMI which was treated medically.    Ms. Cox also was found to have a high burden of PVCs which was treated with magnesium supplementation, amiodarone and beta-blocker with significant reduction in PVC burden.      Demand NSTEMI - asa, statin, b-blocker  PVCs - much improved.  Amiodarone 200mg two times a day x 2 weeks and then day.  Magenesium, lopressor  Ankle fracture - cam boot.  There was an issue with the cam boot not inflating prior to discharge.   Patient was stabilized and transferred to TCU for continued rehabilitation.    Today, patient is seen at the bedside.  Patient continues no to sleep well.  This wasn't a problem prior to admission.  She has a poor appetite and is losing weight.  Her baseline weight is approximately 108 lbs.  On admission she was 96.5 lbs and last week she was 92.6 lbs.  She is on HNS but isn't good about finishing them.  Patient's CAM boot has been discontinued.  She is looking to discharge to home sometime next week. Last covered day for therapy is 2019. Upon discharge she will need AM/PM cares at home.    Past Medical History:   Diagnosis Date     Alopecia 2019     Anxiety      Back pain 3/4/2016     Diastolic congestive heart failure (H) 2018     Hyperlipidemia      IBS (irritable bowel syndrome)      Osteoporosis      Other abnormal clinical  finding     evidence of old septal scar on EKG     Systolic hypertension      Past Surgical History:   Procedure Laterality Date     CATARACT EXTRACTION, BILATERAL        SECTION      X 4     CHOLECYSTECTOMY       TONSILLECTOMY AND ADENOIDECTOMY       Family History   Problem Relation Age of Onset     Stroke Mother         passed age 75     Crohn's disease Father         passed in his 20's from bowel obstructions     Social History     Socioeconomic History     Marital status:      Spouse name: Not on file     Number of children: Not on file     Years of education: Not on file     Highest education level: Not on file   Occupational History     Not on file   Social Needs     Financial resource strain: Not on file     Food insecurity:     Worry: Not on file     Inability: Not on file     Transportation needs:     Medical: Not on file     Non-medical: Not on file   Tobacco Use     Smoking status: Never Smoker     Smokeless tobacco: Never Used   Substance and Sexual Activity     Alcohol use: No     Comment: rarely drinks wine, and drinks wine watered down with ice.     Drug use: Yes     Sexual activity: Not Currently   Lifestyle     Physical activity:     Days per week: Not on file     Minutes per session: Not on file     Stress: Not on file   Relationships     Social connections:     Talks on phone: Not on file     Gets together: Not on file     Attends Evangelical service: Not on file     Active member of club or organization: Not on file     Attends meetings of clubs or organizations: Not on file     Relationship status: Not on file     Intimate partner violence:     Fear of current or ex partner: Not on file     Emotionally abused: Not on file     Physically abused: Not on file     Forced sexual activity: Not on file   Other Topics Concern     Not on file   Social History Narrative     since , has children     Allergies   Allergen Reactions     Evista [Raloxifene]      Caused blood clot      Fosamax [Alendronate] Nausea And Vomiting     Current Outpatient Medications   Medication Sig Dispense Refill     acetaminophen (TYLENOL) 500 MG tablet Take 1,000 mg by mouth 3 (three) times a day as needed.              amiodarone (PACERONE) 200 MG tablet Take 1 tablet (200 mg total) by mouth daily.  0     amiodarone (PACERONE) 200 MG tablet Take 1 tablet (200 mg total) by mouth 2 (two) times a day for 11 days.  0     artificial tears,hypromellose, (GENTEAL; SYSTANE) 0.3 % Gel Administer 1 drop to both eyes as needed (dry eyes).       aspirin 81 mg chewable tablet Chew 1 tablet (81 mg total) daily.  0     atorvastatin (LIPITOR) 20 MG tablet Take 1 tablet (20 mg total) by mouth daily.  0     cholecalciferol, vitamin D3, 1,000 unit tablet Take 2,000 Units by mouth daily.       lisinopril (PRINIVIL,ZESTRIL) 20 MG tablet Take 1 tablet (20 mg total) by mouth daily. 90 tablet 3     magnesium chloride (SLOW-MAG) 64 mg TbEC delayed-release tablet Take 1 tablet (64 mg total) by mouth daily.  0     melatonin 3 mg Tab tablet Take 1-2 tablets (3-6 mg total) by mouth at bedtime as needed.       metoprolol succinate (TOPROL-XL) 25 MG Take 1 tablet (25 mg total) by mouth daily.  0     nitroglycerin (NITROSTAT) 0.4 MG SL tablet Place 1 tablet (0.4 mg total) under the tongue every 5 (five) minutes as needed for chest pain.  0     ranitidine (ZANTAC) 75 MG tablet Take 75 mg by mouth 2 (two) times a day as needed for heartburn.       No current facility-administered medications for this visit.        REVIEW OF SYSTEMS:    Currently, no fever, chills, or rigors. Does not have any visual or hearing problems. Denies any chest pain, headaches, palpitations, lightheadedness, dizziness, shortness of breath, or cough. Appetite is good. Denies any GERD symptoms. Denies any difficulty with swallowing, nausea, or vomiting.  Denies any abdominal pain, diarrhea or constipation. Denies any urinary symptoms. No insomnia. No active bleeding. No  rash.       PHYSICAL EXAMINATION:  Vitals:    12/18/19 1420   BP: 122/58   Pulse: 60   Resp: 16   Temp: 98.1  F (36.7  C)   SpO2: 98%   Weight: (!) 90 lb 1.6 oz (40.9 kg)       GENERAL: Awake, Alert, oriented x3, not in any form of acute distress, answers questions appropriately, follows simple commands, conversant  HEENT: Head is normocephalic with normal hair distribution. No evidence of trauma. Ears: No acute purulent discharge. Eyes: Conjunctivae pink with no scleral jaundice. Nose: Normal mucosa and septum. NECK: Supple with no cervical or supraclavicular lymphadenopathy. Trachea is midline.   CHEST: No tenderness or deformity, no crepitus  LUNG: Clear to auscultation with good chest expansion. There are no crackles or wheezes, normal AP diameter.  BACK: No kyphosis of the thoracic spine. Symmetric, no curvature, ROM normal, no CVA tenderness, no spinal tenderness   CVS: There is good S1  S2, there are no murmurs, rubs, gallops, or heaves, rhythm is regular,  2+ pulses symmetric in all extremities.  ABDOMEN: Globular and soft, nontender to palpation, non distended, no masses, no organomegaly, good bowel sounds, no rebound or guarding, no peritoneal signs.   EXTREMITIES:  Full range of motion on both upper and lower extremities, there is no tenderness to palpation, no pedal edema, no cyanosis or clubbing, no calf tenderness.  Pulses equal in all extremities, normal cap refill, no joint swelling.  SKIN: Warm and dry, no erythema noted.  Skin color, texture, no rashes or lesions.  NEUROLOGICAL: The patient is oriented to person, place and time. Strength and sensation are grossly intact. Face is symmetric.    LABS:      Lab Results   Component Value Date    WBC 6.4 11/22/2019    HGB 11.0 (L) 11/22/2019    HCT 33.0 (L) 11/22/2019    MCV 95 11/22/2019     (L) 11/22/2019     Results for orders placed or performed during the hospital encounter of 11/21/19   Basic Metabolic Panel   Result Value Ref Range     Sodium 132 (L) 136 - 145 mmol/L    Potassium 4.0 3.5 - 5.0 mmol/L    Chloride 102 98 - 107 mmol/L    CO2 23 22 - 31 mmol/L    Anion Gap, Calculation 7 5 - 18 mmol/L    Glucose 130 (H) 70 - 125 mg/dL    Calcium 8.7 8.5 - 10.5 mg/dL    BUN 20 8 - 28 mg/dL    Creatinine 0.78 0.60 - 1.10 mg/dL    GFR MDRD Af Amer >60 >60 mL/min/1.73m2    GFR MDRD Non Af Amer >60 >60 mL/min/1.73m2     Vitamin D, Total (25-Hydroxy)   Date Value Ref Range Status   08/12/2019 49.0 30.0 - 80.0 ng/mL Final     Lab Results   Component Value Date    [id number] 320 08/12/2019       ASSESSMENT/PLAN:    1. Poor appetite - Mirtazapine 7.5 mg daily at bedtime, will dietician see to discuss alternate HNS that patient may enjoy with possibility of her being more compliant   2. Insomnia due to medical condition - See above   3. Physical deconditioning - Continue PT/OT   4. Benign essential hypertension -  Continue Lopressor and Lisinopril. Hold Lopressor for SBP < 100 and HR < 60   5. Closed nondisplaced fracture of neck of left talus with routine healing, subsequent encounter - Evaluated by Podiatry and treated non-operatively. Inflating CAM boot has been discontinued                           Electronically signed by:  Jan Jason CNP

## 2021-06-04 NOTE — PROGRESS NOTES
I was asked by nursing staff at [facility] to address patient's urine culture.  Urinalysis and urine culture were sent based on patient's symptoms of dysuria, urinary frequency, and nocturia.  She has not had fever abdominal or back pain.  Urine culture results showed only 10-50 K E. coli which is broadly sensitive.  Is unclear whether this represents colonization and her symptoms are attributable to noninfectious urinary tract disease, or whether this actually contributes to her symptoms.  A trial short-term antibiotics is reasonable considering her good renal function lack of allergies and sensitive organism.  Amoxicillin 500 3 times daily for 5 days.  If her symptoms persist thereafter I recommend urologic evaluation/cystoscopy as next step    cornelius matos

## 2021-06-04 NOTE — PROGRESS NOTES
[facility] Patient Outreach:     Discharge Date::  12/21/19      Reason for TCU stay (discharge diagnosis)::  Fall with left talus fx, NSTEMI      Are you feeling better, the same or worse since your discharge?:  Patient is feeling better          As part of your discharge plan, did they discuss home care with you?: Yes        Have your seen them yet, or are they scheduled to visit?: Yes                Do you have any follow up visits scheduled with your PCP or Specialist?:  Yes, with PCP      (RN) Is it scheduled soon enough (3-5 days)?: No        (RN) Is the patient okay with moving appointment up (if RN feels appropriate)?: No (Patient is seeing PCP on 12/27/19.  )

## 2021-06-04 NOTE — TELEPHONE ENCOUNTER
"[facility] Nurse Triage Telephone Note      Provider: AMINA Ashley  Facility: [facility]    Facility Type: TCU    Caller: Keely BOOTH  Call Back Number:  [phone]    Allergies: Evista [raloxifene] and Fosamax [alendronate]    Reason for call: 89yo NA 11/26/19 96.5#, today 92.6#. (March 2018 wt 105#) .We've tried offering her different snacks /supplements. \"I've always been a light eater, just don't feel hungry\". C/O GI upset today, states started last pm, \"At home would do BRAT diet. Occ Zantac.\"  Denies problems with teeth or mouth sores. Also told RD she hasn't been sleeping well, has PRN Melatonin 3mg which she has taken occasionally.    Verbal Order/Direction given by Provider: Continue to monitor wt, if drops to <95# let me know. Find out what snacks she does like & give her those. Zantac 75mg two times a day PRN GI upset. Give her BRAT diet as she request for GI upset.    Provider giving order: AMINA Ashley    Verbal order given to: Arslan Golden RN      "

## 2021-06-04 NOTE — PROGRESS NOTES
[facility]    NAME:  Kristal Cox             :  1924  MRN: [id number]  CODE STATUS:  DNR    FACILITY:  [facility] [[id number]]         Chief Complaint   Patient presents with     Problem Visit     NSTEMI     HISTORY OF PRESENT ILLNESS: Kristal Cox is a 89 y.o. female with HTN, HLD, anxiety, IBS who preseted after a fall and found to have a small avulsion fracture of the lateral neck of the talus.  Podiatry evaluated and will be treated non-operatively with a CAM boot.  Also noted to have a demand during the admission NSTEMI which was treated medically.    Ms. Cox also was found to have a high burden of PVCs which was treated with magnesium supplementation, amiodarone and beta-blocker with significant reduction in PVC burden.      Demand NSTEMI - asa, statin, b-blocker  PVCs - much improved.  Amiodarone 200mg two times a day x 2 weeks and then day.  Magenesium, lopressor  Ankle fracture - cam boot.  There was an issue with the cam boot not inflating prior to discharge.   Patient was stabilized and transferred to TCU for continued rehabilitation.    Today, patient is seen at the bedside.  Denies any chest pain or ankle/hip pain. Inflating CAM boot in place.  Normotensive.  Continues with PT/OT.    Past Medical History:   Diagnosis Date     Alopecia 2019     Anxiety      Back pain 3/4/2016     Diastolic congestive heart failure (H) 2018     Hyperlipidemia      IBS (irritable bowel syndrome)      Osteoporosis      Other abnormal clinical finding     evidence of old septal scar on EKG     Systolic hypertension      Past Surgical History:   Procedure Laterality Date     CATARACT EXTRACTION, BILATERAL        SECTION      X 4     CHOLECYSTECTOMY       TONSILLECTOMY AND ADENOIDECTOMY       Family History   Problem Relation Age of Onset     Stroke Mother         passed age 75     Crohn's disease Father         passed in his 20's from bowel  obstructions     Social History     Socioeconomic History     Marital status:      Spouse name: Not on file     Number of children: Not on file     Years of education: Not on file     Highest education level: Not on file   Occupational History     Not on file   Social Needs     Financial resource strain: Not on file     Food insecurity:     Worry: Not on file     Inability: Not on file     Transportation needs:     Medical: Not on file     Non-medical: Not on file   Tobacco Use     Smoking status: Never Smoker     Smokeless tobacco: Never Used   Substance and Sexual Activity     Alcohol use: No     Comment: rarely drinks wine, and drinks wine watered down with ice.     Drug use: Yes     Sexual activity: Not Currently   Lifestyle     Physical activity:     Days per week: Not on file     Minutes per session: Not on file     Stress: Not on file   Relationships     Social connections:     Talks on phone: Not on file     Gets together: Not on file     Attends [employer] service: Not on file     Active member of club or organization: Not on file     Attends meetings of clubs or organizations: Not on file     Relationship status: Not on file     Intimate partner violence:     Fear of current or ex partner: Not on file     Emotionally abused: Not on file     Physically abused: Not on file     Forced sexual activity: Not on file   Other Topics Concern     Not on file   Social History Narrative     since 2009, has children     Allergies   Allergen Reactions     Evista [Raloxifene]      Caused blood clot     Fosamax [Alendronate] Nausea And Vomiting     Current Outpatient Medications   Medication Sig Dispense Refill     acetaminophen (TYLENOL) 500 MG tablet Take 1,000 mg by mouth 3 (three) times a day as needed.              amiodarone (PACERONE) 200 MG tablet Take 1 tablet (200 mg total) by mouth daily.  0     amiodarone (PACERONE) 200 MG tablet Take 1 tablet (200 mg total) by mouth 2 (two) times a day for 11  days.  0     artificial tears,hypromellose, (GENTEAL; SYSTANE) 0.3 % Gel Administer 1 drop to both eyes as needed (dry eyes).       aspirin 81 mg chewable tablet Chew 1 tablet (81 mg total) daily.  0     atorvastatin (LIPITOR) 20 MG tablet Take 1 tablet (20 mg total) by mouth daily.  0     cholecalciferol, vitamin D3, 1,000 unit tablet Take 2,000 Units by mouth daily.       lisinopril (PRINIVIL,ZESTRIL) 20 MG tablet Take 1 tablet (20 mg total) by mouth daily. 90 tablet 3     magnesium chloride (SLOW-MAG) 64 mg TbEC delayed-release tablet Take 1 tablet (64 mg total) by mouth daily.  0     melatonin 3 mg Tab tablet Take 1-2 tablets (3-6 mg total) by mouth at bedtime as needed.       metoprolol succinate (TOPROL-XL) 25 MG Take 1 tablet (25 mg total) by mouth daily.  0     nitroglycerin (NITROSTAT) 0.4 MG SL tablet Place 1 tablet (0.4 mg total) under the tongue every 5 (five) minutes as needed for chest pain.  0     No current facility-administered medications for this visit.        REVIEW OF SYSTEMS:    Currently, no fever, chills, or rigors. Does not have any visual or hearing problems. Denies any chest pain, headaches, palpitations, lightheadedness, dizziness, shortness of breath, or cough. Appetite is good. Denies any GERD symptoms. Denies any difficulty with swallowing, nausea, or vomiting.  Denies any abdominal pain, diarrhea or constipation. Denies any urinary symptoms. No insomnia. No active bleeding. No rash.       PHYSICAL EXAMINATION:  Vitals:    12/09/19 0433   BP: 141/70   Pulse: 63   Resp: 16   Temp: 98.6  F (37  C)   SpO2: 95%   Weight: (!) 96 lb 6.4 oz (43.7 kg)       GENERAL: Awake, Alert, oriented x3, not in any form of acute distress, answers questions appropriately, follows simple commands, conversant  HEENT: Head is normocephalic with normal hair distribution. No evidence of trauma. Ears: No acute purulent discharge. Eyes: Conjunctivae pink with no scleral jaundice. Nose: Normal mucosa and septum.  NECK: Supple with no cervical or supraclavicular lymphadenopathy. Trachea is midline.   CHEST: No tenderness or deformity, no crepitus  LUNG: Clear to auscultation with good chest expansion. There are no crackles or wheezes, normal AP diameter.  BACK: No kyphosis of the thoracic spine. Symmetric, no curvature, ROM normal, no CVA tenderness, no spinal tenderness   CVS: There is good S1  S2, there are no murmurs, rubs, gallops, or heaves, rhythm is regular,  2+ pulses symmetric in all extremities.  ABDOMEN: Globular and soft, nontender to palpation, non distended, no masses, no organomegaly, good bowel sounds, no rebound or guarding, no peritoneal signs.   EXTREMITIES:  Full range of motion on both upper and lower extremities, there is no tenderness to palpation, no pedal edema, no cyanosis or clubbing, no calf tenderness.  Pulses equal in all extremities, normal cap refill, no joint swelling.  SKIN: Warm and dry, no erythema noted.  Skin color, texture, no rashes or lesions.  NEUROLOGICAL: The patient is oriented to person, place and time. Strength and sensation are grossly intact. Face is symmetric.    LABS:      Lab Results   Component Value Date    WBC 6.4 11/22/2019    HGB 11.0 (L) 11/22/2019    HCT 33.0 (L) 11/22/2019    MCV 95 11/22/2019     (L) 11/22/2019     Results for orders placed or performed during the hospital encounter of 11/21/19   Basic Metabolic Panel   Result Value Ref Range    Sodium 132 (L) 136 - 145 mmol/L    Potassium 4.0 3.5 - 5.0 mmol/L    Chloride 102 98 - 107 mmol/L    CO2 23 22 - 31 mmol/L    Anion Gap, Calculation 7 5 - 18 mmol/L    Glucose 130 (H) 70 - 125 mg/dL    Calcium 8.7 8.5 - 10.5 mg/dL    BUN 20 8 - 28 mg/dL    Creatinine 0.78 0.60 - 1.10 mg/dL    GFR MDRD Af Amer >60 >60 mL/min/1.73m2    GFR MDRD Non Af Amer >60 >60 mL/min/1.73m2     Vitamin D, Total (25-Hydroxy)   Date Value Ref Range Status   08/12/2019 49.0 30.0 - 80.0 ng/mL Final     Lab Results   Component Value Date     [id number] 320 08/12/2019       ASSESSMENT/PLAN:    1. NSTEMI (non-ST elevated myocardial infarction) (H) - No chest pain, will continue  ASA, Statin and Beta blocker   2. Closed nondisplaced fracture of neck of left talus with routine healing, subsequent encounter  - Evaluated by Podiatry and treated non-operatively with inflating CAM boot.    3. PVC's (premature ventricular contractions)  - Improved, will continue Amiodarone 200mg two times a day x 2 weeks and then daily.  Magenesium, Lopressor   4. Benign essential hypertension - Blood pressures are within target range, will continue Lopressor and Lisinopril

## 2021-06-04 NOTE — PATIENT INSTRUCTIONS - HE
Physical therapy orders: Ween out of CAM boot and into regular shoes.     Podiatry Clinics that offer foot and toenail care  [facility]  [facility]  [phone]    Foot and Ankle Clinics, [facility]  [phone]    [facility]  [place] - Dr. Green on Tuesdays  [phone]    [facility]  [place]  [phone]    [facility]  [facility]  [phone]    [facility]  [phone]    [facility]  [phone]    [facility]  They have several locations and have a team of registered nurses that offer diabetic foot care.  They do not bill to insurance and the average cost per visit is $37.  [facility]  [phone]    [facility]  *Nurse comes to your home for nail care.  Heather Davey RN Foot Specialist  [phone]

## 2021-06-04 NOTE — TELEPHONE ENCOUNTER
Dr. Schmitz,  Spoke with Jannette at [facility] and relayed message below.  She verbalized understanding, but states she was requesting parameters for blood pressure, pulse, and weight for the patient due to CHF.  Please advise.  Thank you.  Bridget CADE CMA/BENJAMIN....................9:56 AM

## 2021-06-04 NOTE — PROGRESS NOTES
[facility]    NAME:  Kristal Cox             :  1924  MRN: [id number]  CODE STATUS:  DNR    FACILITY:  [facility] [[id number]]         Chief Complaint   Patient presents with     Problem Visit     Poor appetite and left ankle fracture     HISTORY OF PRESENT ILLNESS: Kristal Cox is a 89 y.o. female with HTN, HLD, anxiety, IBS who preseted after a fall and found to have a small avulsion fracture of the lateral neck of the talus.  Podiatry evaluated and will be treated non-operatively with a CAM boot.  Also noted to have a demand during the admission NSTEMI which was treated medically.    Ms. Cox also was found to have a high burden of PVCs which was treated with magnesium supplementation, amiodarone and beta-blocker with significant reduction in PVC burden.      Demand NSTEMI - asa, statin, b-blocker  PVCs - much improved.  Amiodarone 200mg two times a day x 2 weeks and then day.  Magenesium, lopressor  Ankle fracture - cam boot.  There was an issue with the cam boot not inflating prior to discharge.   Patient was stabilized and transferred to TCU for continued rehabilitation.    Today, patient is seen at the bedside. Patient states that she didn't sleep well at all last night.  She usually doesn't have any sleep disturbances at home.  Denies any pain keeping her up.  Declined any medical treatment at this time.  She does have a poor appetite and states that she just doesn't feel hungry.  Upon admission she weighed 96.5lbs and today she is 92.6lbs.     Past Medical History:   Diagnosis Date     Alopecia 2019     Anxiety      Back pain 3/4/2016     Diastolic congestive heart failure (H) 2018     Hyperlipidemia      IBS (irritable bowel syndrome)      Osteoporosis      Other abnormal clinical finding     evidence of old septal scar on EKG     Systolic hypertension      Past Surgical History:   Procedure Laterality Date     CATARACT EXTRACTION,  BILATERAL        SECTION      X 4     CHOLECYSTECTOMY       TONSILLECTOMY AND ADENOIDECTOMY       Family History   Problem Relation Age of Onset     Stroke Mother         passed age 75     Crohn's disease Father         passed in his 20's from bowel obstructions     Social History     Socioeconomic History     Marital status:      Spouse name: Not on file     Number of children: Not on file     Years of education: Not on file     Highest education level: Not on file   Occupational History     Not on file   Social Needs     Financial resource strain: Not on file     Food insecurity:     Worry: Not on file     Inability: Not on file     Transportation needs:     Medical: Not on file     Non-medical: Not on file   Tobacco Use     Smoking status: Never Smoker     Smokeless tobacco: Never Used   Substance and Sexual Activity     Alcohol use: No     Comment: rarely drinks wine, and drinks wine watered down with ice.     Drug use: Yes     Sexual activity: Not Currently   Lifestyle     Physical activity:     Days per week: Not on file     Minutes per session: Not on file     Stress: Not on file   Relationships     Social connections:     Talks on phone: Not on file     Gets together: Not on file     Attends [employer] service: Not on file     Active member of club or organization: Not on file     Attends meetings of clubs or organizations: Not on file     Relationship status: Not on file     Intimate partner violence:     Fear of current or ex partner: Not on file     Emotionally abused: Not on file     Physically abused: Not on file     Forced sexual activity: Not on file   Other Topics Concern     Not on file   Social History Narrative     since , has children     Allergies   Allergen Reactions     Evista [Raloxifene]      Caused blood clot     Fosamax [Alendronate] Nausea And Vomiting     Current Outpatient Medications   Medication Sig Dispense Refill     acetaminophen (TYLENOL) 500 MG tablet  Take 1,000 mg by mouth 3 (three) times a day as needed.              amiodarone (PACERONE) 200 MG tablet Take 1 tablet (200 mg total) by mouth daily.  0     amiodarone (PACERONE) 200 MG tablet Take 1 tablet (200 mg total) by mouth 2 (two) times a day for 11 days.  0     artificial tears,hypromellose, (GENTEAL; SYSTANE) 0.3 % Gel Administer 1 drop to both eyes as needed (dry eyes).       aspirin 81 mg chewable tablet Chew 1 tablet (81 mg total) daily.  0     atorvastatin (LIPITOR) 20 MG tablet Take 1 tablet (20 mg total) by mouth daily.  0     cholecalciferol, vitamin D3, 1,000 unit tablet Take 2,000 Units by mouth daily.       lisinopril (PRINIVIL,ZESTRIL) 20 MG tablet Take 1 tablet (20 mg total) by mouth daily. 90 tablet 3     magnesium chloride (SLOW-MAG) 64 mg TbEC delayed-release tablet Take 1 tablet (64 mg total) by mouth daily.  0     melatonin 3 mg Tab tablet Take 1-2 tablets (3-6 mg total) by mouth at bedtime as needed.       metoprolol succinate (TOPROL-XL) 25 MG Take 1 tablet (25 mg total) by mouth daily.  0     nitroglycerin (NITROSTAT) 0.4 MG SL tablet Place 1 tablet (0.4 mg total) under the tongue every 5 (five) minutes as needed for chest pain.  0     ranitidine (ZANTAC) 75 MG tablet Take 75 mg by mouth 2 (two) times a day as needed for heartburn.       No current facility-administered medications for this visit.        REVIEW OF SYSTEMS:    Currently, no fever, chills, or rigors. Does not have any visual or hearing problems. Denies any chest pain, headaches, palpitations, lightheadedness, dizziness, shortness of breath, or cough. Appetite is good. Denies any GERD symptoms. Denies any difficulty with swallowing, nausea, or vomiting.  Denies any abdominal pain, diarrhea or constipation. Denies any urinary symptoms. No insomnia. No active bleeding. No rash.       PHYSICAL EXAMINATION:  Vitals:    12/16/19 1052   BP: 141/69   Pulse: 77   Resp: 16   Temp: 97.9  F (36.6  C)   SpO2: 96%   Weight: (!) 92 lb  9.6 oz (42 kg)       GENERAL: Awake, Alert, oriented x3, not in any form of acute distress, answers questions appropriately, follows simple commands, conversant  HEENT: Head is normocephalic with normal hair distribution. No evidence of trauma. Ears: No acute purulent discharge. Eyes: Conjunctivae pink with no scleral jaundice. Nose: Normal mucosa and septum. NECK: Supple with no cervical or supraclavicular lymphadenopathy. Trachea is midline.   CHEST: No tenderness or deformity, no crepitus  LUNG: Clear to auscultation with good chest expansion. There are no crackles or wheezes, normal AP diameter.  BACK: No kyphosis of the thoracic spine. Symmetric, no curvature, ROM normal, no CVA tenderness, no spinal tenderness   CVS: There is good S1  S2, there are no murmurs, rubs, gallops, or heaves, rhythm is regular,  2+ pulses symmetric in all extremities.  ABDOMEN: Globular and soft, nontender to palpation, non distended, no masses, no organomegaly, good bowel sounds, no rebound or guarding, no peritoneal signs.   EXTREMITIES:  Full range of motion on both upper and lower extremities, there is no tenderness to palpation, no pedal edema, no cyanosis or clubbing, no calf tenderness.  Pulses equal in all extremities, normal cap refill, no joint swelling.  SKIN: Warm and dry, no erythema noted.  Skin color, texture, no rashes or lesions.  NEUROLOGICAL: The patient is oriented to person, place and time. Strength and sensation are grossly intact. Face is symmetric.    LABS:      Lab Results   Component Value Date    WBC 6.4 11/22/2019    HGB 11.0 (L) 11/22/2019    HCT 33.0 (L) 11/22/2019    MCV 95 11/22/2019     (L) 11/22/2019     Results for orders placed or performed during the hospital encounter of 11/21/19   Basic Metabolic Panel   Result Value Ref Range    Sodium 132 (L) 136 - 145 mmol/L    Potassium 4.0 3.5 - 5.0 mmol/L    Chloride 102 98 - 107 mmol/L    CO2 23 22 - 31 mmol/L    Anion Gap, Calculation 7 5 - 18  mmol/L    Glucose 130 (H) 70 - 125 mg/dL    Calcium 8.7 8.5 - 10.5 mg/dL    BUN 20 8 - 28 mg/dL    Creatinine 0.78 0.60 - 1.10 mg/dL    GFR MDRD Af Amer >60 >60 mL/min/1.73m2    GFR MDRD Non Af Amer >60 >60 mL/min/1.73m2     Vitamin D, Total (25-Hydroxy)   Date Value Ref Range Status   08/12/2019 49.0 30.0 - 80.0 ng/mL Final     Lab Results   Component Value Date    [id number] 320 08/12/2019       ASSESSMENT/PLAN:    1. Poor appetite - Followed by dietician, continues with HNS, will continue to monitor closely   2. Closed nondisplaced fracture of neck of left talus with routine healing, subsequent encounter - Evaluated by Podiatry and treated non-operatively with inflating CAM boot.   3. Age-related osteoporosis without current pathological fracture - Will add Calcium + Vitamin D   4. Benign essential hypertension - Continue Lopressor and Lisinopril. Hold Lopressor for SBP < 100 and HR < 60                     Electronically signed by:  Jan Jason, BENJIE

## 2021-06-04 NOTE — TELEPHONE ENCOUNTER
Spoke with Kaitlynn esquivel [place] and relayed message below from Dr. Schmitz regarding requested orders.  She verbalized understanding and had no further questions at this time.  Bridget CADE CMA/BENJAMIN....................4:51 PM

## 2021-06-04 NOTE — TELEPHONE ENCOUNTER
Verna Farias for orders requested below?  Please advise.  Thank you.  Bridget CADE, XIN/BENJAMIN....................9:27 AM

## 2021-06-05 NOTE — PATIENT INSTRUCTIONS - HE
Below is a list of instructions we discussed today in clinic:   1. You can stop taking the atorvastatin (cholesterol medication).  2. Continue other medications as prescribed  3. Be as active as you can tolerate.  4. Follow up with me again in about 6 months.    It was a pleasure to meet with you today in clinic.  Please do not hesitate to call the [facility] clinic with any questions or concerns at [phone].    Sincerely,     Jin Chopra MD  Non-invasive Cardiology  [facility]

## 2021-06-05 NOTE — PROGRESS NOTES
Office Visit   Kristal Cox   89 y.o. female    Date of Visit: 1/30/2020    Chief Complaint   Patient presents with     Follow-up        Assessment and Plan   1. Benign essential hypertension  Her blood pressure is satisfactory and she is doing well on her medications.  She has not had any recent chest symptoms.  She saw cardiology recently.  We will continue with her current medications.    2. PVC's (premature ventricular contractions)  She will continue to follow-up with cardiology.         Return in about 3 months (around 4/30/2020) for Recheck.     History of Present Illness   This 89 y.o. old female comes in to follow-up.  She had a fall and fracture of her ankle last October.  She also had a myocardial infarction.  I recently saw her after she was discharged back to home.  Comes in today to follow-up and is continuing to do well.  She is ambulating with a walker without difficulty.  Has not had any recurrent chest pain and has no ongoing ankle pain.  Her only concern is that she has some difficulty with sleeping.  She has been taking 3 mg of melatonin and we discussed that she could increase that to 6.  She has no other acute concerns today.    Review of Systems: As above, systems otherwise reviewed and negative.     Medications, Allergies and Problem List   Patient Active Problem List   Diagnosis     Vertebral compression fracture (H)     GERD (gastroesophageal reflux disease)     Osteoporosis     Benign essential hypertension     Hyperlipidemia LDL goal <100     PVC's (premature ventricular contractions)     Current Outpatient Medications   Medication Sig Dispense Refill     acetaminophen (TYLENOL) 500 MG tablet Take 1,000 mg by mouth 3 (three) times a day as needed.              amiodarone (PACERONE) 200 MG tablet Take 0.5 tablets (100 mg total) by mouth daily. 45 tablet 1     artificial tears,hypromellose, (GENTEAL; SYSTANE) 0.3 % Gel Administer 1 drop to both eyes as needed (dry eyes).        "aspirin 81 mg chewable tablet Chew 1 tablet (81 mg total) daily.  0     calcium, as carbonate, (OS-SAMPSON) 500 mg calcium (1,250 mg) tablet Take 1 tablet by mouth daily.       cholecalciferol, vitamin D3, 1,000 unit tablet Take 2,000 Units by mouth daily.       lisinopril (PRINIVIL,ZESTRIL) 20 MG tablet Take 1 tablet (20 mg total) by mouth daily. 90 tablet 3     magnesium chloride (SLOW-MAG) 64 mg TbEC delayed-release tablet Take 1 tablet (64 mg total) by mouth daily.  0     melatonin 3 mg Tab tablet Take 1-2 tablets (3-6 mg total) by mouth at bedtime as needed.       metoprolol succinate (TOPROL-XL) 25 MG Take 1 tablet (25 mg total) by mouth daily. 90 tablet 1     nitroglycerin (NITROSTAT) 0.4 MG SL tablet Place 1 tablet (0.4 mg total) under the tongue every 5 (five) minutes as needed for chest pain.  0     ranitidine (ZANTAC) 75 MG tablet Take 75 mg by mouth 2 (two) times a day as needed for heartburn.       senna-docusate (SENNOSIDES-DOCUSATE SODIUM) 8.6-50 mg tablet Take 1 tablet by mouth daily.       No current facility-administered medications for this visit.      Allergies   Allergen Reactions     Evista [Raloxifene]      Caused blood clot     Fosamax [Alendronate] Nausea And Vomiting          Physical Exam     /60 (Patient Site: Right Arm, Patient Position: Sitting)   Pulse 60   Ht 5' 4\" (1.626 m)   Wt (!) 95 lb (43.1 kg)   SpO2 97%   BMI 16.31 kg/m      General: This is an alert female, sitting comfortably in no apparent distress.     Additional Information   Social History     Tobacco Use     Smoking status: Never Smoker     Smokeless tobacco: Never Used   Substance Use Topics     Alcohol use: No     Comment: rarely drinks wine, and drinks wine watered down with ice.     Drug use: Yes            Palak Schmitz MD    "

## 2021-06-06 NOTE — TELEPHONE ENCOUNTER
Orders being requested: occupational Lymphoedema therapy 2 times a week for 3 weeks  Reason service is needed/diagnosis: edema management  When are orders needed by: [facility]  Where to send Orders: Phone:  [phone]  Okay to leave detailed message?  Yes

## 2021-06-06 NOTE — PROGRESS NOTES
Office Visit   Kristal Cox   89 y.o. female    Date of Visit: 3/13/2020    Chief Complaint   Patient presents with     Follow-up        Assessment and Plan   1. Edema, unspecified type  She has worsening lower extremity edema.  I am going to increase her Lasix.  She has home health and they are going to be working with her with wrapping her lower extremities.  She has a fair amount of abdominal bloating which is concerning as well.  I am going to recheck her labs today including her kidney function, CBC, BNP and urine analysis.  I am also going to set up a CT scan to look for possible mass in her abdomen.  We will increase her Lasix to 40 mg daily and see her back in a couple of weeks to reassess.  She is in agreement with this plan.  - HM2(CBC w/o Differential)  - Comprehensive Metabolic Panel  - Urinalysis-UC if Indicated  - CT Abdomen Pelvis With Oral With IV Contrast; Future  - BNP(B-type Natriuretic Peptide)  - furosemide (LASIX) 20 MG tablet; Take 2 tablets (40 mg total) by mouth daily. Higher dose - 3/13/20  Dispense: 60 tablet; Refill: 11    2. Abdominal bloating  - HM2(CBC w/o Differential)  - Comprehensive Metabolic Panel  - Urinalysis-UC if Indicated  - CT Abdomen Pelvis With Oral With IV Contrast; Future  - BNP(B-type Natriuretic Peptide)    3. Acute systolic heart failure (H)  - BNP(B-type Natriuretic Peptide)       Return in about 2 weeks (around 3/27/2020) for Recheck.     History of Present Illness   This 89 y.o. old female comes in to follow-up.  I saw her a couple weeks ago and she was having back pain and lower extremity edema.  We started a low-dose of Lasix and she comes in to follow-up.  Her back pain is improving with Tylenol.  Swelling is actually worsened.  She is also noting significant abdominal bloating.  She has no other acute concerns.  When she lies flat she is a little bit more short of breath.    Review of Systems: As above, systems otherwise reviewed and negative.      Medications, Allergies and Problem List   Patient Active Problem List   Diagnosis     Vertebral compression fracture (H)     GERD (gastroesophageal reflux disease)     Osteoporosis     Benign essential hypertension     Hyperlipidemia LDL goal <100     PVC's (premature ventricular contractions)     Current Outpatient Medications   Medication Sig Dispense Refill     acetaminophen (TYLENOL) 500 MG tablet Take 1,000 mg by mouth 3 (three) times a day as needed.              amiodarone (PACERONE) 200 MG tablet Take 0.5 tablets (100 mg total) by mouth daily. 45 tablet 1     artificial tears,hypromellose, (GENTEAL; SYSTANE) 0.3 % Gel Administer 1 drop to both eyes as needed (dry eyes).       aspirin 81 mg chewable tablet Chew 1 tablet (81 mg total) daily.  0     calcium, as carbonate, (OS-SAMPSON) 500 mg calcium (1,250 mg) tablet Take 1 tablet by mouth daily.       cholecalciferol, vitamin D3, 1,000 unit tablet Take 2,000 Units by mouth daily.       furosemide (LASIX) 20 MG tablet Take 2 tablets (40 mg total) by mouth daily. Higher dose - 3/13/20 60 tablet 11     lisinopril (PRINIVIL,ZESTRIL) 20 MG tablet Take 1 tablet (20 mg total) by mouth daily. 90 tablet 3     magnesium chloride (SLOW-MAG) 64 mg TbEC delayed-release tablet Take 1 tablet (64 mg total) by mouth daily.  0     melatonin 3 mg Tab tablet Take 1-2 tablets (3-6 mg total) by mouth at bedtime as needed.       metoprolol succinate (TOPROL-XL) 25 MG Take 1 tablet (25 mg total) by mouth daily. 90 tablet 1     nitroglycerin (NITROSTAT) 0.4 MG SL tablet Place 1 tablet (0.4 mg total) under the tongue every 5 (five) minutes as needed for chest pain.  0     polyethylene glycol (GLYCOLAX) 17 gram/dose powder Take 17 g by mouth daily. 235 g 11     polyethylene glycol (MIRALAX) 17 gram packet Take 17 g by mouth daily.       ranitidine (ZANTAC) 75 MG tablet Take 75 mg by mouth 2 (two) times a day as needed for heartburn.       senna-docusate (SENNOSIDES-DOCUSATE SODIUM)  "8.6-50 mg tablet Take 1 tablet by mouth daily.       No current facility-administered medications for this visit.      Allergies   Allergen Reactions     Evista [Raloxifene]      Caused blood clot     Fosamax [Alendronate] Nausea And Vomiting          Physical Exam     /80 (Patient Site: Left Arm, Patient Position: Sitting)   Pulse 64   Ht 5' 4\" (1.626 m)   Wt 102 lb (46.3 kg)   SpO2 97%   BMI 17.51 kg/m      General:  Patient is alert and in no apparent distress.  Gastrointestinal:  Abdomen shows distention and some tenderness with palpation in the lower abdomen.  No rebound or guarding.  Extremities: Significant bilateral lower extremity edema..         Additional Information   Social History     Tobacco Use     Smoking status: Never Smoker     Smokeless tobacco: Never Used   Substance Use Topics     Alcohol use: No     Comment: rarely drinks wine, and drinks wine watered down with ice.     Drug use: Yes          Palak Schmitz MD    "

## 2021-06-06 NOTE — TELEPHONE ENCOUNTER
Orders being requested: orders for [facility] home health care and physical therapy   Reason service is needed/diagnosis: lower back compression fractures   When are orders needed by: asayanet  Where to send Orders: Phone:  [phone]  Okay to leave detailed message?  Yes

## 2021-06-06 NOTE — TELEPHONE ENCOUNTER
Orders being requested:     1. Skilled Nursing    1. 1 x's a week x's 1 week  2. 2 x's a week x's 2 weeks  3. 1 x's a week x's 1 week    2. Home Health Aide    1. 1 x's a week x's 4 weeks    3. PT/OT Evaul and Treat    4. Lymphedema Therapist- Bilat Edema  For compression stockings.    Reason service is needed/diagnosis: Weakness  Compression Fracture    When are orders needed by: ASAP    Where to send Orders: Phone:  [phone]      Okay to leave detailed message?  Yes    Please call with verbal orders.

## 2021-06-06 NOTE — TELEPHONE ENCOUNTER
Unfortunately we ordered the CT to look into why she's having so much new swelling in her legs and is there something in her abdomen in the way.  I agree that for her, avoiding the hospital is a good idea to avoid infection.  However, I'm not able to say how long is ok for her to wait to get this test because I do not know why she's having the swelling.  Therefore, I recommend that they just wait and see and when the viral transmissions seem to slow down, get it done.  Or, if the swelling or abdominal distention seems to worsen, get it done then.  Let me know if they have any other questions.  Thanks.

## 2021-06-06 NOTE — TELEPHONE ENCOUNTER
Called [facility]    She is wondering if they can get home care for her mom. Thanks. If so, please order.

## 2021-06-06 NOTE — TELEPHONE ENCOUNTER
Spoke with Luz at  and relayed message below from Dr. Schmitz regarding the requested orders.  She verbalized understanding and had no further questions at this time.  Bridget CADE CMA/BENJAMIN....................2:38 PM

## 2021-06-06 NOTE — PROGRESS NOTES
Office Visit   Kristal Cox   89 y.o. female    Date of Visit: 3/5/2020    Chief Complaint   Patient presents with     Follow-up     Walk in care- low back pain and UTI        Assessment and Plan   1. Edema, unspecified type  She has worsening lower extremity edema.  I am going to start her on a low-dose of furosemide.  I will plan to see her back in about a week to reassess.  We will need to recheck her electrolytes and creatinine at that time.  She is in agreement with this plan.  - furosemide (LASIX) 20 MG tablet; Take 1 tablet (20 mg total) by mouth daily.  Dispense: 30 tablet; Refill: 11    2. Slow transit constipation  She is having a lot of constipation and I will go ahead and start MiraLAX.  Recommend that she take it daily.  Hopefully this will get her bowels going.  She is in agreement with this plan.  - polyethylene glycol (GLYCOLAX) 17 gram/dose powder; Take 17 g by mouth daily.  Dispense: 235 g; Refill: 11    3. Compression fracture of thoracic vertebra, unspecified thoracic vertebral level, sequela  She does have compression fractures which are causing her back pain.  She is going to be scheduling Tylenol.  I did discuss that it may just take time for her pain to improve.    4. Benign essential hypertension  Hopefully her blood pressure will improve with the furosemide.       Return in about 1 week (around 3/12/2020) for Recheck.     History of Present Illness   This 89 y.o. old female comes in with a couple of concerns.  Over the last few weeks she has had increasing back pain.  She was treated a couple weeks ago for a bladder infection.  She then developed increasing abdominal distention, constipation, lower extremity edema and back pain.  She was seen about 3 days ago in the walk-in clinic and found to have compression fractures of her spine.  Was also felt to be constipated.  Her symptoms have continued.  She has completed the antibiotic for her bladder infection.  She has not had any  fevers or chills.  She notes that her leg swelling is worsening.    Review of Systems: As above, systems otherwise reviewed and negative.     Medications, Allergies and Problem List   Patient Active Problem List   Diagnosis     Vertebral compression fracture (H)     GERD (gastroesophageal reflux disease)     Osteoporosis     Benign essential hypertension     Hyperlipidemia LDL goal <100     PVC's (premature ventricular contractions)     Current Outpatient Medications   Medication Sig Dispense Refill     acetaminophen (TYLENOL) 500 MG tablet Take 1,000 mg by mouth 3 (three) times a day as needed.              amiodarone (PACERONE) 200 MG tablet Take 0.5 tablets (100 mg total) by mouth daily. 45 tablet 1     artificial tears,hypromellose, (GENTEAL; SYSTANE) 0.3 % Gel Administer 1 drop to both eyes as needed (dry eyes).       aspirin 81 mg chewable tablet Chew 1 tablet (81 mg total) daily.  0     calcium, as carbonate, (OS-SAMPSON) 500 mg calcium (1,250 mg) tablet Take 1 tablet by mouth daily.       cholecalciferol, vitamin D3, 1,000 unit tablet Take 2,000 Units by mouth daily.       lisinopril (PRINIVIL,ZESTRIL) 20 MG tablet Take 1 tablet (20 mg total) by mouth daily. 90 tablet 3     magnesium chloride (SLOW-MAG) 64 mg TbEC delayed-release tablet Take 1 tablet (64 mg total) by mouth daily.  0     melatonin 3 mg Tab tablet Take 1-2 tablets (3-6 mg total) by mouth at bedtime as needed.       metoprolol succinate (TOPROL-XL) 25 MG Take 1 tablet (25 mg total) by mouth daily. 90 tablet 1     nitroglycerin (NITROSTAT) 0.4 MG SL tablet Place 1 tablet (0.4 mg total) under the tongue every 5 (five) minutes as needed for chest pain.  0     polyethylene glycol (MIRALAX) 17 gram packet Take 17 g by mouth daily.       ranitidine (ZANTAC) 75 MG tablet Take 75 mg by mouth 2 (two) times a day as needed for heartburn.       furosemide (LASIX) 20 MG tablet Take 1 tablet (20 mg total) by mouth daily. 30 tablet 11     polyethylene glycol  "(GLYCOLAX) 17 gram/dose powder Take 17 g by mouth daily. 235 g 11     senna-docusate (SENNOSIDES-DOCUSATE SODIUM) 8.6-50 mg tablet Take 1 tablet by mouth daily.       No current facility-administered medications for this visit.      Allergies   Allergen Reactions     Evista [Raloxifene]      Caused blood clot     Fosamax [Alendronate] Nausea And Vomiting          Physical Exam     /80   Pulse 72   Ht 5' 4\" (1.626 m)   Wt 101 lb (45.8 kg)   SpO2 98%   BMI 17.34 kg/m      General:  Patient is alert and in no apparent distress.  Cardiovascular:  Regular rate and rhythm, normal S1/S2, no murmurs, rubs, or gallop.  Pulmonary:  Lungs are clear to auscultation bilaterally with normal respiratory effort.  Gastrointestinal: Abdomen is soft but mildly distended with some mild pain with palpation..  Extremities: She has bilateral pitting edema..  Back: She has significant pain with palpation along the lumbar spine.         Additional Information   Social History     Tobacco Use     Smoking status: Never Smoker     Smokeless tobacco: Never Used   Substance Use Topics     Alcohol use: No     Comment: rarely drinks wine, and drinks wine watered down with ice.     Drug use: Yes          Palak Schmitz MD    "

## 2021-06-06 NOTE — TELEPHONE ENCOUNTER
Triage call:   Was seen last week   Pain in her abdomen and back   She reports that her bowels have been backed up   Pain is getting worse and her stomach appears more swollen  Last BM was 3-4 days ago  Across her whole abdomen and back more so on her left side   Having to sleep sitting on the sofa   Constant pain   Not feeling nauseated   Tried miralax - no results  Taking stool softner and no results   She reports that she might need to drink more water as well.     Triaged to be seen in the office now- advised her to try prune juice to see if that would help her have a BM as well. She states that her son would be able to bring her into an appointment but she is not sure when exactly he was available. Advised that she should go to the [facility] today to be seen- advised on address and she states that she will have her son bring her there when he is available today. Declines further questions.     Catie Radford RN BSBA [facility] Triage/Med Refill 3/2/2020 1:43 PM    Reason for Disposition    Constant abdominal pain lasting > 2 hours    Protocols used: CONSTIPATION-A-[place]

## 2021-06-06 NOTE — TELEPHONE ENCOUNTER
Who is calling:  Camilo, patient's son  Reason for Call:  Kristal is scheduled for a CT scan tomorrow at [facility].  Given the pandemic and her being at a higher risk to contract Covid-19, should she keep this appointment?  If postponing, what is the time frame that Kristal should have this imaging done?  Date of last appointment with primary care: 3/13/20  Okay to leave a detailed message: Yes

## 2021-06-06 NOTE — TELEPHONE ENCOUNTER
CC:  Low back pain - L side       Hip area and lower back - yes seems to migrate around to the front as well with some movements - maryam getting in and out of bed       Been going on for about 10 days so far       Is able to stand / walk as usual - does use walker to do so (NL for her)      No fever         No visible rash - she initially thought could be shingles           A/P:   > OK for appt today as able - hse has to contact family for a ride to clinic               Reason for Disposition    MODERATE pain (e.g., interferes with normal activities, limping) and present > 3 days    Protocols used: HIP PAIN-A-OH

## 2021-06-06 NOTE — PROGRESS NOTES
HPI:  Kristal Cox is a 89 y.o. female who is seen for   Chief Complaint   Patient presents with     Pain     Back and hip pain for [id number],    Kristal Cox is seen for new back pain over the last 2 weeks.  States that her pain starts at night when she lays down, sometimes she has to get up 5 or 6 times at night to urinate and when she does get up and down her pain shoots across her low back at the upper hip area.  In the morning she is also very stiff and sore, takes several minutes to hours to feel a little better.  She does not take anything for her pain, does not like to even take 1 Tylenol.  She is using a heating pad regularly.  She denies any weakness numbness or tingling of her lower extremities.  She does have increased fatigue.  She denies any falls or injury.  She denies dysuria, CVA tenderness, fever, nausea, vomiting, weight loss, abdominal pain.  She has not had a recent viral illness, no cough or congestion.  Review of systems as in HPI.  She lives in her own home, has several family members who assist her.  She is with her daughter and son-in-law today.    She has a history of osteoporosis, cannot tolerate Fosamax or Evista, is currently only on vitamin D and calcium.  She has never had a known compression fracture.  She has a history of kyphosis and scoliosis.    No results found for: HGBA1C  Lab Results   Component Value Date    LDLCALC 96 07/07/2017    CREATININE 0.83 12/27/2019     Patient Active Problem List   Diagnosis     Vertebral compression fracture (H)     GERD (gastroesophageal reflux disease)     Osteoporosis     Benign essential hypertension     Hyperlipidemia LDL goal <100     PVC's (premature ventricular contractions)     Family History   Problem Relation Age of Onset     Stroke Mother         passed age 75     Crohn's disease Father         passed in his 20's from bowel obstructions     Social History     Socioeconomic History     Marital status:      Spouse  name: None     Number of children: None     Years of education: None     Highest education level: None   Occupational History     None   Social Needs     Financial resource strain: None     Food insecurity:     Worry: None     Inability: None     Transportation needs:     Medical: None     Non-medical: None   Tobacco Use     Smoking status: Never Smoker     Smokeless tobacco: Never Used   Substance and Sexual Activity     Alcohol use: No     Comment: rarely drinks wine, and drinks wine watered down with ice.     Drug use: Yes     Sexual activity: Not Currently   Lifestyle     Physical activity:     Days per week: None     Minutes per session: None     Stress: None   Relationships     Social connections:     Talks on phone: None     Gets together: None     Attends Sikh service: None     Active member of club or organization: None     Attends meetings of clubs or organizations: None     Relationship status: None     Intimate partner violence:     Fear of current or ex partner: None     Emotionally abused: None     Physically abused: None     Forced sexual activity: None   Other Topics Concern     None   Social History Narrative     since , has children     Past Surgical History:   Procedure Laterality Date     CATARACT EXTRACTION, BILATERAL        SECTION      X 4     CHOLECYSTECTOMY       TONSILLECTOMY AND ADENOIDECTOMY       Current Outpatient Medications on File Prior to Visit   Medication Sig Dispense Refill     acetaminophen (TYLENOL) 500 MG tablet Take 1,000 mg by mouth 3 (three) times a day as needed.              amiodarone (PACERONE) 200 MG tablet Take 0.5 tablets (100 mg total) by mouth daily. 45 tablet 1     artificial tears,hypromellose, (GENTEAL; SYSTANE) 0.3 % Gel Administer 1 drop to both eyes as needed (dry eyes).       aspirin 81 mg chewable tablet Chew 1 tablet (81 mg total) daily.  0     calcium, as carbonate, (OS-SAMPSON) 500 mg calcium (1,250 mg) tablet Take 1 tablet by mouth  daily.       cholecalciferol, vitamin D3, 1,000 unit tablet Take 2,000 Units by mouth daily.       lisinopril (PRINIVIL,ZESTRIL) 20 MG tablet Take 1 tablet (20 mg total) by mouth daily. 90 tablet 3     magnesium chloride (SLOW-MAG) 64 mg TbEC delayed-release tablet Take 1 tablet (64 mg total) by mouth daily.  0     melatonin 3 mg Tab tablet Take 1-2 tablets (3-6 mg total) by mouth at bedtime as needed.       metoprolol succinate (TOPROL-XL) 25 MG Take 1 tablet (25 mg total) by mouth daily. 90 tablet 1     nitroglycerin (NITROSTAT) 0.4 MG SL tablet Place 1 tablet (0.4 mg total) under the tongue every 5 (five) minutes as needed for chest pain.  0     ranitidine (ZANTAC) 75 MG tablet Take 75 mg by mouth 2 (two) times a day as needed for heartburn.       senna-docusate (SENNOSIDES-DOCUSATE SODIUM) 8.6-50 mg tablet Take 1 tablet by mouth daily.       No current facility-administered medications on file prior to visit.      Allergies   Allergen Reactions     Evista [Raloxifene]      Caused blood clot     Fosamax [Alendronate] Nausea And Vomiting     OB History    No obstetric history on file.       I have reviewed the patient's medical history in detail and updated the computerized patient record.  OBJECTIVE:  Wt Readings from Last 3 Encounters:   02/27/20 (!) 95 lb 3 oz (43.2 kg)   01/30/20 (!) 95 lb (43.1 kg)   01/07/20 (!) 93 lb (42.2 kg)     Temp Readings from Last 3 Encounters:   12/18/19 98.1  F (36.7  C)   12/16/19 97.9  F (36.6  C)   12/15/19 98.6  F (37  C)     BP Readings from Last 3 Encounters:   02/27/20 160/80   01/30/20 138/60   01/07/20 (!) 166/36     Pulse Readings from Last 3 Encounters:   01/30/20 60   01/07/20 60   12/27/19 72     Body mass index is 16.34 kg/m .     Alert, cooperative, well-hydrated. Appears well.  Eyes: Pupils equal, round, reactive to light.  HEENT: Sclera white, nares patent, MMM, TM's pearly bilaterally  Lungs: Clear to auscultation. No retractions, no increased work of respiration,  equal chest rise.   Heart: Regular rate and rhythm, no murmurs, clicks,   Gallops.  .Abdomen: BS in 4 quadrants, no tenderness to light or deep palpation, liver and spleen are not palpably enlarged and there are no masses noted.   Extremities: Able to rise and walk to exam table, no instability with walking.  Back:  Scoliosis and kyphosis, obvious pain with rising from seated position and walking, also painful with sitting.  No tenderness to palpation of spinous processes in cervical and thoracic area, no tenderness to palpation of spinous processes in lumbar area, tenderness across iliac crest bilaterally without palpable spasms.  No CVA tenderness bilaterally.    Labs:  Office Visit on 12/27/2019   Component Date Value     WBC 12/27/2019 6.6      RBC 12/27/2019 3.85      Hemoglobin 12/27/2019 12.4      Hematocrit 12/27/2019 36.7      MCV 12/27/2019 95      MCH 12/27/2019 32.1      MCHC 12/27/2019 33.7      RDW 12/27/2019 12.3      Platelets 12/27/2019 172      MPV 12/27/2019 7.9      Sodium 12/27/2019 136      Potassium 12/27/2019 4.2      Chloride 12/27/2019 101      CO2 12/27/2019 26      Anion Gap, Calculation 12/27/2019 9      Glucose 12/27/2019 98      BUN 12/27/2019 15      Creatinine 12/27/2019 0.83      GFR MDRD Af Amer 12/27/2019 >60      GFR MDRD Non Af Amer 12/27/2019 >60      Bilirubin, Total 12/27/2019 0.5      Calcium 12/27/2019 9.6      Protein, Total 12/27/2019 7.1      Albumin 12/27/2019 4.0      Alkaline Phosphatase 12/27/2019 107      AST 12/27/2019 28      ALT 12/27/2019 23      Magnesium 12/27/2019 1.9      Ferritin 12/27/2019 77      Vitamin B-12 12/27/2019 685    Lab Requisition on 12/03/2019   Component Date Value     Color, UA 12/03/2019 Yellow      Clarity, UA 12/03/2019 Clear      Glucose, UA 12/03/2019 Negative      Bilirubin, UA 12/03/2019 Negative      Ketones, UA 12/03/2019 Negative      Specific Gravity, UA 12/03/2019 1.008      Blood, UA 12/03/2019 Negative      pH, UA 12/03/2019  5.5      Protein, UA 12/03/2019 Negative      Urobilinogen, UA 12/03/2019 <2.0 E.U./dL      Nitrite, UA 12/03/2019 Negative      Leukocytes, UA 12/03/2019 Trace*     Bacteria, UA 12/03/2019 None Seen      RBC, UA 12/03/2019 0-2      WBC, UA 12/03/2019 5-10*     Squam Epithel, UA 12/03/2019 0-5      Trans Epithel, UA 12/03/2019 5-10*     Amorphous, UA 12/03/2019 Few*     Mucus, UA 12/03/2019 Few*     Culture 12/03/2019 10,000-50,000 col/ml Escherichia coli*   Lab Requisition on 12/02/2019   Component Date Value     Creatinine 12/02/2019 0.77      GFR MDRD Af Amer 12/02/2019 >60      GFR MDRD Non Af Amer 12/02/2019 >60      Magnesium 12/02/2019 1.8      Potassium 12/02/2019 4.2      ASSESMENT/PLAN:  1. Acute cystitis with hematuria  cefdinir (OMNICEF) 300 MG capsule   2. Acute bilateral back pain, unspecified back location  Urinalysis-UC if Indicated    CANCELED: Culture, Urine   Reviewed x-rays with patient and her family members, answered all their questions, x-ray showed no new compression fractures but there are some old compression fractures in the thoracic spine area.  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  Explained that I do feel that her pain is mostly associated with osteoarthritis but she also has a urinary tract infection which we will treat today.  Follow-up with Dr. Schmitz for recheck.  Continue with close supportive family.  Advised to try at least one Tylenol every 6 hours for her pain and may also use a heating pad.  Advised to protect skin from excessive heating pad use.  Joi Subramanian, MS, PA-C 03/02/20

## 2021-06-06 NOTE — TELEPHONE ENCOUNTER
Phoned patient's daughter to bring mother Kristal to the clinic earlier for testing before appointment at 6:20pm

## 2021-06-06 NOTE — TELEPHONE ENCOUNTER
Orders being requested: PT, 1 x a week for 1 week,  X a week for 2 weeks  Reason service is needed/diagnosis: back care, mobility, fall prevention, gentle strengthening  When are orders needed by: asap  Where to send Orders: Phone:  [phone]  Okay to leave detailed message?  Yes

## 2021-06-06 NOTE — TELEPHONE ENCOUNTER
Orders being requested: occupational therapy - 2 times a week for 4 weeks  Reason service is needed/diagnosis: bilateral lower extremity edema  When are orders needed by: asap  Where to send Orders: Phone:  [phone]  Okay to leave detailed message?  Yes

## 2021-06-06 NOTE — PATIENT INSTRUCTIONS - HE
4 cups of water daily    Tylenol 500 mg four times a day.     Follow up needs to recheck urine for blood.

## 2021-06-06 NOTE — TELEPHONE ENCOUNTER
Patient's son notified- he will discuss it with his siblings- he did call radiology scheduling and they did not advise one way or the other.

## 2021-06-06 NOTE — TELEPHONE ENCOUNTER
Left detailed message for BLAIR Denis with [facility], relaying message below from Dr. Schmitz regarding requested orders below.  Asked that she call with any further questions.  Bridget CADE CMA/BENJAMIN....................4:26 PM

## 2021-06-07 NOTE — TELEPHONE ENCOUNTER
Spoke with Arti RN from  home care, relaying message below from Dr. Swenson regarding requested orders below.  She verbalized understanding and had no further questions at this time.  Bridget CADE CMA/BENJAMIN....................2:52 PM

## 2021-06-07 NOTE — TELEPHONE ENCOUNTER
Orders being requested:   Extend Home health aide 1 time a week for the next 2 weeks.  Reason service is needed/diagnosis: Compression Fx T-Spine  When are orders needed by: ASAP  Where to send Orders: Phone:  [phone]  Okay to leave detailed message?  Yes    Patient has been discharged from skilled nursing effective today 4/2/20.  Does continue with PT/OT.

## 2021-06-07 NOTE — TELEPHONE ENCOUNTER
Orders being requested:   Physical Therapy  2x a week for 2 weeks    Reason service is needed/diagnosis:   Strengthening  Transfer Training  Gait Training  Fall Prevention    When are orders needed by: Friday, 4/3/2020    Where to send Orders: Phone:  Verbal orders to: Arielle Garvey [facility], [phone]     Okay to leave detailed message?  Yes

## 2021-06-07 NOTE — TELEPHONE ENCOUNTER
Orders being requested: Hospital bed   Reason service is needed/diagnosis: Missing from Palak Schmitz MD on documents.   When are orders needed by: as soon as possible   Where to send Orders: Fax: [phone] attn June Fajardoay to leave detailed message?  Yes  [phone]

## 2021-06-07 NOTE — PROGRESS NOTES
"Kristal Cox is a 89 y.o. female who is being evaluated via a billable telephone visit.      The patient has been notified of following:     \"This telephone visit will be conducted via a call between you and your physician/provider. We have found that certain health care needs can be provided without the need for a physical exam.  This service lets us provide the care you need with a short phone conversation.  If a prescription is necessary we can send it directly to your pharmacy.  If lab work is needed we can place an order for that and you can then stop by our lab to have the test done at a later time.    If during the course of the call the physician/provider feels a telephone visit is not appropriate, you will not be charged for this service.\"         Kristal Cox complains of    Chief Complaint   Patient presents with     Follow-up     2 week follow up       HPI:  This is a telephone visit to follow-up on lower extremity edema.  She has been having increasing lower extremity edema for the last few months.  Couple weeks ago we increased her Lasix to 40 mg daily.  She also started to get home visits and get her legs wrapped.  With both of these things her swelling has improved significantly.  She states that she is little bit more tired when she takes 2 of the Lasix rather than just 1.  She has not been weighing herself.  She is interested in decreasing her dosing back to just 20 mg of Lasix.  She has no other acute concerns today.  We did do a CT scan that was negative for any mass or abnormality in her abdomen.    I have reviewed and updated the patient's Past Medical History and Medication List.    ALLERGIES  Evista [raloxifene] and Fosamax [alendronate]      Assessment/Plan:    1. Edema, unspecified type  She has had significant improvement.  I will go ahead and have her decrease the furosemide to 20 mg daily.  Should continue to have the wrapping at home.  We did discuss if she starts to " have an increase in the swelling she can take a second dose of Lasix for 3 days or so until the swelling improves.  She is in agreement with this plan.  - furosemide (LASIX) 20 MG tablet; Take 1-2 tablets (20-40 mg total) by mouth daily. Take 1 daily.  May increase to 2 daily with increased swelling as needed.  Dispense: 60 tablet; Refill: 11      Phone call duration:  5 minutes    Irena Goode, CMA

## 2021-06-07 NOTE — TELEPHONE ENCOUNTER
Who is calling:  NEAL Pagan with [facility]  Reason for Call:  Caller stated [facility] is having issues with their fax machine, so they have not received the order for the patient's hospital bed. Caller stated she is thankful that the clinic did their part but she would like the order to faxed to [facility] to a different fax #, [phone], aditya Doe.    Date of last appointment with primary care: 3/26/20  Okay to leave a detailed message: No

## 2021-06-07 NOTE — TELEPHONE ENCOUNTER
Left detailed message for Arielle with  home care, relaying message below from Dr. Swenson regarding requested orders.  Asked that she call with any further questions.  Bridget CADE CMA/BENJAMIN....................10:01 AM

## 2021-06-07 NOTE — TELEPHONE ENCOUNTER
"Who is calling:  Latasha with [facility]  Reason for Call:  Calling to check on below request. Requesting a Semi electric hospital bed with a group 1 mattress, half bed rails and length of need is life time.  Weight 95 lbs and height is 5'2\". Please ok order now.  Please advise! Fax order to [facility], Attention: June Phone # [phone] Fax # [phone]  Date of last appointment with primary care: 3/26/2020  Okay to leave a detailed message: Yes      "

## 2021-06-07 NOTE — TELEPHONE ENCOUNTER
Who is calling:  NEAL Pagan    Reason for Call:    Needs Script revised for hospital bed.  Insurance wont' cover with current diagnosis.    OT states needs diagnosis  Revised on Vertebral compression fracture (H) [M48.50XA]  - Primary  CHF, Lymphedema Bilat Legs.    Needs supporting documentation stating the using a regular bed aggravates symptoms.    Date of last appointment with primary care: 04/23/2020    Okay to leave a detailed message:   Yes    Please expedite do to time restraint.  [facility]  Attn:  [facility]  Fax: [phone]

## 2021-06-07 NOTE — TELEPHONE ENCOUNTER
Left message to call back for: Latasha  Information to relay to patient:  Need to know type of hospital bed- and does she need it now or can it wait for 4/7 for PCP to order

## 2021-06-07 NOTE — TELEPHONE ENCOUNTER
Spoke with the patient's daughter, Edilia, and let her know that the order has been faxed as requested.  She verbalized understanding and had no further questions at this time.  Also, left a message for Latasha with [facility] home [facility] and let her know that same information regarding the hospital bed order.    Bridget CADE CMA/BENJAMIN....................1:13 PM

## 2021-06-07 NOTE — TELEPHONE ENCOUNTER
Orders being requested: Hospital bed needing doctor's note faxed with order  Reason service is needed/diagnosis:  , I 110 lymphedema    When are orders needed by: asap  Where to send Orders: Phone:  [phone] [facility]  Okay to leave detailed message?  Yes

## 2021-06-07 NOTE — PROGRESS NOTES
"Kristal Cox is a 89 y.o. female who is being evaluated via a billable telephone visit.      The patient has been notified of following:     \"This telephone visit will be conducted via a call between you and your physician/provider. We have found that certain health care needs can be provided without the need for a physical exam.  This service lets us provide the care you need with a short phone conversation.  If a prescription is necessary we can send it directly to your pharmacy.  If lab work is needed we can place an order for that and you can then stop by our lab to have the test done at a later time.    Telephone visits are billed at different rates depending on your insurance coverage. During this emergency period, for some insurers they may be billed the same as an in-person visit.  Please reach out to your insurance provider with any questions.    If during the course of the call the physician/provider feels a telephone visit is not appropriate, you will not be charged for this service.\"    Patient has given verbal consent to a Telephone visit? Yes    Patient would like to receive their AVS by AVS Preference: Joon.      Telephone visit   Kristal Cox   89 y.o. female    Date of Visit: 4/23/2020    Chief Complaint   Patient presents with     Follow-up        Assessment and Plan   1. Benign essential hypertension  She states that her blood pressures been fine.  Its little high before she takes her medication but then comes down after.  She has no acute concerns.  She will continue to monitor.    2. Leg edema  Her edema has been improved.  She is currently just taking the 20 mg of Lasix.     ADDENDUM:  Due to her history of compression fractures, a regular bed exacerbates her pain.  Therefore, it is recommended that she have a hospital bed to improve and prevent worsening symptoms.      Return in about 3 months (around 7/23/2020) for Annual physical - in person.     History of Present Illness "   This 89 y.o. old female was evaluated with a telephone visit today.  This was just a follow-up visit.  She has a history of lower extremity edema and hypertension.  She has been working with home health and they have been wrapping her legs.  This is been very helpful.  She takes 20 mg of furosemide daily.  Her daughter notes that her blood pressure is a little high first thing in the morning before she takes her medicines but after that it is normal.  She is tolerating her medications.  She has been eating well and has no acute concerns today.    Review of Systems: As above, systems otherwise reviewed and negative.     Medications, Allergies and Problem List   Patient Active Problem List   Diagnosis     Vertebral compression fracture (H)     GERD (gastroesophageal reflux disease)     Osteoporosis     Benign essential hypertension     Hyperlipidemia LDL goal <100     PVC's (premature ventricular contractions)     Current Outpatient Medications   Medication Sig Dispense Refill     acetaminophen (TYLENOL) 500 MG tablet Take 1,000 mg by mouth 3 (three) times a day as needed.              amiodarone (PACERONE) 200 MG tablet Take 0.5 tablets (100 mg total) by mouth daily. 45 tablet 1     artificial tears,hypromellose, (GENTEAL; SYSTANE) 0.3 % Gel Administer 1 drop to both eyes as needed (dry eyes).       aspirin 81 mg chewable tablet Chew 1 tablet (81 mg total) daily.  0     calcium, as carbonate, (OS-SAMPSON) 500 mg calcium (1,250 mg) tablet Take 1 tablet by mouth daily.       cholecalciferol, vitamin D3, 1,000 unit tablet Take 2,000 Units by mouth daily.       furosemide (LASIX) 20 MG tablet Take 1-2 tablets (20-40 mg total) by mouth daily. Take 1 daily.  May increase to 2 daily with increased swelling as needed. 60 tablet 11     lisinopril (PRINIVIL,ZESTRIL) 20 MG tablet Take 1 tablet (20 mg total) by mouth daily. 90 tablet 3     magnesium chloride (SLOW-MAG) 64 mg TbEC delayed-release tablet Take 1 tablet (64 mg total)  by mouth daily.  0     melatonin 3 mg Tab tablet Take 1-2 tablets (3-6 mg total) by mouth at bedtime as needed.       metoprolol succinate (TOPROL-XL) 25 MG Take 1 tablet (25 mg total) by mouth daily. 90 tablet 1     nitroglycerin (NITROSTAT) 0.4 MG SL tablet Place 1 tablet (0.4 mg total) under the tongue every 5 (five) minutes as needed for chest pain.  0     polyethylene glycol (GLYCOLAX) 17 gram/dose powder Take 17 g by mouth daily. 235 g 11     ranitidine (ZANTAC) 75 MG tablet Take 75 mg by mouth 2 (two) times a day as needed for heartburn.       senna-docusate (SENNOSIDES-DOCUSATE SODIUM) 8.6-50 mg tablet Take 1 tablet by mouth daily.       No current facility-administered medications for this visit.      Allergies   Allergen Reactions     Evista [Raloxifene]      Caused blood clot     Fosamax [Alendronate] Nausea And Vomiting          Physical Exam     /89 Comment: Pt reported  Pulse (!) 59 Comment: Pt reported  Wt 99 lb 12.8 oz (45.3 kg) Comment: Pt reported  BMI 17.13 kg/m      During our discussion there was no evidence of shortness of breath.     Additional Information   Social History     Tobacco Use     Smoking status: Never Smoker     Smokeless tobacco: Never Used   Substance Use Topics     Alcohol use: No     Comment: rarely drinks wine, and drinks wine watered down with ice.     Drug use: Yes        Palak Schmitz MD        Phone call duration:  6 minutes    Irena Goode CMA

## 2021-06-08 NOTE — PROGRESS NOTES
Clinic Care Coordination Contact    Community Health Worker Follow Up    Goals:   Goals Addressed                 This Visit's Progress       General      Financial Wellbeing (pt-stated)   On track     Goal Statement: I would like to know if my hospital bed rental will be covered by Medicare in the next 30 days.   Date Goal set: 5/13/20  Barriers: Patient needs hospital bed, but it is currently not being covered by insurance.   Date to Achieve By: 6/13/20  Patient expressed understanding of goal: Verbalized understanding of goal.   Action steps to achieve this goal:  1. I understand the [facility] RN will send a note to Dr. Schmitz regarding the hospital bed not being covered by insurance.   2. My children will also check with the Home Care RN, Latasha regarding coverage for my hospital bed rental.   3. I will let the [facility] Community Health Worker, Esperanza know the status of this situation at scheduled outreach telephone call.        Problem Solving (pt-stated)   On Hold     Goal Statement: I would like to have in-home nail care in the next 2 months.   Date Goal set: 5/13/20  Barriers: Patient is unable to provide nail care for herself.   Strengths: Patient has a strong willingness to improve her current situation.   Date to Achieve By: 7/13/20  Patient expressed understanding of goal: Verbalized understanding of goal.   Action steps to achieve this goal:  1. I understand the [facility] RN will provide my son, Venkat with a resource for in-home nail care.   2. Venkat or my daughter Heather Azul will schedule appointment with home care agency for nail care, if I approve.   3. I will contact the [facility] Community Health WorkerEsperanza or the [facility] RN, Tera if I need additional resources.          Reducing Risks (pt-stated)   On track     Goal Statement: I would like to see an ophthalmologist and a dentist in the next 30 days.   Date Goal set: 5/13/20  Barriers: Patient has not been able to see either an ophthalmologist or dentist since the COVID-19  outbreak.   Strengths: Patient has a strong willingness to take care of her health.   Date to Achieve By: 5/13/20  Patient expressed understanding of goal: Verbalized understanding of goal.   Action steps to achieve this goal:  1. My children or I will scheduled appointments with an ophthalmologist and a dentist. Appointment scheduled for eye exam 6/15/2020  2. I will contact the [facility] if I need additional resources for a dental clinic or an ophthalmology clinic  3. I will notify the [facility] when I have attended these appointments so this goal can be completed.             CHW Next Follow-up: 2-3 weeks    CHW Plan: Patient has scheduled her eye exam for 6/15/2020.   She has not scheduled her dental exam yet because she is looking for a new dentist. Her previous dentist is in the basement of a building and can onle be accessed by using stairs which she no longer feels she can do safely. Her son is going to try to get her an appointment at his dentist.     She still has not had her toenails done and still speak to her daughter this weekend about scheduling that right away, she feels like she might be getting an ingrown toenail. She has the number for the foot nurse.      Next outreach due: 7/7/2020

## 2021-06-08 NOTE — PROGRESS NOTES
[facility] RN left a message for Venkat regarding that can do in-home nail care for his mother.     [facility]  [address]   [place] [address] [phone]

## 2021-06-08 NOTE — TELEPHONE ENCOUNTER
Мария Schmitz,     I spoke with patient and her son, Venkat for enrollment in [facility]. Patient stated her hospital bed rental is currently not covered by Medicare. She stated the DME order was initiated through [facility] and was sent to you for approval. Patient stated there was something with the order that was done incorrectly. I am unable to see this order. Patient would like to know if the order could be corrected so it is covered by Medicare. Patient said she is currently paying $200 a month for the bed. Please advise.     Thanks,     Dave Myhre, RN   [facility] RN

## 2021-06-08 NOTE — TELEPHONE ENCOUNTER
Order re-entered and faxed to [facility] @ [phone]    Added CHF, Leg edema to dx's    [facility] should assist with this as they are her homecare providers.

## 2021-06-08 NOTE — PROGRESS NOTES
Clinic Care Coordination Contact    Community Health Worker Follow Up    Goals:   Goals Addressed                 This Visit's Progress       General      Financial Wellbeing (pt-stated)   On track     Goal Statement: I would like to know if my hospital bed rental will be covered by Medicare in the next 30 days.   Date Goal set: 5/13/20  Barriers: Patient needs hospital bed, but it is currently not being covered by insurance.   Date to Achieve By: 6/13/20  Patient expressed understanding of goal: Verbalized understanding of goal.   Action steps to achieve this goal:  1. I understand the [facility] RN will send a note to Dr. Schmitz regarding the hospital bed not being covered by insurance.   2. My children will also check with the Home Care RN, Latasha regarding coverage for my hospital bed rental.   3. I will let the [facility] Community Health Worker, Esperanza know the status of this situation at scheduled outreach telephone call.        Problem Solving (pt-stated)   On track     Goal Statement: I would like to have in-home nail care in the next 2 months.   Date Goal set: 5/13/20  Barriers: Patient is unable to provide nail care for herself.   Strengths: Patient has a strong willingness to improve her current situation.   Date to Achieve By: 7/13/20  Patient expressed understanding of goal: Verbalized understanding of goal.   Action steps to achieve this goal:  1. I understand the [facility] RN will provide my son, Venkat with a resource for in-home nail care.   2. Venkat or my daughter Heather Azul will schedule appointment with home care agency for nail care, if I approve.   3. I will contact the [facility] Community Health WorkerEsperanza or the [facility] RNTera if I need additional resources.          Reducing Risks (pt-stated)   0%     Goal Statement: I would like to see an ophthalmologist and a dentist in the next 30 days.   Date Goal set: 5/13/20  Barriers: Patient has not been able to see either an ophthalmologist or dentist since the COVID-19  outbreak.   Strengths: Patient has a strong willingness to take care of her health.   Date to Achieve By: 5/13/20  Patient expressed understanding of goal: Verbalized understanding of goal.   Action steps to achieve this goal:  1. My children or I will scheduled appointments with an ophthalmologist and a dentist.     2. I will contact the [facility] if I need additional resources for a dental clinic or an ophthalmology clinic  3. I will notify the [facility] when I have attended these appointments so this goal can be completed.             CHW Next Follow-up: 2-3 weeks    CHW Plan: Patient is still hoping to get her hospital bed paid for, the [facility] team is working with PCP to resubmit the order with the correct diagnosis to get this covered and we will also make sure that the company they are using has the MedicareBID for 2020.     Patient states that her son has a number for her nail care and CHW did share [facility] (Nina Matias) resource with patient as well.     Next outreach due: 6/8/2020

## 2021-06-08 NOTE — PROGRESS NOTES
CHW reviewed [facility] RN Assessment, no current CHW delegations.    Next outreach due: 5/22/2020

## 2021-06-09 NOTE — PROGRESS NOTES
Review of systems done with patient over the phone. Positive for snoring, leg swelling, daytime sleepiness, dizziness, frequent urination at night,and anxiety. All other review of systems within normal range.

## 2021-06-09 NOTE — TELEPHONE ENCOUNTER
Spoke with daughter and read to her NINA Brady update and Dr. Schmitz's update. Will set up for labs and question Dr. Chopra next week on phone visit about lipids. No med changes at this time.

## 2021-06-09 NOTE — PROGRESS NOTES
Clinic Care Coordination Contact    Community Health Worker Follow Up    Goals:   Goals Addressed                 This Visit's Progress       General      Financial Wellbeing (pt-stated)   On track     Goal Statement: I would like to know if my hospital bed rental will be covered by Medicare in the next 30 days.   Date Goal set: 5/13/20  Barriers: Patient needs hospital bed, but it is currently not being covered by insurance.   Date to Achieve By: 6/13/20  Patient expressed understanding of goal: Verbalized understanding of goal.   Action steps to achieve this goal:  1. I understand the [facility] RN will send a note to Dr. Schmitz regarding the hospital bed not being covered by insurance.   2. My children will also check with the Home Care RN, Latasha regarding coverage for my hospital bed rental.   3. I will let the [facility] Community Health Worker, Esperanza know the status of this situation at scheduled outreach telephone call.        Problem Solving (pt-stated)   50%     Goal Statement: I would like to have in-home nail care in the next 2 months.   Date Goal set: 5/13/20  Barriers: Patient is unable to provide nail care for herself.   Strengths: Patient has a strong willingness to improve her current situation.   Date to Achieve By: 7/13/20  Patient expressed understanding of goal: Verbalized understanding of goal.   Action steps to achieve this goal:  1. I understand the [facility] RN will provide my son, Venkat with a resource for in-home nail care.   2. Venkat or my daughter Heather Azul will schedule appointment with home care agency for nail care, if I approve.   3. I will contact the [facility] Community Health WorkerEsperanza or the CCC RNTera if I need additional resources.          Reducing Risks (pt-stated)   20%     Goal Statement: I would like to see an ophthalmologist and a dentist in the next 30 days.   Date Goal set: 5/13/20  Barriers: Patient has not been able to see either an ophthalmologist or dentist since the COVID-19  outbreak.   Strengths: Patient has a strong willingness to take care of her health.   Date to Achieve By: 5/13/20  Patient expressed understanding of goal: Verbalized understanding of goal.   Action steps to achieve this goal:  1. My children or I will scheduled appointments with an ophthalmologist and a dentist. Appointment scheduled for eye exam 6/15/2020  2. I will contact the [facility] CHEsperanza MOFFETT if I need additional resources for a dental clinic or an ophthalmology clinic  3. I will notify the [facility] CHEsperanza MOFFETT when I have attended these appointments so this goal can be completed.             CHW Next Follow-up: 3-4 weeks    CHW Plan: Patient has a lot of family support.   Unsure if her hospital bed is being covered yet, a new order was submitted in June and they have not received a July bill yet from the DME company.    Daughter in law has the information to schedule for nail care but patient is seeing her PCP today and they will discuss if she should see a podiatrist or the Nail RN.    She has placed her eye and dental appointment on hold for now due to COVID.    Next outreach due: 8/11/2020

## 2021-06-09 NOTE — TELEPHONE ENCOUNTER
----- Message from Palak Schmitz MD sent at 7/15/2020  7:43 AM CDT -----  Please call and let her know that I recommend a recheck of her thyroid levels before starting thyroid replacement.  This is likely abnormal due to her being on amiodarone.  If she's in agreement, I'll order the labs for 2-3 weeks from now.  We can also do a recheck of her lipids at that time.  Let me know.  Thanks.

## 2021-06-09 NOTE — PROGRESS NOTES
Clinic Care Coordination Contact    Situation: Patient chart reviewed by care coordinator.    Background: CCC team currently supporting patient with goals around hospital bed coverage, in-home nail care, and follow up dental and eye appointments.     Assessment: CCC CHW spoke with patient this week. Patient reported she has not received a bill for her hospital bed for July, aware that a new DME order was placed by Dr Schmitz. Patient has nail care information provided by CCC RN. She is deciding on  whether she should have an an RN coming to her home or see a Podiatrist. Patient is current holding off on seeing the dentist or ophthalmologist related to COVID-19 pandemic.     Plan/Recommendations: CCC RN will continue to monitor and will be available as nursing needs arise.

## 2021-06-09 NOTE — TELEPHONE ENCOUNTER
Called pt and she agrees to lab work in 2-3 weeks. She will call back for an appt for lab only. Please order. Thanks.

## 2021-06-09 NOTE — TELEPHONE ENCOUNTER
Spoke with BLAIR Bhatia with [facility] and relayed message below from Dr. Schmitz regarding requested order below.  She verbalized understanding and will be faxing an order for Dr. Schmitz to sign and fax back.   Bridget CADE CMA/BENJAMIN....................11:10 AM

## 2021-06-09 NOTE — TELEPHONE ENCOUNTER
----- Message from Maxine Vasquez sent at 7/15/2020  9:39 AM CDT -----  General phone call:  DAUGHTER CALLING STATES PATIENTS THYROID LEVELS FROM BLOOD TEST ARE OFF, PLEASE ADVISE, PLEASE CALL  Caller: ADALBERTO WRIGHT  Primary cardiologist: DR NINA HARRIS  Detailed reason for call: SEE ABOVE  New or active symptoms? NO  Best phone number: [phone]  Best time to contact: ANY TIME  Ok to leave a detailed message? YES  Device? NO    Additional Info:

## 2021-06-09 NOTE — TELEPHONE ENCOUNTER
Orders being requested: Compression wrap daily Continuously  Reason service is needed/diagnosis: Edema   When are orders needed by: ASAP  Where to send Orders: Fax: [phone]  Okay to leave detailed message?  Yes

## 2021-06-09 NOTE — PROGRESS NOTES
Assessment and Plan:     1. Wellness examination  Her examination is satisfactory.  She has a mild amount of memory loss.  She had a fall about 9 months ago but not frequently.  She has an advanced directive.  She has assistance at home with activities of daily living.  She is up-to-date on age-appropriate health maintenance.  We reviewed the documentation for her wellness visit today.    2. Systolic hypertension  Her blood pressure is a little bit high.  She has a follow-up scheduled with cardiology next week and she will keep that.  May need to adjust her medications.  I will get lab work done today.    - lisinopriL (PRINIVIL,ZESTRIL) 20 MG tablet; Take 1 tablet (20 mg total) by mouth daily.  Dispense: 90 tablet; Refill: 3  - HM2(CBC w/o Differential)  - Comprehensive Metabolic Panel  - Magnesium    3. PVC's (premature ventricular contractions)  She is now on amiodarone.  She has been tolerating that well.    4. Long term use of drug  - Amiodarone (Cordarone )And Metabolite  - Thyroid Stimulating Hormone (TSH)      The patient's current medical problems were reviewed.    I have had an Advance Directives discussion with the patient.  The following health maintenance schedule was reviewed with the patient and provided in printed form in the after visit summary:   Health Maintenance   Topic Date Due     HEART FAILURE ACTION PLAN  04/04/1924     DXA SCAN  04/04/1989     ZOSTER VACCINES (2 of 3) 07/16/2009     LIPID  07/07/2018     TD 18+ HE  01/01/2021     INFLUENZA VACCINE RULE BASED (1) 08/01/2020     BMP  09/13/2020     ALT  03/13/2021     CBC  03/13/2021     MEDICARE ANNUAL WELLNESS VISIT  07/13/2021     FALL RISK ASSESSMENT  07/13/2021     ADVANCE CARE PLANNING  07/03/2022     TSH  Completed     PNEUMOCOCCAL IMMUNIZATION 65+ LOW/MEDIUM RISK  Completed        Subjective:   Chief Complaint: Kristal Cox is an 89 y.o. female here for an Annual Wellness visit.   HPI: She is seen for a general checkup.  We  did review the documentation for her wellness visit.  She did have a fall last November but has done well since then.  She has assistance with activities of daily living with home health and with family.  She has a little bit of memory loss.  She has an advanced directive.  She has no acute concerns today.  She has hypertension and her blood pressure is a little bit high.  She was having a lot of PVCs and is now on amiodarone.  She follows up with cardiology next week.  She is also had chronic lower extremity edema which is improved significantly with wraps.  She has no other acute concerns today.    Review of Systems:   Please see above.  The rest of the review of systems are negative for all systems.    Patient Care Team:  Palak Schmitz MD as PCP - General (Internal Medicine)  Palak Schmitz MD as Assigned PCP  Myhre, David J, RN as Lead Care Coordinator (Primary Care - CC)  Esperanza Romero CHW as Community Health Worker (Primary Care - CC)     Patient Active Problem List   Diagnosis     Vertebral compression fracture (H)     GERD (gastroesophageal reflux disease)     Osteoporosis     Benign essential hypertension     Hyperlipidemia LDL goal <100     PVC's (premature ventricular contractions)     Past Medical History:   Diagnosis Date     Alopecia 2019     Ankle fracture, left, closed, initial encounter      Anxiety      Back pain 3/4/2016     Diastolic congestive heart failure (H) 2018     Hyperlipidemia      IBS (irritable bowel syndrome)      Osteoporosis      Other abnormal clinical finding     evidence of old septal scar on EKG     Systolic hypertension       Past Surgical History:   Procedure Laterality Date     CATARACT EXTRACTION, BILATERAL        SECTION      X 4     CHOLECYSTECTOMY       TONSILLECTOMY AND ADENOIDECTOMY        Family History   Problem Relation Age of Onset     Stroke Mother         passed age 75     Crohn's disease Father         passed in his 20's from bowel  obstructions      Social History     Socioeconomic History     Marital status:      Spouse name: Not on file     Number of children: Not on file     Years of education: Not on file     Highest education level: Not on file   Occupational History     Not on file   Social Needs     Financial resource strain: Not on file     Food insecurity     Worry: Not on file     Inability: Not on file     Transportation needs     Medical: Not on file     Non-medical: Not on file   Tobacco Use     Smoking status: Never Smoker     Smokeless tobacco: Never Used   Substance and Sexual Activity     Alcohol use: No     Comment: rarely drinks wine, and drinks wine watered down with ice.     Drug use: Yes     Sexual activity: Not Currently   Lifestyle     Physical activity     Days per week: Not on file     Minutes per session: Not on file     Stress: Not on file   Relationships     Social connections     Talks on phone: Not on file     Gets together: Not on file     Attends Denominational service: Not on file     Active member of club or organization: Not on file     Attends meetings of clubs or organizations: Not on file     Relationship status: Not on file     Intimate partner violence     Fear of current or ex partner: Not on file     Emotionally abused: Not on file     Physically abused: Not on file     Forced sexual activity: Not on file   Other Topics Concern     Not on file   Social History Narrative     since 2009, has children      Current Outpatient Medications   Medication Sig Dispense Refill     acetaminophen (TYLENOL) 500 MG tablet Take 1,000 mg by mouth 3 (three) times a day as needed.              amiodarone (PACERONE) 200 MG tablet TAKE 1/2 TABLET(100 MG) BY MOUTH DAILY 45 tablet 0     artificial tears,hypromellose, (GENTEAL; SYSTANE) 0.3 % Gel Administer 1 drop to both eyes as needed (dry eyes).       aspirin 81 mg chewable tablet Chew 1 tablet (81 mg total) daily.  0     calcium, as carbonate, (OS-SAMPSON) 500 mg  "calcium (1,250 mg) tablet Take 1 tablet by mouth daily.       cholecalciferol, vitamin D3, 1,000 unit tablet Take 2,000 Units by mouth daily.       furosemide (LASIX) 20 MG tablet Take 1-2 tablets (20-40 mg total) by mouth daily. Take 1 daily.  May increase to 2 daily with increased swelling as needed. 60 tablet 11     lisinopriL (PRINIVIL,ZESTRIL) 20 MG tablet Take 1 tablet (20 mg total) by mouth daily. 90 tablet 3     magnesium chloride (SLOW-MAG) 64 mg TbEC delayed-release tablet Take 1 tablet (64 mg total) by mouth daily.  0     melatonin 3 mg Tab tablet Take 1-2 tablets (3-6 mg total) by mouth at bedtime as needed.       metoprolol succinate (TOPROL-XL) 25 MG Take 1 tablet (25 mg total) by mouth daily. 90 tablet 1     nitroglycerin (NITROSTAT) 0.4 MG SL tablet Place 1 tablet (0.4 mg total) under the tongue every 5 (five) minutes as needed for chest pain.  0     polyethylene glycol (GLYCOLAX) 17 gram/dose powder Take 17 g by mouth daily. 235 g 11     senna-docusate (SENNOSIDES-DOCUSATE SODIUM) 8.6-50 mg tablet Take 1 tablet by mouth daily.       No current facility-administered medications for this visit.       Objective:   Vital Signs:   Visit Vitals  /60 (Patient Site: Left Arm, Patient Position: Sitting)   Pulse (!) 56   Ht 5' 4\" (1.626 m)   Wt (!) 98 lb (44.5 kg)   SpO2 97%   BMI 16.82 kg/m           VisionScreening:  No exam data present     PHYSICAL EXAM  General:  Patient is alert and in no apparent distress.  Cardiovascular:  Regular rate and rhythm, normal S1/S2, no murmurs, rubs, or gallop.  Pulmonary:  Lungs are clear to auscultation bilaterally with normal respiratory effort.  Gastrointestinal:  Abdomen is soft, with mild distention.  No pain with palpation.  Extremities: Wrapped with no significant edema today.  Palpable dorsalis pedis pulses.  Neurologic Cranial nerves are intact.  No focal deficits.  Psychiatric:  Pleasant, no confusion or agitation   Skin:  Warm and well perfused with no " rashes or abnormalities.        Assessment Results 7/13/2020   Activities of Daily Living 1 - Full function   Instrumental Activities of Daily Living 2-4 - Moderate impairment   Get Up and Go Score Less than 12 seconds   Mini Cog Total Score 2   Some recent data might be hidden     A Mini-Cog score of 0-2 suggests the possibility of dementia, score of 3-5 suggests no dementia        Identified Health Risks:     The patient was provided with suggestions to help her develop a healthy physical lifestyle.   She is at risk for lack of exercise and has been provided with information to increase physical activity for the benefit of her well-being.  The patient reports that she has difficulty with instrumental activities of daily living. The patient was provided with written information regarding signs of hearing loss.  Information on urinary incontinence and treatment options given to patient.  The patient was provided with suggestions to help her develop a healthy emotional lifestyle.   She is at risk for falling and has been provided with information to reduce the risk of falling at home.  Patient's advanced directive was discussed and I am comfortable with the patient's wishes.        The patient was provided with appropriate referrals to address her memory problem.

## 2021-06-09 NOTE — TELEPHONE ENCOUNTER
Orders being requested: Compression wrap  Reason service is needed/diagnosis: Lower extremity lymphedema   When are orders needed by: as soon as possible   Where to send Orders: Fax: [phone] and Phone:  [phone]  Okay to leave detailed message?  Yes

## 2021-06-09 NOTE — PROGRESS NOTES
Please call and let her know that I recommend a recheck of her thyroid levels before starting thyroid replacement.  This is likely abnormal due to her being on amiodarone.  If she's in agreement, I'll order the labs for 2-3 weeks from now.  We can also do a recheck of her lipids at that time.  Let me know.  Thanks.

## 2021-06-10 ENCOUNTER — RECORDS - HEALTHEAST (OUTPATIENT)
Dept: ADMINISTRATIVE | Facility: OTHER | Age: 86
End: 2021-06-10

## 2021-06-11 NOTE — PROGRESS NOTES
Assessment and Plan:   Hue velasco who lives by herself in a townhouse.  She has been fairly stable.    1. Routine general medical examination at a health care facility    - Lipid Cascade; Future    2. Systolic hypertension  She stopped Dyazide because her mouth was dry.  She also had urinary frequency.  None of her symptoms improved with stopping Dyazide.  Pressure is now up to 156/68.  I told her to restart the Dyazide.    3. Osteoporosis  She does not tolerate medicines such as Fosamax etc.  - Vitamin D, Total (25-Hydroxy); Future    4. Gastroesophageal reflux disease without esophagitis  Try Zantac 75 mg once or twice daily.  She does not want to take omeprazole long-term.    5. Anxiety  As needed use of Valium over the last 15 years has helped.  She uses a very low dose of 2 mg.  Some irritable bowel-like symptoms with bloating and mild discomfort and constipation.  I have asked her to increase her fluid and fiber intake.    6. Medication management  No obvious problems from meds  - HM1(CBC and Differential); Future  - Comprehensive Metabolic Panel; Future    7. Urinary frequency  No dysuria urgency, just frequency.  - Urinalysis Macro & Micro      The patient's current medical problems were reviewed.    I have had an Advance Directives discussion with the patient.  The following health maintenance schedule was reviewed with the patient and provided in printed form in the after visit summary:   Health Maintenance   Topic Date Due     DXA SCAN  04/04/1989     ADVANCE DIRECTIVES DISCUSSED WITH PATIENT  01/01/2017     INFLUENZA VACCINE RULE BASED (1) 08/01/2017     FALL RISK ASSESSMENT  11/15/2017     TD 18+ HE  01/01/2021     PNEUMOCOCCAL POLYSACCHARIDE VACCINE AGE 65 AND OVER  Completed     PNEUMOCOCCAL CONJUGATE VACCINE FOR ADULTS (PCV13 OR PREVNAR)  Completed     ZOSTER VACCINE  Completed        Subjective:   Chief Complaint: Kristal Cox is an 89 y.o. female here for an Annual Wellness visit.   HPI:  89-year-old woman in for physical exam-wellness visit.  She lives alone.  She has had a fairly good year.  No complaints of fevers chills or recent infections.  She does get some shortness of breath with exertion.  She has irritable bowel syndrome with constipation occasional bloating.  No blood in the stool.  No chest pain with exertion  Mild dyspnea on exertion.  No cough or wheeze  Mild constipation.  No blood in the stool.  No dysphasia.  Complains of urinary frequency.  No dysuria or hematuria.  No flank  Aches and pains.  When is damp and cold her joints hurt more.  Fortunately, no injuries  No TIAs.  No neuropathy.  No epilepsy.    Review of Systems:    Please see above.  The rest of the review of systems are negative for all systems.    Patient Care Team:  Tera Iglesias MD as PCP - General (Internal Medicine)     Patient Active Problem List   Diagnosis     Back pain     Tachycardia     Chest pain     Vertebral compression fracture     GERD (gastroesophageal reflux disease)     Thoracic back pain     Osteoporosis     Anxiety     Systolic hypertension     Past Medical History:   Diagnosis Date     Anxiety      Hyperlipidemia      IBS (irritable bowel syndrome)      Osteoporosis      Other abnormal clinical finding     evidence of old septal scar on EKG     Systolic hypertension       Past Surgical History:   Procedure Laterality Date     BREAST BIOPSY      for benign disease     CATARACT EXTRACTION, BILATERAL        SECTION      X 4     CHOLECYSTECTOMY       TONSILLECTOMY AND ADENOIDECTOMY        Family History   Problem Relation Age of Onset     Stroke Mother      passed age 75     Crohn's disease Father      passed in his 20's from bowel obstructions      Social History     Social History     Marital status:      Spouse name: N/A     Number of children: N/A     Years of education: N/A     Occupational History     Not on file.     Social History Main Topics     Smoking status: Never Smoker  "    Smokeless tobacco: Not on file     Alcohol use No      Comment: rarely drinks wine, and drinks wine watered down with ice.     Drug use: Yes     Sexual activity: Not Currently     Other Topics Concern     Not on file     Social History Narrative     since 2009, has children      Current Outpatient Prescriptions   Medication Sig Dispense Refill     cholecalciferol, vitamin D3, 1,000 unit tablet Take 2,000 Units by mouth daily.       diazePAM (VALIUM) 2 MG tablet TAKE 1 TABLET(2 MG) BY MOUTH TWICE DAILY 60 tablet 0     diazePAM (VALIUM) 2 MG tablet TAKE 1 TO 2 TABLETS BY MOUTH EVERY NIGHT AT BEDTIME 30 tablet 0     docusate sodium (COLACE) 100 MG capsule Take 100 mg by mouth 3 (three) times a day as needed for constipation. Indications: Constipation       dorzolamide (TRUSOPT) 2 % ophthalmic solution Administer 1 drop to both eyes 2 (two) times a day.       lisinopril (PRINIVIL,ZESTRIL) 10 MG tablet TAKE 1 TABLET BY MOUTH DAILY 90 tablet 3     loteprednol (LOTEMAX) 0.5 % ophthalmic suspension 1 drop 4 (four) times a day.       MAGNESIUM HYDROXIDE (MILK OF MAGNESIA ORAL) Take 1 Dose by mouth daily as needed (constipation  or no bm for 2 days). Indications: constipation       omeprazole (PRILOSEC) 20 MG capsule TAKE 1 CAPSULE BY MOUTH EVERY DAY 30 capsule 2     triamterene-hydrochlorothiazide (DYAZIDE) 37.5-25 mg per capsule TAKE ONE CAPSULE BY MOUTH DAILY 60 capsule 5     No current facility-administered medications for this visit.       Objective:   Vital Signs:   Visit Vitals     Pulse 97     Ht 5' 2\" (1.575 m)     Wt 104 lb (47.2 kg)     SpO2 96%     BMI 19.02 kg/m2        VisionScreening:  No exam data present     PHYSICAL EXAM  Healthy but very thin appearing elderly woman.  Blood pressure 156/68.  Pulse is 88 and regular.  Oxygen saturations 96%.  EYES: Eyelids, conjunctiva, and sclera were normal. Pupils were normal. Cornea, iris, and lens were normal bilaterally.  Bilateral cataract surgery in the " past.  HEAD, EARS, NOSE, MOUTH, AND THROAT: Head and face were normal. Hearing was normal to voice and the ears were normal to external exam. Nose appearance was normal and there was no discharge. Oropharynx was normal.  Bilateral hearing aids.  NECK: Neck appearance was normal. There were no neck masses and the thyroid was not enlarged.  RESPIRATORY: Breathing pattern was normal and the chest moved symmetrically.  Percussion/auscultatory percussion was normal.  Lung sounds were normal and there were no abnormal sounds.  CARDIOVASCULAR: Heart rate and rhythm were normal.  S1 and S2 were normal and there were no extra sounds or murmurs. Peripheral pulses in arms and legs were normal.  Jugular venous pressure was normal.  There was 1 plus peripheral edema.  GASTROINTESTINAL: The abdomen was normal in contour.  Bowel sounds were present.  Percussion detected no organ enlargement or tenderness.  Palpation detected no tenderness, mass, or enlarged organs.   MUSCULOSKELETAL: Skeletal configuration was normal and muscle mass was normal for age. Joint appearance was overall normal.  Diffuse osteoarthritic changes.  She is quite frail.  LYMPHATIC: There were no enlarged nodes.  SKIN/HAIR/NAILS: Skin color was normal.  There were no skin lesions.  Hair and nails were normal.  NEUROLOGIC: The patient was alert and oriented to person, place, time, and circumstance. Speech was normal. Cranial nerves were normal. Motor strength was normal for age. The patient was normally coordinated.  PSYCHIATRIC:  Mood and affect were normal and the patient had normal recent and remote memory. The patient's judgment and insight were normal.    ADDITIONAL VITAL SIGNS: Oxygen saturations 96%.  CHEST WALL/BREASTS: Atrophic no masses.    RECTAL: Not checked   GENITAL/URINARY: Normal female        Assessment Results 7/3/2017   Activities of Daily Living 1 - Full function   Instrumental Activities of Daily Living 2-4 - Moderate impairment   Get Up  and Go Score 12 seconds or more   Mini Cog Total Score 4     A Mini-Cog score of 0-2 suggests the possibility of dementia, score of 3-5 suggests no dementia    Identified Health Risks:

## 2021-06-12 RX ORDER — DOCUSATE SODIUM 100 MG/1
100 CAPSULE, LIQUID FILLED ORAL DAILY
Qty: 90 CAPSULE | Refills: 1 | Status: SHIPPED
Start: 2021-06-12 | End: 2021-01-01

## 2021-06-12 NOTE — TELEPHONE ENCOUNTER
Called pt and she is agreeing to a thyroid medication. Please send in to her [facility] pharmacy. Thanks.

## 2021-06-12 NOTE — PROGRESS NOTES
Clinic Care Coordination Contact    Community Health Worker Follow Up    Goals:   Goals Addressed                 This Visit's Progress       General      COMPLETED: Problem Solving (pt-stated)        Goal Statement: I would like to have in-home nail care in the next 2 months.   Date Goal set: 5/13/20  Barriers: Patient is unable to provide nail care for herself.   Strengths: Patient has a strong willingness to improve her current situation.   Date to Achieve By: 7/13/20  Patient expressed understanding of goal: Verbalized understanding of goal.   Action steps to achieve this goal:  1. I am now seeing a podiatrist for ongoing nail care.  2. Venkat or my daughter Heather Azul will schedule follow up appointments for me with podiatrist.   3. I will contact the [facility] or the CCC RN, Tera if I need additional resources.          Reducing Risks (pt-stated)        Goal Statement: I would like to see an ophthalmologist and a dentist in the next 30 days.   Date Goal set: 5/13/20  Barriers: Patient has not been able to see either an ophthalmologist or dentist since the COVID-19 outbreak.   Strengths: Patient has a strong willingness to take care of her health.   Date to Achieve By: 5/13/20  Patient expressed understanding of goal: Verbalized understanding of goal.   Action steps to achieve this goal:  1. My children or I will scheduled appointments with an ophthalmologist and a dentist.   2. I will contact the [facility] if I need additional resources for a dental clinic or an ophthalmology clinic  3. I will notify the [facility] when I have attended these appointments so this goal can be completed.     Discussed 10/5/2020            CHW Next Follow-up: 1 month    CHW Plan: Continue to support patient with goals    Discussion: Patient is not receiving in home nail care. Patient is now seeing a podiatrist due to the complexity of her nail care. Patient had a fungal infection that  required the removal of the nails on her great toe bilaterally. She will continue to have ongoing care with the podiatrist every 6-8 weeks.    Next outreach due: 11/5/2020

## 2021-06-12 NOTE — TELEPHONE ENCOUNTER
Spoke with the patient's daughter, Edilia, and relayed message below from Dr. Schmitz.  She verbalized understanding and had no further questions at this time.  Bridget CADE CMA/BENJAMIN....................9:04 AM

## 2021-06-12 NOTE — PROGRESS NOTES
Clinic Care Coordination Contact    Situation: Patient chart reviewed by care coordinator.    Background: CCC has been supporting patient with goals round getting in-home nail car, seeing a dentist and seeing an ophthalmologist.     Assessment: [facility] CHW cotinues to reach out to patient on a monthly basis. Patient currently working with a Podiatrist related to complexity of the pa care she needs and for treatment of a fungal infection. Goal around nail care was completed.   Patient has made little progress towards completing goal around seeing a dentist and ophthalmologist related to the COVID-19 pandemic. She will schedule these appointments or will have her children assist her with scheduling these appointments when she feels ready.     Plan/Recommendations: CCC RN will continue to monitor and will be available as nursing needs arise.     CCC RN will follow up in 45 days or earlier if needed.

## 2021-06-12 NOTE — TELEPHONE ENCOUNTER
----- Message from Paalk Schmitz MD sent at 10/16/2020  7:11 AM CDT -----  Please call and let her (or her daughter) know that it appears she is still low in thyroid.  I recommend starting a low dose of replacement and then rechecking in 2 months.  If she agrees, let me know.  JASMYN

## 2021-06-12 NOTE — PROGRESS NOTES
Office Visit   Kristal Cox   89 y.o. female    Date of Visit: 10/15/2020    Chief Complaint   Patient presents with     Follow-up     3 month follow up     Insomnia        Assessment and Plan   1. Benign essential hypertension  Her blood pressure is improved today.  We will continue with her current medication.  We did discuss that she can decrease the frequency of checking her blood pressure to 2-3 times a week.  She is in agreement with this plan.    2. Anxiety  Her biggest concern is that she is not sleeping well and having a lot of anxiety.  We discussed options and I am going to start her on a low-dose of mirtazapine at bedtime.  We will plan to see her back in 4 to 6 weeks.  Discussed potential side effects.  - mirtazapine (REMERON) 7.5 MG tablet; Take 1 tablet (7.5 mg total) by mouth at bedtime.  Dispense: 90 tablet; Refill: 3  - Thyroid [place]         Return in about 6 weeks (around 11/26/2020) for Follow up.     History of Present Illness   This 89 y.o. old female comes in to follow-up.  She has a history of hypertension and her last blood pressures have been a little bit high.  Today it is normal.  She has been getting it checked daily by a home health care nurse.  I think that she could cut back to 2-3 times a week on checking it.  She also had a change in her thyroid function on amiodarone.  She stopped the amiodarone couple of months ago.  She is due for recheck of her thyroid.  She also recently had a Holter monitor and has follow-up scheduled with cardiology next week.  She has not had any chest pain or palpitations recently.  Her biggest concern today is that she has trouble with sleep and she has been feeling quite anxious and tearful.  She has no other acute concerns today.    Review of Systems: As above, systems otherwise reviewed and negative.     Medications, Allergies and Problem List   Patient Active Problem List   Diagnosis     Vertebral compression fracture (H)     GERD  (gastroesophageal reflux disease)     Osteoporosis     Benign essential hypertension     Hyperlipidemia LDL goal <100     PVC's (premature ventricular contractions)     Current Outpatient Medications   Medication Sig Dispense Refill     acetaminophen (TYLENOL) 500 MG tablet Take 1,000 mg by mouth 3 (three) times a day as needed.              artificial tears,hypromellose, (GENTEAL; SYSTANE) 0.3 % Gel Administer 1 drop to both eyes as needed (dry eyes).       aspirin 81 mg chewable tablet Chew 1 tablet (81 mg total) daily.  0     calcium, as carbonate, (OS-SAMPSON) 500 mg calcium (1,250 mg) tablet Take 1 tablet by mouth daily.       cholecalciferol, vitamin D3, 1,000 unit tablet Take 2,000 Units by mouth daily.       furosemide (LASIX) 20 MG tablet Take 1-2 tablets (20-40 mg total) by mouth daily. Take 1 daily.  May increase to 2 daily with increased swelling as needed. (Patient taking differently: Take 20 mg by mouth daily. Take 1 daily.  May increase to 2 daily with increased swelling as needed.) 60 tablet 11     lisinopriL (PRINIVIL,ZESTRIL) 20 MG tablet Take 1 tablet (20 mg total) by mouth daily. 90 tablet 3     magnesium chloride (SLOW-MAG) 64 mg TbEC delayed-release tablet Take 1 tablet (64 mg total) by mouth daily.  0     melatonin 3 mg Tab tablet Take 1-2 tablets (3-6 mg total) by mouth at bedtime as needed.       metoprolol succinate (TOPROL-XL) 25 MG Take 1 tablet (25 mg total) by mouth daily. 90 tablet 0     nitroglycerin (NITROSTAT) 0.4 MG SL tablet Place 1 tablet (0.4 mg total) under the tongue every 5 (five) minutes as needed for chest pain.  0     polyethylene glycol (GLYCOLAX) 17 gram/dose powder Take 17 g by mouth daily. 235 g 11     senna-docusate (SENNOSIDES-DOCUSATE SODIUM) 8.6-50 mg tablet Take 1 tablet by mouth daily.       mirtazapine (REMERON) 7.5 MG tablet Take 1 tablet (7.5 mg total) by mouth at bedtime. 90 tablet 3     No current facility-administered medications for this visit.      Allergies  "  Allergen Reactions     Evista [Raloxifene]      Caused blood clot     Fosamax [Alendronate] Nausea And Vomiting          Physical Exam     /80 (Patient Site: Left Arm, Patient Position: Sitting)   Pulse 74   Ht 5' 4\" (1.626 m)   Wt (!) 96 lb (43.5 kg)   SpO2 98%   BMI 16.48 kg/m      General: This is an alert, elderly female, no apparent distress.     Additional Information   Social History     Tobacco Use     Smoking status: Never Smoker     Smokeless tobacco: Never Used   Substance Use Topics     Alcohol use: No     Comment: rarely drinks wine, and drinks wine watered down with ice.     Drug use: Yes            Palak Schmitz MD    "

## 2021-06-12 NOTE — TELEPHONE ENCOUNTER
Please contact her daughter and let her know that I was not in the clinic yesterday to contact her.  I recommend her mom stop the anti-anxiety medication.  She can take the thyroid medication with food if it is too hard to take before that.  It just decreases the absorption of it but that is ok.  We will monitor her levels.  If she does not feel better with that, then let me know.  Thanks

## 2021-06-12 NOTE — TELEPHONE ENCOUNTER
Wellness Screening Tool  Symptom Screening:  Do you have one of the following NEW symptoms:    Fever (subjective or >100.0)?  No    A new cough?  No    Shortness of breath?  No     Chills? No     New loss of taste or smell? No     Generalized body aches? No     New persistent headache? No     New sore throat? No     Nausea, vomiting, or diarrhea?  No    Within the past 2 weeks, have you been exposed to someone with a known positive illness below:    COVID-19 (known or suspected)?  No    Chicken pox?  No    Mealses?  No    Pertussis?  No    Patient notified of visitor policy- They may have one person accompany them to their appointment, but they will need to wear a mask and will be screened upon arrival for symptoms: Yes  Pt informed to wear a mask: Yes  Pt notified if they develop any symptoms listed above, prior to their appointment, they are to call the clinic directly at [phone] for further instructions.  Yes  Patient's appointment status: Patient will be seen in clinic as scheduled on 10/22

## 2021-06-12 NOTE — TELEPHONE ENCOUNTER
Trying an anxiety pill and thyroid med. Having side effects to thyroid med. Saw heart MD who reassured her about it. She gets hot flashes, stomach pains. She does't want to take it. Thinks it'll killl her. Anxiety over taking it 30-45 minutes before eating. All of it has made it such an issue. Groggy and off balance with anxiety med. Is there a lower dose she can take of the anxiety medication? She was quite off balance going to the bathroom at night. Please call her daughter Delaney:  [phone]. Patient's only taken 5 thyroid pills and adamant to not take it again. She does have a follow up scheduled. Delaney is out of commission until later this afternoon because she's taking her special needs daughter to an esophogram today. Try calling after 3:30 p.m. today to discuss both medications..  Thank you,  Li Lewis RN  [facility]    Reason for Disposition    Caller has URGENT medication question about med that PCP prescribed and triager unable to answer question    Additional Information    Negative: Drug overdose and triager unable to answer question    Negative: Caller requesting information unrelated to medicine    Negative: Caller requesting a prescription for Strep throat and has a positive culture result    Negative: Rash while taking a medication or within 3 days of stopping it    Negative: Immunization reaction suspected    Negative: Asthma and having symptoms of asthma (cough, wheezing, etc.)    Negative: Breastfeeding questions about mother's medicines and diet    Negative: MORE THAN A DOUBLE DOSE of a prescription or over-the-counter (OTC) drug    Negative: DOUBLE DOSE (an extra dose or lesser amount) of over-the-counter (OTC) drug and any symptoms (e.g., dizziness, nausea, pain, sleepiness)    Negative: DOUBLE DOSE (an extra dose or lesser amount) of prescription drug and any symptoms (e.g., dizziness, nausea, pain, sleepiness)    Negative: Took another person's prescription drug     Negative: DOUBLE DOSE (an extra dose or lesser amount) of prescription drug and NO symptoms (Exception: a double dose of antibiotics)    Negative: Diabetes drug error or overdose (e.g., took wrong type of insulin or took extra dose)    Negative: Caller has medication question about med not prescribed by PCP and triager unable to answer question (e.g., compatibility with other med, storage)    Negative: Request for URGENT new prescription or refill of 'essential' medication (i.e., likelihood of harm to patient if not taken) and triager unable to fill per department policy    Negative: Prescription not at pharmacy and was prescribed today by PCP    Negative: Pharmacy calling with prescription questions and triager unable to answer question    Protocols used: MEDICATION QUESTION CALL-A-[place]

## 2021-06-13 NOTE — PROGRESS NOTES
Clinic Care Coordination Contact    Community Health Worker Follow Up    Goals:   Goals Addressed                 This Visit's Progress       General      Reducing Risks (pt-stated)   30%     Goal Statement: I would like to see an ophthalmologist and a dentist in the next 30 days.   Date Goal set: 5/13/20  Barriers: Patient has not been able to see either an ophthalmologist or dentist since the COVID-19 outbreak.   Strengths: Patient has a strong willingness to take care of her health.   Date to Achieve By: 5/13/20  Patient expressed understanding of goal: Verbalized understanding of goal.   Action steps to achieve this goal:  1. My children or I will scheduled appointments with an ophthalmologist and a dentist.   2. I will contact the [facility] if I need additional resources for a dental clinic or an ophthalmology clinic  3. I will notify the [facility] when I have attended these appointments so this goal can be completed.     Discussed 10/5/2020            CHW Next Follow-up: 3-4 weeks    CHW Plan: Continue to support patient with goals through routine outreach    Patient has an eye doctor appointment in the next few weeks.   Right side has been bothering her for a few days, she didn't fall or have an injury that she is aware of. She is using heat and will call PCP if pain does not improve.  Family is stopping over 1 at a time on Thanksgiving to visit.     Next outreach due: 12/22/2020

## 2021-06-13 NOTE — PROGRESS NOTES
Clinic Care Coordination Contact    Situation: Patient chart reviewed by care coordinator.    Background: CCC continues to support patient with a goal seeing an ophthalmologist and a dentist.     Assessment: CCC CHW continues to reach out to patient on a monthly basis to discuss progress of goal. According to CHW's note on 11/23/20, patient has scheduled an eye appointment scheduled in the next few weeks.     Plan/Recommendations: CCC RN will continue to monitor and will be available as nursing needs arise.     CCC RN will do chart review in 45 days.

## 2021-06-13 NOTE — PROGRESS NOTES
Office Visit   Kristal Cox   89 y.o. female    Date of Visit: 11/16/2020    Chief Complaint   Patient presents with     Follow-up        Assessment and Plan   1. Anxiety  She continues to feel anxious.  She did not really tolerate the mirtazapine but is interested in trying it again and perhaps cutting it in half.  I think that could be beneficial.  We will plan to see her back in about 6 weeks to reassess.  If she has any trouble with it in the meantime she will let me know.  - mirtazapine (REMERON) 7.5 MG tablet; Take 1 tablet (7.5 mg total) by mouth at bedtime.  Dispense: 90 tablet; Refill: 3    2. Acquired hypothyroidism  She has now been taking the thyroid replacement for about 10 days.  She is not having any side effects.  We will plan to see her back in about 6 weeks and plan to reassess her thyroid level at that time.         Return in about 6 weeks (around 12/28/2020) for Follow up.     History of Present Illness   This 89 y.o. old female comes in to follow-up.  I last saw her about 5 weeks ago and she was having problems with anxiety and was low in thyroid.  We started thyroid replacement as well as Remeron at bedtime.  Unfortunately when she took the Remeron she got too groggy.  She also had some problems with the thyroid replacement initially but now has been taking it for about 10 days.  She has no acute concerns today.  She feels that she is doing better with the thyroid.  She still having a lot of anxiety and is interested in trying that again.  She wonders about cutting it in half.    Review of Systems: As above, systems otherwise reviewed and negative.     Medications, Allergies and Problem List   Patient Active Problem List   Diagnosis     Vertebral compression fracture (H)     GERD (gastroesophageal reflux disease)     Osteoporosis     Benign essential hypertension     Hyperlipidemia LDL goal <100     PVC's (premature ventricular contractions)     Acquired hypothyroidism     Current  Outpatient Medications   Medication Sig Dispense Refill     acetaminophen (TYLENOL) 500 MG tablet Take 1,000 mg by mouth 3 (three) times a day as needed.              artificial tears,hypromellose, (GENTEAL; SYSTANE) 0.3 % Gel Administer 1 drop to both eyes as needed (dry eyes).       aspirin 81 mg chewable tablet Chew 1 tablet (81 mg total) daily.  0     calcium, as carbonate, (OS-SAMPSON) 500 mg calcium (1,250 mg) tablet Take 1 tablet by mouth daily.       cholecalciferol, vitamin D3, 1,000 unit tablet Take 2,000 Units by mouth daily.       furosemide (LASIX) 20 MG tablet Take 1-2 tablets (20-40 mg total) by mouth daily. Take 1 daily.  May increase to 2 daily with increased swelling as needed. (Patient taking differently: Take 20 mg by mouth daily. Take 1 daily.  May increase to 2 daily with increased swelling as needed.) 60 tablet 11     levothyroxine (SYNTHROID) 50 MCG tablet Take 1 tablet (50 mcg total) by mouth Daily at 6:00 am. 90 tablet 3     lisinopriL (PRINIVIL,ZESTRIL) 20 MG tablet Take 1 tablet (20 mg total) by mouth daily. 90 tablet 3     magnesium chloride (SLOW-MAG) 64 mg TbEC delayed-release tablet Take 1 tablet (64 mg total) by mouth daily.  0     melatonin 3 mg Tab tablet Take 1-2 tablets (3-6 mg total) by mouth at bedtime as needed.       metoprolol succinate (TOPROL-XL) 25 MG Take 1 tablet (25 mg total) by mouth daily. 90 tablet 0     mirtazapine (REMERON) 7.5 MG tablet Take 1 tablet (7.5 mg total) by mouth at bedtime. 90 tablet 3     nitroglycerin (NITROSTAT) 0.4 MG SL tablet Place 1 tablet (0.4 mg total) under the tongue every 5 (five) minutes as needed for chest pain.  0     polyethylene glycol (GLYCOLAX) 17 gram/dose powder Take 17 g by mouth daily. 235 g 11     senna-docusate (SENNOSIDES-DOCUSATE SODIUM) 8.6-50 mg tablet Take 1 tablet by mouth daily.       No current facility-administered medications for this visit.      Allergies   Allergen Reactions     Evista [Raloxifene]      Caused blood  "clot     Fosamax [Alendronate] Nausea And Vomiting          Physical Exam     /62 (Patient Site: Right Arm, Patient Position: Sitting)   Pulse 70   Temp 97.5  F (36.4  C)   Ht 5' 4\" (1.626 m)   Wt (!) 99 lb (44.9 kg)   SpO2 97%   BMI 16.99 kg/m      General: This is an alert female in no apparent distress.     Additional Information   Social History     Tobacco Use     Smoking status: Never Smoker     Smokeless tobacco: Never Used   Substance Use Topics     Alcohol use: No     Comment: rarely drinks wine, and drinks wine watered down with ice.     Drug use: Yes            Palak Schmitz MD    "

## 2021-06-14 NOTE — PROGRESS NOTES
Clinic Care Coordination Contact    Situation: Patient chart reviewed by care coordinator.     Background: CCC continues to support patient with a goal seeing a dentist.      Assessment: [facility] CHW continues to reach out to patient on a monthly basis to discuss progress of goal. According to CHW's note on 1/5/21, patient has been seen by an ophthalmologist, but continue to need to see the dentist. She stated she would schedule an appointment with the dentist before her next months outreach with the [facility] CHW.      Plan/Recommendations: CCC RN will continue to monitor and will be available as nursing needs arise.    CCC RN will perform Chart Review in 45 days.

## 2021-06-14 NOTE — PROGRESS NOTES
Office Visit   Kristal Cox   89 y.o. female    Date of Visit: 12/28/2020    Chief Complaint   Patient presents with     Follow-up     6 week follow up        Assessment and Plan   1. Acquired hypothyroidism  She is due for recheck of her thyroid level and will do that today.  I will get back to her with results.  She does have constipation and I have advised her that it is important to take the MiraLAX and Colace daily.  She should only hold them if she is having loose stools.  She is in agreement with this plan.  - Thyroid Cascade    2. Dyspepsia  In addition to having a lot of constipation she is having stomach discomfort.  She has taken Pepcid on occasion which helps.  I recommended that she start taking it more frequently and even up to twice a day.  - famotidine (PEPCID) 20 MG tablet; Take 1 tablet (20 mg total) by mouth 2 (two) times a day as needed for heartburn.  Dispense: 60 tablet; Refill: 10    3. Systolic hypertension  - lisinopriL (PRINIVIL,ZESTRIL) 20 MG tablet; Take 1 tablet (20 mg total) by mouth daily.  Dispense: 90 tablet; Refill: 3       Return in about 3 months (around 3/28/2021) for Follow up.     History of Present Illness   This 89 y.o. old female comes in to follow-up on a few concerns.  She was recently started on thyroid replacement and is due for recheck of her TSH.  We will do that today.  She has had quite a bit of difficulty with stomach discomfort, back pain, constipation and sleep troubles.  We have tried different medications but she is reluctant to take them.  She has not been taking her MiraLAX daily and has hard pebbly stools.  We discussed the importance of taking it daily and that she can also take Colace daily.  She also takes Pepcid on occasion that helps with her stomach pain.  She has been reluctant to take it daily and we discussed that that can be very helpful.  Finally she has back pain but has been afraid to take Tylenol.  I have informed her that that is  helpful and safe.    Review of Systems: As above, systems otherwise reviewed and negative.     Medications, Allergies and Problem List   Patient Active Problem List   Diagnosis     Vertebral compression fracture (H)     GERD (gastroesophageal reflux disease)     Osteoporosis     Benign essential hypertension     Hyperlipidemia LDL goal <100     PVC's (premature ventricular contractions)     Acquired hypothyroidism     Current Outpatient Medications   Medication Sig Dispense Refill     acetaminophen (TYLENOL) 500 MG tablet Take 1,000 mg by mouth 3 (three) times a day as needed.              artificial tears,hypromellose, (GENTEAL; SYSTANE) 0.3 % Gel Administer 1 drop to both eyes as needed (dry eyes).       aspirin 81 mg chewable tablet Chew 1 tablet (81 mg total) daily.  0     calcium, as carbonate, (OS-SAMPSON) 500 mg calcium (1,250 mg) tablet Take 1 tablet by mouth daily.       cholecalciferol, vitamin D3, 1,000 unit tablet Take 2,000 Units by mouth daily.       furosemide (LASIX) 20 MG tablet Take 1-2 tablets (20-40 mg total) by mouth daily. Take 1 daily.  May increase to 2 daily with increased swelling as needed. (Patient taking differently: Take 20 mg by mouth daily. Take 1 daily.  May increase to 2 daily with increased swelling as needed.) 60 tablet 11     levothyroxine (SYNTHROID) 50 MCG tablet Take 1 tablet (50 mcg total) by mouth Daily at 6:00 am. 90 tablet 3     lisinopriL (PRINIVIL,ZESTRIL) 20 MG tablet Take 1 tablet (20 mg total) by mouth daily. 90 tablet 3     magnesium chloride (SLOW-MAG) 64 mg TbEC delayed-release tablet Take 1 tablet (64 mg total) by mouth daily.  0     melatonin 3 mg Tab tablet Take 1-2 tablets (3-6 mg total) by mouth at bedtime as needed.       metoprolol succinate (TOPROL-XL) 25 MG Take 1 tablet (25 mg total) by mouth daily. 90 tablet 0     nitroglycerin (NITROSTAT) 0.4 MG SL tablet Place 1 tablet (0.4 mg total) under the tongue every 5 (five) minutes as needed for chest pain.  0      "polyethylene glycol (GLYCOLAX) 17 gram/dose powder Take 17 g by mouth daily. 235 g 11     senna-docusate (SENNOSIDES-DOCUSATE SODIUM) 8.6-50 mg tablet Take 1 tablet by mouth daily.       famotidine (PEPCID) 20 MG tablet Take 1 tablet (20 mg total) by mouth 2 (two) times a day as needed for heartburn. 60 tablet 10     No current facility-administered medications for this visit.      Allergies   Allergen Reactions     Evista [Raloxifene]      Caused blood clot     Fosamax [Alendronate] Nausea And Vomiting          Physical Exam     /60 (Patient Site: Left Arm, Patient Position: Sitting)   Pulse 70   Temp 96.6  F (35.9  C)   Ht 5' 4\" (1.626 m)   Wt (!) 98 lb (44.5 kg)   SpO2 99%   BMI 16.82 kg/m      General: This is an alert female in no apparent distress.     Additional Information   Social History     Tobacco Use     Smoking status: Never Smoker     Smokeless tobacco: Never Used   Substance Use Topics     Alcohol use: No     Comment: rarely drinks wine, and drinks wine watered down with ice.     Drug use: Yes            Palak Schmitz MD    "

## 2021-06-15 NOTE — TELEPHONE ENCOUNTER
Mohini was notified of Dr. Schmitz's approval for orders and to have Kristal see Dr. Schmitz sooner than 4/6/21 for increased back pain.

## 2021-06-15 NOTE — TELEPHONE ENCOUNTER
Reason for Call: Request for an order or referral:    Order or referral being requested:   PHYSICAL THERAPY  1 TIME WEEKLY FOR 2 WEEKS  2 TIMES WEEKLY FOR 5 WEEKS  1 TIME WEEKLY FOR 2 WEEKS  IMPROVE FUNCTIONAL MOBILITY  STRENGTH AND BALANCE  SAFETY IN THE HOME  WOULD LIKE MONITOR OXYGEN WITH ACTIVITY  WILL NOTIFY DR IF LESS THAN 90%  EDUCATE ON HOT PACK USE FOR HIS BACK  DIETER TRAINING    PT HAVING BACK PAIN SINCE SHE SAT DOWN HARD 3 WEEKS AGO, WONDERING IF PATIENT NEEDS TO BE SEEN SOONER, DOES HAVE A APPT ON 04/06/2021 -  HAS A HX OF COMPRESSION FX IN HER BACK    HER PAIN IN BACK RADIATES ROUND TO HER LEFT SIDE -  SHE FILLS HER RIBS CARE CRUSHING HER INSIDES  DOES PATIENT NEED TO HAVE FURTHER IMAGING OR A BACK BRACE    Date needed: as soon as possible    Has the patient been seen by the PCP for this problem? YES    Additional comments: N/A    Phone number Patient can be reached at:  Other phone number:  [phone]    Best Time:  ANYTIME    Can we leave a detailed message on this number?  Yes    Call taken on 3/12/2021 at 3:29 PM by Bhumika Borrego

## 2021-06-15 NOTE — PROGRESS NOTES
OFFICE VISIT NOTE  Kristal Cox   89 y.o. female            Assessment/Plan for  Kristal Cox is a 89 y.o. female.  No Patient Care Coordination Note on file.       There are no diagnoses linked to this encounter.   1.  Dyspnea probably secondary to diastolic dysfunction and noncompliance with diuretic  2.  Diastolic dysfunction secondary to hypertension-noted on echo 3/2016  3.  Mild emphysema non-smoker.  Both on chest x-ray and CT.  I do not think this is a problem.  I will check spirometry and recheck an x-ray.  4.  Edema- regular lab with BNP, liver profile thyroid    Plan:  Diuretic compliance.  I hope she will do at least 3-4 times a week  Follow-up 2 weeks  I expect her to lose about 3 pounds  Chest x-ray EKG spiral BNP  Laboratory    There are no Patient Instructions on file for this visit.    Diagnoses and all orders for this visit:    Systolic hypertension    Edema  -     Hepatic Profile  -     Thyroid Cascade  -     Hepatic Profile    Mild emphysema  -     Electrocardiogram Perform - Clinic  -     XR Chest 2 Views; Future; Expected date: 2/8/18  -     Spirometry without bronchodilator    Dyspnea  -     HM1(CBC and Differential)  -     Basic Metabolic Panel  -     BNP(B-type Natriuretic Peptide)  -     Electrocardiogram Perform - Clinic  -     HM1 (CBC with Diff)        Medications after visit  Current Outpatient Prescriptions   Medication Sig Dispense Refill     cholecalciferol, vitamin D3, 1,000 unit tablet Take 2,000 Units by mouth daily.       diazePAM (VALIUM) 2 MG tablet TAKE 1 TABLET(2 MG) BY MOUTH TWICE DAILY 60 tablet 0     lisinopril (PRINIVIL,ZESTRIL) 10 MG tablet TAKE 1 TABLET BY MOUTH DAILY 90 tablet 1     ranitidine (ZANTAC) 150 MG tablet Take 150 mg by mouth as needed for heartburn.       triamterene-hydrochlorothiazide (DYAZIDE) 37.5-25 mg per capsule TAKE ONE CAPSULE BY MOUTH DAILY 60 capsule 5     No current facility-administered medications for this visit.          This  provider spent greater than 40 min. face-to-face time with the patient and/or his family.  More than half this time was spent in counseling and or coordination of care with other providers or agencies which were consistent with the nature of this patient's problems which are listed and described in the assessment and plan.             Fabricio Chacon MD  Internal medicine  [facility]  [phone]  [email or web address]    Much or all of the text in this note was generated through the use of Dragon Dictate voice-to-text software. Errors in spelling or words which seem out of context are unintentional.   Sound alike errors, in particular, may have escaped editing.                 Subjective:   Chief Complaint:  Foot Swelling (Feet really swollen by the end of the day for quite a while)    Patient with foot swelling and some dyspnea.  Notes onset in the morning and worse as the day goes on  Bilateral  She is only taking her diuretic once a week  She says she is up at night too much urinating  She takes it in the morning  No PND orthopnea.  She does have shortness of breath with exertion    She is very reluctant to take her diuretic more than once a week.  She tends to take this on Sunday when she is not out as much    She notes when she takes too much she gets orthostatic    X-rays 3/2016 for hospitalization with compression fracture revealed some emphysema.  She had a PE run at that time which was negative which did confirm mild emphysema.  She had an echo at that time which showed LVH/diastolic dysfunction with no valvular disease or wall motion abnormality    She is not having any angina        Review of Systems:     Extensive 10-point review of systems was performed. Please see the HPI for problem specific pertinent review of systems.     Patient does note her appetite is fair.  No melena nor hematochezia    Otherwise, the following systems are noncontributory including  constitutional, eyes, ears, nose and throat, cardiovascular, respiratory, gastrointestinal, genitourinary, musculoskeletal,neurological, skin and/or breast, endocrine, hematologic/lymph, allergic/immunologic and psychiatric.              Medications:  Current Outpatient Prescriptions on File Prior to Visit   Medication Sig     cholecalciferol, vitamin D3, 1,000 unit tablet Take 2,000 Units by mouth daily.     diazePAM (VALIUM) 2 MG tablet TAKE 1 TABLET(2 MG) BY MOUTH TWICE DAILY     lisinopril (PRINIVIL,ZESTRIL) 10 MG tablet TAKE 1 TABLET BY MOUTH DAILY     triamterene-hydrochlorothiazide (DYAZIDE) 37.5-25 mg per capsule TAKE ONE CAPSULE BY MOUTH DAILY     [DISCONTINUED] diazePAM (VALIUM) 2 MG tablet TAKE 1 TO 2 TABLETS BY MOUTH EVERY NIGHT AT BEDTIME     [DISCONTINUED] docusate sodium (COLACE) 100 MG capsule Take 100 mg by mouth 3 (three) times a day as needed for constipation. Indications: Constipation     [DISCONTINUED] dorzolamide (TRUSOPT) 2 % ophthalmic solution Administer 1 drop to both eyes 2 (two) times a day.     [DISCONTINUED] lisinopril (PRINIVIL) 10 MG tablet Take 1 tablet (10 mg total) by mouth daily.     [DISCONTINUED] loteprednol (LOTEMAX) 0.5 % ophthalmic suspension 1 drop 4 (four) times a day.     [DISCONTINUED] MAGNESIUM HYDROXIDE (MILK OF MAGNESIA ORAL) Take 1 Dose by mouth daily as needed (constipation  or no bm for 2 days). Indications: constipation     [DISCONTINUED] omeprazole (PRILOSEC) 20 MG capsule TAKE 1 CAPSULE BY MOUTH EVERY DAY     No current facility-administered medications on file prior to visit.             Allergies:  Allergies   Allergen Reactions     Evista [Raloxifene]      Caused blood clot     Fosamax [Alendronate] Nausea And Vomiting       PSFHx: Tobacco Status:  She  reports that she has never smoked. She has never used smokeless tobacco.   Alcohol Status:    History   Alcohol Use No     Comment: rarely drinks wine, and drinks wine watered down with ice.       reports that  "she has never smoked. She has never used smokeless tobacco. She reports that she uses illicit drugs. She reports that she does not drink alcohol.    Objective:    /56 (Patient Site: Right Arm, Patient Position: Sitting, Cuff Size: Adult Regular)  Pulse 99  Ht 5' 2\" (1.575 m)  Wt 106 lb (48.1 kg)  SpO2 96%  BMI 19.39 kg/m2  Weight:   Wt Readings from Last 3 Encounters:   02/08/18 106 lb (48.1 kg)   07/03/17 104 lb (47.2 kg)   11/15/16 106 lb (48.1 kg)     BP Readings from Last 3 Encounters:   02/08/18 158/56   07/03/17 154/68   05/25/16 124/64         General-appears well, no acute distress.  Pleasant  Well-groomed  Sharp articulate  Thin stature  Weight stable    No neck vein distention  No neck adenopathy  Lungs increased AP diameter  Clear moves air well  No adventitious sounds  No wheeze    Cardiac regular throughout  No pauses  No premature  No murmur    Abdomen-soft  Extremities-2+ edema      Dr. Tera Iglesias note of 3/2016 reveals no edema      Review of clinical lab tests  Lab Results   Component Value Date    WBC 4.4 07/07/2017    HGB 13.5 07/07/2017    HCT 40.4 07/07/2017     07/07/2017    CHOL 178 07/07/2017    TRIG 71 07/07/2017    HDL 68 07/07/2017    ALT 15 07/07/2017    AST 23 07/07/2017     07/07/2017    K 4.7 07/07/2017     07/07/2017    CREATININE 0.88 07/07/2017    BUN 19 07/07/2017    CO2 26 07/07/2017    INR 1.01 03/04/2016     Lab Results   Component Value Date     03/04/2016       Glucose   Date/Time Value Ref Range Status   07/07/2017 08:25 AM 89 70 - 125 mg/dL Final   03/04/2016 12:02  70 - 125 mg/dL Final     Recent Results (from the past 24 hour(s))   Electrocardiogram Perform - Clinic   Result Value Ref Range    SYSTOLIC BLOOD PRESSURE  mmHg    DIASTOLIC BLOOD PRESSURE  mmHg    VENTRICULAR RATE 89 BPM    ATRIAL RATE 89 BPM    P-R INTERVAL 196 ms    QRS DURATION 136 ms    Q-T INTERVAL 398 ms    QTC CALCULATION (BEZET) 484 ms    P Axis 57 degrees "    R AXIS -20 degrees    T AXIS 83 degrees    MUSE DIAGNOSIS       ** Poor data quality, interpretation may be adversely affected  Sinus rhythm with occasional and consecutive Premature ventricular complexes and Fusion complexes  Left bundle branch block  Abnormal ECG  When compared with ECG of 04-MAR-2016 22:41,  Fusion complexes are now Present  Premature ventricular complexes are now Present  Left anterior fascicular block is no longer Present  Left bundle branch block is now Present         RADIOLOGY: No results found.    Review of recent consultation-   Study Result   CTA CHEST PE RUN  3/4/2016 2:37 PM     INDICATION: Back pain with elevated D-dimer.  TECHNIQUE: Helical acquisition through the chest was performed during the arterial phase of contrast enhancement using IV contrast. 2D and 3D reconstructions were performed by the CT technologist. Dose reduction techniques were used.   IV CONTRAST: 80 ml Omni 350.  COMPARISON: None.     FINDINGS:  ANGIOGRAM CHEST: Negative for pulmonary emboli. Negative for thoracic aortic dissection and aneurysms.     LUNGS AND PLEURA: Mild centrilobular pulmonary emphysema. Biapical pleural-parenchymal scarring. No acute infiltrate or pleural effusion. Indeterminate middle lobe pulmonary nodule image 52 of series 7 measuring 6 mm. Indeterminate lingula pulmonary   nodule image 54 series 7 measuring 4 mm. Dependent atelectasis. No pleural effusion. Fibro-atelectatic change at the inferior most right lung base posteriorly. Middle lobe pulmonary nodule image 54 of series 7 measures 4 to 5 mm. No pneumothorax. No   pulmonary edema.     MEDIASTINUM: Homogeneous thyroid gland. No supraclavicular or mediastinal adenopathy. No hilar adenopathy. No axillary lymphadenopathy. Course and caliber of the great vessels in the mediastinum normal. Heart size normal. No pericardial thickening or   effusion.     LIMITED UPPER ABDOMEN: Images through the liver, pancreas, spleen, adrenal glands and  upper portions of the kidneys unremarkable.     MUSCULOSKELETAL: Diffuse bone demineralization. Age uncertain T8 compression fracture with loss of 60-70% vertebral body height.     IMPRESSION:   CONCLUSION:  1.  No acute pulmonary arterial embolism identified.  2.  No evidence for aortic dissection or thoracic aortic aneurysm. Atherosclerotic thoracic aorta.  3.  Age uncertain T8 compression fracture with loss of 60-70% vertebral body height.  4.  Mild centrilobular pulmonary emphysema with indeterminate pulmonary nodules. Follow-up in accordance with the Fleischner criteria for incidentally detected pulmonary nodules     Echo 3/2016-cardiac diastolic dysfunction otherwise normal

## 2021-06-15 NOTE — TELEPHONE ENCOUNTER
Reason for Call:  Radha calling to inform provider that due to volume, they are referring [facility] to [facility].    Detailed comments: nursing and PT    Phone Number Patient can be reached at: Other phone number:  [facility] Valarie Manley [phone]    Best Time:     Can we leave a detailed message on this number?: No call back needed    Call taken on 3/4/2021 at 2:54 PM by Deandra Madison

## 2021-06-15 NOTE — TELEPHONE ENCOUNTER
Yes, ok for the order.  If she is having worsening back pain, she could come in for a visit sooner than her appointment on 4/6.

## 2021-06-15 NOTE — PROGRESS NOTES
Patient's daughter spoke with [facility] RN on 3/11/21 and discussed goals.    CHW delegations: Follow up with patient within 30 days.    Next outreach due: 4/12/21

## 2021-06-15 NOTE — PROGRESS NOTES
Clinic Care Coordination Contact    Community Health Worker Follow Up    Goals:   Goals Addressed                 This Visit's Progress       General      Reducing Risks (pt-stated)   60%     Goal Statement: I would like to see an ophthalmologist and a dentist in the next 30 days.   Date Goal set: 5/13/20  Barriers: Patient has not been able to see either an ophthalmologist or dentist since the COVID-19 outbreak.   Strengths: Patient has a strong willingness to take care of her health.   Date to Achieve By: 5/13/20  Patient expressed understanding of goal: Verbalized understanding of goal.   Action steps to achieve this goal:  1. My children or I will scheduled appointments with an ophthalmologist and a dentist.   2. I will contact the [facility] if I need additional resources for a dental clinic or an ophthalmology clinic  3. I will notify the [facility] when I have attended these appointments so this goal can be completed.     Discussed 2/4/21            CHW Next Follow-up: 1 month    CHW Plan: CHW will continue to support patient with goals through routine scheduled outreach.     Discussion: Patient has seen her eye doctor and her dentist. She needs follow up at the dentist and is waiting for the dental office to call her to schedule her cleaning and needed dental work.  She asked about the COVID vaccine and if it was available, she was given the number for her daughter to call and schedule.    Next outreach due: 3/4/21

## 2021-06-15 NOTE — PROGRESS NOTES
Clinic Care Coordination Contact    Follow Up Progress Note      Assessment: [facility] RN spoke with patient's daughter, Edilia today to follow up on patient's goal and to discuss and needs or concerns. Edilia said patient is receiving home care, both Nursing and PT related to her back pain. She said her mother had her initial assessment from Home Care on Sunday and PT will be starting tomorrow.   Patient has not yet seen the dentist related to the COVID-19 pandemic. It is her plan to see the dentist after she has been vaccinated. She hopes to schedule her COVID-19 vaccine after her back pain in under better control.   No other concerns from Edilia.     Goals addressed this encounter:   Goals Addressed                 This Visit's Progress       Patient Stated      Reducing Risks (pt-stated)        Goal Statement: I would like to see an ophthalmologist and a dentist in the next 30 days.   Date Goal set: 5/13/20  Barriers: Patient has not been able to see either an ophthalmologist or dentist since the COVID-19 outbreak.   Strengths: Patient has a strong willingness to take care of her health.   Date to Achieve By: 5/13/20  Patient expressed understanding of goal: Verbalized understanding of goal.   Action steps to achieve this goal:  1. My children or I will scheduled appointments with an ophthalmologist and a dentist.   2. I will contact the [facility]3] [facility] if I need additional resources for a dental clinic or an ophthalmology clinic  3. I will notify the [facility]] [facility] when I have attended these appointments so this goal can be completed.     Discussed 2/4/21  Discussed on 3/11/21             Intervention/Education provided during outreach: Discussed the importance of patient taking her medications as directed. Discussed COVID-19 precautions.        Plan: [facility] RN will continue to monitor, support patient with current goal and will be available as nursing needs arise.     Care Coordinator will perform Chart  Review in 45 days.

## 2021-06-16 PROBLEM — E03.9 ACQUIRED HYPOTHYROIDISM: Status: ACTIVE | Noted: 2020-11-16

## 2021-06-16 NOTE — TELEPHONE ENCOUNTER
Reason for Call:  Other call back      Detailed comments: Didi RN was there today, pt and her PCA and her were stating that she hit her right side above her hip on the door knob.  States it hurts when she takes deep breaths. Rated for pain 4/10.  She is using a heating pad and did use ice but too cold for her to do ice packs.   Didi did advise is hard to breath then for go ER.    Vitals  Temp 97.9  Pulse 83  resp 18  Weight 93.6  /86  Oxygen 96 with room air  Lungs clear    Phone Number Patient can be reached at: Other phone number:  [phone] Edilia (daughter)-also call daughter and let her know    Best Time: any    Can we leave a detailed message on this number?: Yes    Call taken on 3/26/2021 at 11:41 AM by Pamela Behr

## 2021-06-16 NOTE — TELEPHONE ENCOUNTER
Called Hope to clarify if this was an urgent message. Hope reports that this was just a FYI message for PCP as a update.

## 2021-06-16 NOTE — TELEPHONE ENCOUNTER
Mohini is visiting Kristal today.  Kristal gives verbal consent to talk with Mohini.    Kristal continues to have pain in her right side at her rib cage level after falling into a door knob on 3/26/21.   Her pain is at a 3 out of 10.  It goes to a 5 when she is getting off her toilet.   There is no bruise.  Patient is not short of breath and has O2 level of 97% on RA.  She has taken Tylenol several times as needed and it does help.    Kristal does have an appointment on 4/6/21 with Dr. Schmitz and prefers not to come in at this time.   She will make an appointment if her pain worsens.    She is asking if taking Tylenol on a scheduled basis would be appropriate?  If so, how often she should do this?    She continues to use cool packs to the area as well.

## 2021-06-16 NOTE — PROGRESS NOTES
Office Visit   Kristal Cox   89 y.o. female    Date of Visit: 3/19/2021    Chief Complaint   Patient presents with     Back Pain     Physical Therapy recommended pt see pcp        Assessment and Plan   1. Abdominal discomfort  She has ongoing discomfort in the abdomen.  Exam is benign.  She had similar symptoms about a year ago and a CT showed stool but no other abnormalities.  We discussed that working with physical therapy is still beneficial.  She has significant curvature of her spine which could be making her abdominal symptoms worse.  She has no pain along the spine so no sign of a compression fracture.  Recommend that she continue to take MiraLAX daily to try to keep her bowels going.  She is in agreement with this plan.  We will also recheck all of her labs today.    2. Benign essential hypertension  She is due for recheck of her blood work.  Blood pressure is satisfactory.  - HM2(CBC w/o Differential)  - Thyroid Helotes  - Comprehensive Metabolic Panel  - Urinalysis-UC if Indicated    3. Acquired hypothyroidism  - Thyroid Helotes         Return in about 2 months (around 5/19/2021) for Establish Care - [place].     History of Present Illness   This 89 y.o. old female comes in due to some abdominal and back discomfort as well as to follow-up.  Couple weeks ago she sat down a little bit harder than usual and developed some discomfort in her back but also in the abdomen.  She continues to feel that her abdomen is uncomfortable and is if she is collapsed.  Back pain is no longer a problem.  She does have difficulty with constipation and only has a bowel movement about every other day.  She and her daughter been working with MiraLAX but it is been difficult to manage.  She has not had any fevers or chills or cough.  Has no other acute concerns.  She has a history of hypothyroidism and hypertension.  She is due for recheck of labs.    Review of Systems: As above, systems otherwise reviewed and  negative.     Medications, Allergies and Problem List   Patient Active Problem List   Diagnosis     Vertebral compression fracture (H)     GERD (gastroesophageal reflux disease)     Osteoporosis     Benign essential hypertension     Hyperlipidemia LDL goal <100     PVC's (premature ventricular contractions)     Acquired hypothyroidism     Current Outpatient Medications   Medication Sig Dispense Refill     acetaminophen (TYLENOL) 500 MG tablet Take 1,000 mg by mouth 3 (three) times a day as needed.              artificial tears,hypromellose, (GENTEAL; SYSTANE) 0.3 % Gel Administer 1 drop to both eyes as needed (dry eyes).       aspirin 81 mg chewable tablet Chew 1 tablet (81 mg total) daily.  0     calcium, as carbonate, (OS-SAMPSON) 500 mg calcium (1,250 mg) tablet Take 1 tablet by mouth daily.       cholecalciferol, vitamin D3, 1,000 unit tablet Take 2,000 Units by mouth daily.       famotidine (PEPCID) 20 MG tablet Take 1 tablet (20 mg total) by mouth 2 (two) times a day as needed for heartburn. 60 tablet 10     furosemide (LASIX) 20 MG tablet Take 1-2 tablets (20-40 mg total) by mouth daily. Take 1 daily.  May increase to 2 daily with increased swelling as needed. (Patient taking differently: Take 20 mg by mouth daily. Take 1 daily.  May increase to 2 daily with increased swelling as needed.) 60 tablet 11     levothyroxine (SYNTHROID) 50 MCG tablet Take 1 tablet (50 mcg total) by mouth Daily at 6:00 am. 90 tablet 3     lisinopriL (PRINIVIL,ZESTRIL) 20 MG tablet Take 1 tablet (20 mg total) by mouth daily. 90 tablet 3     magnesium chloride (SLOW-MAG) 64 mg TbEC delayed-release tablet Take 1 tablet (64 mg total) by mouth daily.  0     melatonin 3 mg Tab tablet Take 1-2 tablets (3-6 mg total) by mouth at bedtime as needed.       metoprolol succinate (TOPROL-XL) 25 MG TAKE 1 TABLET(25 MG) BY MOUTH DAILY 90 tablet 1     nitroglycerin (NITROSTAT) 0.4 MG SL tablet Place 1 tablet (0.4 mg total) under the tongue every 5  "(five) minutes as needed for chest pain.  0     polyethylene glycol (GLYCOLAX) 17 gram/dose powder Take 17 g by mouth daily. 235 g 11     senna-docusate (SENNOSIDES-DOCUSATE SODIUM) 8.6-50 mg tablet Take 1 tablet by mouth daily.       No current facility-administered medications for this visit.      Allergies   Allergen Reactions     Evista [Raloxifene]      Caused blood clot     Fosamax [Alendronate] Nausea And Vomiting          Physical Exam     /62 (Patient Site: Left Arm, Patient Position: Sitting, Cuff Size: Adult Regular)   Pulse 70   Ht 5' 4\" (1.626 m)   Wt (!) 94 lb 11.2 oz (43 kg)   SpO2 98%   BMI 16.26 kg/m      General: This is an alert female, sitting comfortably, no apparent distress.  Ambulates with a walker without difficulty.  Back: Significant curvature of the spine.  She does not have any pain with palpation over her spine.  Cardiovascular: Regular rhythm with a normal S1 and S2.  Lungs: Good air movement throughout with no wheezes or rales.  Abdomen: Abdomen is soft, nondistended, no rebound or guarding.  Extremities: Trace pitting edema bilaterally.     Additional Information   Social History     Tobacco Use     Smoking status: Never Smoker     Smokeless tobacco: Never Used   Substance Use Topics     Alcohol use: No     Comment: rarely drinks wine, and drinks wine watered down with ice.     Drug use: Yes            Palak Schmitz MD  "

## 2021-06-16 NOTE — TELEPHONE ENCOUNTER
Reason contacted:  Message from the doctor  Information relayed:   Patient can safely schedule  1000 mg of Tylenol evenly spaced out throughout the day for a total of 3000 mg.  So- 1000 mg a.m., midday and at bedtimeAdditional questions:  No  Further follow-up needed:  No  Okay to leave a detailed message:  No   2.04

## 2021-06-16 NOTE — PROGRESS NOTES
"OFFICE VISIT NOTE    Subjective:   Chief Complaint:  Follow-up (ankle edema)    Elderly woman of 89 years old who has a history of congestive heart failure diastolic type.  Also has systolic hypertension osteoporosis.  Recent problems with increasing edema and shortness of breath.  She does not like to take Dyazide as it makes her urinate all day.  Last several days she has had increasing ankle swelling.  Current Outpatient Prescriptions   Medication Sig     cholecalciferol, vitamin D3, 1,000 unit tablet Take 2,000 Units by mouth daily.     diazePAM (VALIUM) 2 MG tablet TAKE 1 TABLET(2 MG) BY MOUTH TWICE DAILY     lisinopril (PRINIVIL,ZESTRIL) 10 MG tablet TAKE 1 TABLET BY MOUTH DAILY     ranitidine (ZANTAC) 150 MG tablet Take 150 mg by mouth as needed for heartburn.     triamterene-hydrochlorothiazide (DYAZIDE) 37.5-25 mg per capsule TAKE ONE CAPSULE BY MOUTH DAILY     furosemide (LASIX) 20 MG tablet 1 tablet by mouth every Monday Wednesday and Friday morning       PSFHx: Tobacco Status:  She  reports that she has never smoked. She has never used smokeless tobacco.    Review of Systems:  A comprehensive review of systems is negative except for the comments above    Objective:    Pulse 100  Ht 5' 2\" (1.575 m)  Wt 106 lb (48.1 kg)  SpO2 97%  BMI 19.39 kg/m2  GENERAL: No acute distress.  Weight is the same.  Blood pressure 146/60.  Pulse 92.  Oxygen saturations 96%.  2-3+ edema of her lower ankles.  No JVD.  Lungs do sound clear without rales or any obvious pleural effusion.  Heart does show sinus rhythm.  Neurologic exam seems normal.  She is frail but ambulates with out cyst.  Mentally she is sharp and alert answers questions appropriately.  Cranial nerves are intact.    Assessment & Plan   Kristal Cox is a 89 y.o. female.    Think she had evidence here of diastolic congestive heart failure.  She has mild systolic hypertension.  Discontinue Dyazide.  Give her Lasix 20 mg every Monday Wednesday and " Friday.  This is shorter acting and should not cause her to have urination all day and all night long.  She is also on a high potassium diet.  I will see her back in 4 weeks.    Diagnoses and all orders for this visit:    Systolic hypertension    Chronic diastolic congestive heart failure  -     furosemide (LASIX) 20 MG tablet; 1 tablet by mouth every Monday Wednesday and Friday morning  Dispense: 30 tablet; Refill: 4        The following low BMI interventions were performed this visit: weight gain advised    Tera Iglesias MD  Transcription using voice recognition software, may contain typographical errors.

## 2021-06-16 NOTE — TELEPHONE ENCOUNTER
A can safely schedule  1000 mg of Tylenol evenly spaced out throughout the day for a total of 3000 mg.  So- 1000 mg a.m., midday and at bedtime.

## 2021-06-16 NOTE — PROGRESS NOTES
"OFFICE VISIT NOTE    Subjective:   Chief Complaint:  Follow-up (4 weeks)    89-year-old woman in for follow-up regarding systolic hypertension, osteoporosis, chronic anxiety, probable diastolic congestive heart failure and irritable bowel syndrome.  She did not tolerate the Lasix 20 mg 3 times a week.  She says it causes too much constipation.  She wants to go back to Dyazide 1 daily.  She does have less edema.  Still complains of bloating but this is a chronic complaint for her.  No blood in the stool.  Appetite is about the same.    Current Outpatient Prescriptions   Medication Sig     cholecalciferol, vitamin D3, 1,000 unit tablet Take 2,000 Units by mouth daily.     diazePAM (VALIUM) 2 MG tablet TAKE 1 TABLET(2 MG) BY MOUTH TWICE DAILY     furosemide (LASIX) 20 MG tablet 1 tablet by mouth every Monday Wednesday and Friday morning     lisinopril (PRINIVIL,ZESTRIL) 10 MG tablet TAKE 1 TABLET BY MOUTH DAILY     ranitidine (ZANTAC) 150 MG tablet Take 150 mg by mouth as needed for heartburn.     triamterene-hydrochlorothiazide (DYAZIDE) 37.5-25 mg per capsule TAKE ONE CAPSULE BY MOUTH DAILY       PSFHx: Tobacco Status:  She  reports that she has never smoked. She has never used smokeless tobacco.    Review of Systems:  A comprehensive review of systems is negative except for the comments above    Objective:    Pulse 82  Ht 5' 2\" (1.575 m)  Wt 105 lb (47.6 kg)  SpO2 97%  BMI 19.2 kg/m2  GENERAL: No acute distress.  Weight is down 1 pound.  Blood pressure is 160/60.  Pulse in the 80s.  Trace edema only at the ankles.  The legs are without any edema.  Lungs are clear.  Heart shows a regular rhythm without significant murmur gallop or rub.  No JVD.  No cyanosis no jaundice no presacral edema.  The abdomen is nontender.  No masses or any organomegaly noted.    Assessment & Plan   Kristal Cox is a 89 y.o. female.    Clinically she is stable.  Will discontinue Lasix since she has too much constipation.  Go back " to Dyazide 1 capsule 3 times a week.  Other medicines remain the same.  I told her to drink enough water as well as eating fiber to prevent constipation.   GI symptoms are probably irritable bowel-like symptoms.    Diagnoses and all orders for this visit:    Anxiety    Systolic hypertension    Chronic diastolic congestive heart failure        The following low BMI interventions were performed this visit: weight gain advised    Tera Iglesias MD  Transcription using voice recognition software, may contain typographical errors.

## 2021-06-16 NOTE — TELEPHONE ENCOUNTER
JUANCARLOS Rajan recommendations and verbalized understanding. She reports that patient seems to be resistant to an appt right now therefore will hold off on visit. Mohini will be seeing patient again Wednesday and will we reevaluated if she still has discomfort/pains.

## 2021-06-16 NOTE — PROGRESS NOTES
Clinic Care Coordination Contact    Follow Up Progress Note      Assessment: [facility] RN spoke with patient's daughter Edilia today.  Edilia reported patient continues to have in-home PT for strength building. She said they will decide today if they are going to continue to PT services or if patient will be discharged from home care. Edilia patient has not had any falls since the end of March. Family continues to be very involved with her care and assist patient with her needs. Goal completed around seeing the opthalmologist and dentist. Edilia said patient has seen both recently. No new needs at this time. Patient's daughter agreed to move her to maintenance.   Edilia did stated her mother had her first COVID-19 vaccine.     Goals addressed this encounter:   Goals Addressed                 This Visit's Progress       Patient Stated      Reducing Risks (pt-stated)   100%     Goal Statement: I would like to see an ophthalmologist and a dentist in the next 30 days.   Date Goal set: 5/13/20  Barriers: Patient has not been able to see either an ophthalmologist or dentist since the COVID-19 outbreak.   Strengths: Patient has a strong willingness to take care of her health.   Date to Achieve By: 5/13/20  Patient expressed understanding of goal: Verbalized understanding of goal.   Action steps to achieve this goal:  1. My children or I will scheduled appointments with an ophthalmologist and a dentist.   2. I will contact the [facility] if I need additional resources for a dental clinic or an ophthalmology clinic  3. I will notify the [facility] when I have attended these appointments so this goal can be completed.     Discussed 2/4/21  Discussed on 3/11/21  Discussed on 4/22/21 - completed              Intervention/Education provided during outreach: Discussed the importance of patient taking her medications as directed.           Plan: Patient will move to maintenance. Edilia is aware [facility] will reach  out to patient in 2 months to discuss and new needs or concerns. If new needs or concerns exists, a new assessment will scheduled with either the CCC RN or the CCC LSW be completed  Patient's daughter is aware she can contact CCC at anytime if needs or concerns arise. Edilia is also aware patient will be receiving a new CCC team as she is transitioning to Dr. Barba's care at the [facility]. Warm hand off will be given to the [facility] team.     Care Coordinator will follow up in one month.

## 2021-06-16 NOTE — TELEPHONE ENCOUNTER
Reason for Call:  Other call back      Detailed comments: [facility] is calling stating that patient fell on 03/26/2021 - walking to bathroom and fell into the door - did not hit head, but caught herself on the door, but today patient stating her right side under right breast , patient stating when fell into the door that part may have hit door knob    Pt took some  Tyelnol  This a.m., stating helped some and then a cold compress also did help some    Pain will increase sometimes with a deep breath   Moving around also does increase some pain  Need an order for cold pack use?  Needing to know are there any orders at this time?     Phone Number Patient can be reached at: Other phone number:  [phone]    Best Time: anytime    Can we leave a detailed message on this number?: Yes    Call taken on 3/29/2021 at 12:26 PM by Bhumika Borrego

## 2021-06-16 NOTE — TELEPHONE ENCOUNTER
Does she need to be seen by someone?  If so, please schedule her an appt for eval.   Ok for ice/cold three times a day for three days.

## 2021-06-16 NOTE — PROGRESS NOTES
Office Visit   Kristal Cox   89 y.o. female    Date of Visit: 4/6/2021    Chief Complaint   Patient presents with     Follow-up     Back Pain        Assessment and Plan   1. Benign essential hypertension  Her blood pressure is little bit high today but she has been having more pain due to a fall.  She did not take Tylenol before the visit today.  She will continue to monitor this.  I am going to recheck her labs today.  - HM2(CBC w/o Differential)  - Comprehensive Metabolic Panel    2. Rib pain  She is having pain on the right rib area.  We will get an x-ray.  Recommend that she schedule the Tylenol to make sure that her pain is better controlled.  She is also working with physical therapy at home.  She will continue with that.  If anything changes or worsen she may need reevaluation.  - XR Ribs Right         Return in about 4 weeks (around 5/4/2021) for Establish Care with [place].     History of Present Illness   This 89 y.o. old female comes in to follow-up.  About 10 days ago she lost her balance and fell up against a doorknob.  She hit her right side of her rib cage.  Since then she has had pain in that area.  There was no bruising.  Sometimes with a deep breath it is worse.  It seems to wax and wane but is not going away.  She has been taking Tylenol and when she does take Tylenol she has improvement.  Today she did not take any before the visit.  She is not short of breath and has no other acute concerns.  She is chronically had low back pain and has been working with physical therapy for that.  Blood pressure is elevated today but she thinks is because of her pain.    Review of Systems: As above, systems otherwise reviewed and negative.     Medications, Allergies and Problem List   Patient Active Problem List   Diagnosis     Vertebral compression fracture (H)     GERD (gastroesophageal reflux disease)     Osteoporosis     Benign essential hypertension     Hyperlipidemia LDL goal <100     PVC's  (premature ventricular contractions)     Acquired hypothyroidism     Current Outpatient Medications   Medication Sig Dispense Refill     acetaminophen (TYLENOL) 500 MG tablet Take 1,000 mg by mouth 3 (three) times a day as needed.              artificial tears,hypromellose, (GENTEAL; SYSTANE) 0.3 % Gel Administer 1 drop to both eyes as needed (dry eyes).       aspirin 81 mg chewable tablet Chew 1 tablet (81 mg total) daily.  0     calcium, as carbonate, (OS-SAMPSON) 500 mg calcium (1,250 mg) tablet Take 1 tablet by mouth daily.       cholecalciferol, vitamin D3, 1,000 unit tablet Take 2,000 Units by mouth daily.       famotidine (PEPCID) 20 MG tablet Take 1 tablet (20 mg total) by mouth 2 (two) times a day as needed for heartburn. 60 tablet 10     furosemide (LASIX) 20 MG tablet Take 1-2 tablets (20-40 mg total) by mouth daily. Take 1 daily.  May increase to 2 daily with increased swelling as needed. (Patient taking differently: Take 20 mg by mouth daily. Take 1 daily.  May increase to 2 daily with increased swelling as needed.) 60 tablet 11     levothyroxine (SYNTHROID) 50 MCG tablet Take 1 tablet (50 mcg total) by mouth Daily at 6:00 am. 90 tablet 3     lisinopriL (PRINIVIL,ZESTRIL) 20 MG tablet Take 1 tablet (20 mg total) by mouth daily. 90 tablet 3     magnesium chloride (SLOW-MAG) 64 mg TbEC delayed-release tablet Take 1 tablet (64 mg total) by mouth daily.  0     melatonin 3 mg Tab tablet Take 1-2 tablets (3-6 mg total) by mouth at bedtime as needed.       metoprolol succinate (TOPROL-XL) 25 MG TAKE 1 TABLET(25 MG) BY MOUTH DAILY 90 tablet 1     nitroglycerin (NITROSTAT) 0.4 MG SL tablet Place 1 tablet (0.4 mg total) under the tongue every 5 (five) minutes as needed for chest pain.  0     polyethylene glycol (GLYCOLAX) 17 gram/dose powder Take 17 g by mouth daily. 235 g 11     senna-docusate (SENNOSIDES-DOCUSATE SODIUM) 8.6-50 mg tablet Take 1 tablet by mouth daily.       No current facility-administered  medications for this visit.      Allergies   Allergen Reactions     Evista [Raloxifene]      Caused blood clot     Fosamax [Alendronate] Nausea And Vomiting          Physical Exam     /58 (Patient Site: Left Arm, Patient Position: Sitting, Cuff Size: Adult Regular)   Pulse 88   Wt (!) 94 lb 3.2 oz (42.7 kg)   SpO2 97%   BMI 16.17 kg/m      General:  Patient is alert and in no apparent distress..  Cardiovascular:  Regular rate and rhythm, normal S1/S2, no murmurs, rubs, or gallop.  Pulmonary:  Lungs are clear to auscultation bilaterally with normal respiratory effort.  Gastrointestinal:  Abdomen is soft, non-tender, non-distended, with no organomegaly, rebound or guarding.  She has pain with palpation on the right lateral rib cage.         Additional Information   Social History     Tobacco Use     Smoking status: Never Smoker     Smokeless tobacco: Never Used   Substance Use Topics     Alcohol use: No     Comment: rarely drinks wine, and drinks wine watered down with ice.     Drug use: Yes          Palak Schmitz MD

## 2021-06-16 NOTE — PROGRESS NOTES
Patient spoke with CCC RN on 4/22/21 and discussed goals     CHW delegations: Patient placed on maintenance follow up in 2 month at that time patient can graduated or establish new goals with the [facility] CCC team     Next outreach due: 6/22/21

## 2021-06-17 NOTE — TELEPHONE ENCOUNTER
Hello,   Thank you for this home care referral on this patient. At this time, [facility] is experiancing staffing complications and are unable to see patient within the 48 hour time frame recommended. However, I have reached out to one of our partnering agencies, [facility] and they would be more than happy to take this patient on for services. Please respond back to this message or call our office: [phone] for any additional questions that may arise.  [facility] can be reached at:   phone: [phone]  fax: [phone]    thank you,   [facility]

## 2021-06-17 NOTE — TELEPHONE ENCOUNTER
Telephone Encounter by Bridget Valencia CMA at 10/16/2020 12:18 PM     Author: Bridget Valencia CMA Service: -- Author Type: Medical Assistant    Filed: 10/16/2020 12:19 PM Encounter Date: 10/16/2020 Status: Signed    : Bridget Valencia CMA (Medical Assistant)       Spoke with the patient and relayed the following message from Dr. Schmitz:  Palak Schmitz MD [place], [facility] Care Team [place] 13 minutes ago (12:03 PM)     I sent a prescription.  We can recheck when she follow up in November.  Thanks.    Routing comment      She verbalized understanding and had no further questions at this time.  Bridget CADE CMA/BENJAMIN....................12:19 PM

## 2021-06-17 NOTE — TELEPHONE ENCOUNTER
Called pt's daughter. Gave phone number to call and get the ultrasound scheduled.    She has an appt with her heart   Dr next Friday. Would this be too soon to recheck her Hgb? (Daughter is wondering). It's hard to get her in for appointments.

## 2021-06-17 NOTE — PROGRESS NOTES
Your hemoglobin has dropped to 8.6.  The normal is 12 14.  This is a further drop, from 10, 1 month ago, and from 11.1, 2 months ago.  I am concerned about this.  If her hemoglobin drops below 7, or below 8, and she has symptoms of chest pain, shortness of breath, dizziness, we would have to transfuse her.  I will place a future hemoglobin blood test, in 2 weeks, and will ask the medical assistant to schedule a lab appointment for you.    Please let us know if you develop any chest pain, shortness of breath, or dizziness in the meantime.    Your liver enzymes function, is normal.     Your sodium is slightly low at 134. Looking at your urine and other blood tests, this is most likely due to you having a lot of fluid on board. However we can't increase your lasix, as your body is already having decreased blood flow through your kidneys (BUN elevated at 32, indicates this).     I have ordered an ultrasound (test) to look at your abdomen and also looks at some of the big vessels in your abdomen. They will contact you, I have asked for this to be done tomorrow.    Tiesha Barba MD    Team: Please schedule future labs in two weeks, orders have been placed. Please update patient and her daughter Heather Azul that I have placed an order for an ultrasound to look at her abdomen and asked for this to be done tomorrow.     Thank you, Tiesha Barba MD

## 2021-06-17 NOTE — PATIENT INSTRUCTIONS - HE
1. Use Colace (stool softener) one every day to try and help with constipation. If not helping enough may increase to one twice a day.   2. I will let you know if you need to increase your lasix for a short time. I need to review your blood results and urine results from today.  3. I discussed using a small dose of something called Citalopram in the morning to help with your anxiety. I will hold off on this for now, due to your sodium being low. I will know more when I see your blood and urine results.

## 2021-06-17 NOTE — TELEPHONE ENCOUNTER
Telephone Encounter by Bridget Valencia CMA at 7/15/2020 12:36 PM     Author: Bridget Valencia CMA Service: -- Author Type: Medical Assistant    Filed: 7/15/2020 12:36 PM Encounter Date: 7/15/2020 Status: Signed    : Bridget Valencia CMA (Medical Assistant)       Per the following message from Dr. Schmitz.  Palak Schmitz MD Hanzel, Kristen Care Team Pool 46 minutes ago (11:49 AM)       I ordered the labs.  Thanks.      Bridget CADE CMA/BENJAMIN....................12:36 PM

## 2021-06-17 NOTE — TELEPHONE ENCOUNTER
----- Message from Tiesha Barba MD sent at 5/20/2021  1:04 PM CDT -----  Your hemoglobin has dropped to 8.6.  The normal is 12 14.  This is a further drop, from 10, 1 month ago, and from 11.1, 2 months ago.  I am concerned about this.  If her hemoglobin drops below 7, or below 8, and she has symptoms of chest pain, shortness of breath, dizziness, we would have to transfuse her.  I will place a future hemoglobin blood test, in 2 weeks, and will ask the medical assistant to schedule a lab appointment for you.    Please let us know if you develop any chest pain, shortness of breath, or dizziness in the meantime.    Your liver enzymes function, is normal.     Your sodium is slightly low at 134. Looking at your urine and other blood tests, this is most likely due to you having a lot of fluid on board. However we can't increase your lasix, as your body is already having decreased blood flow through your kidneys (BUN elevated at 32, indicates this).     I have ordered an ultrasound (test) to look at your abdomen and also looks at some of the big vessels in your abdomen. They will contact you, I have asked for this to be done tomorrow.    Tiesha Barba MD    Team: Please schedule future labs in two weeks, orders have been placed. Please update patient and her daughter Heather Azul that I have placed an order for an ultrasound to look at her abdomen and asked for this to be done tomorrow.     Thank you, Tiesha Barba MD

## 2021-06-18 NOTE — PATIENT INSTRUCTIONS - HE
Group Topic: BH Therapeutic Activity    Date: January 8  Start Time:  5:00 PM  End Time:  5:45 PM    Focus: Meal Plan Compliance  Number in attendance: 2    Pt ate 100% and actively engaged in table conversation.  Bonifacio Mccray, MSW, LCSW     Patient Instructions by Ivelisse Burr CNP at 3/2/2020  4:10 PM     Author: Ivelisse Burr CNP Service: -- Author Type: Nurse Practitioner    Filed: 3/2/2020  6:08 PM Encounter Date: 3/2/2020 Status: Addendum    : Ivelisse Burr CNP (Nurse Practitioner)    Related Notes: Original Note by Ivelisse Burr CNP (Nurse Practitioner) filed at 3/2/2020  6:07 PM       Recheck in your clinic later this week regarding back pain.      PLEASE take Tylenol 500 mg every 4-6 hours.  It may keep you out of transitional care :-)     Recheck in 2 days or so.  In-home physical therapy may be helpful.    Constipation: Miralax 1 capful in glass of water daily for at least 1 week and several normal bowel movements in a row.     Fruits and green vegetables, caffeinated coffee, prunes (5-6 prunes or prune juice daily)    If no BM another 2 days or so and pain is worsening or you start vomiting or cannot eat at all, go to emergency room for likely high volume enema.    Your back pain is very low and I do not think it is a kidney infection.    Keep feel elevated as much as possible (coffee table or another chair until special chair arrives).      Urine without infection today.               Patient Education     Treating Constipation    Constipation is a common and often uncomfortable problem. Constipation means you have bowel movements fewer than 3 times per week. Or that you strain to pass hard, dry stool. It can last a short time. Or it can be a problem that never seems to go away. The good news is that it can often be treated and controlled.  Eat more fiber  One of the best ways to help treat constipation is to increase your fiber intake. You can do this either through diet or by using fiber supplements. Fiber (in whole grains, fruits, and vegetables) adds bulk and absorbs water to soften the stool. This helps the stool pass through the colon more easily. When you increase your fiber intake, do it slowly to prevent side  effects such as bloating. Also increase the amount of water that you drink. Eating more of these foods can add fiber to your diet:    High-fiber cereals    Whole grains, bran, and brown rice    Vegetables such as carrots, broccoli, and greens    Fresh fruits (especially apples, pears, and dried fruits such as raisins and apricots)    Nuts and legumes (especially beans such as lentils, kidney beans, and lima beans)  Get physically active  Exercise helps improve the working of your colon which helps ease constipation. Try to get some physical activity every day. If you havent been active for a while, talk with your healthcare provider before starting again.  Laxatives  Your healthcare provider may suggest an over-the-counter product to help ease your constipation. He or she may suggest the use of bulk-forming agents or laxatives. Laxatives, if used as directed, are common and safe. Follow directions carefully when using them. See your provider for new-onset constipation, or long-term constipation, to rule out other causes such as medicines or thyroid disease.  Date Last Reviewed: 7/1/2016 2000-2019 [employer]. [address] [place] [address], [place] [address]. All rights reserved. This information is not intended as a substitute for professional medical care. Always follow your healthcare professional's instructions.

## 2021-06-18 NOTE — PATIENT INSTRUCTIONS - HE
Patient Instructions by Jin Chopra MD at 10/22/2020 11:30 AM     Author: Jin Chopra MD Service: -- Author Type: Physician    Filed: 10/22/2020 12:14 PM Encounter Date: 10/22/2020 Status: Signed    : Jin Chopra MD (Physician)       It was a pleasure to meet with you today.      Below is a summary of your visit.   1. Start taking your levothyroxine as recommended by Dr. Schmitz. You can take it in the middle of the night when you wake up anyway to keep it  from food and other medications  2. Continue your other medications as prescribed  3. Follow up in 6 months or sooner if needed.     Please do not hesitate to call the [facility] clinic with any questions or concerns at [phone].    Sincerely,

## 2021-06-18 NOTE — PATIENT INSTRUCTIONS - HE
Patient Instructions by Jin Chopra MD at 7/22/2020  1:30 PM     Author: Jin Chopra MD Service: -- Author Type: Physician    Filed: 7/22/2020  2:03 PM Encounter Date: 7/22/2020 Status: Signed    : Jin Chopra MD (Physician)       It was a pleasure to speak with you on 7/22/2020.    Below is a summary of our conversation:   1. Discontinue amiodarone  2. Schedule your appointment for the follow-up thyroid blood test now.  3. Wear a holter monitor for 24 hours in about 2.5 months to monitor heart rhythm and extra beats  4. Follow up with me after the monitor results are back with a thyroid blood test prior.    You should receive a phone call from this office informing you of test or procedure results within 3 business days of the test being performed.  If you do not hear from our office with the test results within 1 week please do not hesitate to call asking for these results.     Please do not hesitate to call the [facility] clinic with any questions or concerns at [phone].    Sincerely,

## 2021-06-19 NOTE — LETTER
Letter by Su Scott NP at      Author: Su Scott NP Service: -- Author Type: --    Filed:  Encounter Date: 2019 Status: (Other)         Patient: Kristal Cox   MR Number: [id number]   YOB: 1924   Date of Visit: 2019                 [facility]    DATE: 2019    NAME:  Kristal Cox             :  1924  MRN: [id number]  CODE STATUS:  DNR    VISIT TYPE: Review Of Multiple Medical Conditions     FACILITY:  [facility] [[id number]]       CHIEF COMPLAIN/REASON FOR VISIT:    Chief Complaint   Patient presents with   ? Review Of Multiple Medical Conditions               HISTORY OF PRESENT ILLNESS: Kristal Cox is a 89 y.o. female who was admitted - for fall, right humerus fracture. She tripped and fell while ambulating to the bathroom at home. Ortho recommended non operative treatment. She was place din sling and made NWB. She was discharged to TCU for further rehab. She has PMH of HTN, osteoporosis, anxiety, HLD, IBS. Prior to this she lived at home in a townhouse alone.     Today Ms. Cox is seen for follow up visit today. She is seen laying in bed as she just finished physical therapy. She is tired after doing some exercises. She says her appetite is ok but didn't eat much of her breakfast this morning. She says she is not having any dizziness recently and no issues with her bowels as long as she takes the stool softeners every once in a while. She says otherwise she is sleeping ok and no concerns with anything else. She is not having any headaches or visual changes. She says that her sling is off today and has been trying to wean out of this.     REVIEW OF SYSTEMS:  PROBLEMS AND REVIEW OF SYSTEMS:   Today on ROS:   Currently, no fever, chills, or rigors. Decreased vision and hearing. Denies any chest pain, headaches, palpitations, lightheadedness, dizziness, shortness of breath. Appetite is good.  Denies any GERD symptoms. Denies any difficulty with swallowing, nausea, or vomiting. No insomnia. Positive for minimal right arm pain, leg swelling stable, urinary incontinence at times, constipation intermittent      Allergies   Allergen Reactions   ? Evista [Raloxifene]      Caused blood clot   ? Fosamax [Alendronate] Nausea And Vomiting     Current Outpatient Medications   Medication Sig   ? acetaminophen (TYLENOL) 500 MG tablet Take 1,000 mg by mouth 3 (three) times a day as needed.          ? cholecalciferol, vitamin D3, 1,000 unit tablet Take 2,000 Units by mouth daily.   ? dorzolamide (TRUSOPT) 2 % ophthalmic solution Administer 1 drop to both eyes 2 (two) times a day.          ? lisinopril (PRINIVIL,ZESTRIL) 10 MG tablet Take 20 mg by mouth daily.          ? melatonin 3 mg Tab tablet Take 1 tablet (3 mg total) by mouth at bedtime as needed. (Patient taking differently: Take 6 mg by mouth at bedtime.       )   ? senna-docusate (SENNOSIDES-DOCUSATE SODIUM) 8.6-50 mg tablet Take 1 tablet by mouth 2 (two) times a day as needed. And at bedtime prn            Past Medical History:    Past Medical History:   Diagnosis Date   ? Anxiety    ? Hyperlipidemia    ? IBS (irritable bowel syndrome)    ? Osteoporosis    ? Other abnormal clinical finding     evidence of old septal scar on EKG   ? Systolic hypertension            PHYSICAL EXAMINATION  Vitals:    08/01/19 0700   BP: 125/68   Pulse: (!) 59   Resp: 18   Temp: 97  F (36.1  C)   SpO2: 94%       Today on physical exam:     GENERAL: Awake, Alert, oriented x3, not in any form of acute distress, answers questions appropriately, follows simple commands, conversant  HEENT: Head is normocephalic with normal hair distribution. No evidence of trauma. Ears: No acute purulent discharge. Eyes: Conjunctivae pink with no scleral jaundice. Nose: Normal mucosa and septum. NECK: Supple with no cervical or supraclavicular lymphadenopathy. Trachea is midline. Glasses, [place]  CHEST: No  tenderness or deformity, no crepitus  LUNG: dim to auscultation with good chest expansion. There are no crackles or wheezes, normal AP diameter.   BACK: No kyphosis of the thoracic spine. Symmetric, no curvature, ROM normal, no CVA tenderness, no spinal tenderness   CVS: There is good S1  S2, regular rhythm, there are no murmurs, rubs, gallops, or heaves,  2+ pulses symmetric in all extremities.  ABDOMEN: Rounded and soft, nontender to palpation, non distended, no masses, no organomegaly, good bowel sounds, no rebound or guarding, no peritoneal signs.   EXTREMITIES: Right arm sling, right shoulder trace edema, no edema in fingers, no numb/tingling, cap refill <3 sec, trace ble nonpitting edema  SKIN: Warm and dry, no erythema noted.  Skin color, texture, no rashes or lesions.  NEUROLOGICAL: The patient is oriented to person, place and time. Anxious at times but calm and pleasant today            LABS:   No results found for this or any previous visit (from the past 168 hour(s)).  Results for orders placed or performed in visit on 07/10/19   Basic Metabolic Panel   Result Value Ref Range    Sodium 136 136 - 145 mmol/L    Potassium 3.6 3.5 - 5.0 mmol/L    Chloride 99 98 - 107 mmol/L    CO2 26 22 - 31 mmol/L    Anion Gap, Calculation 11 5 - 18 mmol/L    Glucose 83 70 - 125 mg/dL    Calcium 9.4 8.5 - 10.5 mg/dL    BUN 21 8 - 28 mg/dL    Creatinine 0.78 0.60 - 1.10 mg/dL    GFR MDRD Af Amer >60 >60 mL/min/1.73m2    GFR MDRD Non Af Amer >60 >60 mL/min/1.73m2         Lab Results   Component Value Date    WBC 4.4 07/10/2019    HGB 10.6 (L) 07/10/2019    HCT 31.1 (L) 07/10/2019    MCV 95 07/10/2019     07/10/2019       No results found for: HZIEDYYG25  No results found for: HGBA1C  Lab Results   Component Value Date    INR 1.01 03/04/2016     Vitamin D, Total (25-Hydroxy)   Date Value Ref Range Status   07/10/2019 45.0 30.0 - 80.0 ng/mL Final     Lab Results   Component Value Date    TSH 2.69 04/29/2019            ASSESSMENT/PLAN:    1. Fall, Right humerus fracture: Nonoperative treatment. Right shoulder trace edema, no edema in fingers, no numb/tingling, cap refill < 3 sec. NWB. F/u with ortho on 7/19-PROM, work towards AROM, wean from sling, hold off on pressure for 2 weeks, f/u in 6 weeks on 8/30. Minimal to no pain, on tylenol 1000 three times a day prn. Not using tylenol recently. Weaning out of sling.   2. HTN: SBP 120s. On lisinopril 20mg at bedtime. Hold parameters.   3. Constipation: On miralax daily prn, senna doc prn.     Controlled, intermittent constipation but controlled when uses stool softeners.   4. OAB: improved, urinary incontinence at times.   5. Insomnia: melatonin at bedtime, improved since increasing dose.   6. Vitamin d deficiency: on vitamin d. Vit d 45 on 7/10.   7. Diastolic CHF: weights 3 times weekly-104-104-38-16--65-16eek. trace ble, analilia masseye. dc'd hctz. No shortness of breath today. Appetite good, stable.   8. Viral uri v allergic rhinitis v sinusitis: resolved today.      Per therapy PT - iliana 8/28, SEC 100ft CGA/min A, sit to stand CGA/Mod A,  t/f's SBA/Malinda, bed mob mod A , stairs CGA/Min A OT -  UB SBA/min A, LB SBA, toileting CGA/Min A. Red tag. Recommending 2 weeks. Lives alone in town house with one step    Electronically signed by: Su Scott NP

## 2021-06-19 NOTE — LETTER
Letter by Su Scott NP at      Author: Su Scott NP Service: -- Author Type: --    Filed:  Encounter Date: 2019 Status: (Other)         Patient: Kristal Cox   MR Number: [id number]   YOB: 1924   Date of Visit: 2019                 [facility]    DATE: 2019    NAME:  Kristal Cox             :  1924  MRN: [id number]  CODE STATUS:  DNR    VISIT TYPE: Review Of Multiple Medical Conditions     FACILITY:  [facility] [[id number]]       CHIEF COMPLAIN/REASON FOR VISIT:    Chief Complaint   Patient presents with   ? Review Of Multiple Medical Conditions               HISTORY OF PRESENT ILLNESS: Kristal Cox is a 89 y.o. female who was admitted - for fall, right humerus fracture. She tripped and fell while ambulating to the bathroom at home. Ortho recommended non operative treatment. She was place din sling and made NWB. She was discharged to TCU for further rehab. She has PMH of HTN, osteoporosis, anxiety, HLD, IBS. Prior to this she lived at home in a townhouse alone.     Today Ms. Cox is seen for follow up visit today. She says she thinks she may need a stool softener today but not completely sure. She says she is willing to see how things go on her own today before deciding later today to take one. She doesn't want to get the runs but says she doesn't have the strength to push it out. She says her appetite is good for her and she has never been a big eater. She denies trouble sleeping or concerns in therapy. Her arm is sore but nothing she cannot handle or tolerate. She denies issues with bowels.     REVIEW OF SYSTEMS:  PROBLEMS AND REVIEW OF SYSTEMS:   Today on ROS:   Currently, no fever, chills, or rigors. Decreased vision and hearing. Denies any chest pain, headaches, palpitations, lightheadedness, dizziness, shortness of breath. Appetite is good. Denies any GERD symptoms. Denies any  difficulty with swallowing, nausea, or vomiting. No insomnia. Positive for minimal right arm pain, right arm sling, leg swelling stable, difficulty swallowing pills, urinary incontinence at times, constipation      Allergies   Allergen Reactions   ? Evista [Raloxifene]      Caused blood clot   ? Fosamax [Alendronate] Nausea And Vomiting     Current Outpatient Medications   Medication Sig   ? acetaminophen (TYLENOL) 500 MG tablet Take 1,000 mg by mouth 3 (three) times a day as needed.          ? cholecalciferol, vitamin D3, 1,000 unit tablet Take 2,000 Units by mouth daily.   ? dorzolamide (TRUSOPT) 2 % ophthalmic solution Administer 1 drop to both eyes 2 (two) times a day.          ? lisinopril (PRINIVIL,ZESTRIL) 10 MG tablet Take 20 mg by mouth daily.          ? melatonin 3 mg Tab tablet Take 1 tablet (3 mg total) by mouth at bedtime as needed. (Patient taking differently: Take 6 mg by mouth at bedtime.       )   ? senna-docusate (SENNOSIDES-DOCUSATE SODIUM) 8.6-50 mg tablet Take 1 tablet by mouth 2 (two) times a day as needed. And at bedtime prn            Past Medical History:    Past Medical History:   Diagnosis Date   ? Anxiety    ? Hyperlipidemia    ? IBS (irritable bowel syndrome)    ? Osteoporosis    ? Other abnormal clinical finding     evidence of old septal scar on EKG   ? Systolic hypertension            PHYSICAL EXAMINATION  Vitals:    07/28/19 1927   BP: 134/78   Pulse: 91   Resp: 18   Temp: 98.8  F (37.1  C)   SpO2: 94%   Weight: (!) 96 lb (43.5 kg)       Today on physical exam:     GENERAL: Awake, Alert, oriented x3, not in any form of acute distress, answers questions appropriately, follows simple commands, conversant  HEENT: Head is normocephalic with normal hair distribution. No evidence of trauma. Ears: No acute purulent discharge. Eyes: Conjunctivae pink with no scleral jaundice. Nose: Normal mucosa and septum. NECK: Supple with no cervical or supraclavicular lymphadenopathy. Trachea is midline.  Glasses, [place]  CHEST: No tenderness or deformity, no crepitus  LUNG: dim to auscultation with good chest expansion. There are no crackles or wheezes, normal AP diameter. Cough improved  BACK: No kyphosis of the thoracic spine. Symmetric, no curvature, ROM normal, no CVA tenderness, no spinal tenderness   CVS: There is good S1  S2, regular rhythm, there are no murmurs, rubs, gallops, or heaves,  2+ pulses symmetric in all extremities.  ABDOMEN: Rounded and soft, nontender to palpation, non distended, no masses, no organomegaly, good bowel sounds, no rebound or guarding, no peritoneal signs.   EXTREMITIES: Right arm sling, right shoulder trace edema, no edema in fingers, no numb/tingling, cap refill <3 sec, trace ble nonpitting edema  SKIN: Warm and dry, no erythema noted.  Skin color, texture, no rashes or lesions.  NEUROLOGICAL: The patient is oriented to person, place and time. Anxious at times but calm and pleasant today            LABS:   No results found for this or any previous visit (from the past 168 hour(s)).  Results for orders placed or performed in visit on 07/10/19   Basic Metabolic Panel   Result Value Ref Range    Sodium 136 136 - 145 mmol/L    Potassium 3.6 3.5 - 5.0 mmol/L    Chloride 99 98 - 107 mmol/L    CO2 26 22 - 31 mmol/L    Anion Gap, Calculation 11 5 - 18 mmol/L    Glucose 83 70 - 125 mg/dL    Calcium 9.4 8.5 - 10.5 mg/dL    BUN 21 8 - 28 mg/dL    Creatinine 0.78 0.60 - 1.10 mg/dL    GFR MDRD Af Amer >60 >60 mL/min/1.73m2    GFR MDRD Non Af Amer >60 >60 mL/min/1.73m2         Lab Results   Component Value Date    WBC 4.4 07/10/2019    HGB 10.6 (L) 07/10/2019    HCT 31.1 (L) 07/10/2019    MCV 95 07/10/2019     07/10/2019       No results found for: OSGATPEF29  No results found for: HGBA1C  Lab Results   Component Value Date    INR 1.01 03/04/2016     Vitamin D, Total (25-Hydroxy)   Date Value Ref Range Status   07/10/2019 45.0 30.0 - 80.0 ng/mL Final     Lab Results   Component Value Date     TSH 2.69 04/29/2019           ASSESSMENT/PLAN:    1. Fall, Right humerus fracture: Nonoperative treatment. Right shoulder trace edema, no edema in fingers, no numb/tingling, cap refill < 3 sec. Sling in place, NWB. F/u with ortho on 7/19-PROM, work towards AROM, wean from sling, hold off on pressure for 2 weeks, f/u in 6 weeks. Minimal to no pain, on tylenol 1000 three times a day prn. No concerns today. No recent use of tylenol per chart review.   2. HTN: SBP 130s. On lisinopril 20mg at bedtime. Hold parameters.  Stable today, previously increased lisinopril dose.   3. Constipation: On miralax daily prn, senna doc prn.   Reports possibly feeling a little constipated today. Will wait until later today to decide on prn senna.   4. OAB: improved, urinary incontinence at times.   5. Insomnia: melatonin at bedtime, improved since increasing dose.   6. Vitamin d deficiency: on vitamin d. Vit d 45 on 7/10.   7. Diastolic CHF: weights 3 times weekly-104-104-55-65--95lbs. trace ble, analilia masseye. dc'd hctz. No shortness of breath today. Appetite good, stable.   8. Viral uri v allergic rhinitis v sinusitis: No shortness of breath, sore throat, fevers, chills, sneezing, other symptoms but now resolving. On mucinex. Declines allergy medicine at this time.       Per therapy PT - iliana 8/28, SEC 100ft CGA/min A, sit to stand CGA/Mod A,  t/f's SBA/Malinda, bed mob mod A , stairs CGA/Min A OT -  UB SBA/min A, LB SBA, toileting CGA/Min A. Red tag. Recommending 2 weeks. Lives alone in town house with one step    Electronically signed by: Su Scott NP

## 2021-06-19 NOTE — LETTER
Letter by Vasu Guerra MD at      Author: Vasu Guerra MD Service: -- Author Type: --    Filed:  Encounter Date: 7/30/2019 Status: (Other)         Patient: Kristal Cox   MR Number: [id number]   YOB: 1924   Date of Visit: 7/30/2019     [facility]    Facility:   [facility] [[id number]]   Code Status: DNR/DNI      CHIEF COMPLAINT/REASON FOR VISIT:  Chief Complaint   Patient presents with   ? Review Of Multiple Medical Conditions       HISTORY:      HPI: Kristal is a 89 y.o. female who had a fall that resulted in proximal right humeral fracture.  Orthopedic consultation recommended conservative measures of using a sling.  She does have underlying medical conditions which include hypertension, osteoporosis, anxiety, irritable bowel syndrome, and hyperlipidemia.    Upon current review of systems the orthopedic consultant has allowed more activity with the arm and a weaning away from the sling.  Therapy department mentions that she has had some pain and they are going to look into some other modalities that may help out such as ultrasound.  She is not having other problems such as fevers or chills or cough or shortness of breath or chest pain or new bowel or bladder problems.    Past Medical History:   Diagnosis Date   ? Anxiety    ? Hyperlipidemia    ? IBS (irritable bowel syndrome)    ? Osteoporosis    ? Other abnormal clinical finding     evidence of old septal scar on EKG   ? Systolic hypertension              Family History   Problem Relation Age of Onset   ? Stroke Mother         passed age 75   ? Crohn's disease Father         passed in his 20's from bowel obstructions     Social History     Socioeconomic History   ? Marital status:      Spouse name: Not on file   ? Number of children: Not on file   ? Years of education: Not on file   ? Highest education level: Not on file   Occupational History   ? Not on file   Social Needs   ?  Financial resource strain: Not on file   ? Food insecurity:     Worry: Not on file     Inability: Not on file   ? Transportation needs:     Medical: Not on file     Non-medical: Not on file   Tobacco Use   ? Smoking status: Never Smoker   ? Smokeless tobacco: Never Used   Substance and Sexual Activity   ? Alcohol use: No     Comment: rarely drinks wine, and drinks wine watered down with ice.   ? Drug use: Yes   ? Sexual activity: Not Currently   Lifestyle   ? Physical activity:     Days per week: Not on file     Minutes per session: Not on file   ? Stress: Not on file   Relationships   ? Social connections:     Talks on phone: Not on file     Gets together: Not on file     Attends [employer] service: Not on file     Active member of club or organization: Not on file     Attends meetings of clubs or organizations: Not on file     Relationship status: Not on file   ? Intimate partner violence:     Fear of current or ex partner: Not on file     Emotionally abused: Not on file     Physically abused: Not on file     Forced sexual activity: Not on file   Other Topics Concern   ? Not on file   Social History Narrative     since 2009, has children         Review of Systems    .  Vitals:    07/30/19 1632   BP: 158/65   Pulse: 89   Temp: 97.8  F (36.6  C)   SpO2: 96%       Physical Exam   Constitutional: No distress.   HENT:   Nose: Nose normal.   Eyes: Right eye exhibits no discharge. Left eye exhibits no discharge.   Neck: No JVD present.   Cardiovascular: Normal heart sounds.   Pulmonary/Chest: Breath sounds normal. No respiratory distress.   Neurological: She is alert.   Psychiatric: She has a normal mood and affect.   Nursing note and vitals reviewed.        LABS:   No new laboratory testing.    ASSESSMENT:      ICD-10-CM    1. Closed fracture of proximal end of right humerus with routine healing, unspecified fracture morphology, subsequent encounter S42.201D    2. Fall, subsequent encounter W19.XXXD    3.  Diastolic congestive heart failure, unspecified HF chronicity (H) I50.30    4. Physical deconditioning R53.81        PLAN:    I offered encouragement of doing as much activity as therapy allows to decrease the risk of a frozen shoulder.  Continue with current cares.    Electronically signed by: Vasu Guerra MD

## 2021-06-19 NOTE — PROGRESS NOTES
"OFFICE VISIT NOTE    Subjective:   Chief Complaint:  Follow-up (4 months)    89-year-old woman in for follow-up regarding systolic hypertension, osteoporosis, anxiety, irritable bowel syndrome.  She is fairly stable.  She continues to complain of some bloating and epigastric distress.  Never any melena.  No dysphasia.  No weight loss.  Appetite is about the same.  She denies any orthopnea.  She does take furosemide every other day but frequently will hold that if she is going to be going anywhere she is afraid of urinary frequency.  Denies any orthopnea.  Slight edema of the legs but no increased recently.    Current Outpatient Prescriptions   Medication Sig     cholecalciferol, vitamin D3, 1,000 unit tablet Take 2,000 Units by mouth daily.     diazePAM (VALIUM) 2 MG tablet TAKE 1 TABLET(2 MG) BY MOUTH TWICE DAILY     furosemide (LASIX) 20 MG tablet 1 tablet by mouth every Monday Wednesday and Friday morning     lisinopril (PRINIVIL,ZESTRIL) 10 MG tablet Take 1 tablet (10 mg total) by mouth daily.     ranitidine (ZANTAC) 150 MG tablet Take 150 mg by mouth as needed for heartburn.       PSFHx: Tobacco Status:  She  reports that she has never smoked. She has never used smokeless tobacco.    Review of Systems:  A comprehensive review of systems is negative except for the comments above    Objective:    Pulse 91  Ht 5' 2\" (1.575 m)  Wt 106 lb (48.1 kg)  SpO2 96%  BMI 19.39 kg/m2  GENERAL: No acute distress.  Weight is stable.  Blood pressure today is 136/74.  Pulse 85.  Oxygen saturations 97%.  1+ edema of the ankles at 11:30 in the morning.  Lungs are clear.  Heart shows a sinus rhythm without any ectopic beats.  No significant murmurs per  No jaundice.  No cyanosis  No JVD.  Abdomen is non-distended and non-tender.  No organomegaly.  Mentally she is sharp and alert for age 89.  She ambulates with the use of a cane.  Memory seems excellent.    Assessment & Plan   Kristal Cox is a 89 y.o. " female.    Clinically she remains stable.  Osteoporosis is treated with calcium and vitamin D supplements as well as ambulation as much as possible.  I told her she should continue to take the furosemide every other day as it keeps her blood pressure down and reduces her edema.  Regarding the abdominal pain she can use antacids as needed.  Do not think she requires any further evaluation at this time.    Diagnoses and all orders for this visit:    Osteoporosis    Systolic hypertension  -     Basic Metabolic Panel    Abdominal pain, epigastric  -     HM2(CBC w/o Differential)        The following low BMI interventions were performed this visit: weight gain advised    Tera Iglesias MD  Transcription using voice recognition software, may contain typographical errors.

## 2021-06-19 NOTE — LETTER
Letter by Su Scott NP at      Author: Su Scott NP Service: -- Author Type: --    Filed:  Encounter Date: 2019 Status: (Other)         Patient: Kristal Cox   MR Number: [id number]   YOB: 1924   Date of Visit: 2019                 [facility]    DATE: 2019    NAME:  Kristal Cox             :  1924  MRN: [id number]  CODE STATUS:  DNR    VISIT TYPE: Review Of Multiple Medical Conditions     FACILITY:  [facility] [[id number]]       CHIEF COMPLAIN/REASON FOR VISIT:    Chief Complaint   Patient presents with   ? Review Of Multiple Medical Conditions               HISTORY OF PRESENT ILLNESS: Kristal Cox is a 89 y.o. female who was admitted - for fall, right humerus fracture. She tripped and fell while ambulating to the bathroom at home. Ortho recommended non operative treatment. She was place din sling and made NWB. She was discharged to TCU for further rehab. She has PMH of HTN, osteoporosis, anxiety, HLD, IBS. Prior to this she lived at home in a townhouse alone.     Today Ms. Cox is seen for review of systems. She reports having some issues with phlegm. She doesn't really feel like she is having allergy issues but this cough and mucous started a few days ago. She is not really able to blow her nose and feels like it drains some into her throat and then she tries to cough it up but has a hard time doing this. She does not feel short of breath and has no sore throat or headache. She denies fevers, chills, sneezing, or other associated symptoms. She says other than this she started having some aching pain when she takes a deep breath on her left side. She told therapy about it and they were working on it today. She just noticed this yesterday. She is not sure what it is or what it is from. She denies any issues with bowels and had one yesterday. She is not having any chest pain or stomach  upset. She denies nausea or other concerns. Her appetite is about the same.     REVIEW OF SYSTEMS:  PROBLEMS AND REVIEW OF SYSTEMS:   Today on ROS:   Currently, no fever, chills, or rigors. Decreased vision and hearing. Denies any chest pain, headaches, palpitations, lightheadedness, dizziness, shortness of breath. Appetite is good. Denies any GERD symptoms. Denies any difficulty with swallowing, nausea, or vomiting. No insomnia. Positive for minimal right arm pain, right arm sling, leg swelling stable, difficulty swallowing pills, urinary incontinence at times, congested cough      Allergies   Allergen Reactions   ? Evista [Raloxifene]      Caused blood clot   ? Fosamax [Alendronate] Nausea And Vomiting     Current Outpatient Medications   Medication Sig   ? acetaminophen (TYLENOL) 500 MG tablet Take 1,000 mg by mouth 3 (three) times a day as needed.          ? cholecalciferol, vitamin D3, 1,000 unit tablet Take 2,000 Units by mouth daily.   ? dorzolamide (TRUSOPT) 2 % ophthalmic solution Administer 1 drop to both eyes 2 (two) times a day.          ? lisinopril (PRINIVIL,ZESTRIL) 10 MG tablet Take 20 mg by mouth daily.          ? melatonin 3 mg Tab tablet Take 1 tablet (3 mg total) by mouth at bedtime as needed. (Patient taking differently: Take 6 mg by mouth at bedtime.       )   ? senna-docusate (SENNOSIDES-DOCUSATE SODIUM) 8.6-50 mg tablet Take 1 tablet by mouth 2 (two) times a day as needed. And at bedtime prn            Past Medical History:    Past Medical History:   Diagnosis Date   ? Anxiety    ? Hyperlipidemia    ? IBS (irritable bowel syndrome)    ? Osteoporosis    ? Other abnormal clinical finding     evidence of old septal scar on EKG   ? Systolic hypertension            PHYSICAL EXAMINATION  Vitals:    07/24/19 2242   BP: 117/63   Pulse: (!) 104   Resp: 18   Temp: 98.5  F (36.9  C)   SpO2: 95%   Weight: (!) 95 lb (43.1 kg)       Today on physical exam:     GENERAL: Awake, Alert, oriented x3, not in any  form of acute distress, answers questions appropriately, follows simple commands, conversant  HEENT: Head is normocephalic with normal hair distribution. No evidence of trauma. Ears: No acute purulent discharge. Eyes: Conjunctivae pink with no scleral jaundice. Nose: Normal mucosa and septum. NECK: Supple with no cervical or supraclavicular lymphadenopathy. Trachea is midline. Glasses, [place]  CHEST: No tenderness or deformity, no crepitus  LUNG: dim to auscultation with good chest expansion. There are no crackles or wheezes, normal AP diameter. Congested cough at times, white to clear phlegm at times, tender to touch over left flank/intercostal spaces with deep breathing, reproducible on exam to palpation  BACK: No kyphosis of the thoracic spine. Symmetric, no curvature, ROM normal, no CVA tenderness, no spinal tenderness   CVS: There is good S1  S2, regular rhythm, there are no murmurs, rubs, gallops, or heaves,  2+ pulses symmetric in all extremities.  ABDOMEN: Rounded and soft, nontender to palpation, non distended, no masses, no organomegaly, good bowel sounds, no rebound or guarding, no peritoneal signs.   EXTREMITIES: Right arm sling, right shoulder trace edema, no edema in fingers, no numb/tingling, cap refill <3 sec, trace ble nonpitting edema  SKIN: Warm and dry, no erythema noted.  Skin color, texture, no rashes or lesions.  NEUROLOGICAL: The patient is oriented to person, place and time. Anxious at times but calm and pleasant today            LABS:   No results found for this or any previous visit (from the past 168 hour(s)).  Results for orders placed or performed in visit on 07/10/19   Basic Metabolic Panel   Result Value Ref Range    Sodium 136 136 - 145 mmol/L    Potassium 3.6 3.5 - 5.0 mmol/L    Chloride 99 98 - 107 mmol/L    CO2 26 22 - 31 mmol/L    Anion Gap, Calculation 11 5 - 18 mmol/L    Glucose 83 70 - 125 mg/dL    Calcium 9.4 8.5 - 10.5 mg/dL    BUN 21 8 - 28 mg/dL    Creatinine 0.78 0.60 - 1.10  mg/dL    GFR MDRD Af Amer >60 >60 mL/min/1.73m2    GFR MDRD Non Af Amer >60 >60 mL/min/1.73m2         Lab Results   Component Value Date    WBC 4.4 07/10/2019    HGB 10.6 (L) 07/10/2019    HCT 31.1 (L) 07/10/2019    MCV 95 07/10/2019     07/10/2019       No results found for: [id number]  No results found for: HGBA1C  Lab Results   Component Value Date    INR 1.01 03/04/2016     Vitamin D, Total (25-Hydroxy)   Date Value Ref Range Status   07/10/2019 45.0 30.0 - 80.0 ng/mL Final     Lab Results   Component Value Date    TSH 2.69 04/29/2019           ASSESSMENT/PLAN:    1. Fall, Right humerus fracture: Nonoperative treatment. Right shoulder trace edema, no edema in fingers, no numb/tingling, cap refill < 3 sec. Sling in place, NWB. F/u with ortho on 7/19-PROM, work towards AROM, wean from sling, hold off on pressure for 2 weeks, f/u in 6 weeks. Minimal to no pain, on tylenol 1000 three times a day prn. No concerns today.   2. HTN: SBP 110s. On lisinopril 20mg at bedtime. Hold parameters.  Stable today, previously increased lisinopril dose.   3. Constipation: On miralax daily prn, senna doc prn.   Stable. bm yesterday.   4. OAB: improved, urinary incontinence at times.   5. Insomnia: melatonin at bedtime, improved since increasing dose.   6. Vitamin d deficiency: on vitamin d. Vit d 45 on 7/10.   7. Diastolic CHF: weights 3 times weekly-104-104-85-79--95lbs. trace ble, analilia hose. dc'd hctz. No shortness of breath today. Appetite good, stable.   8. Viral uri v allergic rhinitis v sinusitis: No shortness of breath, sore throat, fevers, chills, sneezing, other symptoms. Only having some nasal drainage and cough. Will order mucinex two times a day x 5 days. Declines allergy medicine at this time.       Per therapy PT - iliana 8/28, SEC 100ft min A LOB x 2, t/f's SBA/Malinda, bed mob mod A , stairs Min A OT -  UB min A, LB CGA, toileting Min A. Recommending 1-2 weeks, red tag, Lives alone in town home with 1  step    Electronically signed by: Su Scott NP

## 2021-06-19 NOTE — LETTER
Letter by Su Scott NP at      Author: Su Scott NP Service: -- Author Type: --    Filed:  Encounter Date: 2019 Status: (Other)         Patient: Kristal Cox   MR Number: [id number]   YOB: 1924   Date of Visit: 2019                 [facility]    DATE: 2019    NAME:  Kristal Cox             :  1924  MRN: [id number]  CODE STATUS:  DNR    VISIT TYPE: Review Of Multiple Medical Conditions     FACILITY:  [facility] [[id number]]       CHIEF COMPLAIN/REASON FOR VISIT:    Chief Complaint   Patient presents with   ? Review Of Multiple Medical Conditions               HISTORY OF PRESENT ILLNESS: Kristal Cox is a 89 y.o. female who was admitted - for fall, right humerus fracture. She tripped and fell while ambulating to the bathroom at home. Ortho recommended non operative treatment. She was place din sling and made NWB. She was discharged to TCU for further rehab. She has PMH of HTN, osteoporosis, anxiety, HLD, IBS. Prior to this she lived at home in a townhouse alone.     Today Ms. Cox states she is not always sleeping well. She is tired today and asked to lay down after her therapies. She does not want anything to help her sleep but is not sure if she is on something. She says her bowels are moving fine but they keep trying to give her more stool softeners. She does not want them and has been refusing. She thinks they are moving fine without them. She thinks her pain is doing fine and not asking for pain meds that she can recall. On review of chart she has not used tylenol at all recently other than scheduled dose at bedtime. She says the swelling in her arm and legs is much better today. She is not having to urinate as often being off the water pill and she appreciates this. She denies any dizziness or other concerns today. Per staff has been consistently refusing bowel meds last day or two.      REVIEW OF SYSTEMS:  PROBLEMS AND REVIEW OF SYSTEMS:   Today on ROS:   Currently, no fever, chills, or rigors. Decreased vision and hearing. Denies any chest pain, headaches, palpitations, lightheadedness, dizziness, shortness of breath, or cough. Appetite is good. Denies any GERD symptoms. Denies any difficulty with swallowing, nausea, or vomiting. No insomnia. Positive for right arm pain, right arm sling, leg swelling, difficulty swallowing pills, urinary incontinence, hesitancy improved      Allergies   Allergen Reactions   ? Evista [Raloxifene]      Caused blood clot   ? Fosamax [Alendronate] Nausea And Vomiting     Current Outpatient Medications   Medication Sig   ? acetaminophen (TYLENOL) 500 MG tablet Take 1,000 mg by mouth at bedtime. And three times a day prn   ? cholecalciferol, vitamin D3, 1,000 unit tablet Take 2,000 Units by mouth daily.   ? dorzolamide (TRUSOPT) 2 % ophthalmic solution Administer 1 drop to both eyes 2 (two) times a day.          ? lisinopril (PRINIVIL,ZESTRIL) 10 MG tablet Take 15 mg by mouth daily.          ? melatonin 3 mg Tab tablet Take 1 tablet (3 mg total) by mouth at bedtime as needed. (Patient taking differently: Take 3 mg by mouth at bedtime.       )   ? polyethylene glycol (MIRALAX) 17 gram packet Take 1 packet (17 g total) by mouth daily. (Patient taking differently: Take 17 g by mouth daily as needed.       )   ? ranitidine (ZANTAC) 150 MG tablet Take 150 mg by mouth as needed for heartburn.   ? senna-docusate (SENNOSIDES-DOCUSATE SODIUM) 8.6-50 mg tablet Take 1 tablet by mouth 2 (two) times a day as needed. And at bedtime prn            Past Medical History:    Past Medical History:   Diagnosis Date   ? Anxiety    ? Hyperlipidemia    ? IBS (irritable bowel syndrome)    ? Osteoporosis    ? Other abnormal clinical finding     evidence of old septal scar on EKG   ? Systolic hypertension            PHYSICAL EXAMINATION  Vitals:    07/10/19 2216   BP: 118/67   Pulse: 89    Resp: 16   Temp: 97.7  F (36.5  C)   SpO2: 96%   Weight: (!) 98 lb (44.5 kg)       Today on physical exam:     GENERAL: Awake, Alert, oriented x3, not in any form of acute distress, answers questions appropriately, follows simple commands, conversant  HEENT: Head is normocephalic with normal hair distribution. No evidence of trauma. Ears: No acute purulent discharge. Eyes: Conjunctivae pink with no scleral jaundice. Nose: Normal mucosa and septum. NECK: Supple with no cervical or supraclavicular lymphadenopathy. Trachea is midline. Glasses, [place]  CHEST: No tenderness or deformity, no crepitus  LUNG: dim to auscultation with good chest expansion. There are no crackles or wheezes, normal AP diameter.  BACK: No kyphosis of the thoracic spine. Symmetric, no curvature, ROM normal, no CVA tenderness, no spinal tenderness   CVS: There is good S1  S2, regular rhythm, there are no murmurs, rubs, gallops, or heaves,  2+ pulses symmetric in all extremities.  ABDOMEN: Rounded and soft, nontender to palpation, non distended, no masses, no organomegaly, good bowel sounds, no rebound or guarding, no peritoneal signs.   EXTREMITIES: Right arm sling, right shoulder trace edema, trace in fingers, no numb/tingling, cap refill <3 sec, trace ble nonpitting edema  SKIN: Warm and dry, no erythema noted.  Skin color, texture, no rashes or lesions.  NEUROLOGICAL: The patient is oriented to person, place and time. Anxious at times but calm and pleasant today            LABS:   Recent Results (from the past 168 hour(s))   Basic Metabolic Panel   Result Value Ref Range    Sodium 136 136 - 145 mmol/L    Potassium 3.6 3.5 - 5.0 mmol/L    Chloride 99 98 - 107 mmol/L    CO2 26 22 - 31 mmol/L    Anion Gap, Calculation 11 5 - 18 mmol/L    Glucose 83 70 - 125 mg/dL    Calcium 9.4 8.5 - 10.5 mg/dL    BUN 21 8 - 28 mg/dL    Creatinine 0.78 0.60 - 1.10 mg/dL    GFR MDRD Af Amer >60 >60 mL/min/1.73m2    GFR MDRD Non Af Amer >60 >60 mL/min/1.73m2    Vitamin D, Total (25-Hydroxy)   Result Value Ref Range    Vitamin D, Total (25-Hydroxy) 45.0 30.0 - 80.0 ng/mL   HM1 (CBC with Diff)   Result Value Ref Range    WBC 4.4 4.0 - 11.0 thou/uL    RBC 3.26 (L) 3.80 - 5.40 mill/uL    Hemoglobin 10.6 (L) 12.0 - 16.0 g/dL    Hematocrit 31.1 (L) 35.0 - 47.0 %    MCV 95 80 - 100 fL    MCH 32.5 27.0 - 34.0 pg    MCHC 34.1 32.0 - 36.0 g/dL    RDW 13.2 11.0 - 14.5 %    Platelets 177 140 - 440 thou/uL    MPV 10.8 8.5 - 12.5 fL    Neutrophils % 47 (L) 50 - 70 %    Lymphocytes % 32 20 - 40 %    Monocytes % 17 (H) 2 - 10 %    Eosinophils % 3 0 - 6 %    Basophils % 1 0 - 2 %    Neutrophils Absolute 2.1 2.0 - 7.7 thou/uL    Lymphocytes Absolute 1.4 0.8 - 4.4 thou/uL    Monocytes Absolute 0.8 0.0 - 0.9 thou/uL    Eosinophils Absolute 0.1 0.0 - 0.4 thou/uL    Basophils Absolute 0.0 0.0 - 0.2 thou/uL     Results for orders placed or performed in visit on 07/10/19   Basic Metabolic Panel   Result Value Ref Range    Sodium 136 136 - 145 mmol/L    Potassium 3.6 3.5 - 5.0 mmol/L    Chloride 99 98 - 107 mmol/L    CO2 26 22 - 31 mmol/L    Anion Gap, Calculation 11 5 - 18 mmol/L    Glucose 83 70 - 125 mg/dL    Calcium 9.4 8.5 - 10.5 mg/dL    BUN 21 8 - 28 mg/dL    Creatinine 0.78 0.60 - 1.10 mg/dL    GFR MDRD Af Amer >60 >60 mL/min/1.73m2    GFR MDRD Non Af Amer >60 >60 mL/min/1.73m2         Lab Results   Component Value Date    WBC 4.4 07/10/2019    HGB 10.6 (L) 07/10/2019    HCT 31.1 (L) 07/10/2019    MCV 95 07/10/2019     07/10/2019       No results found for: [id number]  No results found for: HGBA1C  Lab Results   Component Value Date    INR 1.01 03/04/2016     Vitamin D, Total (25-Hydroxy)   Date Value Ref Range Status   07/10/2019 45.0 30.0 - 80.0 ng/mL Final     Lab Results   Component Value Date    TSH 2.69 04/29/2019           ASSESSMENT/PLAN:    1. Fall, Right humerus fracture: Nonoperative treatment. Right shoulder trace edema, trace in fingers, no numb/tingling, cap refill <  3 sec. Sling in place, NWB. F/u with ortho in 2 weeks. Minimal to no pain, on tylenol 1000 at bedtime and three times a day prn. Not using prn dose.   2. HTN: SBP 110s. On lisinopril 15mg at bedtime. Hold parameters.  Stable off hctz.   3. Constipation: On miralax daily, senna doc daily and at bedtime prn. Will change miralax and senna to prn.    4. GERD: On ranitidine daily prn. Not recently used.   5. Insomnia: melatonin at bedtime  6. Vitamin d deficiency: on vitamin d. Vit d 45 on 7/10.   7. Diastolic CHF: weights 3 times weekly-104-104-98lbs. trace bleanalilia. dc'd hctz per her request. Will monitor. No shortness of breath today. Unsure if weight accurate, significant weight loss. Stopped hctz and edema is improved today.       Per therapy eval this week    Electronically signed by: Su Scott NP

## 2021-06-19 NOTE — LETTER
Letter by Su Scott NP at      Author: Su Scott NP Service: -- Author Type: --    Filed:  Encounter Date: 8/15/2019 Status: (Other)         Patient: Kristal Cox   MR Number: [id number]   YOB: 1924   Date of Visit: 8/15/2019                 [facility]    DATE: 8/15/2019    NAME:  Kristal Cox             :  1924  MRN: [id number]  CODE STATUS:  DNR    VISIT TYPE: Review Of Multiple Medical Conditions     FACILITY:  [facility] [[id number]]       CHIEF COMPLAIN/REASON FOR VISIT:    Chief Complaint   Patient presents with   ? Review Of Multiple Medical Conditions               HISTORY OF PRESENT ILLNESS: Kristal Cox is a 89 y.o. female who was admitted - for fall, right humerus fracture. She tripped and fell while ambulating to the bathroom at home. Ortho recommended non operative treatment. She was place din sling and made NWB. She was discharged to TCU for further rehab. She has PMH of HTN, osteoporosis, anxiety, HLD, IBS. Prior to this she lived at home in a townhouse alone.     Today Ms. Cox is seen for follow up visit. Staff do note she broke out into rash and itching again yesterday and had to change her clothes. She did use the sarna lotion because hydrocortisone cream had not come from pharmacy yet and this did help some. Today she has not been complaining of itching. They are concerned this may be from the laundry detergent on her clothes and they notified her daughter again. Kristal says she is not itching but not sure if she has a rash still today. She is wondering if her daughter uses more laundry detergent than what she does at home. It is under the clothes material that she itches and not anywhere else. She feels better when she changes out of her clothes. She says she did not sleep well because of itching last night. She feels her daughter has a lot going on and is afraid this is going to  add more stress to her plate since she was kind enough to already be washing her clothes for her. She says the lotion yesterday did help some. She is wondering if she had a stool softener this morning because she had a bm and doesn't think she needs one. We discussed that she has really struggled with constipation and should take it every day to keep regular. She says she has no breathing issues and appetite is fine. She is concerned about her right eye itching and irritated. There was a little bit of drainage on it this morning too and wonders if she is coming down with something.     REVIEW OF SYSTEMS:  PROBLEMS AND REVIEW OF SYSTEMS:   Today on ROS:   Currently, no fever, chills, or rigors. Decreased vision and hearing. Denies any chest pain, headaches, palpitations, lightheadedness, dizziness, shortness of breath. Appetite is good. Denies any GERD symptoms. Denies any difficulty with swallowing, nausea, or vomiting. No insomnia. Positive for minimal to no right arm pain, leg swelling improved, urinary incontinence at times,  hair thinning and loss, rash and itching periodically, right eye itching, irritation, drainage, bowels moving better      Allergies   Allergen Reactions   ? Evista [Raloxifene]      Caused blood clot   ? Fosamax [Alendronate] Nausea And Vomiting     Current Outpatient Medications   Medication Sig   ? acetaminophen (TYLENOL) 500 MG tablet Take 1,000 mg by mouth 3 (three) times a day as needed.          ? camphor-menthol (SARNA) lotion Apply 1 application topically every 6 (six) hours as needed for itching.   ? cholecalciferol, vitamin D3, 1,000 unit tablet Take 2,000 Units by mouth daily.   ? dorzolamide (TRUSOPT) 2 % ophthalmic solution Administer 1 drop to both eyes 2 (two) times a day.          ? hydrocortisone 1 % cream Apply 1 application topically every 6 (six) hours as needed.   ? lisinopril (PRINIVIL,ZESTRIL) 10 MG tablet Take 20 mg by mouth daily.          ? melatonin 3 mg Tab tablet  Take 1 tablet (3 mg total) by mouth at bedtime as needed. (Patient taking differently: Take 6 mg by mouth at bedtime.       )   ? polyethylene glycol (MIRALAX) 17 gram packet Take 17 g by mouth daily.   ? senna-docusate (SENNOSIDES-DOCUSATE SODIUM) 8.6-50 mg tablet Take 1 tablet by mouth daily. And at bedtime prn            Past Medical History:    Past Medical History:   Diagnosis Date   ? Anxiety    ? Hyperlipidemia    ? IBS (irritable bowel syndrome)    ? Osteoporosis    ? Other abnormal clinical finding     evidence of old septal scar on EKG   ? Systolic hypertension            PHYSICAL EXAMINATION  Vitals:    08/14/19 2132   BP: 113/61   Pulse: 75   Resp: 18   Temp: 98.6  F (37  C)   SpO2: 95%   Weight: (!) 94 lb (42.6 kg)       Today on physical exam:     GENERAL: Awake, Alert, oriented x3, not in any form of acute distress, answers questions appropriately, follows simple commands, conversant  HEENT: Head is normocephalic with normal hair distribution. No evidence of trauma. Ears: No acute purulent discharge. Eyes: Conjunctivae pink with no scleral jaundice. Nose: Normal mucosa and septum. NECK: Supple with no cervical or supraclavicular lymphadenopathy. Trachea is midline. Glasses, [place], hair thinning, loss, right eye mild erythema, small amount of clear to yellow stringy drainage, itching present  CHEST: No tenderness or deformity, no crepitus  LUNG: dim to auscultation with good chest expansion. There are no crackles or wheezes, normal AP diameter.   BACK: No kyphosis of the thoracic spine. Symmetric, no curvature, ROM normal, no CVA tenderness, no spinal tenderness   CVS: There is good S1  S2, regular rhythm, there are no murmurs, rubs, gallops, or heaves,  2+ pulses symmetric in all extremities.  ABDOMEN: Rounded and soft, nontender to palpation, non distended, no masses, no organomegaly, good bowel sounds, no rebound or guarding, no peritoneal signs.   EXTREMITIES: right shoulder no edema, no edema in  fingers, no numb/tingling, cap refill <3 sec, trace ble nonpitting edema, analilia hose  SKIN: Warm and dry, no erythema noted.  Skin color, texture, no rashes or lesions.  NEUROLOGICAL: The patient is oriented to person, place and time. Calm and pleasant            LABS:   Recent Results (from the past 168 hour(s))   Thyroid Stimulating Hormone (TSH)   Result Value Ref Range    TSH 2.40 0.30 - 5.00 uIU/mL   Vitamin D, Total (25-Hydroxy)   Result Value Ref Range    Vitamin D, Total (25-Hydroxy) 49.0 30.0 - 80.0 ng/mL   Comprehensive Metabolic Panel   Result Value Ref Range    Sodium 138 136 - 145 mmol/L    Potassium 4.0 3.5 - 5.0 mmol/L    Chloride 106 98 - 107 mmol/L    CO2 27 22 - 31 mmol/L    Anion Gap, Calculation 5 5 - 18 mmol/L    Glucose 79 70 - 125 mg/dL    BUN 18 8 - 28 mg/dL    Creatinine 0.74 0.60 - 1.10 mg/dL    GFR MDRD Af Amer >60 >60 mL/min/1.73m2    GFR MDRD Non Af Amer >60 >60 mL/min/1.73m2    Bilirubin, Total 0.4 0.0 - 1.0 mg/dL    Calcium 9.5 8.5 - 10.5 mg/dL    Protein, Total 5.8 (L) 6.0 - 8.0 g/dL    Albumin 3.1 (L) 3.5 - 5.0 g/dL    Alkaline Phosphatase 82 45 - 120 U/L    AST 17 0 - 40 U/L    ALT 11 0 - 45 U/L   Vitamin B12   Result Value Ref Range    Vitamin B-12 320 213 - 816 pg/mL   Iron   Result Value Ref Range    Iron 61 42 - 175 ug/dL   Ferritin   Result Value Ref Range    Ferritin 136 (H) 10 - 130 ng/mL   HM1 (CBC with Diff)   Result Value Ref Range    WBC 5.3 4.0 - 11.0 thou/uL    RBC 3.72 (L) 3.80 - 5.40 mill/uL    Hemoglobin 12.0 12.0 - 16.0 g/dL    Hematocrit 37.0 35.0 - 47.0 %     80 - 100 fL    MCH 32.3 27.0 - 34.0 pg    MCHC 32.4 32.0 - 36.0 g/dL    RDW 13.9 11.0 - 14.5 %    Platelets 171 140 - 440 thou/uL    MPV 11.4 8.5 - 12.5 fL    Neutrophils % 57 50 - 70 %    Lymphocytes % 27 20 - 40 %    Monocytes % 12 (H) 2 - 10 %    Eosinophils % 3 0 - 6 %    Basophils % 1 0 - 2 %    Neutrophils Absolute 3.1 2.0 - 7.7 thou/uL    Lymphocytes Absolute 1.4 0.8 - 4.4 thou/uL    Monocytes  Absolute 0.7 0.0 - 0.9 thou/uL    Eosinophils Absolute 0.2 0.0 - 0.4 thou/uL    Basophils Absolute 0.1 0.0 - 0.2 thou/uL     Results for orders placed or performed in visit on 07/10/19   Basic Metabolic Panel   Result Value Ref Range    Sodium 136 136 - 145 mmol/L    Potassium 3.6 3.5 - 5.0 mmol/L    Chloride 99 98 - 107 mmol/L    CO2 26 22 - 31 mmol/L    Anion Gap, Calculation 11 5 - 18 mmol/L    Glucose 83 70 - 125 mg/dL    Calcium 9.4 8.5 - 10.5 mg/dL    BUN 21 8 - 28 mg/dL    Creatinine 0.78 0.60 - 1.10 mg/dL    GFR MDRD Af Amer >60 >60 mL/min/1.73m2    GFR MDRD Non Af Amer >60 >60 mL/min/1.73m2         Lab Results   Component Value Date    WBC 5.3 08/12/2019    HGB 12.0 08/12/2019    HCT 37.0 08/12/2019     08/12/2019     08/12/2019       Lab Results   Component Value Date    [id number] 320 08/12/2019     No results found for: HGBA1C  Lab Results   Component Value Date    INR 1.01 03/04/2016     Vitamin D, Total (25-Hydroxy)   Date Value Ref Range Status   08/12/2019 49.0 30.0 - 80.0 ng/mL Final     Lab Results   Component Value Date    TSH 2.40 08/12/2019           ASSESSMENT/PLAN:    1. Fall, Right humerus fracture: Nonoperative treatment. Right shoulder trace edema, no edema in fingers, no numb/tingling, cap refill < 3 sec. F/u with ortho on 7/19-PROM, work towards AROM, wean from sling, hold off on pressure for 2 weeks, f/u in 6 weeks on 8/30. Minimal to no pain, on tylenol 1000 three times a day prn. No concerns today.   2. HTN: SBP 110s. On lisinopril 20mg at bedtime. Hold parameters.   3. Constipation: On miralax daily, senna doc daily and at bedtime prn.  Eating prunes, managing diet. Wanting to refuse stool softener today because she went and discussed she probably went because had stool softener and has struggled with constipation. Recommended she take the stool softeners regularly so has bowel movements regularly.   4. OAB: stable, nocturia, incontinence intermittent.   5. Insomnia:  melatonin at bedtime. No concerns, did have some trouble sleeping last night related to pruritus.   6. Vitamin d deficiency: on vitamin d. Vit d 45 on 7/10.   7. Diastolic CHF: weights 3 times weekly-104-104-49-53--52-31-35-41-09-94lbs. trace ble, analilia casas. dc'd hctz. No shortness of breath today or recently. Appetite fair-good.   8. Alopecia: unknown cause at this time, no associated symptoms, rashes, pruritus. checked cmp, hm1, iron, ferritin, vit d, vit b12, tsh 8/12 and all stable, ferritin slightly elevated but iron levels normal. Will defer checking for hormonal abnormality prolactin level, progesterone, cortisol to pcp for referral to endocrine if needed. Follow up with pcp regarding this after discharge. May pursue endocrine workup if she is concerned as outpatient.   9. Macular rash, pruritus: two episodes now, both relating to clothing. Daughter doing laundry and suspecting related to laundry detergent, felt better and resolved after changing clothes. Using sarna lotion as needed. HC cream came from pharmacy today also prn. No rash today on skin check.   10. Right eye allergic conjunctivitis: stringly, yellow drainage, mild irritation and itching. Ordered alaway two times a day x 3 days. Also ordered to cleanse with mild soap and wash daily due to drainage.     Per therapy  PT - iliana 19/28, [id number] SBA, sit to stand CGA,  t/f's CGA, bed mob S , stairs CGA OT -  UB set up/SBA, LB SBA, toileting SBA. Red tag. Lives alone in town home with 1 step. PT/OT- 1-2 depending on the home assessment. Recommending lifeline    Electronically signed by: Su Scott NP

## 2021-06-19 NOTE — LETTER
Letter by Su Scott NP at      Author: Su Scott NP Service: -- Author Type: --    Filed:  Encounter Date: 2019 Status: (Other)         Patient: Kristal Cox   MR Number: [id number]   YOB: 1924   Date of Visit: 2019                 [facility]    DATE: 2019    NAME:  Kristal Cox             :  1924  MRN: [id number]  CODE STATUS:  DNR    VISIT TYPE: Review Of Multiple Medical Conditions     FACILITY:  [facility] [[id number]]       CHIEF COMPLAIN/REASON FOR VISIT:    Chief Complaint   Patient presents with   ? Review Of Multiple Medical Conditions               HISTORY OF PRESENT ILLNESS: Kristal Cox is a 89 y.o. female who was admitted - for fall, right humerus fracture. She tripped and fell while ambulating to the bathroom at home. Ortho recommended non operative treatment. She was place din sling and made NWB. She was discharged to TCU for further rehab. She has PMH of HTN, osteoporosis, anxiety, HLD, IBS. Prior to this she lived at home in a townhouse alone.     Today Ms. Cox is seen for follow up visit today. She says her pain has been doing well and she doesn't think she needs the tylenol at bedtime at night anymore. She just would like it as needed. She says her bowels are moving well and doesn't need the stool softeners. She says she is still having the urinary frequency and gets up often at night to go to the bathroom. However she says it was better last night than before. She does not want to try any meds at this time and is relieved to hear it is not an infection. She says she is not sleeping well and would like her melatonin increased if possible. She does not like to take meds though. She is not having any stomach upset and not used the ranitidine at all. Her weights are stable and vitals are good. Her blood pressures are better controlled now. She denies any other concerns  today.     REVIEW OF SYSTEMS:  PROBLEMS AND REVIEW OF SYSTEMS:   Today on ROS:   Currently, no fever, chills, or rigors. Decreased vision and hearing. Denies any chest pain, headaches, palpitations, lightheadedness, dizziness, shortness of breath, or cough. Appetite is good. Denies any GERD symptoms. Denies any difficulty with swallowing, nausea, or vomiting. No insomnia. Positive for minimal right arm pain, right arm sling, leg swelling improved, difficulty swallowing pills, urinary incontinence, urgency, frequency, small amounts, dysuria mildly improved      Allergies   Allergen Reactions   ? Evista [Raloxifene]      Caused blood clot   ? Fosamax [Alendronate] Nausea And Vomiting     Current Outpatient Medications   Medication Sig   ? acetaminophen (TYLENOL) 500 MG tablet Take 1,000 mg by mouth 3 (three) times a day as needed.          ? cholecalciferol, vitamin D3, 1,000 unit tablet Take 2,000 Units by mouth daily.   ? dorzolamide (TRUSOPT) 2 % ophthalmic solution Administer 1 drop to both eyes 2 (two) times a day.          ? lisinopril (PRINIVIL,ZESTRIL) 10 MG tablet Take 20 mg by mouth daily.          ? melatonin 3 mg Tab tablet Take 1 tablet (3 mg total) by mouth at bedtime as needed. (Patient taking differently: Take 6 mg by mouth at bedtime.       )   ? senna-docusate (SENNOSIDES-DOCUSATE SODIUM) 8.6-50 mg tablet Take 1 tablet by mouth 2 (two) times a day as needed. And at bedtime prn            Past Medical History:    Past Medical History:   Diagnosis Date   ? Anxiety    ? Hyperlipidemia    ? IBS (irritable bowel syndrome)    ? Osteoporosis    ? Other abnormal clinical finding     evidence of old septal scar on EKG   ? Systolic hypertension            PHYSICAL EXAMINATION  Vitals:    07/18/19 2224   BP: 119/76   Pulse: (!) 101   Resp: 18   Temp: 98.3  F (36.8  C)   SpO2: 94%   Weight: (!) 95 lb (43.1 kg)       Today on physical exam:     GENERAL: Awake, Alert, oriented x3, not in any form of acute  distress, answers questions appropriately, follows simple commands, conversant  HEENT: Head is normocephalic with normal hair distribution. No evidence of trauma. Ears: No acute purulent discharge. Eyes: Conjunctivae pink with no scleral jaundice. Nose: Normal mucosa and septum. NECK: Supple with no cervical or supraclavicular lymphadenopathy. Trachea is midline. Glasses, [place]  CHEST: No tenderness or deformity, no crepitus  LUNG: dim to auscultation with good chest expansion. There are no crackles or wheezes, normal AP diameter.  BACK: No kyphosis of the thoracic spine. Symmetric, no curvature, ROM normal, no CVA tenderness, no spinal tenderness   CVS: There is good S1  S2, regular rhythm, there are no murmurs, rubs, gallops, or heaves,  2+ pulses symmetric in all extremities.  ABDOMEN: Rounded and soft, nontender to palpation, non distended, no masses, no organomegaly, good bowel sounds, no rebound or guarding, no peritoneal signs.   EXTREMITIES: Right arm sling, right shoulder trace edema, trace in fingers, no numb/tingling, cap refill <3 sec, trace ble nonpitting edema  SKIN: Warm and dry, no erythema noted.  Skin color, texture, no rashes or lesions.  NEUROLOGICAL: The patient is oriented to person, place and time. Anxious at times but calm and pleasant today            LABS:   Recent Results (from the past 168 hour(s))   Urinalysis   Result Value Ref Range    Color, UA Colorless Colorless, Yellow, Straw, Light Yellow    Clarity, UA Clear Clear    Glucose, UA Negative Negative    Bilirubin, UA Negative Negative    Ketones, UA Negative Negative    Specific Gravity, UA 1.003 1.001 - 1.030    Blood, UA Negative Negative    pH, UA 6.5 4.5 - 8.0    Protein, UA Negative Negative mg/dL    Urobilinogen, UA <2.0 E.U./dL <2.0 E.U./dL, 2.0 E.U./dL    Nitrite, UA Negative Negative    Leukocytes, UA Negative Negative   Culture, Urine   Result Value Ref Range    Culture No Growth      Results for orders placed or performed in  visit on 07/10/19   Basic Metabolic Panel   Result Value Ref Range    Sodium 136 136 - 145 mmol/L    Potassium 3.6 3.5 - 5.0 mmol/L    Chloride 99 98 - 107 mmol/L    CO2 26 22 - 31 mmol/L    Anion Gap, Calculation 11 5 - 18 mmol/L    Glucose 83 70 - 125 mg/dL    Calcium 9.4 8.5 - 10.5 mg/dL    BUN 21 8 - 28 mg/dL    Creatinine 0.78 0.60 - 1.10 mg/dL    GFR MDRD Af Amer >60 >60 mL/min/1.73m2    GFR MDRD Non Af Amer >60 >60 mL/min/1.73m2         Lab Results   Component Value Date    WBC 4.4 07/10/2019    HGB 10.6 (L) 07/10/2019    HCT 31.1 (L) 07/10/2019    MCV 95 07/10/2019     07/10/2019       No results found for: OLFSCIEO29  No results found for: HGBA1C  Lab Results   Component Value Date    INR 1.01 03/04/2016     Vitamin D, Total (25-Hydroxy)   Date Value Ref Range Status   07/10/2019 45.0 30.0 - 80.0 ng/mL Final     Lab Results   Component Value Date    TSH 2.69 04/29/2019           ASSESSMENT/PLAN:    1. Fall, Right humerus fracture: Nonoperative treatment. Right shoulder trace edema, trace in fingers, no numb/tingling, cap refill < 3 sec. Sling in place, NWB. F/u with ortho on 7/19. Minimal to no pain, on tylenol 1000 at bedtime and three times a day prn. No tylenol use several day, discussed and will change to just three times a day prn. No scheduled doses. Much improved. Ortho this afternoon.   2. HTN: SBP 110s. On lisinopril 20mg at bedtime. Hold parameters.  Off HCTZ. low sodium diet. Much improved with increasing lisinopril.   3. Constipation: On miralax daily prn, senna doc prn.   No stool softeners needed, moving much better now. Will dc miralax.   4. GERD: dc ranitidine as not using.  5. Insomnia: melatonin at bedtime  6. Vitamin d deficiency: on vitamin d. Vit d 45 on 7/10.   7. Diastolic CHF: weights 3 times weekly-104-104-98-98-95lbs. trace karol, analilia casas. dc'd hctz. No shortness of breath today. Reports thinking first weight inaccurate, runs 98lbs at home. Intake is good but says rushed at  meals at times.   8. OAB: dysuria, urinary frequency, small amounts, urgency, incontinence. Urine culture negative, mild improvement in symptoms. Discussed med options and declines at this time.       Per therapy PT - iliana 8/28, SEC 100ft min A LOB x 2, t/f's SBA/Malinda, bed mob mod A OT -  UB min/mod A, LB min A, toileting Min A. Red tag, lives in [facility] with one step. Pt, ot 2 weeks.     Electronically signed by: Su Scott NP      Total floor/unit time spent 35 min with 25 min spent on counseling and coordination of care. Counseling regarding med changes for gerd, bowel regimen, pain, insomnia. Counseling regarding weight loss, supplement recs, dietary changes. Discussed therapy and ortho restrictions, ortho follow up. Coordinated care with nursing for management of pain, insomnia, gerd, bowels, ortho follow up, skilled therapy needs.

## 2021-06-19 NOTE — LETTER
Letter by Su Scott NP at      Author: Su Scott NP Service: -- Author Type: --    Filed:  Encounter Date: 2019 Status: (Other)         Patient: Kristal Cox   MR Number: [id number]   YOB: 1924   Date of Visit: 2019                 [facility]    DATE: 2019    NAME:  Kristal Cox             :  1924  MRN: [id number]  CODE STATUS:  DNR    VISIT TYPE: Review Of Multiple Medical Conditions     FACILITY:  [facility] [[id number]]       CHIEF COMPLAIN/REASON FOR VISIT:    Chief Complaint   Patient presents with   ? Review Of Multiple Medical Conditions               HISTORY OF PRESENT ILLNESS: Kristal Cox is a 89 y.o. female who was admitted - for fall, right humerus fracture. She tripped and fell while ambulating to the bathroom at home. Ortho recommended non operative treatment. She was place din sling and made NWB. She was discharged to TCU for further rehab. She has PMH of HTN, osteoporosis, anxiety, HLD, IBS. Prior to this she lived at home in a townhouse alone.     Today Ms. Cox is seen for follow up visit. She reports that she has been doing well the last few days. She thinks her bowels are moving fine and no shortness of breath. She denies cough or stomach upset. She is eating well and sleeping fine. She says she is tired this morning after doing therapy. She thinks her weights are good and still no return of the rash or itching. She is planning on going home on Saturday. She feels pretty good about this and had a meeting about it. She will have some help at home she thinks. She denies other concerns today. Per staff lcd set for  and she will return home on Saturday.    REVIEW OF SYSTEMS:  PROBLEMS AND REVIEW OF SYSTEMS:   Today on ROS:   Currently, no fever, chills, or rigors. Decreased vision and hearing. Denies any chest pain, headaches, palpitations, lightheadedness, dizziness,  shortness of breath. Appetite is good. Denies any GERD symptoms. Denies any difficulty with swallowing, nausea, or vomiting. No insomnia. Positive for minimal to no right arm pain, leg swelling improved, urinary incontinence at times,  hair thinning and loss      Allergies   Allergen Reactions   ? Evista [Raloxifene]      Caused blood clot   ? Fosamax [Alendronate] Nausea And Vomiting     Current Outpatient Medications   Medication Sig   ? acetaminophen (TYLENOL) 500 MG tablet Take 1,000 mg by mouth 3 (three) times a day as needed.          ? camphor-menthol (SARNA) lotion Apply 1 application topically every 6 (six) hours as needed for itching.   ? cholecalciferol, vitamin D3, 1,000 unit tablet Take 2,000 Units by mouth daily.   ? dorzolamide (TRUSOPT) 2 % ophthalmic solution Administer 1 drop to both eyes 2 (two) times a day.          ? hydrocortisone 1 % cream Apply 1 application topically every 6 (six) hours as needed.   ? lisinopril (PRINIVIL,ZESTRIL) 10 MG tablet Take 20 mg by mouth daily.          ? melatonin 3 mg Tab tablet Take 1 tablet (3 mg total) by mouth at bedtime as needed. (Patient taking differently: Take 6 mg by mouth at bedtime.       )   ? polyethylene glycol (MIRALAX) 17 gram packet Take 17 g by mouth every other day.          ? senna-docusate (SENNOSIDES-DOCUSATE SODIUM) 8.6-50 mg tablet Take 1 tablet by mouth daily. And at bedtime prn            Past Medical History:    Past Medical History:   Diagnosis Date   ? Anxiety    ? Hyperlipidemia    ? IBS (irritable bowel syndrome)    ? Osteoporosis    ? Other abnormal clinical finding     evidence of old septal scar on EKG   ? Systolic hypertension            PHYSICAL EXAMINATION  Vitals:    08/28/19 1921   BP: 137/65   Pulse: 83   Resp: 18   Temp: 99.6  F (37.6  C)   SpO2: 95%   Weight: (!) 94 lb (42.6 kg)       Today on physical exam:     GENERAL: Awake, Alert, oriented x3, not in any form of acute distress, answers questions appropriately, follows  simple commands, conversant  HEENT: Head is normocephalic with normal hair distribution. No evidence of trauma. Ears: No acute purulent discharge. Eyes: Conjunctivae pink with no scleral jaundice. Nose: Normal mucosa and septum. NECK: Supple with no cervical or supraclavicular lymphadenopathy. Trachea is midline. Glasses, [place], hair thinning, loss  CHEST: No tenderness or deformity, no crepitus  LUNG: dim to auscultation with good chest expansion. There are no crackles or wheezes, normal AP diameter.   BACK: No kyphosis of the thoracic spine. Symmetric, no curvature, ROM normal, no CVA tenderness, no spinal tenderness   CVS: There is good S1  S2, regular rhythm, there are no murmurs, rubs, gallops, or heaves,  2+ pulses symmetric in all extremities.  ABDOMEN: Rounded and soft, nontender to palpation, non distended, no masses, no organomegaly, good bowel sounds, no rebound or guarding, no peritoneal signs.   EXTREMITIES: right shoulder no edema, no edema in fingers, no numb/tingling, cap refill <3 sec, trace ble nonpitting edema, analilia hose  SKIN: Warm and dry, no erythema noted.  Skin color, texture, no rashes or lesions.  NEUROLOGICAL: The patient is oriented to person, place and time. Calm and pleasant            LABS:   No results found for this or any previous visit (from the past 168 hour(s)).  Results for orders placed or performed in visit on 07/10/19   Basic Metabolic Panel   Result Value Ref Range    Sodium 136 136 - 145 mmol/L    Potassium 3.6 3.5 - 5.0 mmol/L    Chloride 99 98 - 107 mmol/L    CO2 26 22 - 31 mmol/L    Anion Gap, Calculation 11 5 - 18 mmol/L    Glucose 83 70 - 125 mg/dL    Calcium 9.4 8.5 - 10.5 mg/dL    BUN 21 8 - 28 mg/dL    Creatinine 0.78 0.60 - 1.10 mg/dL    GFR MDRD Af Amer >60 >60 mL/min/1.73m2    GFR MDRD Non Af Amer >60 >60 mL/min/1.73m2         Lab Results   Component Value Date    WBC 5.3 08/12/2019    HGB 12.0 08/12/2019    HCT 37.0 08/12/2019     08/12/2019      08/12/2019       Lab Results   Component Value Date    [id number] 320 08/12/2019     No results found for: HGBA1C  Lab Results   Component Value Date    INR 1.01 03/04/2016     Vitamin D, Total (25-Hydroxy)   Date Value Ref Range Status   08/12/2019 49.0 30.0 - 80.0 ng/mL Final     Lab Results   Component Value Date    TSH 2.40 08/12/2019           ASSESSMENT/PLAN:    1. Fall, Right humerus fracture: Nonoperative treatment. Right shoulder no edema, no edema in fingers, no numb/tingling, cap refill < 3 sec. F/u with ortho on 7/19-PROM, work towards AROM, wean from sling, hold off on pressure for 2 weeks, f/u in 6 weeks on 8/30. Minimal to no pain, on tylenol 1000 three times a day prn. No concerns today.   2. HTN: SBP 130s. On lisinopril 20mg at bedtime. Hold parameters. Mostly stable, no changes today.   3. Constipation: On miralax every other day, senna doc daily and at bedtime prn.  Eating prunes, managing diet. Improved since changing miralax.   4. OAB: stable, nocturia, incontinence intermittent.   5. Insomnia: melatonin at bedtime. Stable. .   6. Vitamin d deficiency: on vitamin d. Vit d 45 on 7/10.   7. Diastolic CHF: weights 3 times [id number] edema today, previously stopped diuretics. Stable. No shortness of breath.   8. Alopecia: unknown cause at this time, no associated symptoms, rashes, pruritus. checked cmp, hm1, iron, ferritin, vit d, vit b12, tsh 8/12 and all stable, ferritin slightly elevated but iron levels normal. Will defer checking for hormonal abnormality prolactin level, progesterone, cortisol to pcp for referral to endocrine if needed. Follow up with pcp regarding this after discharge. May pursue endocrine workup if she is concerned as outpatient.       PT:  home eval completed and went well, iliana 21/28, ambulatig 250 feet with FWW and supervision,  no AD CGA/SBA, SEC with CGA/SBA for balance, bed mobility is mod indepednent          OT:  SBA  for all dressing, toileting, SBA with shower with vc's. lcd 8/30. hh pt, ot, hha, nursing    Electronically signed by: Su Scott NP

## 2021-06-19 NOTE — LETTER
Letter by Tera Igleisas MD at      Author: Tera Iglesias MD Service: -- Author Type: --    Filed:  Encounter Date: 4/30/2019 Status: (Other)         Kristal Cox  [address] [address]             April 30, 2019         Dear Ms. Cox,    Below are the results from your recent visit:    Resulted Orders   Comprehensive Metabolic Panel   Result Value Ref Range    Sodium 137 136 - 145 mmol/L    Potassium 4.7 3.5 - 5.0 mmol/L    Chloride 104 98 - 107 mmol/L    CO2 21 (L) 22 - 31 mmol/L    Anion Gap, Calculation 12 5 - 18 mmol/L    Glucose 95 70 - 125 mg/dL    BUN 21 8 - 28 mg/dL    Creatinine 0.78 0.60 - 1.10 mg/dL    GFR MDRD Af Amer >60 >60 mL/min/1.73m2    GFR MDRD Non Af Amer >60 >60 mL/min/1.73m2    Bilirubin, Total 0.4 0.0 - 1.0 mg/dL    Calcium 9.9 8.5 - 10.5 mg/dL    Protein, Total 6.7 6.0 - 8.0 g/dL    Albumin 3.8 3.5 - 5.0 g/dL    Alkaline Phosphatase 80 45 - 120 U/L    AST 25 0 - 40 U/L    ALT 15 0 - 45 U/L    Narrative    Fasting Glucose reference range is 70-99 mg/dL per  American Diabetes Association (ADA) guidelines.   Thyroid Cascade   Result Value Ref Range    TSH 2.69 0.30 - 5.00 uIU/mL   HM1 (CBC with Diff)   Result Value Ref Range    WBC 4.8 4.0 - 11.0 thou/uL    RBC 4.18 3.80 - 5.40 mill/uL    Hemoglobin 13.2 12.0 - 16.0 g/dL    Hematocrit 39.2 35.0 - 47.0 %    MCV 94 80 - 100 fL    MCH 31.7 27.0 - 34.0 pg    MCHC 33.8 32.0 - 36.0 g/dL    RDW 12.4 11.0 - 14.5 %    Platelets 145 140 - 440 thou/uL    MPV 8.5 7.0 - 10.0 fL    Neutrophils % 57 50 - 70 %    Lymphocytes % 29 20 - 40 %    Monocytes % 12 (H) 2 - 10 %    Eosinophils % 2 0 - 6 %    Basophils % 1 0 - 2 %    Neutrophils Absolute 2.7 2.0 - 7.7 thou/uL    Lymphocytes Absolute 1.4 0.8 - 4.4 thou/uL    Monocytes Absolute 0.6 0.0 - 0.9 thou/uL    Eosinophils Absolute 0.1 0.0 - 0.4 thou/uL    Basophils Absolute 0.0 0.0 - 0.2 thou/uL       Kristal, recent labs are reviewed.  Electrolytes, blood sugar, kidney  functions, and all liver function tests, were normal.  TSH, a measure of thyroid hormone levels, is normal.  Your hemoglobin and white blood count were normal.  In summary, labs all look okay.    Please call with questions or contact us using MyChart.    Sincerely,        Electronically signed by Tera Iglesias MD

## 2021-06-19 NOTE — LETTER
Letter by Su Scott NP at      Author: Su Scott NP Service: -- Author Type: --    Filed:  Encounter Date: 2019 Status: (Other)         Patient: Kristal Cox   MR Number: [id number]   YOB: 1924   Date of Visit: 2019                 [facility]    DATE: 2019    NAME:  Kristal Cox             :  1924  MRN: [id number]  CODE STATUS:  DNR    VISIT TYPE: Discharge Summary     FACILITY:  [facility] [[id number]]       CHIEF COMPLAIN/REASON FOR VISIT:    Chief Complaint   Patient presents with   ? Discharge Summary               HISTORY OF PRESENT ILLNESS: Kristal Cox is a 89 y.o. female who was admitted - for fall, right humerus fracture. She tripped and fell while ambulating to the bathroom at home. Ortho recommended non operative treatment. She was place din sling and made NWB. She was discharged to TCU for further rehab. She has PMH of HTN, osteoporosis, anxiety, HLD, IBS. Prior to this she lived at home in a townhouse alone.     TCU course:   Ms. Cox has made progress with therapy and is ambulating 250 feet with walker and supervision. She is using no assistive device and stand by to contact guard for ADLs. She scored 21/28 on tinnetti. She had home eval that went well. During her stay her pain was well controlled and was weaned off all pain meds except tylenol prn. She had f/u with ortho on  and ordered to work towards active range of motion and wean from sling. She has another follow up on . Her vitals have been stable. Her weights were stable 98-95lbs during stay. Her chf was stable and did not require treatment for exacerbation. She did have a few episodes of macular rash and was thought to be from laundry detergent on clothing. This has now resolved. She did have some concerns for hair thinning and loss and discussed aging process versus pathologic cause. Labs were ordered and  were all stable but did not check hormone levels (prolactin, progesterone, cortisol) as discussed this could be discussed with pcp and determine if necessary and if so then would need endocrine referral if abnormal. She was agreeable to discussing with her pcp. She will be returning home on 8/31 with  PT, OT, HHA, RN. She will f/u with PCP on 9/6.     REVIEW OF SYSTEMS:  PROBLEMS AND REVIEW OF SYSTEMS:   Today on ROS:   Currently, no fever, chills, or rigors. Decreased vision and hearing. Denies any chest pain, headaches, palpitations, lightheadedness, dizziness, shortness of breath. Appetite is good. Denies any GERD symptoms. Denies any difficulty with swallowing, nausea, or vomiting. No insomnia. Positive for minimal to no right arm pain, leg swelling improved, urinary incontinence at times,  hair thinning and loss      Allergies   Allergen Reactions   ? Evista [Raloxifene]      Caused blood clot   ? Fosamax [Alendronate] Nausea And Vomiting     Current Outpatient Medications   Medication Sig   ? acetaminophen (TYLENOL) 500 MG tablet Take 1,000 mg by mouth 3 (three) times a day as needed.          ? camphor-menthol (SARNA) lotion Apply 1 application topically every 6 (six) hours as needed for itching.   ? cholecalciferol, vitamin D3, 1,000 unit tablet Take 2,000 Units by mouth daily.   ? dorzolamide (TRUSOPT) 2 % ophthalmic solution Administer 1 drop to both eyes 2 (two) times a day.          ? hydrocortisone 1 % cream Apply 1 application topically every 6 (six) hours as needed.   ? lisinopril (PRINIVIL,ZESTRIL) 10 MG tablet Take 20 mg by mouth daily.          ? melatonin 3 mg Tab tablet Take 1 tablet (3 mg total) by mouth at bedtime as needed. (Patient taking differently: Take 6 mg by mouth at bedtime.       )   ? polyethylene glycol (MIRALAX) 17 gram packet Take 17 g by mouth every other day.          ? senna-docusate (SENNOSIDES-DOCUSATE SODIUM) 8.6-50 mg tablet Take 1 tablet by mouth daily. And at bedtime  prn            Past Medical History:    Past Medical History:   Diagnosis Date   ? Anxiety    ? Hyperlipidemia    ? IBS (irritable bowel syndrome)    ? Osteoporosis    ? Other abnormal clinical finding     evidence of old septal scar on EKG   ? Systolic hypertension            PHYSICAL EXAMINATION  Vitals:    08/29/19 1629   BP: 120/67   Pulse: 71   Resp: 14   Temp: 97  F (36.1  C)   SpO2: 94%   Weight: (!) 95 lb (43.1 kg)       Today on physical exam:     GENERAL: Awake, Alert, oriented x3, not in any form of acute distress, answers questions appropriately, follows simple commands, conversant  HEENT: Head is normocephalic with normal hair distribution. No evidence of trauma. Ears: No acute purulent discharge. Eyes: Conjunctivae pink with no scleral jaundice. Nose: Normal mucosa and septum. NECK: Supple with no cervical or supraclavicular lymphadenopathy. Trachea is midline. Glasses, [place], hair thinning, loss  CHEST: No tenderness or deformity, no crepitus  LUNG: dim to auscultation with good chest expansion. There are no crackles or wheezes, normal AP diameter.   BACK: No kyphosis of the thoracic spine. Symmetric, no curvature, ROM normal, no CVA tenderness, no spinal tenderness   CVS: There is good S1  S2, regular rhythm, there are no murmurs, rubs, gallops, or heaves,  2+ pulses symmetric in all extremities.  ABDOMEN: Rounded and soft, nontender to palpation, non distended, no masses, no organomegaly, good bowel sounds, no rebound or guarding, no peritoneal signs.   EXTREMITIES: right shoulder no edema, no edema in fingers, no numb/tingling, cap refill <3 sec, no ble nonpitting edema, analilia hose  SKIN: Warm and dry, no erythema noted.  Skin color, texture, no rashes or lesions.  NEUROLOGICAL: The patient is oriented to person, place and time. Calm and pleasant            LABS:   No results found for this or any previous visit (from the past 168 hour(s)).  Results for orders placed or performed in visit on 07/10/19    Basic Metabolic Panel   Result Value Ref Range    Sodium 136 136 - 145 mmol/L    Potassium 3.6 3.5 - 5.0 mmol/L    Chloride 99 98 - 107 mmol/L    CO2 26 22 - 31 mmol/L    Anion Gap, Calculation 11 5 - 18 mmol/L    Glucose 83 70 - 125 mg/dL    Calcium 9.4 8.5 - 10.5 mg/dL    BUN 21 8 - 28 mg/dL    Creatinine 0.78 0.60 - 1.10 mg/dL    GFR MDRD Af Amer >60 >60 mL/min/1.73m2    GFR MDRD Non Af Amer >60 >60 mL/min/1.73m2         Lab Results   Component Value Date    WBC 5.3 08/12/2019    HGB 12.0 08/12/2019    HCT 37.0 08/12/2019     08/12/2019     08/12/2019       Lab Results   Component Value Date    [id number] 320 08/12/2019     No results found for: HGBA1C  Lab Results   Component Value Date    INR 1.01 03/04/2016     Vitamin D, Total (25-Hydroxy)   Date Value Ref Range Status   08/12/2019 49.0 30.0 - 80.0 ng/mL Final     Lab Results   Component Value Date    TSH 2.40 08/12/2019           ASSESSMENT/PLAN:    1. Fall, Right humerus fracture: Nonoperative treatment. Right shoulder no edema, no edema in fingers, no numb/tingling, cap refill < 3 sec. F/u with ortho on 7/19-PROM, work towards AROM, wean from sling, hold off on pressure for 2 weeks, f/u in 6 weeks on 8/30. No recent pain. Tylenol prn. Doing well.    2. HTN: -130s. On lisinopril 20mg at bedtime. Hold parameters. Mostly stable, no changes today.   3. Constipation: On miralax every other day, senna doc daily and at bedtime prn.  Eating prunes, managing diet. Stable recently.   4. OAB: stable, nocturia, incontinence intermittent.   5. Insomnia: melatonin at bedtime. Stable. .   6. Vitamin d deficiency: on vitamin d. Vit d 45 on 7/10.   7. Diastolic CHF: weights 3 times [id number] edema today, previously stopped diuretics. Stable. No shortness of breath.   8. Alopecia: unknown cause at this time, no associated symptoms, rashes, pruritus. checked cmp, hm1, iron, ferritin, vit d, vit b12, tsh  "8/12 and all stable, ferritin slightly elevated but iron levels normal. Will defer checking for hormonal abnormality prolactin level, progesterone, cortisol to pcp for referral to endocrine if needed. Follow up with pcp regarding this after discharge. May pursue endocrine workup if she is concerned as outpatient.       Electronically signed by: Su Scott NP    Total floor/unit time spent 35 min with 25 min spent on counseling and coordination of care. Counseling regarding tcu course, med changes, lab results, weight monitoring, humerus fracture course, discharge planning. Coordinated care with nursing, therapy,  for discharge planning, services for discharge, med orders for discharge, primary care follow up, ortho follow up, evaluation of equipment needs.     Please evaluate Kristal Cox for admission to Home Health.    Face to Face Attestation and Initial Plan of Care    The face-to-face encounter occurred on date: 8/30/19  Face to Face encounter was with: Su Scott    Please provide brief clinical summary of reason for visit and need for home care. deconditionig after hospital stay for humerus fracture    Please identify which of the following home health disciplines the patient will need AND describe the skilled services that you would like the home health agency to perform: SKILLED NURSING (RN): complex med management, PHYSICAL THERAPY: strength training and gait training, OCCUPATIONAL THERAPY: ADLs and home safety and HOME HEALTH AIDE    Homebound Status (describe the functional limitations that support this patient is confined to his/her home. Medicaid recipients are not required to be homebound.):assistive device needed:  2WW    Name of physician who will be responsible for the ongoing home health plan of care (CMS requires the referring physician to provide the specific name of the community physician instead of a title, such as \"PCP\"): Tera Iglesias MD    Requested Start " of Care Date: Within 48 hours    Other information to assist the home health agency in developing the initial Plan of Care:    I certify that services are/were furnished while this patient was under the care of a physician and that a physician or an allowed non-physician practitioner (NPP), had a face-to-face encounter that meets the physician face-to-face encounter requirements. The encounter was in whole, or in part, related to the primary reason for home health. The patient is confined to his/her home and needs intermittent skilled nursing, physical therapy, speech-language pathology, or the continued need for occupational therapy. A plan of care has been established by a physician and is periodically reviewed by a physician.      Post Discharge Medication Reconciliation Status: discharge medications reconciled, continue medications without change

## 2021-06-19 NOTE — LETTER
Letter by Su Scott NP at      Author: Su Scott NP Service: -- Author Type: --    Filed:  Encounter Date: 2019 Status: (Other)         Patient: Kristal Cox   MR Number: [id number]   YOB: 1924   Date of Visit: 2019                 [facility]    DATE: 2019    NAME:  Kristal Cox             :  1924  MRN: [id number]  CODE STATUS:  DNR    VISIT TYPE: Problem Visit (constipation)     FACILITY:  [facility] [[id number]]       CHIEF COMPLAIN/REASON FOR VISIT:    Chief Complaint   Patient presents with   ? Problem Visit     constipation               HISTORY OF PRESENT ILLNESS: Kristal Cox is a 89 y.o. female who was admitted - for fall, right humerus fracture. She tripped and fell while ambulating to the bathroom at home. Ortho recommended non operative treatment. She was place din sling and made NWB. She was discharged to TCU for further rehab. She has PMH of HTN, osteoporosis, anxiety, HLD, IBS. Prior to this she lived at home in a townhouse alone.     Today Ms. Cox is seen for constipation. She says it has been several days and is feeling bloated and uncomfortable. She is not wanting to eat anymore because of this. She is wondering what else she should do. She takes her stool softeners, drinks prune juice, eats prunes, manages this with her diet. She does admit she may not be drinking enough water because if she drinks too much she has to go to the bathroom a lot especially at night. She feels this was caused by the water pill she was on and now is used to going at night. She does not feel this is any worse than normal though. She says her therapy seems to be going well and not having much pain or soreness today. She also notes her concern is that her hair is thinning and falling out. She shows a picture from a wedding in may where her hair was thick and full. She is wondering if she is on a  medication that can cause this or what it may be from. We discussed different possibilities including hormonal imbalance, endocrine disorder, vitamin deficiency. She is willing to check some labs to see if can evaluate the source of this. She is also willing to take a suppository today to get going on her bowels.     REVIEW OF SYSTEMS:  PROBLEMS AND REVIEW OF SYSTEMS:   Today on ROS:   Currently, no fever, chills, or rigors. Decreased vision and hearing. Denies any chest pain, headaches, palpitations, lightheadedness, dizziness, shortness of breath. Appetite is good. Denies any GERD symptoms. Denies any difficulty with swallowing, nausea, or vomiting. No insomnia. Positive for minimal to no right arm pain, leg swelling improved, urinary incontinence at times, constipation, hair thinning and loss      Allergies   Allergen Reactions   ? Evista [Raloxifene]      Caused blood clot   ? Fosamax [Alendronate] Nausea And Vomiting     Current Outpatient Medications   Medication Sig   ? acetaminophen (TYLENOL) 500 MG tablet Take 1,000 mg by mouth 3 (three) times a day as needed.          ? cholecalciferol, vitamin D3, 1,000 unit tablet Take 2,000 Units by mouth daily.   ? dorzolamide (TRUSOPT) 2 % ophthalmic solution Administer 1 drop to both eyes 2 (two) times a day.          ? lisinopril (PRINIVIL,ZESTRIL) 10 MG tablet Take 20 mg by mouth daily.          ? melatonin 3 mg Tab tablet Take 1 tablet (3 mg total) by mouth at bedtime as needed. (Patient taking differently: Take 6 mg by mouth at bedtime.       )   ? senna-docusate (SENNOSIDES-DOCUSATE SODIUM) 8.6-50 mg tablet Take 1 tablet by mouth daily. And at bedtime prn            Past Medical History:    Past Medical History:   Diagnosis Date   ? Anxiety    ? Hyperlipidemia    ? IBS (irritable bowel syndrome)    ? Osteoporosis    ? Other abnormal clinical finding     evidence of old septal scar on EKG   ? Systolic hypertension            PHYSICAL EXAMINATION  Vitals:     08/07/19 2153   BP: 134/73   Pulse: 82   Resp: 18   Temp: 97.7  F (36.5  C)   SpO2: 94%   Weight: (!) 94 lb (42.6 kg)       Today on physical exam:     GENERAL: Awake, Alert, oriented x3, not in any form of acute distress, answers questions appropriately, follows simple commands, conversant  HEENT: Head is normocephalic with normal hair distribution. No evidence of trauma. Ears: No acute purulent discharge. Eyes: Conjunctivae pink with no scleral jaundice. Nose: Normal mucosa and septum. NECK: Supple with no cervical or supraclavicular lymphadenopathy. Trachea is midline. Glasses, [place]  CHEST: No tenderness or deformity, no crepitus  LUNG: dim to auscultation with good chest expansion. There are no crackles or wheezes, normal AP diameter.   BACK: No kyphosis of the thoracic spine. Symmetric, no curvature, ROM normal, no CVA tenderness, no spinal tenderness   CVS: There is good S1  S2, regular rhythm, there are no murmurs, rubs, gallops, or heaves,  2+ pulses symmetric in all extremities.  ABDOMEN: Rounded and soft, nontender to palpation, non distended, no masses, no organomegaly, good bowel sounds, no rebound or guarding, no peritoneal signs.   EXTREMITIES: Right arm sling, right shoulder no edema, no edema in fingers, no numb/tingling, cap refill <3 sec, trace ble nonpitting edema, analilia hose  SKIN: Warm and dry, no erythema noted.  Skin color, texture, no rashes or lesions.  NEUROLOGICAL: The patient is oriented to person, place and time. Calm and pleasant            LABS:   No results found for this or any previous visit (from the past 168 hour(s)).  Results for orders placed or performed in visit on 07/10/19   Basic Metabolic Panel   Result Value Ref Range    Sodium 136 136 - 145 mmol/L    Potassium 3.6 3.5 - 5.0 mmol/L    Chloride 99 98 - 107 mmol/L    CO2 26 22 - 31 mmol/L    Anion Gap, Calculation 11 5 - 18 mmol/L    Glucose 83 70 - 125 mg/dL    Calcium 9.4 8.5 - 10.5 mg/dL    BUN 21 8 - 28 mg/dL    Creatinine  0.78 0.60 - 1.10 mg/dL    GFR MDRD Af Amer >60 >60 mL/min/1.73m2    GFR MDRD Non Af Amer >60 >60 mL/min/1.73m2         Lab Results   Component Value Date    WBC 4.4 07/10/2019    HGB 10.6 (L) 07/10/2019    HCT 31.1 (L) 07/10/2019    MCV 95 07/10/2019     07/10/2019       No results found for: [id number]  No results found for: HGBA1C  Lab Results   Component Value Date    INR 1.01 03/04/2016     Vitamin D, Total (25-Hydroxy)   Date Value Ref Range Status   07/10/2019 45.0 30.0 - 80.0 ng/mL Final     Lab Results   Component Value Date    TSH 2.69 04/29/2019           ASSESSMENT/PLAN:    1. Fall, Right humerus fracture: Nonoperative treatment. Right shoulder trace edema, no edema in fingers, no numb/tingling, cap refill < 3 sec. NWB. F/u with ortho on 7/19-PROM, work towards AROM, wean from sling, hold off on pressure for 2 weeks, f/u in 6 weeks on 8/30. Minimal to no pain, on tylenol 1000 three times a day prn. No recent tylenol use. Doing well in therapy.   2. HTN: SBP 130s. On lisinopril 20mg at bedtime. Hold parameters.   3. Constipation: On miralax daily prn, senna doc prn.     Eating prunes, managing with diet. Will order bisacodyl supp today and see how does overnight. Plan to change senna doc to daily.   4. OAB: stable, nocturia, incontinence intermittent. Explained changes with age today.   5. Insomnia: melatonin at bedtime, improved since increasing dose.   6. Vitamin d deficiency: on vitamin d. Vit d 45 on 7/10.   7. Diastolic CHF: weights 3 times weekly-104-104-02-10--59-57-94lbs. trace ble, analilia hose. dc'd hctz. No shortness of breath today or recently. Appetite good, stable.   8. Alopecia: unknown cause at this time, no associated symptoms, rashes, pruritus. Will order cmp, hm1, iron, ferritin, vit d, vit b12, tsh for 8/12. Will defer checking for hormonal abnormality prolactin level, progesterone, cortisol to pcp for referral to endocrine if needed.     Per therapy PT - iliana 8/28, SEC  200ft CGA, sit to stand CGA,  t/f's CGA, bed mob SBA , stairs CGA/Min A OT -  UB set up/SBA, LB SBA, toileting CGA. Red tag. Lives alone in town home with 1 step. Recommending 2-3 weeks.     Electronically signed by: Su Scott NP    Total floor/unit time spent 35 min with 25 min spent on counseling and coordination of care. Counseling regarding alopecia, potential causes, differential diagnoses, lab workup, potential referrals. Counseling regarding constipation management. Coordinated care with nursing for management of labs, alopecia, constipation, humerus fracture.

## 2021-06-19 NOTE — LETTER
Letter by Su Scott NP at      Author: Su Scott NP Service: -- Author Type: --    Filed:  Encounter Date: 2019 Status: (Other)         Patient: Kristal Cox   MR Number: [id number]   YOB: 1924   Date of Visit: 2019                 [facility]    DATE: 2019    NAME:  Kristal Cox             :  1924  MRN: [id number]  CODE STATUS:  DNR    VISIT TYPE: Problem Visit (hospital f/u)     FACILITY:  [facility] [[id number]]       CHIEF COMPLAIN/REASON FOR VISIT:    Chief Complaint   Patient presents with   ? Problem Visit     hospital f/u               HISTORY OF PRESENT ILLNESS: Kristal Cox is a 89 y.o. female who was admitted - for fall, right humerus fracture. She tripped and fell while ambulating to the bathroom at home. Ortho recommended non operative treatment. She was place din sling and made NWB. She was discharged to TCU for further rehab. She has PMH of HTN, osteoporosis, anxiety, HLD, IBS. Prior to this she lived at home in a townhouse alone.     Today Ms. Cox states she is not having any dizziness and is wearing her arm sling. Her appetite has been fair but doesn't feel she is doing enough to be hungry. It is also difficult for her to be learning to feed herself with her left hand. She is not having any trouble urinating but later says she does have hesitancy and incontinence and wears incontinent pads at home. She had felt her pain was controlled until last night. She had a very rough night and she had tylenol prior to bed. She doesn't feel like she needs the tylenol all the time but definitely at night. She does feel constipated and drank some prune juice today. She has had problems with this before at home too. She would like something else ordered for constipation. She uses colace at times at home so something similar to this should work. She is not having any nausea or stomach upset  but feels uncomfortable from the constipation. She has trouble swallowing pills and has to take them in applesauce. She wears glasses and hearing aids. She has had some swelling in her legs but thinks it has gone down recently. Her ankles were before the fall. She is not having any shortness of breath or cough. She did not sleep well last night because of pain but before this was sleeping fine. She is wondering if her water pill can be stopped because she thinks it makes her urinate too frequently and contribute to her incontinence. She says she always has been an anxious person and worried a lot. She feels nervous easily but declines ACP consult at this time. She says she lives alone. When she fell she tripped while going to the bathroom. She had to drag herself back across the room to get to her phone to call 911. Then none of her doors were open so they had to break in to get to her. She lives in a house with a downstairs. She had a walker and cane before but did not use them much. She used the cane more than the walker. She has one son who helps do some of her cooking but otherwise she uses the microwave a lot. She never drove so her kids always drive her to appointments. She has 4 children and all live in [place]. Clarified code status and she does want to be DNR.     REVIEW OF SYSTEMS:  PROBLEMS AND REVIEW OF SYSTEMS:   Today on ROS:   Currently, no fever, chills, or rigors. Decreased vision and hearing. Denies any chest pain, headaches, palpitations, lightheadedness, dizziness, shortness of breath, or cough. Appetite is good. Denies any GERD symptoms. Denies any difficulty with swallowing, nausea, or vomiting. No insomnia. Positive for weakness, right arm pain, right arm sling, constipation, leg swelling, difficulty swallowing pills, urinary incontinence, hesitancy      Allergies   Allergen Reactions   ? Evista [Raloxifene]      Caused blood clot   ? Fosamax [Alendronate] Nausea And Vomiting     Current  Outpatient Medications   Medication Sig   ? acetaminophen (TYLENOL) 500 MG tablet Take 1,000 mg by mouth at bedtime. And three times a day prn   ? senna-docusate (SENNOSIDES-DOCUSATE SODIUM) 8.6-50 mg tablet Take 1 tablet by mouth daily. And at bedtime prn   ? cholecalciferol, vitamin D3, 1,000 unit tablet Take 2,000 Units by mouth daily.   ? dorzolamide (TRUSOPT) 2 % ophthalmic solution Administer 1 drop to both eyes 2 (two) times a day.          ? lisinopril (PRINIVIL,ZESTRIL) 10 MG tablet Take 15 mg by mouth daily.          ? melatonin 3 mg Tab tablet Take 1 tablet (3 mg total) by mouth at bedtime as needed. (Patient taking differently: Take 3 mg by mouth at bedtime.       )   ? polyethylene glycol (MIRALAX) 17 gram packet Take 1 packet (17 g total) by mouth daily.   ? ranitidine (ZANTAC) 150 MG tablet Take 150 mg by mouth as needed for heartburn.     Past Medical History:    Past Medical History:   Diagnosis Date   ? Anxiety    ? Hyperlipidemia    ? IBS (irritable bowel syndrome)    ? Osteoporosis    ? Other abnormal clinical finding     evidence of old septal scar on EKG   ? Systolic hypertension            PHYSICAL EXAMINATION  Vitals:    07/07/19 2027   BP: 128/81   Pulse: 90   Resp: 18   Temp: 96.9  F (36.1  C)   SpO2: 97%   Weight: 104 lb (47.2 kg)       Today on physical exam:     GENERAL: Awake, Alert, oriented x3, not in any form of acute distress, answers questions appropriately, follows simple commands, conversant  HEENT: Head is normocephalic with normal hair distribution. No evidence of trauma. Ears: No acute purulent discharge. Eyes: Conjunctivae pink with no scleral jaundice. Nose: Normal mucosa and septum. NECK: Supple with no cervical or supraclavicular lymphadenopathy. Trachea is midline. Glasses, [place]  CHEST: No tenderness or deformity, no crepitus  LUNG: dim to auscultation with good chest expansion. There are no crackles or wheezes, normal AP diameter.  BACK: No kyphosis of the thoracic spine.  Symmetric, no curvature, ROM normal, no CVA tenderness, no spinal tenderness   CVS: There is good S1  S2, regular rhythm, there are no murmurs, rubs, gallops, or heaves,  2+ pulses symmetric in all extremities.  ABDOMEN: Rounded and soft, nontender to palpation, non distended, no masses, no organomegaly, good bowel sounds, no rebound or guarding, no peritoneal signs.   EXTREMITIES: Right arm sling, right shoulder 2+ edema, trace in fingers, no numb/tingling, cap refill <3 sec, 1+ ble nonpitting edema  SKIN: Warm and dry, no erythema noted.  Skin color, texture, no rashes or lesions.  NEUROLOGICAL: The patient is oriented to person, place and time. Anxious at times but calm and pleasant today            LABS:   Recent Results (from the past 168 hour(s))   ECG 12 lead nursing unit performed   Result Value Ref Range    SYSTOLIC BLOOD PRESSURE 137 mmHg    DIASTOLIC BLOOD PRESSURE 94 mmHg    VENTRICULAR RATE 104 BPM    ATRIAL RATE 104 BPM    P-R INTERVAL 206 ms    QRS DURATION 116 ms    Q-T INTERVAL 356 ms    QTC CALCULATION (BEZET) 468 ms    P Axis 78 degrees    R AXIS -26 degrees    T AXIS 103 degrees    MUSE DIAGNOSIS       Sinus tachycardia  Anteroseptal infarct , possibly acute  T wave abnormality, consider lateral ischemia  Abnormal ECG  When compared with ECG of 08-FEB-2018 12:43,  Left bundle branch block is no longer Present  Anteroseptal infarct is now Present  Confirmed by TRACIE HAUSER, KELLY LOC: ([id number]) on 7/4/2019 3:43:06 PM     Basic Metabolic Panel   Result Value Ref Range    Sodium 137 136 - 145 mmol/L    Potassium 3.8 3.5 - 5.0 mmol/L    Chloride 102 98 - 107 mmol/L    CO2 25 22 - 31 mmol/L    Anion Gap, Calculation 10 5 - 18 mmol/L    Glucose 109 70 - 125 mg/dL    Calcium 10.1 8.5 - 10.5 mg/dL    BUN 16 8 - 28 mg/dL    Creatinine 0.86 0.60 - 1.10 mg/dL    GFR MDRD Af Amer >60 >60 mL/min/1.73m2    GFR MDRD Non Af Amer >60 >60 mL/min/1.73m2   HM1 (CBC with Diff)   Result Value Ref Range    WBC 8.8 4.0 -  11.0 thou/uL    RBC 4.17 3.80 - 5.40 mill/uL    Hemoglobin 13.1 12.0 - 16.0 g/dL    Hematocrit 39.2 35.0 - 47.0 %    MCV 94 80 - 100 fL    MCH 31.4 27.0 - 34.0 pg    MCHC 33.4 32.0 - 36.0 g/dL    RDW 13.2 11.0 - 14.5 %    Platelets 172 140 - 440 thou/uL    MPV 10.3 8.5 - 12.5 fL    Neutrophils % 70 50 - 70 %    Lymphocytes % 19 (L) 20 - 40 %    Monocytes % 9 2 - 10 %    Eosinophils % 1 0 - 6 %    Basophils % 1 0 - 2 %    Neutrophils Absolute 6.2 2.0 - 7.7 thou/uL    Lymphocytes Absolute 1.7 0.8 - 4.4 thou/uL    Monocytes Absolute 0.8 0.0 - 0.9 thou/uL    Eosinophils Absolute 0.1 0.0 - 0.4 thou/uL    Basophils Absolute 0.1 0.0 - 0.2 thou/uL     Results for orders placed or performed during the hospital encounter of 07/04/19   Basic Metabolic Panel   Result Value Ref Range    Sodium 137 136 - 145 mmol/L    Potassium 3.8 3.5 - 5.0 mmol/L    Chloride 102 98 - 107 mmol/L    CO2 25 22 - 31 mmol/L    Anion Gap, Calculation 10 5 - 18 mmol/L    Glucose 109 70 - 125 mg/dL    Calcium 10.1 8.5 - 10.5 mg/dL    BUN 16 8 - 28 mg/dL    Creatinine 0.86 0.60 - 1.10 mg/dL    GFR MDRD Af Amer >60 >60 mL/min/1.73m2    GFR MDRD Non Af Amer >60 >60 mL/min/1.73m2         Lab Results   Component Value Date    WBC 8.8 07/04/2019    HGB 13.1 07/04/2019    HCT 39.2 07/04/2019    MCV 94 07/04/2019     07/04/2019       No results found for: FNKRLMVH20  No results found for: HGBA1C  Lab Results   Component Value Date    INR 1.01 03/04/2016     Vitamin D, Total (25-Hydroxy)   Date Value Ref Range Status   07/07/2017 57.2 30.0 - 80.0 ng/mL Final     Lab Results   Component Value Date    TSH 2.69 04/29/2019           ASSESSMENT/PLAN:    1. Fall, Right humerus fracture: Nonoperative treatment. Right shoulder 2+ edema, trace in fingers, no numb/tingling, cap refill < 3 sec. Sling in place, NWB. F/u with ortho in 2 weeks. Pain not well controlled, will change to tylenol 1000 at bedtime and three times a day prn.   2. HTN: SBP 120s. On HCTZ and  lisinopril. Will dc hctz, increase lisinopril to 15mg at bedtime. Hold parameters.   3. Constipation: On miralax daily, will add senna doc daily.   4. GERD: On ranitidine daily prn. Not recently used.   5. Insomnia: melatonin at bedtime prn, will change to scheduled.   6. Vitamin d deficiency: on vitamin d.   7. Diastolic CHF: weights 3 times weekly-104-104lbs. 1+ ble, ordered analilia hose. dc'd hctz per her request. Will monitor. No shortness of breath today. Monitor closely.     Bmp, hm1, vit d on 7/10    Per therapy eval today    Electronically signed by: Su Scott NP    Total floor/unit time spent 35 with 25 time spent on counseling and coordination of care. Counseling was done regarding hospital course, med changes, pain management, hypertension management, skilled therapy needs, ortho restrictions, specialty care follow up, lab monitoring. Coordinated care with nursing for management of hypertension, pain, lab monitoring, ortho follow up.

## 2021-06-19 NOTE — LETTER
Letter by Vasu Guerra MD at      Author: Vasu Guerra MD Service: -- Author Type: --    Filed:  Encounter Date: 2019 Status: (Other)         Patient: Kristal Cox   MR Number: [id number]   YOB: 1924   Date of Visit: 2019     [facility]      Facility:    [facility] [[id number]]  Code Status: DNR/DNI      Chief Complaint/Reason for Visit:  Chief Complaint   Patient presents with   ? H & P       HPI:   Kristal is a 89 y.o. female who had a fall that resulted in proximal right humeral fracture.  Orthopedic consultation recommended conservative measures of using a sling.  She does have underlying medical conditions which include hypertension, osteoporosis, anxiety, irritable bowel syndrome, and hyperlipidemia.    Upon current review of systems she has had some urinary frequency.  She is not experiencing fevers or chills.  She does not have sore throat or nasal congestion.  She does not have chest pain or palpitations of the heart.  She does not have abdominal pain or nausea.    Past Medical History:  Past Medical History:   Diagnosis Date   ? Anxiety    ? Hyperlipidemia    ? IBS (irritable bowel syndrome)    ? Osteoporosis    ? Other abnormal clinical finding     evidence of old septal scar on EKG   ? Systolic hypertension            Surgical History:  Past Surgical History:   Procedure Laterality Date   ? BREAST BIOPSY      for benign disease   ? CATARACT EXTRACTION, BILATERAL     ?  SECTION      X 4   ? CHOLECYSTECTOMY     ? TONSILLECTOMY AND ADENOIDECTOMY         Family History:   Family History   Problem Relation Age of Onset   ? Stroke Mother         passed age 75   ? Crohn's disease Father         passed in his 20's from bowel obstructions       Social History:    Social History     Socioeconomic History   ? Marital status:      Spouse name: Not on file   ? Number of children: Not on file   ? Years of education:  Not on file   ? Highest education level: Not on file   Occupational History   ? Not on file   Social Needs   ? Financial resource strain: Not on file   ? Food insecurity:     Worry: Not on file     Inability: Not on file   ? Transportation needs:     Medical: Not on file     Non-medical: Not on file   Tobacco Use   ? Smoking status: Never Smoker   ? Smokeless tobacco: Never Used   Substance and Sexual Activity   ? Alcohol use: No     Comment: rarely drinks wine, and drinks wine watered down with ice.   ? Drug use: Yes   ? Sexual activity: Not Currently   Lifestyle   ? Physical activity:     Days per week: Not on file     Minutes per session: Not on file   ? Stress: Not on file   Relationships   ? Social connections:     Talks on phone: Not on file     Gets together: Not on file     Attends [employer] service: Not on file     Active member of club or organization: Not on file     Attends meetings of clubs or organizations: Not on file     Relationship status: Not on file   ? Intimate partner violence:     Fear of current or ex partner: Not on file     Emotionally abused: Not on file     Physically abused: Not on file     Forced sexual activity: Not on file   Other Topics Concern   ? Not on file   Social History Narrative     since 2009, has children          Review of Systems   All other systems reviewed and are negative.      Vitals:    07/17/19 1423   BP: 112/68   Pulse: 83   Temp: 98.9  F (37.2  C)   SpO2: 95%       Physical Exam   Constitutional: No distress.   HENT:   Mouth/Throat: Oropharynx is clear and moist.   Eyes: Right eye exhibits no discharge. Left eye exhibits no discharge.   Neck: No JVD present. No thyromegaly present.   Cardiovascular: Normal heart sounds.   Pulmonary/Chest: Breath sounds normal. No respiratory distress.   Abdominal: Soft. Bowel sounds are normal. There is no tenderness.   Musculoskeletal:   Right arm is in a sling.  Distal CMS normal.   Lymphadenopathy:     She has no  cervical adenopathy.   Neurological: She is alert.   Skin: Skin is warm and dry.   Psychiatric: She has a normal mood and affect.   Nursing note and vitals reviewed.      Medication List:  Current Outpatient Medications   Medication Sig   ? acetaminophen (TYLENOL) 500 MG tablet Take 1,000 mg by mouth at bedtime. And three times a day prn   ? cholecalciferol, vitamin D3, 1,000 unit tablet Take 2,000 Units by mouth daily.   ? dorzolamide (TRUSOPT) 2 % ophthalmic solution Administer 1 drop to both eyes 2 (two) times a day.          ? lisinopril (PRINIVIL,ZESTRIL) 10 MG tablet Take 20 mg by mouth daily.          ? melatonin 3 mg Tab tablet Take 1 tablet (3 mg total) by mouth at bedtime as needed. (Patient taking differently: Take 3 mg by mouth at bedtime.       )   ? polyethylene glycol (MIRALAX) 17 gram packet Take 1 packet (17 g total) by mouth daily. (Patient taking differently: Take 17 g by mouth daily as needed.       )   ? ranitidine (ZANTAC) 150 MG tablet Take 150 mg by mouth as needed for heartburn.   ? senna-docusate (SENNOSIDES-DOCUSATE SODIUM) 8.6-50 mg tablet Take 1 tablet by mouth 2 (two) times a day as needed. And at bedtime prn              Labs:  Urinalysis is negative.    Assessment:    ICD-10-CM    1. Closed fracture of proximal end of right humerus with routine healing, unspecified fracture morphology, subsequent encounter S42.201D    2. Fall, subsequent encounter W19.XXXD    3. Physical deconditioning R53.81    4. Age-related osteoporosis without current pathological fracture M81.0        Plan:  Continue with evaluation and treatment by occupational and physical therapies regarding strength, gait, and independence of activities of daily living.  Continue to monitor medical conditions.      Electronically signed by: Vasu Guerra MD

## 2021-06-19 NOTE — LETTER
Letter by Su Scott NP at      Author: Su Scott NP Service: -- Author Type: --    Filed:  Encounter Date: 2019 Status: (Other)         Patient: Kristal Cox   MR Number: [id number]   YOB: 1924   Date of Visit: 2019                 [facility]    DATE: 2019    NAME:  Kristal Cox             :  1924  MRN: [id number]  CODE STATUS:  DNR    VISIT TYPE: Review Of Multiple Medical Conditions     FACILITY:  [facility] [[id number]]       CHIEF COMPLAIN/REASON FOR VISIT:    Chief Complaint   Patient presents with   ? Review Of Multiple Medical Conditions               HISTORY OF PRESENT ILLNESS: Kristal Cox is a 89 y.o. female who was admitted - for fall, right humerus fracture. She tripped and fell while ambulating to the bathroom at home. Ortho recommended non operative treatment. She was place din sling and made NWB. She was discharged to TCU for further rehab. She has PMH of HTN, osteoporosis, anxiety, HLD, IBS. Prior to this she lived at home in a townhouse alone.     Today Ms. Cox is seen for follow up visit. She reports she has not had any recurrence of rash and itching the last few days. She thinks therapy is going well and rarely has pain. She says she seems to be making some progress in therapy. She thinks she may be going home sometime next week but not for sure. She is sleeping pretty well for the most part and her appetite is fine. She denies nausea or stomach upset. Her bowels are moving more regularly now. She denies any dizziness or lightheadedness. Per staff no recent concerns or recurrence of rash. Her vitals have been stable and weight steady at 95lbs. She does have LCD of .    REVIEW OF SYSTEMS:  PROBLEMS AND REVIEW OF SYSTEMS:   Today on ROS:   Currently, no fever, chills, or rigors. Decreased vision and hearing. Denies any chest pain, headaches, palpitations,  lightheadedness, dizziness, shortness of breath. Appetite is good. Denies any GERD symptoms. Denies any difficulty with swallowing, nausea, or vomiting. No insomnia. Positive for minimal to no right arm pain, leg swelling improved, urinary incontinence at times,  hair thinning and loss, no rash or itching recently      Allergies   Allergen Reactions   ? Evista [Raloxifene]      Caused blood clot   ? Fosamax [Alendronate] Nausea And Vomiting     Current Outpatient Medications   Medication Sig   ? acetaminophen (TYLENOL) 500 MG tablet Take 1,000 mg by mouth 3 (three) times a day as needed.          ? camphor-menthol (SARNA) lotion Apply 1 application topically every 6 (six) hours as needed for itching.   ? cholecalciferol, vitamin D3, 1,000 unit tablet Take 2,000 Units by mouth daily.   ? dorzolamide (TRUSOPT) 2 % ophthalmic solution Administer 1 drop to both eyes 2 (two) times a day.          ? hydrocortisone 1 % cream Apply 1 application topically every 6 (six) hours as needed.   ? lisinopril (PRINIVIL,ZESTRIL) 10 MG tablet Take 20 mg by mouth daily.          ? melatonin 3 mg Tab tablet Take 1 tablet (3 mg total) by mouth at bedtime as needed. (Patient taking differently: Take 6 mg by mouth at bedtime.       )   ? polyethylene glycol (MIRALAX) 17 gram packet Take 17 g by mouth every other day.          ? senna-docusate (SENNOSIDES-DOCUSATE SODIUM) 8.6-50 mg tablet Take 1 tablet by mouth daily. And at bedtime prn            Past Medical History:    Past Medical History:   Diagnosis Date   ? Anxiety    ? Hyperlipidemia    ? IBS (irritable bowel syndrome)    ? Osteoporosis    ? Other abnormal clinical finding     evidence of old septal scar on EKG   ? Systolic hypertension            PHYSICAL EXAMINATION  Vitals:    08/21/19 2228   BP: 165/71   Pulse: 78   Resp: 18   Temp: 98  F (36.7  C)   SpO2: 94%   Weight: (!) 95 lb (43.1 kg)       Today on physical exam:     GENERAL: Awake, Alert, oriented x3, not in any form of  acute distress, answers questions appropriately, follows simple commands, conversant  HEENT: Head is normocephalic with normal hair distribution. No evidence of trauma. Ears: No acute purulent discharge. Eyes: Conjunctivae pink with no scleral jaundice. Nose: Normal mucosa and septum. NECK: Supple with no cervical or supraclavicular lymphadenopathy. Trachea is midline. Glasses, [place], hair thinning, loss  CHEST: No tenderness or deformity, no crepitus  LUNG: dim to auscultation with good chest expansion. There are no crackles or wheezes, normal AP diameter.   BACK: No kyphosis of the thoracic spine. Symmetric, no curvature, ROM normal, no CVA tenderness, no spinal tenderness   CVS: There is good S1  S2, regular rhythm, there are no murmurs, rubs, gallops, or heaves,  2+ pulses symmetric in all extremities.  ABDOMEN: Rounded and soft, nontender to palpation, non distended, no masses, no organomegaly, good bowel sounds, no rebound or guarding, no peritoneal signs.   EXTREMITIES: right shoulder no edema, no edema in fingers, no numb/tingling, cap refill <3 sec, trace ble nonpitting edema, analilia hose  SKIN: Warm and dry, no erythema noted.  Skin color, texture, no rashes or lesions.  NEUROLOGICAL: The patient is oriented to person, place and time. Calm and pleasant            LABS:   No results found for this or any previous visit (from the past 168 hour(s)).  Results for orders placed or performed in visit on 07/10/19   Basic Metabolic Panel   Result Value Ref Range    Sodium 136 136 - 145 mmol/L    Potassium 3.6 3.5 - 5.0 mmol/L    Chloride 99 98 - 107 mmol/L    CO2 26 22 - 31 mmol/L    Anion Gap, Calculation 11 5 - 18 mmol/L    Glucose 83 70 - 125 mg/dL    Calcium 9.4 8.5 - 10.5 mg/dL    BUN 21 8 - 28 mg/dL    Creatinine 0.78 0.60 - 1.10 mg/dL    GFR MDRD Af Amer >60 >60 mL/min/1.73m2    GFR MDRD Non Af Amer >60 >60 mL/min/1.73m2         Lab Results   Component Value Date    WBC 5.3 08/12/2019    HGB 12.0 08/12/2019     HCT 37.0 08/12/2019     08/12/2019     08/12/2019       Lab Results   Component Value Date    VSZCQEFC24 320 08/12/2019     No results found for: HGBA1C  Lab Results   Component Value Date    INR 1.01 03/04/2016     Vitamin D, Total (25-Hydroxy)   Date Value Ref Range Status   08/12/2019 49.0 30.0 - 80.0 ng/mL Final     Lab Results   Component Value Date    TSH 2.40 08/12/2019           ASSESSMENT/PLAN:    1. Fall, Right humerus fracture: Nonoperative treatment. Right shoulder no edema, no edema in fingers, no numb/tingling, cap refill < 3 sec. F/u with ortho on 7/19-PROM, work towards AROM, wean from sling, hold off on pressure for 2 weeks, f/u in 6 weeks on 8/30. Minimal to no pain, on tylenol 1000 three times a day prn. No concerns today.   2. HTN: -160s. On lisinopril 20mg at bedtime. Hold parameters. Mostly stable, no changes today.   3. Constipation: On miralax every other day, senna doc daily and at bedtime prn.  Eating prunes, managing diet. Improved since changing miralax.   4. OAB: stable, nocturia, incontinence intermittent.   5. Insomnia: melatonin at bedtime. Stable. .   6. Vitamin d deficiency: on vitamin d. Vit d 45 on 7/10.   7. Diastolic CHF: weights 3 times poffab-[id number]--[id number] edema today, previously stopped diuretics. Stable. No shortness of breath.   8. Alopecia: unknown cause at this time, no associated symptoms, rashes, pruritus. checked cmp, hm1, iron, ferritin, vit d, vit b12, tsh 8/12 and all stable, ferritin slightly elevated but iron levels normal. Will defer checking for hormonal abnormality prolactin level, progesterone, cortisol to pcp for referral to endocrine if needed. Follow up with pcp regarding this after discharge. May pursue endocrine workup if she is concerned as outpatient.   9. Macular rash, pruritus: resolved now, sarna and hc cream prn.   10. Right eye allergic conjunctivitis: resolved.     Per therapy PT:  home lesa  completed and went well, tinetti 21/28, ambulatig 250 feet with FWW and SBA/supervision, SEC with CGA/SBA for balance, bed mobility is mod indepednent          OT:  SBA for all dressing, toileting - need to assess a shower. Yellow tag.Lives alone in town home with 1 step.  Recommend home PT/OT, [facility], home nursing. LCD 8/30.     Electronically signed by: Su Scott NP

## 2021-06-19 NOTE — LETTER
Letter by Su Scott NP at      Author: Su Scott NP Service: -- Author Type: --    Filed:  Encounter Date: 2019 Status: (Other)         Patient: Kristal Cox   MR Number: [id number]   YOB: 1924   Date of Visit: 2019                 [facility]    DATE: 2019    NAME:  Kristal Cox             :  1924  MRN: [id number]  CODE STATUS:  DNR    VISIT TYPE: Review Of Multiple Medical Conditions     FACILITY:  [facility] [[id number]]       CHIEF COMPLAIN/REASON FOR VISIT:    Chief Complaint   Patient presents with   ? Review Of Multiple Medical Conditions               HISTORY OF PRESENT ILLNESS: Kristal Cox is a 89 y.o. female who was admitted - for fall, right humerus fracture. She tripped and fell while ambulating to the bathroom at home. Ortho recommended non operative treatment. She was place din sling and made NWB. She was discharged to TCU for further rehab. She has PMH of HTN, osteoporosis, anxiety, HLD, IBS. Prior to this she lived at home in a townhouse alone.     Today Ms. Cox is seen for follow up visit. She says she had a little trouble with bowels over weekend and ate some prunes with breakfast every day. She feels it is ok and doesn't want to change anything right now. Her arm is sore at times but trying to get used to not having the sling on now. She did not have therapy yesterday so this allowed her to rest her arm. She says her appetite is about the same and no nausea or stomach upset. She is sleeping ok and no concerns with heartburn or dizziness. She thinks her leg swelling is improving but is still wearing her analilia hose every day. She thinks therapy is going fine just wishes she could do more with her arm. She denies new concerns today. Per staff uneventful weekend.     REVIEW OF SYSTEMS:  PROBLEMS AND REVIEW OF SYSTEMS:   Today on ROS:   Currently, no fever, chills, or rigors.  Decreased vision and hearing. Denies any chest pain, headaches, palpitations, lightheadedness, dizziness, shortness of breath. Appetite is good. Denies any GERD symptoms. Denies any difficulty with swallowing, nausea, or vomiting. No insomnia. Positive for minimal to no right arm pain, leg swelling improved, urinary incontinence at times, constipation intermittent      Allergies   Allergen Reactions   ? Evista [Raloxifene]      Caused blood clot   ? Fosamax [Alendronate] Nausea And Vomiting     Current Outpatient Medications   Medication Sig   ? acetaminophen (TYLENOL) 500 MG tablet Take 1,000 mg by mouth 3 (three) times a day as needed.          ? cholecalciferol, vitamin D3, 1,000 unit tablet Take 2,000 Units by mouth daily.   ? dorzolamide (TRUSOPT) 2 % ophthalmic solution Administer 1 drop to both eyes 2 (two) times a day.          ? lisinopril (PRINIVIL,ZESTRIL) 10 MG tablet Take 20 mg by mouth daily.          ? melatonin 3 mg Tab tablet Take 1 tablet (3 mg total) by mouth at bedtime as needed. (Patient taking differently: Take 6 mg by mouth at bedtime.       )   ? senna-docusate (SENNOSIDES-DOCUSATE SODIUM) 8.6-50 mg tablet Take 1 tablet by mouth 2 (two) times a day as needed. And at bedtime prn            Past Medical History:    Past Medical History:   Diagnosis Date   ? Anxiety    ? Hyperlipidemia    ? IBS (irritable bowel syndrome)    ? Osteoporosis    ? Other abnormal clinical finding     evidence of old septal scar on EKG   ? Systolic hypertension            PHYSICAL EXAMINATION  Vitals:    08/04/19 1952   BP: 125/72   Pulse: 85   Resp: 20   Temp: 98.3  F (36.8  C)   SpO2: 94%   Weight: (!) 94 lb (42.6 kg)       Today on physical exam:     GENERAL: Awake, Alert, oriented x3, not in any form of acute distress, answers questions appropriately, follows simple commands, conversant  HEENT: Head is normocephalic with normal hair distribution. No evidence of trauma. Ears: No acute purulent discharge. Eyes:  Conjunctivae pink with no scleral jaundice. Nose: Normal mucosa and septum. NECK: Supple with no cervical or supraclavicular lymphadenopathy. Trachea is midline. Glasses, [place]  CHEST: No tenderness or deformity, no crepitus  LUNG: dim to auscultation with good chest expansion. There are no crackles or wheezes, normal AP diameter.   BACK: No kyphosis of the thoracic spine. Symmetric, no curvature, ROM normal, no CVA tenderness, no spinal tenderness   CVS: There is good S1  S2, regular rhythm, there are no murmurs, rubs, gallops, or heaves,  2+ pulses symmetric in all extremities.  ABDOMEN: Rounded and soft, nontender to palpation, non distended, no masses, no organomegaly, good bowel sounds, no rebound or guarding, no peritoneal signs.   EXTREMITIES: Right arm sling, right shoulder no edema, no edema in fingers, no numb/tingling, cap refill <3 sec, trace ble nonpitting edema, analilia hose  SKIN: Warm and dry, no erythema noted.  Skin color, texture, no rashes or lesions.  NEUROLOGICAL: The patient is oriented to person, place and time. Calm and pleasant            LABS:   No results found for this or any previous visit (from the past 168 hour(s)).  Results for orders placed or performed in visit on 07/10/19   Basic Metabolic Panel   Result Value Ref Range    Sodium 136 136 - 145 mmol/L    Potassium 3.6 3.5 - 5.0 mmol/L    Chloride 99 98 - 107 mmol/L    CO2 26 22 - 31 mmol/L    Anion Gap, Calculation 11 5 - 18 mmol/L    Glucose 83 70 - 125 mg/dL    Calcium 9.4 8.5 - 10.5 mg/dL    BUN 21 8 - 28 mg/dL    Creatinine 0.78 0.60 - 1.10 mg/dL    GFR MDRD Af Amer >60 >60 mL/min/1.73m2    GFR MDRD Non Af Amer >60 >60 mL/min/1.73m2         Lab Results   Component Value Date    WBC 4.4 07/10/2019    HGB 10.6 (L) 07/10/2019    HCT 31.1 (L) 07/10/2019    MCV 95 07/10/2019     07/10/2019       No results found for: GYVXNVFD41  No results found for: HGBA1C  Lab Results   Component Value Date    INR 1.01 03/04/2016     Vitamin D,  Total (25-Hydroxy)   Date Value Ref Range Status   07/10/2019 45.0 30.0 - 80.0 ng/mL Final     Lab Results   Component Value Date    TSH 2.69 04/29/2019           ASSESSMENT/PLAN:    1. Fall, Right humerus fracture: Nonoperative treatment. Right shoulder trace edema, no edema in fingers, no numb/tingling, cap refill < 3 sec. NWB. F/u with ortho on 7/19-PROM, work towards AROM, wean from sling, hold off on pressure for 2 weeks, f/u in 6 weeks on 8/30. Minimal to no pain, on tylenol 1000 three times a day prn. Not using tylenol recently. Sling off today, some mild soreness but no acute concerns.   2. HTN: SBP 120s. On lisinopril 20mg at bedtime. Hold parameters.   3. Constipation: On miralax daily prn, senna doc prn.     Prunes with breakfast, no concerns today. Constipation intermittent.   4. OAB: improved, urinary incontinence at times.   5. Insomnia: melatonin at bedtime, improved since increasing dose. Stable over weekend.   6. Vitamin d deficiency: on vitamin d. Vit d 45 on 7/10.   7. Diastolic CHF: weights 3 times weekly-104-104-44-99--64-74-94lbs. trace ble, analilia casas. dc'd hctz. No shortness of breath today or recently. Appetite good, stable.     Per therapy PT - iliana 8/28, SEC 200ft CGA, sit to stand CGA,  t/f's CGA, bed mob SBA , stairs CGA/Min A OT -  UB set up/SBA, LB SBA, toileting CGA. Red tag. Lives alone in town home with 1 step. Recommending 2-3 weeks.     Electronically signed by: Su Scott NP

## 2021-06-19 NOTE — LETTER
Letter by Su Scott NP at      Author: Su Scott NP Service: -- Author Type: --    Filed:  Encounter Date: 2019 Status: (Other)         Patient: Kristal Cox   MR Number: [id number]   YOB: 1924   Date of Visit: 2019                 [facility]    DATE: 2019    NAME:  Kristal Cox             :  1924  MRN: [id number]  CODE STATUS:  DNR    VISIT TYPE: Review Of Multiple Medical Conditions     FACILITY:  [facility] [[id number]]       CHIEF COMPLAIN/REASON FOR VISIT:    Chief Complaint   Patient presents with   ? Review Of Multiple Medical Conditions               HISTORY OF PRESENT ILLNESS: Kristal Cox is a 89 y.o. female who was admitted - for fall, right humerus fracture. She tripped and fell while ambulating to the bathroom at home. Ortho recommended non operative treatment. She was place din sling and made NWB. She was discharged to TCU for further rehab. She has PMH of HTN, osteoporosis, anxiety, HLD, IBS. Prior to this she lived at home in a townhouse alone.     Today Ms. Cox is seen for follow up visit today. She says her pain has been doing pretty well. She did wake up in pain this morning but she thinks it was how she slept. She says overall it has been controlled with ice and tylenol on occasion. She did see the ortho person on Friday and they said she could start some movement and range of motion but they haven't started that yet. She was also told she could wean out of the sling but she does not know how to go about this and hopes someone will tell her. She says she could use a stool softener today because she is a little constipated. Otherwise she slept pretty well and no concerns with urination. She is not having any fevers, chills, shortness of breath, cough. Her leg swelling is better and appetite is good.     REVIEW OF SYSTEMS:  PROBLEMS AND REVIEW OF SYSTEMS:   Today on  ROS:   Currently, no fever, chills, or rigors. Decreased vision and hearing. Denies any chest pain, headaches, palpitations, lightheadedness, dizziness, shortness of breath, or cough. Appetite is good. Denies any GERD symptoms. Denies any difficulty with swallowing, nausea, or vomiting. No insomnia. Positive for minimal right arm pain, right arm sling, leg swelling improved, difficulty swallowing pills, urinary incontinence, urgency, frequency improved, constipation      Allergies   Allergen Reactions   ? Evista [Raloxifene]      Caused blood clot   ? Fosamax [Alendronate] Nausea And Vomiting     Current Outpatient Medications   Medication Sig   ? acetaminophen (TYLENOL) 500 MG tablet Take 1,000 mg by mouth 3 (three) times a day as needed.          ? cholecalciferol, vitamin D3, 1,000 unit tablet Take 2,000 Units by mouth daily.   ? dorzolamide (TRUSOPT) 2 % ophthalmic solution Administer 1 drop to both eyes 2 (two) times a day.          ? lisinopril (PRINIVIL,ZESTRIL) 10 MG tablet Take 20 mg by mouth daily.          ? melatonin 3 mg Tab tablet Take 1 tablet (3 mg total) by mouth at bedtime as needed. (Patient taking differently: Take 6 mg by mouth at bedtime.       )   ? senna-docusate (SENNOSIDES-DOCUSATE SODIUM) 8.6-50 mg tablet Take 1 tablet by mouth 2 (two) times a day as needed. And at bedtime prn            Past Medical History:    Past Medical History:   Diagnosis Date   ? Anxiety    ? Hyperlipidemia    ? IBS (irritable bowel syndrome)    ? Osteoporosis    ? Other abnormal clinical finding     evidence of old septal scar on EKG   ? Systolic hypertension            PHYSICAL EXAMINATION  Vitals:    07/21/19 1841   BP: 124/70   Pulse: (!) 106   Resp: 18   Temp: 98.9  F (37.2  C)   SpO2: 94%   Weight: 100 lb (45.4 kg)       Today on physical exam:     GENERAL: Awake, Alert, oriented x3, not in any form of acute distress, answers questions appropriately, follows simple commands, conversant  HEENT: Head is  normocephalic with normal hair distribution. No evidence of trauma. Ears: No acute purulent discharge. Eyes: Conjunctivae pink with no scleral jaundice. Nose: Normal mucosa and septum. NECK: Supple with no cervical or supraclavicular lymphadenopathy. Trachea is midline. Glasses, [place]  CHEST: No tenderness or deformity, no crepitus  LUNG: dim to auscultation with good chest expansion. There are no crackles or wheezes, normal AP diameter.  BACK: No kyphosis of the thoracic spine. Symmetric, no curvature, ROM normal, no CVA tenderness, no spinal tenderness   CVS: There is good S1  S2, regular rhythm, there are no murmurs, rubs, gallops, or heaves,  2+ pulses symmetric in all extremities.  ABDOMEN: Rounded and soft, nontender to palpation, non distended, no masses, no organomegaly, good bowel sounds, no rebound or guarding, no peritoneal signs.   EXTREMITIES: Right arm sling, right shoulder trace edema, no edema in fingers, no numb/tingling, cap refill <3 sec, trace ble nonpitting edema  SKIN: Warm and dry, no erythema noted.  Skin color, texture, no rashes or lesions.  NEUROLOGICAL: The patient is oriented to person, place and time. Anxious at times but calm and pleasant today            LABS:   Recent Results (from the past 168 hour(s))   Urinalysis   Result Value Ref Range    Color, UA Colorless Colorless, Yellow, Straw, Light Yellow    Clarity, UA Clear Clear    Glucose, UA Negative Negative    Bilirubin, UA Negative Negative    Ketones, UA Negative Negative    Specific Gravity, UA 1.003 1.001 - 1.030    Blood, UA Negative Negative    pH, UA 6.5 4.5 - 8.0    Protein, UA Negative Negative mg/dL    Urobilinogen, UA <2.0 E.U./dL <2.0 E.U./dL, 2.0 E.U./dL    Nitrite, UA Negative Negative    Leukocytes, UA Negative Negative   Culture, Urine   Result Value Ref Range    Culture No Growth      Results for orders placed or performed in visit on 07/10/19   Basic Metabolic Panel   Result Value Ref Range    Sodium 136 136 - 145  mmol/L    Potassium 3.6 3.5 - 5.0 mmol/L    Chloride 99 98 - 107 mmol/L    CO2 26 22 - 31 mmol/L    Anion Gap, Calculation 11 5 - 18 mmol/L    Glucose 83 70 - 125 mg/dL    Calcium 9.4 8.5 - 10.5 mg/dL    BUN 21 8 - 28 mg/dL    Creatinine 0.78 0.60 - 1.10 mg/dL    GFR MDRD Af Amer >60 >60 mL/min/1.73m2    GFR MDRD Non Af Amer >60 >60 mL/min/1.73m2         Lab Results   Component Value Date    WBC 4.4 07/10/2019    HGB 10.6 (L) 07/10/2019    HCT 31.1 (L) 07/10/2019    MCV 95 07/10/2019     07/10/2019       No results found for: [id number]  No results found for: HGBA1C  Lab Results   Component Value Date    INR 1.01 03/04/2016     Vitamin D, Total (25-Hydroxy)   Date Value Ref Range Status   07/10/2019 45.0 30.0 - 80.0 ng/mL Final     Lab Results   Component Value Date    TSH 2.69 04/29/2019           ASSESSMENT/PLAN:    1. Fall, Right humerus fracture: Nonoperative treatment. Right shoulder trace edema, no edema in fingers, no numb/tingling, cap refill < 3 sec. Sling in place, NWB. F/u with ortho on 7/19-PROM, work towards AROM, wean from sling, hold off on pressure for 2 weeks, f/u in 6 weeks. Minimal to no pain, on tylenol 1000 three times a day prn. No concerns today.   2. HTN: SBP 120s. On lisinopril 20mg at bedtime. Hold parameters.  Off HCTZ. low sodium diet. Much improved with increasing lisinopril.   3. Constipation: On miralax daily prn, senna doc prn.   Stool softener today.   4. OAB: dysuria, urinary frequency, small amounts, urgency, incontinence. Urine culture negative, mild improvement in symptoms. Reports improved today, no meds needed.   5. Insomnia: melatonin at bedtime, improved since increasing dose.   6. Vitamin d deficiency: on vitamin d. Vit d 45 on 7/10.   7. Diastolic CHF: weights 3 times weekly-104-104-23-26-91-100lbs. trace ble, analilia casas. dc'd hctz. No shortness of breath today. Appetite good, stable.       Per therapy PT - iliana 8/28, SEC 100ft min A LOB x 2, t/f's SBA/Mal[place], bed mob  mod A , stairs Min A OT -  UB min A, LB CGA, toileting Min A. Recommending 1-2 weeks, red tag, Lives alone in town home with 1 step    Electronically signed by: Su Scott NP

## 2021-06-19 NOTE — LETTER
Letter by Su Scott NP at      Author: Su Scott NP Service: -- Author Type: --    Filed:  Encounter Date: 2019 Status: (Other)         Patient: Kristal Cox   MR Number: [id number]   YOB: 1924   Date of Visit: 2019                 [facility]    DATE: 2019    NAME:  Kristal Cox             :  1924  MRN: [id number]  CODE STATUS:  DNR    VISIT TYPE: Problem Visit (lab results)     FACILITY:  [facility] [[id number]]       CHIEF COMPLAIN/REASON FOR VISIT:    Chief Complaint   Patient presents with   ? Problem Visit     lab results               HISTORY OF PRESENT ILLNESS: Kristal Cox is a 89 y.o. female who was admitted - for fall, right humerus fracture. She tripped and fell while ambulating to the bathroom at home. Ortho recommended non operative treatment. She was place din sling and made NWB. She was discharged to TCU for further rehab. She has PMH of HTN, osteoporosis, anxiety, HLD, IBS. Prior to this she lived at home in a townhouse alone.     Today Ms. Cox is seen for concerns of lab results and rash that developed overnight. Staff reported when put her pajamas on last night she developed a rash and itching under arms, on chest, upper arms, and back. They removed her pajamas and put her in a gown and it did go away after some time. This morning she has no rash but is still complaining of itching. They were going to notify her daughter today of the rash since she does her laundry. On exam Kristal is seen in her room with daughter present. She says she had a rough night and did not sleep because of the rash and itching. She says the rash did go away she thinks but she is still itching this morning. Her daughter in room reports her sister who does the laundry said she didn't use any different laundry detergent this time. She also notes her mom was complaining of itching yesterday  afternoon when she was here around 2pm. Kristal says she has not used any new lotions or soaps and has not had a shower since last Thursday. She denies any new foods or anything else that may have caused the rash. She has never had this happen before. She denies any other concerns overnight. Discussed and reviewed lab results with her regarding the workup for hair thinning and loss and she acknowledged understanding and verbalized appreciation for update on this. Discussed that hormone levels were not checked because if abnormal would require an endocrine referral which would need to come from primary and this workup can occur after discharge. She agreed with plan of care.     REVIEW OF SYSTEMS:  PROBLEMS AND REVIEW OF SYSTEMS:   Today on ROS:   Currently, no fever, chills, or rigors. Decreased vision and hearing. Denies any chest pain, headaches, palpitations, lightheadedness, dizziness, shortness of breath. Appetite is good. Denies any GERD symptoms. Denies any difficulty with swallowing, nausea, or vomiting. No insomnia. Positive for minimal to no right arm pain, leg swelling improved, urinary incontinence at times, constipation, hair thinning and loss, rash overnight, itching      Allergies   Allergen Reactions   ? Evista [Raloxifene]      Caused blood clot   ? Fosamax [Alendronate] Nausea And Vomiting     Current Outpatient Medications   Medication Sig   ? camphor-menthol (SARNA) lotion Apply 1 application topically every 6 (six) hours as needed for itching.   ? hydrocortisone 1 % cream Apply 1 application topically every 6 (six) hours as needed.   ? acetaminophen (TYLENOL) 500 MG tablet Take 1,000 mg by mouth 3 (three) times a day as needed.          ? cholecalciferol, vitamin D3, 1,000 unit tablet Take 2,000 Units by mouth daily.   ? dorzolamide (TRUSOPT) 2 % ophthalmic solution Administer 1 drop to both eyes 2 (two) times a day.          ? lisinopril (PRINIVIL,ZESTRIL) 10 MG tablet Take 20 mg by mouth daily.           ? melatonin 3 mg Tab tablet Take 1 tablet (3 mg total) by mouth at bedtime as needed. (Patient taking differently: Take 6 mg by mouth at bedtime.       )   ? polyethylene glycol (MIRALAX) 17 gram packet Take 17 g by mouth daily.   ? senna-docusate (SENNOSIDES-DOCUSATE SODIUM) 8.6-50 mg tablet Take 1 tablet by mouth daily. And at bedtime prn            Past Medical History:    Past Medical History:   Diagnosis Date   ? Anxiety    ? Hyperlipidemia    ? IBS (irritable bowel syndrome)    ? Osteoporosis    ? Other abnormal clinical finding     evidence of old septal scar on EKG   ? Systolic hypertension            PHYSICAL EXAMINATION  Vitals:    08/12/19 2205   BP: 126/61   Pulse: 74   Resp: 18   Temp: 98.1  F (36.7  C)   SpO2: 95%   Weight: (!) 92 lb (41.7 kg)       Today on physical exam:     GENERAL: Awake, Alert, oriented x3, not in any form of acute distress, answers questions appropriately, follows simple commands, conversant  HEENT: Head is normocephalic with normal hair distribution. No evidence of trauma. Ears: No acute purulent discharge. Eyes: Conjunctivae pink with no scleral jaundice. Nose: Normal mucosa and septum. NECK: Supple with no cervical or supraclavicular lymphadenopathy. Trachea is midline. Glasses, [place], hair thinning, loss  CHEST: No tenderness or deformity, no crepitus  LUNG: dim to auscultation with good chest expansion. There are no crackles or wheezes, normal AP diameter.   BACK: No kyphosis of the thoracic spine. Symmetric, no curvature, ROM normal, no CVA tenderness, no spinal tenderness   CVS: There is good S1  S2, regular rhythm, there are no murmurs, rubs, gallops, or heaves,  2+ pulses symmetric in all extremities.  ABDOMEN: Rounded and soft, nontender to palpation, non distended, no masses, no organomegaly, good bowel sounds, no rebound or guarding, no peritoneal signs.   EXTREMITIES: right shoulder no edema, no edema in fingers, no numb/tingling, cap refill <3 sec, trace ble  nonpitting edema, analilia hose  SKIN: Warm and dry, no erythema noted.  Skin color, texture, no rashes or lesions.  NEUROLOGICAL: The patient is oriented to person, place and time. Calm and pleasant            LABS:   Recent Results (from the past 168 hour(s))   Thyroid Stimulating Hormone (TSH)   Result Value Ref Range    TSH 2.40 0.30 - 5.00 uIU/mL   Vitamin D, Total (25-Hydroxy)   Result Value Ref Range    Vitamin D, Total (25-Hydroxy) 49.0 30.0 - 80.0 ng/mL   Comprehensive Metabolic Panel   Result Value Ref Range    Sodium 138 136 - 145 mmol/L    Potassium 4.0 3.5 - 5.0 mmol/L    Chloride 106 98 - 107 mmol/L    CO2 27 22 - 31 mmol/L    Anion Gap, Calculation 5 5 - 18 mmol/L    Glucose 79 70 - 125 mg/dL    BUN 18 8 - 28 mg/dL    Creatinine 0.74 0.60 - 1.10 mg/dL    GFR MDRD Af Amer >60 >60 mL/min/1.73m2    GFR MDRD Non Af Amer >60 >60 mL/min/1.73m2    Bilirubin, Total 0.4 0.0 - 1.0 mg/dL    Calcium 9.5 8.5 - 10.5 mg/dL    Protein, Total 5.8 (L) 6.0 - 8.0 g/dL    Albumin 3.1 (L) 3.5 - 5.0 g/dL    Alkaline Phosphatase 82 45 - 120 U/L    AST 17 0 - 40 U/L    ALT 11 0 - 45 U/L   Vitamin B12   Result Value Ref Range    Vitamin B-12 320 213 - 816 pg/mL   Iron   Result Value Ref Range    Iron 61 42 - 175 ug/dL   Ferritin   Result Value Ref Range    Ferritin 136 (H) 10 - 130 ng/mL   HM1 (CBC with Diff)   Result Value Ref Range    WBC 5.3 4.0 - 11.0 thou/uL    RBC 3.72 (L) 3.80 - 5.40 mill/uL    Hemoglobin 12.0 12.0 - 16.0 g/dL    Hematocrit 37.0 35.0 - 47.0 %     80 - 100 fL    MCH 32.3 27.0 - 34.0 pg    MCHC 32.4 32.0 - 36.0 g/dL    RDW 13.9 11.0 - 14.5 %    Platelets 171 140 - 440 thou/uL    MPV 11.4 8.5 - 12.5 fL    Neutrophils % 57 50 - 70 %    Lymphocytes % 27 20 - 40 %    Monocytes % 12 (H) 2 - 10 %    Eosinophils % 3 0 - 6 %    Basophils % 1 0 - 2 %    Neutrophils Absolute 3.1 2.0 - 7.7 thou/uL    Lymphocytes Absolute 1.4 0.8 - 4.4 thou/uL    Monocytes Absolute 0.7 0.0 - 0.9 thou/uL    Eosinophils Absolute 0.2  0.0 - 0.4 thou/uL    Basophils Absolute 0.1 0.0 - 0.2 thou/uL     Results for orders placed or performed in visit on 07/10/19   Basic Metabolic Panel   Result Value Ref Range    Sodium 136 136 - 145 mmol/L    Potassium 3.6 3.5 - 5.0 mmol/L    Chloride 99 98 - 107 mmol/L    CO2 26 22 - 31 mmol/L    Anion Gap, Calculation 11 5 - 18 mmol/L    Glucose 83 70 - 125 mg/dL    Calcium 9.4 8.5 - 10.5 mg/dL    BUN 21 8 - 28 mg/dL    Creatinine 0.78 0.60 - 1.10 mg/dL    GFR MDRD Af Amer >60 >60 mL/min/1.73m2    GFR MDRD Non Af Amer >60 >60 mL/min/1.73m2         Lab Results   Component Value Date    WBC 5.3 08/12/2019    HGB 12.0 08/12/2019    HCT 37.0 08/12/2019     08/12/2019     08/12/2019       Lab Results   Component Value Date    TUYHILEF02 320 08/12/2019     No results found for: HGBA1C  Lab Results   Component Value Date    INR 1.01 03/04/2016     Vitamin D, Total (25-Hydroxy)   Date Value Ref Range Status   08/12/2019 49.0 30.0 - 80.0 ng/mL Final     Lab Results   Component Value Date    TSH 2.40 08/12/2019           ASSESSMENT/PLAN:    1. Fall, Right humerus fracture: Nonoperative treatment. Right shoulder trace edema, no edema in fingers, no numb/tingling, cap refill < 3 sec. F/u with ortho on 7/19-PROM, work towards AROM, wean from sling, hold off on pressure for 2 weeks, f/u in 6 weeks on 8/30. Minimal to no pain, on tylenol 1000 three times a day prn. No concerns today.   2. HTN: SBP 120s. On lisinopril 20mg at bedtime. Hold parameters.   3. Constipation: On miralax daily, senna doc daily and at bedtime prn.  Eating prunes, managing diet. Did have some relief with suppository last week.   4. OAB: stable, nocturia, incontinence intermittent. Explained changes with age today.   5. Insomnia: melatonin at bedtime, improved since increasing dose.   6. Vitamin d deficiency: on vitamin d. Vit d 45 on 7/10.   7. Diastolic CHF: weights 3 times weekly-104-104-10-04--52-65-94-92-92lbs. analilia evans.  dc'd hctz. No shortness of breath today or recently. Appetite fair-good.   8. Alopecia: unknown cause at this time, no associated symptoms, rashes, pruritus. checked cmp, hm1, iron, ferritin, vit d, vit b12, tsh 8/12 and all stable, ferritin slightly elevated but iron levels normal. Will defer checking for hormonal abnormality prolactin level, progesterone, cortisol to pcp for referral to endocrine if needed. Follow up with pcp regarding this after discharge. May pursue endocrine workup if she is concerned as outpatient.   9. Macular rash, pruritus: Developed last evening after wearing pajamas. Resolved after changing clothes. Daughter reports was present prior in the afternoon yesterday. No rash noted today on skin check, some pruritus. Will order sarna lotion prn, hydrocortisone cream prn.     Per therapy  PT - iliana 19/28, [id number] SBA, sit to stand CGA,  t/f's CGA, bed mob S , stairs CGA OT -  UB set up/SBA, LB SBA, toileting SBA. Red tag. Lives alone in town home with 1 step. PT/OT- 1-2 depending on the home assessment. Recommending lif[facility]    Electronically signed by: Su Scott NP

## 2021-06-19 NOTE — LETTER
Letter by Su Scott NP at      Author: Su Scott NP Service: -- Author Type: --    Filed:  Encounter Date: 7/15/2019 Status: (Other)         Patient: Kristal Cox   MR Number: [id number]   YOB: 1924   Date of Visit: 7/15/2019                 [facility]    DATE: 7/15/2019    NAME:  Kristal Cox             :  1924  MRN: [id number]  CODE STATUS:  DNR    VISIT TYPE: Problem Visit (hypertension)     FACILITY:  [facility] [[id number]]       CHIEF COMPLAIN/REASON FOR VISIT:    Chief Complaint   Patient presents with   ? Problem Visit     hypertension               HISTORY OF PRESENT ILLNESS: Kristal Cox is a 89 y.o. female who was admitted - for fall, right humerus fracture. She tripped and fell while ambulating to the bathroom at home. Ortho recommended non operative treatment. She was place din sling and made NWB. She was discharged to TCU for further rehab. She has PMH of HTN, osteoporosis, anxiety, HLD, IBS. Prior to this she lived at home in a townhouse alone.     Today Ms. Cox is seen for concerns of hypertension. She is concerned about this because her blood pressure is usually well controlled but she does not want to go back on the water pill. She is ok with increasing her lisinopril. She is also asking if she should be on a low salt diet. She generally tries to eat this way and did not eat the holder at breakfast today but is wondering if her diet could be changed so they will not give her things high in salt. She says they were concerned she had some weight loss but she does not think the initial weight here was accurate as she usually runs 98lbs at home. She is sleeping well and has no pain, even if she does have pain it is pretty minimal. She did not ask for anything for pain today was just going to try an ice pack. She is not having any fevers but does have chills at times because she tends to  feel cold. She is concerned because she is having a lot of urinary frequency, urgency, and some discomfort when she urinates. She says it was bothersome yesterday and overnight but then she thought she was better this morning but she just went again and nearly had an accident. She says this is abnormal for her and wondering if she has a urinary tract infection. She is also only going very small amounts and not sure she is completely emptying or not. She denies any dizziness or concerns with bowels today. She does not want diarrhea because that wipes her out but she is not strong enough to strain. She did go a little today so she thinks she is ok right now.     REVIEW OF SYSTEMS:  PROBLEMS AND REVIEW OF SYSTEMS:   Today on ROS:   Currently, no fever, chills, or rigors. Decreased vision and hearing. Denies any chest pain, headaches, palpitations, lightheadedness, dizziness, shortness of breath, or cough. Appetite is good. Denies any GERD symptoms. Denies any difficulty with swallowing, nausea, or vomiting. No insomnia. Positive for right arm pain, right arm sling, leg swelling, difficulty swallowing pills, urinary incontinence, urgency, frequency, small amounts, dysuria worse      Allergies   Allergen Reactions   ? Evista [Raloxifene]      Caused blood clot   ? Fosamax [Alendronate] Nausea And Vomiting     Current Outpatient Medications   Medication Sig   ? acetaminophen (TYLENOL) 500 MG tablet Take 1,000 mg by mouth at bedtime. And three times a day prn   ? cholecalciferol, vitamin D3, 1,000 unit tablet Take 2,000 Units by mouth daily.   ? dorzolamide (TRUSOPT) 2 % ophthalmic solution Administer 1 drop to both eyes 2 (two) times a day.          ? lisinopril (PRINIVIL,ZESTRIL) 10 MG tablet Take 20 mg by mouth daily.          ? melatonin 3 mg Tab tablet Take 1 tablet (3 mg total) by mouth at bedtime as needed. (Patient taking differently: Take 3 mg by mouth at bedtime.       )   ? polyethylene glycol (MIRALAX) 17 gram  packet Take 1 packet (17 g total) by mouth daily. (Patient taking differently: Take 17 g by mouth daily as needed.       )   ? ranitidine (ZANTAC) 150 MG tablet Take 150 mg by mouth as needed for heartburn.   ? senna-docusate (SENNOSIDES-DOCUSATE SODIUM) 8.6-50 mg tablet Take 1 tablet by mouth 2 (two) times a day as needed. And at bedtime prn            Past Medical History:    Past Medical History:   Diagnosis Date   ? Anxiety    ? Hyperlipidemia    ? IBS (irritable bowel syndrome)    ? Osteoporosis    ? Other abnormal clinical finding     evidence of old septal scar on EKG   ? Systolic hypertension            PHYSICAL EXAMINATION  Vitals:    07/14/19 2116   BP: 170/84   Pulse: (!) 101   Resp: 18   Temp: 97.9  F (36.6  C)   SpO2: 95%   Weight: (!) 98 lb (44.5 kg)       Today on physical exam:     GENERAL: Awake, Alert, oriented x3, not in any form of acute distress, answers questions appropriately, follows simple commands, conversant  HEENT: Head is normocephalic with normal hair distribution. No evidence of trauma. Ears: No acute purulent discharge. Eyes: Conjunctivae pink with no scleral jaundice. Nose: Normal mucosa and septum. NECK: Supple with no cervical or supraclavicular lymphadenopathy. Trachea is midline. Glasses, [place]  CHEST: No tenderness or deformity, no crepitus  LUNG: dim to auscultation with good chest expansion. There are no crackles or wheezes, normal AP diameter.  BACK: No kyphosis of the thoracic spine. Symmetric, no curvature, ROM normal, no CVA tenderness, no spinal tenderness   CVS: There is good S1  S2, regular rhythm, there are no murmurs, rubs, gallops, or heaves,  2+ pulses symmetric in all extremities.  ABDOMEN: Rounded and soft, nontender to palpation, non distended, no masses, no organomegaly, good bowel sounds, no rebound or guarding, no peritoneal signs.   EXTREMITIES: Right arm sling, right shoulder trace edema, trace in fingers, no numb/tingling, cap refill <3 sec, trace ble  nonpitting edema  SKIN: Warm and dry, no erythema noted.  Skin color, texture, no rashes or lesions.  NEUROLOGICAL: The patient is oriented to person, place and time. Anxious at times but calm and pleasant today            LABS:   Recent Results (from the past 168 hour(s))   Basic Metabolic Panel   Result Value Ref Range    Sodium 136 136 - 145 mmol/L    Potassium 3.6 3.5 - 5.0 mmol/L    Chloride 99 98 - 107 mmol/L    CO2 26 22 - 31 mmol/L    Anion Gap, Calculation 11 5 - 18 mmol/L    Glucose 83 70 - 125 mg/dL    Calcium 9.4 8.5 - 10.5 mg/dL    BUN 21 8 - 28 mg/dL    Creatinine 0.78 0.60 - 1.10 mg/dL    GFR MDRD Af Amer >60 >60 mL/min/1.73m2    GFR MDRD Non Af Amer >60 >60 mL/min/1.73m2   Vitamin D, Total (25-Hydroxy)   Result Value Ref Range    Vitamin D, Total (25-Hydroxy) 45.0 30.0 - 80.0 ng/mL   HM1 (CBC with Diff)   Result Value Ref Range    WBC 4.4 4.0 - 11.0 thou/uL    RBC 3.26 (L) 3.80 - 5.40 mill/uL    Hemoglobin 10.6 (L) 12.0 - 16.0 g/dL    Hematocrit 31.1 (L) 35.0 - 47.0 %    MCV 95 80 - 100 fL    MCH 32.5 27.0 - 34.0 pg    MCHC 34.1 32.0 - 36.0 g/dL    RDW 13.2 11.0 - 14.5 %    Platelets 177 140 - 440 thou/uL    MPV 10.8 8.5 - 12.5 fL    Neutrophils % 47 (L) 50 - 70 %    Lymphocytes % 32 20 - 40 %    Monocytes % 17 (H) 2 - 10 %    Eosinophils % 3 0 - 6 %    Basophils % 1 0 - 2 %    Neutrophils Absolute 2.1 2.0 - 7.7 thou/uL    Lymphocytes Absolute 1.4 0.8 - 4.4 thou/uL    Monocytes Absolute 0.8 0.0 - 0.9 thou/uL    Eosinophils Absolute 0.1 0.0 - 0.4 thou/uL    Basophils Absolute 0.0 0.0 - 0.2 thou/uL     Results for orders placed or performed in visit on 07/10/19   Basic Metabolic Panel   Result Value Ref Range    Sodium 136 136 - 145 mmol/L    Potassium 3.6 3.5 - 5.0 mmol/L    Chloride 99 98 - 107 mmol/L    CO2 26 22 - 31 mmol/L    Anion Gap, Calculation 11 5 - 18 mmol/L    Glucose 83 70 - 125 mg/dL    Calcium 9.4 8.5 - 10.5 mg/dL    BUN 21 8 - 28 mg/dL    Creatinine 0.78 0.60 - 1.10 mg/dL    GFR MDRD  Af Amer >60 >60 mL/min/1.73m2    GFR MDRD Non Af Amer >60 >60 mL/min/1.73m2         Lab Results   Component Value Date    WBC 4.4 07/10/2019    HGB 10.6 (L) 07/10/2019    HCT 31.1 (L) 07/10/2019    MCV 95 07/10/2019     07/10/2019       No results found for: [id number]  No results found for: HGBA1C  Lab Results   Component Value Date    INR 1.01 03/04/2016     Vitamin D, Total (25-Hydroxy)   Date Value Ref Range Status   07/10/2019 45.0 30.0 - 80.0 ng/mL Final     Lab Results   Component Value Date    TSH 2.69 04/29/2019           ASSESSMENT/PLAN:    1. Fall, Right humerus fracture: Nonoperative treatment. Right shoulder trace edema, trace in fingers, no numb/tingling, cap refill < 3 sec. Sling in place, NWB. F/u with ortho on 7/19. Minimal to no pain, on tylenol 1000 at bedtime and three times a day prn. Not using prn dose. Today just used ice packs.   2. HTN: -180s. On lisinopril 15mg at bedtime. Hold parameters.  Off HCTZ. Increase to 20mg daily, low sodium diet.   3. Constipation: On miralax daily prn, senna doc prn.     4. GERD: On ranitidine daily prn. Not recently used.   5. Insomnia: melatonin at bedtime  6. Vitamin d deficiency: on vitamin d. Vit d 45 on 7/10.   7. Diastolic CHF: weights 3 times weekly-104-104-98-98lbs. trace ble, analilia casas. dc'd hctz per her request. Will monitor. No shortness of breath today. Reports thinking first weight inaccurate, runs 98lbs at home. Stable. Intake good. Monitor.   8. UTI: dysuria, urinary frequency, small amounts, urgency, incontinence. UA per straight cath x 1 today.       Per therapy PT - iliana 8/28, SEC 100ft min A LOB x 2, t/f's SBA/Malinda, bed mob mod A OT -  UB min/mod A, LB min A, toileting Min A. Red tag, lives in suad with one step. Pt, ot 2 weeks.     Electronically signed by: Su Scott NP

## 2021-06-19 NOTE — LETTER
Letter by Su Scott NP at      Author: Su Scott NP Service: -- Author Type: --    Filed:  Encounter Date: 2019 Status: (Other)         Patient: Kristal Cox   MR Number: [id number]   YOB: 1924   Date of Visit: 2019                 [facility]    DATE: 2019    NAME:  Kristal Cox             :  1924  MRN: [id number]  CODE STATUS:  DNR    VISIT TYPE: Review Of Multiple Medical Conditions     FACILITY:  [facility] [[id number]]       CHIEF COMPLAIN/REASON FOR VISIT:    Chief Complaint   Patient presents with   ? Review Of Multiple Medical Conditions               HISTORY OF PRESENT ILLNESS: Kristal Cox is a 89 y.o. female who was admitted - for fall, right humerus fracture. She tripped and fell while ambulating to the bathroom at home. Ortho recommended non operative treatment. She was place din sling and made NWB. She was discharged to TCU for further rehab. She has PMH of HTN, osteoporosis, anxiety, HLD, IBS. Prior to this she lived at home in a townhouse alone.     Today Ms. Cox is seen for follow up visit today. She reports she had some loose stools again over the weekend and refused her stool softeners yesterday. Today she has not had any loose stools yet. She is hoping this is done now. She says she doesn't understand she gets so stopped up and then takes the stool softeners and has the runs for days. She cannot seem to find a happy medium. She says her breathing is fine and no issues with appetite. Her appetite wasn't the best with the diarrhea but is better now. She ate most of her breakfast. She says she has minimal to no pain in her arm and shoulder. Therapy is going fine. She is not sure how much longer she will be here. She hopes someone will tell her soon. She did not develop any further rash or itching over the weekend. She denies other concerns today. Per staff still had one  elevated blood pressure over weekend. She has been complaining of loose stools and feeling weak and tired as result of diarrhea.     REVIEW OF SYSTEMS:  PROBLEMS AND REVIEW OF SYSTEMS:   Today on ROS:   Currently, no fever, chills, or rigors. Decreased vision and hearing. Denies any chest pain, headaches, palpitations, lightheadedness, dizziness, shortness of breath. Appetite is good. Denies any GERD symptoms. Denies any difficulty with swallowing, nausea, or vomiting. No insomnia. Positive for minimal to no right arm pain, leg swelling improved, urinary incontinence at times,  hair thinning and loss, no rash or itching over weekend, right eye itching at times but improved, no drainange or redness today, loose stools      Allergies   Allergen Reactions   ? Evista [Raloxifene]      Caused blood clot   ? Fosamax [Alendronate] Nausea And Vomiting     Current Outpatient Medications   Medication Sig   ? acetaminophen (TYLENOL) 500 MG tablet Take 1,000 mg by mouth 3 (three) times a day as needed.          ? camphor-menthol (SARNA) lotion Apply 1 application topically every 6 (six) hours as needed for itching.   ? cholecalciferol, vitamin D3, 1,000 unit tablet Take 2,000 Units by mouth daily.   ? dorzolamide (TRUSOPT) 2 % ophthalmic solution Administer 1 drop to both eyes 2 (two) times a day.          ? hydrocortisone 1 % cream Apply 1 application topically every 6 (six) hours as needed.   ? lisinopril (PRINIVIL,ZESTRIL) 10 MG tablet Take 20 mg by mouth daily.          ? melatonin 3 mg Tab tablet Take 1 tablet (3 mg total) by mouth at bedtime as needed. (Patient taking differently: Take 6 mg by mouth at bedtime.       )   ? polyethylene glycol (MIRALAX) 17 gram packet Take 17 g by mouth every other day.          ? senna-docusate (SENNOSIDES-DOCUSATE SODIUM) 8.6-50 mg tablet Take 1 tablet by mouth daily. And at bedtime prn            Past Medical History:    Past Medical History:   Diagnosis Date   ? Anxiety    ?  Hyperlipidemia    ? IBS (irritable bowel syndrome)    ? Osteoporosis    ? Other abnormal clinical finding     evidence of old septal scar on EKG   ? Systolic hypertension            PHYSICAL EXAMINATION  Vitals:    08/18/19 1826   BP: 147/66   Pulse: 83   Resp: 18   Temp: 97.2  F (36.2  C)   SpO2: 96%   Weight: (!) 94 lb (42.6 kg)       Today on physical exam:     GENERAL: Awake, Alert, oriented x3, not in any form of acute distress, answers questions appropriately, follows simple commands, conversant  HEENT: Head is normocephalic with normal hair distribution. No evidence of trauma. Ears: No acute purulent discharge. Eyes: Conjunctivae pink with no scleral jaundice. Nose: Normal mucosa and septum. NECK: Supple with no cervical or supraclavicular lymphadenopathy. Trachea is midline. Glasses, [place], hair thinning, loss, right eye erythema resolved, no discharge noted, mild itching reported  CHEST: No tenderness or deformity, no crepitus  LUNG: dim to auscultation with good chest expansion. There are no crackles or wheezes, normal AP diameter.   BACK: No kyphosis of the thoracic spine. Symmetric, no curvature, ROM normal, no CVA tenderness, no spinal tenderness   CVS: There is good S1  S2, regular rhythm, there are no murmurs, rubs, gallops, or heaves,  2+ pulses symmetric in all extremities.  ABDOMEN: Rounded and soft, nontender to palpation, non distended, no masses, no organomegaly, good bowel sounds, no rebound or guarding, no peritoneal signs.   EXTREMITIES: right shoulder no edema, no edema in fingers, no numb/tingling, cap refill <3 sec, trace ble nonpitting edema, analilia hose  SKIN: Warm and dry, no erythema noted.  Skin color, texture, no rashes or lesions.  NEUROLOGICAL: The patient is oriented to person, place and time. Calm and pleasant            LABS:   No results found for this or any previous visit (from the past 168 hour(s)).  Results for orders placed or performed in visit on 07/10/19   Basic Metabolic  Panel   Result Value Ref Range    Sodium 136 136 - 145 mmol/L    Potassium 3.6 3.5 - 5.0 mmol/L    Chloride 99 98 - 107 mmol/L    CO2 26 22 - 31 mmol/L    Anion Gap, Calculation 11 5 - 18 mmol/L    Glucose 83 70 - 125 mg/dL    Calcium 9.4 8.5 - 10.5 mg/dL    BUN 21 8 - 28 mg/dL    Creatinine 0.78 0.60 - 1.10 mg/dL    GFR MDRD Af Amer >60 >60 mL/min/1.73m2    GFR MDRD Non Af Amer >60 >60 mL/min/1.73m2         Lab Results   Component Value Date    WBC 5.3 08/12/2019    HGB 12.0 08/12/2019    HCT 37.0 08/12/2019     08/12/2019     08/12/2019       Lab Results   Component Value Date    [id number] 320 08/12/2019     No results found for: HGBA1C  Lab Results   Component Value Date    INR 1.01 03/04/2016     Vitamin D, Total (25-Hydroxy)   Date Value Ref Range Status   08/12/2019 49.0 30.0 - 80.0 ng/mL Final     Lab Results   Component Value Date    TSH 2.40 08/12/2019           ASSESSMENT/PLAN:    1. Fall, Right humerus fracture: Nonoperative treatment. Right shoulder trace edema, no edema in fingers, no numb/tingling, cap refill < 3 sec. F/u with ortho on 7/19-PROM, work towards AROM, wean from sling, hold off on pressure for 2 weeks, f/u in 6 weeks on 8/30. Minimal to no pain, on tylenol 1000 three times a day prn. No concerns today.   2. HTN: SBP 140s. On lisinopril 20mg at bedtime. Hold parameters. One reading in 170s over weekend but resolved after meds.   3. Constipation: On miralax daily, senna doc daily and at bedtime prn.  Eating prunes, managing diet. Change miralax to every other day due to recent loose stools over weekend. Did refuse doses yesterday.   4. OAB: stable, nocturia, incontinence intermittent.   5. Insomnia: melatonin at bedtime. No concerns, did have some trouble sleeping last night related to pruritus.   6. Vitamin d deficiency: on vitamin d. Vit d 45 on 7/10.   7. Diastolic CHF: weights 3 times [id number]. analilia evans. dc'd hctz. No  shortness of breath today or recently. Appetite fair-good.   8. Alopecia: unknown cause at this time, no associated symptoms, rashes, pruritus. checked cmp, hm1, iron, ferritin, vit d, vit b12, tsh 8/12 and all stable, ferritin slightly elevated but iron levels normal. Will defer checking for hormonal abnormality prolactin level, progesterone, cortisol to pcp for referral to endocrine if needed. Follow up with pcp regarding this after discharge. May pursue endocrine workup if she is concerned as outpatient.   9. Macular rash, pruritus: two episodes now, both relating to clothing. Daughter doing laundry and suspecting related to laundry detergent, felt better and resolved after changing clothes. sarna and HC cream prn. No concerns over weekend or today.   10. Right eye allergic conjunctivitis: stringly, yellow drainage, mild irritation and itching.  [place] two times a day x 3 days. Cleanse with mild soap and warm wash cloth. Resolved today. Eye has mild itching but no other complaints.     Per therapy PT:  home eval completed and went well, iliana 21/28, ambulatig 250 feet with FWW and SBA/supervision, SEC with CGA/SBA for balance, bed mobility is mod indepednent          OT:  SBA for all dressing, toileting - need to assess a shower. Yellow tag.Lives alone in town home with 1 step.  Recommend home PT/OT, [facility] health aid, home nursing. LCD 8/30.     Electronically signed by: Su Scott NP

## 2021-06-19 NOTE — LETTER
Letter by Su Scott NP at      Author: Su Scott NP Service: -- Author Type: --    Filed:  Encounter Date: 2019 Status: (Other)         Patient: Kristal Cox   MR Number: [id number]   YOB: 1924   Date of Visit: 2019                 [facility]    DATE: 2019    NAME:  Kristal Cox             :  1924  MRN: [id number]  CODE STATUS:  DNR    VISIT TYPE: Review Of Multiple Medical Conditions     FACILITY:  [facility] [[id number]]       CHIEF COMPLAIN/REASON FOR VISIT:    Chief Complaint   Patient presents with   ? Review Of Multiple Medical Conditions               HISTORY OF PRESENT ILLNESS: Kristal Cox is a 89 y.o. female who was admitted - for fall, right humerus fracture. She tripped and fell while ambulating to the bathroom at home. Ortho recommended non operative treatment. She was place din sling and made NWB. She was discharged to TCU for further rehab. She has PMH of HTN, osteoporosis, anxiety, HLD, IBS. Prior to this she lived at home in a townhouse alone.     Today Ms. Cox is seen for follow up visit today. She says her bowels are still not moving well. She did go last week after they gave her the suppository but has not gone since. She is willing to take whatever she needs to in order to get going more regularly. She says otherwise she has been sleeping well and her appetite is fair. She denies nausea or stomach upset. Therapy seems to be going well. She did have some soreness in her arm overnight and needed some tylenol. It does not hurt this morning but she has not done any therapy yet. She denies uncontrolled pain. She is not having any dizziness, lightheadedness, shortness of breath, or cough. Per staff had toenails clipped on  and has some ingrown toenails and was recommended to f/u with podiatry.     REVIEW OF SYSTEMS:  PROBLEMS AND REVIEW OF SYSTEMS:   Today on ROS:    Currently, no fever, chills, or rigors. Decreased vision and hearing. Denies any chest pain, headaches, palpitations, lightheadedness, dizziness, shortness of breath. Appetite is good. Denies any GERD symptoms. Denies any difficulty with swallowing, nausea, or vomiting. No insomnia. Positive for minimal to no right arm pain, leg swelling improved, urinary incontinence at times, constipation, hair thinning and loss      Allergies   Allergen Reactions   ? Evista [Raloxifene]      Caused blood clot   ? Fosamax [Alendronate] Nausea And Vomiting     Current Outpatient Medications   Medication Sig   ? polyethylene glycol (MIRALAX) 17 gram packet Take 17 g by mouth daily.   ? acetaminophen (TYLENOL) 500 MG tablet Take 1,000 mg by mouth 3 (three) times a day as needed.          ? cholecalciferol, vitamin D3, 1,000 unit tablet Take 2,000 Units by mouth daily.   ? dorzolamide (TRUSOPT) 2 % ophthalmic solution Administer 1 drop to both eyes 2 (two) times a day.          ? lisinopril (PRINIVIL,ZESTRIL) 10 MG tablet Take 20 mg by mouth daily.          ? melatonin 3 mg Tab tablet Take 1 tablet (3 mg total) by mouth at bedtime as needed. (Patient taking differently: Take 6 mg by mouth at bedtime.       )   ? senna-docusate (SENNOSIDES-DOCUSATE SODIUM) 8.6-50 mg tablet Take 1 tablet by mouth daily. And at bedtime prn            Past Medical History:    Past Medical History:   Diagnosis Date   ? Anxiety    ? Hyperlipidemia    ? IBS (irritable bowel syndrome)    ? Osteoporosis    ? Other abnormal clinical finding     evidence of old septal scar on EKG   ? Systolic hypertension            PHYSICAL EXAMINATION  Vitals:    08/11/19 2118   BP: 148/74   Pulse: 84   Resp: 18   Temp: 97.6  F (36.4  C)   SpO2: 97%   Weight: (!) 92 lb 9.6 oz (42 kg)       Today on physical exam:     GENERAL: Awake, Alert, oriented x3, not in any form of acute distress, answers questions appropriately, follows simple commands, conversant  HEENT: Head is  normocephalic with normal hair distribution. No evidence of trauma. Ears: No acute purulent discharge. Eyes: Conjunctivae pink with no scleral jaundice. Nose: Normal mucosa and septum. NECK: Supple with no cervical or supraclavicular lymphadenopathy. Trachea is midline. Glasses, [place], hair thinning, loss  CHEST: No tenderness or deformity, no crepitus  LUNG: dim to auscultation with good chest expansion. There are no crackles or wheezes, normal AP diameter.   BACK: No kyphosis of the thoracic spine. Symmetric, no curvature, ROM normal, no CVA tenderness, no spinal tenderness   CVS: There is good S1  S2, regular rhythm, there are no murmurs, rubs, gallops, or heaves,  2+ pulses symmetric in all extremities.  ABDOMEN: Rounded and soft, nontender to palpation, non distended, no masses, no organomegaly, good bowel sounds, no rebound or guarding, no peritoneal signs.   EXTREMITIES: right shoulder no edema, no edema in fingers, no numb/tingling, cap refill <3 sec, trace ble nonpitting edema, analilia hose  SKIN: Warm and dry, no erythema noted.  Skin color, texture, no rashes or lesions.  NEUROLOGICAL: The patient is oriented to person, place and time. Calm and pleasant            LABS:   Recent Results (from the past 168 hour(s))   Thyroid Stimulating Hormone (TSH)   Result Value Ref Range    TSH 2.40 0.30 - 5.00 uIU/mL   Vitamin D, Total (25-Hydroxy)   Result Value Ref Range    Vitamin D, Total (25-Hydroxy) 49.0 30.0 - 80.0 ng/mL   Comprehensive Metabolic Panel   Result Value Ref Range    Sodium 138 136 - 145 mmol/L    Potassium 4.0 3.5 - 5.0 mmol/L    Chloride 106 98 - 107 mmol/L    CO2 27 22 - 31 mmol/L    Anion Gap, Calculation 5 5 - 18 mmol/L    Glucose 79 70 - 125 mg/dL    BUN 18 8 - 28 mg/dL    Creatinine 0.74 0.60 - 1.10 mg/dL    GFR MDRD Af Amer >60 >60 mL/min/1.73m2    GFR MDRD Non Af Amer >60 >60 mL/min/1.73m2    Bilirubin, Total 0.4 0.0 - 1.0 mg/dL    Calcium 9.5 8.5 - 10.5 mg/dL    Protein, Total 5.8 (L) 6.0 - 8.0  g/dL    Albumin 3.1 (L) 3.5 - 5.0 g/dL    Alkaline Phosphatase 82 45 - 120 U/L    AST 17 0 - 40 U/L    ALT 11 0 - 45 U/L   Vitamin B12   Result Value Ref Range    Vitamin B-12 320 213 - 816 pg/mL   Iron   Result Value Ref Range    Iron 61 42 - 175 ug/dL   Ferritin   Result Value Ref Range    Ferritin 136 (H) 10 - 130 ng/mL   HM1 (CBC with Diff)   Result Value Ref Range    WBC 5.3 4.0 - 11.0 thou/uL    RBC 3.72 (L) 3.80 - 5.40 mill/uL    Hemoglobin 12.0 12.0 - 16.0 g/dL    Hematocrit 37.0 35.0 - 47.0 %     80 - 100 fL    MCH 32.3 27.0 - 34.0 pg    MCHC 32.4 32.0 - 36.0 g/dL    RDW 13.9 11.0 - 14.5 %    Platelets 171 140 - 440 thou/uL    MPV 11.4 8.5 - 12.5 fL    Neutrophils % 57 50 - 70 %    Lymphocytes % 27 20 - 40 %    Monocytes % 12 (H) 2 - 10 %    Eosinophils % 3 0 - 6 %    Basophils % 1 0 - 2 %    Neutrophils Absolute 3.1 2.0 - 7.7 thou/uL    Lymphocytes Absolute 1.4 0.8 - 4.4 thou/uL    Monocytes Absolute 0.7 0.0 - 0.9 thou/uL    Eosinophils Absolute 0.2 0.0 - 0.4 thou/uL    Basophils Absolute 0.1 0.0 - 0.2 thou/uL     Results for orders placed or performed in visit on 07/10/19   Basic Metabolic Panel   Result Value Ref Range    Sodium 136 136 - 145 mmol/L    Potassium 3.6 3.5 - 5.0 mmol/L    Chloride 99 98 - 107 mmol/L    CO2 26 22 - 31 mmol/L    Anion Gap, Calculation 11 5 - 18 mmol/L    Glucose 83 70 - 125 mg/dL    Calcium 9.4 8.5 - 10.5 mg/dL    BUN 21 8 - 28 mg/dL    Creatinine 0.78 0.60 - 1.10 mg/dL    GFR MDRD Af Amer >60 >60 mL/min/1.73m2    GFR MDRD Non Af Amer >60 >60 mL/min/1.73m2         Lab Results   Component Value Date    WBC 5.3 08/12/2019    HGB 12.0 08/12/2019    HCT 37.0 08/12/2019     08/12/2019     08/12/2019       Lab Results   Component Value Date    XJYATOPD57 320 08/12/2019     No results found for: HGBA1C  Lab Results   Component Value Date    INR 1.01 03/04/2016     Vitamin D, Total (25-Hydroxy)   Date Value Ref Range Status   08/12/2019 49.0 30.0 - 80.0 ng/mL  Final     Lab Results   Component Value Date    TSH 2.40 08/12/2019           ASSESSMENT/PLAN:    1. Fall, Right humerus fracture: Nonoperative treatment. Right shoulder trace edema, no edema in fingers, no numb/tingling, cap refill < 3 sec. F/u with ortho on 7/19-PROM, work towards AROM, wean from sling, hold off on pressure for 2 weeks, f/u in 6 weeks on 8/30. Minimal to no pain, on tylenol 1000 three times a day prn. Tylenol overnight. Stable today.    2. HTN: SBP 140s. On lisinopril 20mg at bedtime. Hold parameters.   3. Constipation: On miralax daily prn, senna doc daily and at bedtime prn.  Eating prunes, managing diet. Did have some relief with suppository last week. Will change miralax to daily scheduled.   4. OAB: stable, nocturia, incontinence intermittent. Explained changes with age today.   5. Insomnia: melatonin at bedtime, improved since increasing dose.   6. Vitamin d deficiency: on vitamin d. Vit d 45 on 7/10.   7. Diastolic CHF: weights 3 times weekly-104-104-22-92--34-17-94-92lbs. trace ble, analilia masseye. dc'd hctz. No shortness of breath today or recently. Appetite fair-good.   8. Alopecia: unknown cause at this time, no associated symptoms, rashes, pruritus. Will order cmp, hm1, iron, ferritin, vit d, vit b12, tsh for 8/12. Will defer checking for hormonal abnormality prolactin level, progesterone, cortisol to pcp for referral to endocrine if needed. Labs today-pending.     Per therapy  PT - iliana 19/28, [id number] SBA, sit to stand CGA,  t/f's CGA, bed mob S , stairs CGA OT -  UB set up/SBA, LB SBA, toileting SBA. Red tag. Lives alone in town home with 1 step. PT/OT- 1-2 depending on the home assessment. Recommending lifeline    Electronically signed by: Su Scott NP

## 2021-06-20 NOTE — LETTER
Letter by Myhre, David J, RN at      Author: Myhre, David J, RN Service: -- Author Type: --    Filed:  Encounter Date: 5/13/2020 Status: (Other)       Care Plan  About Me:    Patient Name:  Kristal Cox    YOB: 1924  Age:         89 y.o.   [facility] MRN:    [id number] Telephone Information:  Home Phone [phone]   Mobile [phone]       Address:  [address] [place] [address] Email address:  [email or web address]      Emergency Contact(s)  Extended Emergency Contact Information      Name: Camilo Cox    Home Phone Number: [phone]  Relation: Child      Name: Declined, Per Pt    Relation: Declined          Primary language:  English     needed? Sheryl [employer]:  [phone] op. 1  Other communication barriers: None  Preferred Method of Communication:  Joon  Current living arrangement: I live alone  Mobility Status/ Medical Equipment: Independent w/Device    Health Maintenance  Health Maintenance Reviewed: Up to date    My Access Plan  Medical Emergency 911   Primary Clinic Line Palak Schmitz MD - [phone]   24 Hour Appointment Line [phone] or  [phone]) (toll-free)   24 Hour Nurse Line [phone] (toll-free)   Preferred Urgent Care [facility], [phone]   Preferred Hospital [facility]  [phone]   Preferred Pharmacy [id number] - [place], [place] - [address] [place] [facility] [address]     Behavioral Health Crisis Line The National Suicide Prevention Lifeline at 1-186.910.4229 or 911             My Care Team Members  Patient Care Team       Relationship Specialty Notifications Start End    Palak Schmitz MD PCP - General Internal Medicine  9/6/19     Phone: [phone] Fax: [phone]         [address] [address]    Palak Schmitz MD Assigned PCP   1/20/20     Phone: [phone] Fax:  [phone]         [address] [facility] [address]    Myhre, David J, RN Lead Care Coordinator Primary Care - CC Admissions 5/15/20     Fax: [phone]         Esperanza Romero CHW Community Health Worker Primary Care - [facility] Admissions 5/15/20     Phone: [phone] Fax: [phone]                My Care Plans  Self Management and Treatment Plan  Goals and (Comments)  Goals        General    Financial Wellbeing (pt-stated)     Notes - Note created  5/15/2020  1:40 PM by Myhre, David J, RN    Goal Statement: I would like to know if my hospital bed rental will be covered by Medicare in the next 30 days.   Date Goal set: 5/13/20  Barriers: Patient needs hospital bed, but it is currently not being covered by insurance.   Date to Achieve By: 6/13/20  Patient expressed understanding of goal: Verbalized understanding of goal.   Action steps to achieve this goal:  1. I understand the [facility] RN will send a note to Dr. Schmitz regarding the hospital bed not being covered by insurance.   2. My children will also check with the [facility] regarding coverage for my hospital bed rental.   3. I will let the [facility] Community Health WorkerEsperanza know the status of this situation at scheduled outreach telephone call.      Problem Solving (pt-stated)     Notes - Note created  5/15/2020  1:34 PM by Myhre, David J, RN    Goal Statement: I would like to have in-home nail care in the next 2 months.   Date Goal set: 5/13/20  Barriers: Patient is unable to provide nail care for herself.   Strengths: Patient has a strong willingness to improve her current situation.   Date to Achieve By: 7/13/20  Patient expressed understanding of goal: Verbalized understanding of goal.   Action steps to achieve this goal:  1. I understand the [facility] RN will provide my son, Venkat with a resource for in-home nail care.   2. Venkat or my daughter Heather Azul will schedule appointment with home care agency for nail care, if I approve.   3. I will  contact the [facility] Community Health WorkerEsperanza or the [facility] RNTera if I need additional resources.        Reducing Risks (pt-stated)     Notes - Note created  5/15/2020  1:49 PM by Myhre, David J, RN    Goal Statement: I would like to see an ophthalmologist and a dentist in the next 30 days.   Date Goal set: 5/13/20  Barriers: Patient has not been able to see either an ophthalmologist or dentist since the COVID-19 outbreak.   Strengths: Patient has a strong willingness to take care of her health.   Date to Achieve By: 5/13/20  Patient expressed understanding of goal: Verbalized understanding of goal.   Action steps to achieve this goal:  1. My children or I will scheduled appointments with an ophthalmologist and a dentist.     2. I will contact the [facility] CHWEsperanza if I need additional resources for a dental clinic or an ophthalmology clinic  3. I will notify the [facility] CHEsperanza MOFFETT when I have attended these appointments so this goal can be completed.                Advance Care Plans/Directives Type:   Type Advanced Care Plans/Directives: Advanced Directive - On File, DNR/DNI    My Medical and Care Information  Problem List   Patient Active Problem List   Diagnosis   ? Vertebral compression fracture (H)   ? GERD (gastroesophageal reflux disease)   ? Osteoporosis   ? Benign essential hypertension   ? Hyperlipidemia LDL goal <100   ? PVC's (premature ventricular contractions)      Current Medications and Allergies:  See printed Medication Report.    Care Coordination Start Date: 5/13/2020   Frequency of Care Coordination:     Form Last Updated: 05/15/2020

## 2021-06-20 NOTE — LETTER
Letter by Myhre, David J, RN at      Author: Myhre, David J, RN Service: -- Author Type: --    Filed:  Encounter Date: 5/13/2020 Status: (Other)       CARE COORDINATION  [facility]  [address] [facility] [place] [address]      May 15, 2020    Kristal KEEGAN Cox  [address] [address]      Dear Kristal,    I am a clinic care coordinator  who works with Palak Schmitz MD at the [facility]. I wanted to thank you for spending the time to talk with me.  I wanted to introduce myself and provide you with our contact information so that you can call with questions or concerns about your health care. Below is a description of clinic care coordination and how I can further assist you.     The clinic care coordinator team is made up of a registered nurse,  and community health worker who understand the health care system. The goal of clinic care coordination is to help you manage your health and improve access to the health care system in the most efficient manner. The team can assist you in meeting your health care goals by providing education, coordinating services, strengthening the communication among your providers, assist you with any financial, behavioral, psychosocial, chemical dependency, counseling, and/or psychiatric resources if needed.    Please feel free to contact Esperanza, the Community Health Worker, at [phone] with any questions or concerns. We are focused on providing you with the highest-quality healthcare experience possible and that all starts with you.     Sincerely,       David Myhre, RN   [facility] RN      Enclosed: I have enclosed a copy of the Care Plan. This has helpful information and goals that we have talked about. Please keep this in an easy to access place to use as needed.

## 2021-06-20 NOTE — LETTER
Letter by Palak Schmitz MD at      Author: Palak Schmitz MD Service: -- Author Type: --    Filed:  Encounter Date: 8/4/2020 Status: (Other)       8/6/2020      To whom it may concern,    I am writing this letter at the request of Edilia Simpson.  Edilia is active in the care of her mother who is a patient of mine, Kristal Cox.  Kristal has several medical problems that greatly increase her risk if she were to contract COVID-19.  These include her advanced age of 89, heart failure, and hypertension.  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      I appreciate your consideration in this matter.  Please contact me if you have any further questions.      Sincerely,      Palak Schmitz MD

## 2021-06-20 NOTE — LETTER
Letter by Matilda Swenson MD at      Author: Matilda Swenson MD Service: -- Author Type: --    Filed:  Encounter Date: 7/2/2020 Status: (Other)         July 2, 2020     Patient: Kristal Cox   YOB: 1924           To Whom It May Concern@ Home Instead:    It is my medical opinion that Kristal Cox should be wearing compression wraps daily on both lower extremities to control swelling.     If you have any questions or concerns, please don't hesitate to call.    Sincerely,        Electronically signed by Matilda Swenson MD

## 2021-06-21 NOTE — PROGRESS NOTES
"OFFICE VISIT NOTE    Subjective:   Chief Complaint:  Follow-up    89-year-old essential hypertension, osteoporosis, chronic back pain, irritable bowel type syndrome.  She is doing about the same.  Some issues with chronic back and rib discomfort.  No falls or injuries.  He denies having any chest pain or shortness of breath.  There is some swelling of the legs.  She is supposed be taking a diuretic 2-3 times a week which he will frequently hold because it causes urinary frequency.  There is no orthopnea to report.  No TIAs    Current Outpatient Medications   Medication Sig     cholecalciferol, vitamin D3, 1,000 unit tablet Take 2,000 Units by mouth daily.     diazePAM (VALIUM) 2 MG tablet TAKE 1 TABLET(2 MG) BY MOUTH TWICE DAILY     furosemide (LASIX) 20 MG tablet 1 tablet by mouth every Monday Wednesday and Friday morning     lisinopril (PRINIVIL,ZESTRIL) 10 MG tablet TAKE 1 TABLET(10 MG) BY MOUTH DAILY     ranitidine (ZANTAC) 150 MG tablet Take 150 mg by mouth as needed for heartburn.       PSFHx: Tobacco Status:  She  reports that  has never smoked. she has never used smokeless tobacco.    Review of Systems:  A comprehensive review of systems is negative except for the comments above    Objective:    Ht 5' 2\" (1.575 m)   Wt 104 lb (47.2 kg)   BMI 19.02 kg/m    GENERAL: No acute distress.  Weight is down 2 pounds.  Blood pressure is 144/64.  Pulse is 70.  1+ edema of the ankles.  No JVD.  Lungs are clear of any rales or wheezing  Heart exam shows a sinus rhythm without significant murmur gallop.  Neurologic exam is fairly normal for age.  Mentally she is sharp and alert.  She is rather frail.  Some osteoarthritic changes.  Kyphoscoliosis of the spine.    Assessment & Plan   Kristal Cox is a 89 y.o. female.    Clinically she is stable.  I think blood pressure is adequate at this age.  The chronic midline thoracic pain should be treated with Tylenol only.  Ambulation was encouraged to prevent further " joint stiffness and pain.  I will see her again early April.    Diagnoses and all orders for this visit:    Systolic hypertension    Chronic midline thoracic back pain    Age-related osteoporosis without current pathological fracture        The following low BMI interventions were performed this visit: weight gain advised    Tera Iglesias MD  Transcription using voice recognition software, may contain typographical errors.

## 2021-06-23 NOTE — TELEPHONE ENCOUNTER
Controlled Substance Refill Request  Medication:   Requested Prescriptions     Pending Prescriptions Disp Refills     diazePAM (VALIUM) 2 MG tablet [Pharmacy Med Name: DIAZEPAM 2MG TABLETS] 60 tablet 0     Sig: TAKE 1 TABLET(2 MG) BY MOUTH TWICE DAILY     Date Last Fill: 11/14/18  Pharmacy: [id number]   Submit electronically to pharmacy  Controlled Substance Agreement on File:   Encounter-Level CSA Scan Date:    There are no encounter-level csa scan date.       Last office visit: Last office visit pertaining to requested medication was 11/26/18.

## 2021-06-23 NOTE — PROGRESS NOTES
Pt on the [employer] Tracker program has not completed a survey in several months. Due to lack of participation, she will be dis-enrolled.     Ayla Wynn RN Care Manager, Population Health

## 2021-06-23 NOTE — TELEPHONE ENCOUNTER
Unable to reach. No return call.  Pt on the [employer] Tracker program has not completed a survey in several months. Due to lack of participation, he will be dis-enrolled.     Ayla Wynn RN Care Manager, Population Health

## 2021-06-25 NOTE — TELEPHONE ENCOUNTER
Controlled Substance Refill Request  Medication:   Requested Prescriptions     Pending Prescriptions Disp Refills     diazePAM (VALIUM) 2 MG tablet [Pharmacy Med Name: DIAZEPAM 2MG TABLETS] 60 tablet 0     Sig: TAKE 1 TABLET(2 MG) BY MOUTH TWICE DAILY     Date Last Fill: 1/14/19  [id number]  Submit electronically to pharmacy  Controlled Substance Agreement on File:   Encounter-Level CSA Scan Date:    There are no encounter-level csa scan date.       Last office visit: Last office visit pertaining to requested medication was 11/26/18 .

## 2021-06-25 NOTE — TELEPHONE ENCOUNTER
..Reason for Call:  Home Health Care    [place] [facility] called regarding (reason for call): Verbal    Orders are needed for this patient. OT    PT: N/A    OT: 2 VISITS TO HELP WITH COMPRESSION GARMENTS, MEASURING ORDER AND TRAINING.     Skilled Nursing: N/A    Pt Provider: Dr. Barba     Phone Number Homecare Nurse can be reached at: [phone]    Can we leave a detailed message on this number? Yes       Call taken on 5/27/2021 at 1:36 PM by Cindy Ayoub

## 2021-06-25 NOTE — TELEPHONE ENCOUNTER
Prescription Monitoring Program activity reviewed with no discrepancies noted.      Last fill per : 1/14/19  Quantity/days supply: 30/60    Controlled Substance Agreement on file: No  Date:     Last office visit with provider:  11/26/2018 Tera Iglesias MD    Please advise.

## 2021-06-28 NOTE — PROGRESS NOTES
Progress Notes by Jin Chopra MD at 1/7/2020 10:10 AM     Author: Jin Chopra MD Service: -- Author Type: Physician    Filed: 1/7/2020 10:42 AM Encounter Date: 1/7/2020 Status: Signed    : Jin Chopra MD (Physician)           Thank you, Palak Farias MD, for asking the [facility] team to see Ms. Kristal Cox to Follow-up PVCs.      Assessment/Recommendations   Assessment:    1. PVCs - treated with amiodarone  2. Low body weight  3. hyperlipidemia    Plan:  1. Stop atorvastatin  2. Continue other medications  3. Consider decreasing amiodarone to 100 mg daily on follow-up  4. Follow up in 6 months or sooner if needed         History of Present Illness   Ms. Kristal Cox is a 89 y.o. female with a significant past history of moderate mitral valve regurgitation, frequent PVCs, hypertension, and hyperlipidemai who presents for follow-up.    I initially met Ms. Cox when she was admitted to the hospital with an ankle fracture on 11/22/19. She was noted to have elevated troponin and frequent PVCs. She did not have any cardiac symptoms. After some discussion we elected to treat her cardiac issues medically and limit diagnostic testing. She was started on amiodarone with significant improvement in her PVCs. At baseline, Kristal ambulates with a walker.     Today, Kristal has moved back to her townhouse and is living independently. She has no cardiac symptoms but does state that she is weaker than she was prior to her ankle fracture 6 weeks ago. Her biggest complaint today is inability to get a good night's sleep.    She is accompanied today by a son and daughter who both indicate they do not have concerns about Kristal's heart today.    Other than noted above, Ms. Cox denies any chest pain/pressure/tightness, shortness of breath at rest or with exertion, light headedness/dizziness, pre-syncope, syncope, lower extremity swelling,  palpitations, paroxysmal nocturnal dyspnea (PND), or orthopnea.     Cardiac Problems and Cardiac Diagnostics     Most Recent Cardiac testing:    ECHO (report reviewed):   Echo results:   Results for orders placed during the hospital encounter of 11/21/19   Echo Complete [ECH10] 11/22/2019    Narrative   Normal left ventricular size and systolic function.The estimated left   ventricular ejection fraction is 55%. This represents a normal ejection   fraction. The left ventricular wall thickness is normal. Left ventricular   diastolic function is abnnormal.    Normal right ventricular size and systolic function.    Left atrial volume is severely increased.    Moderate aortic regurgitation.    Moderate (3+) degenearive mitral regurgitation.    Mild tricuspid valve regurgitation. No pulmonary hypertension present.   The estimated systolic pulmonary artery pressure is 35 mmhg.    Estimated central venous pressure equal to 8 mmHg.    No previous study for comparison.             Medications  Allergies   Current Outpatient Medications   Medication Sig Dispense Refill   ? acetaminophen (TYLENOL) 500 MG tablet Take 1,000 mg by mouth 3 (three) times a day as needed.            ? amiodarone (PACERONE) 200 MG tablet Take 1 tablet (200 mg total) by mouth daily.  0   ? artificial tears,hypromellose, (GENTEAL; SYSTANE) 0.3 % Gel Administer 1 drop to both eyes as needed (dry eyes).     ? aspirin 81 mg chewable tablet Chew 1 tablet (81 mg total) daily.  0   ? calcium, as carbonate, (OS-SAMPSON) 500 mg calcium (1,250 mg) tablet Take 1 tablet by mouth daily.     ? cholecalciferol, vitamin D3, 1,000 unit tablet Take 2,000 Units by mouth daily.     ? lisinopril (PRINIVIL,ZESTRIL) 20 MG tablet Take 1 tablet (20 mg total) by mouth daily. 90 tablet 3   ? magnesium chloride (SLOW-MAG) 64 mg TbEC delayed-release tablet Take 1 tablet (64 mg total) by mouth daily.  0   ? melatonin 3 mg Tab tablet Take 1-2 tablets (3-6 mg total) by mouth at bedtime  as needed.     ? metoprolol succinate (TOPROL-XL) 25 MG Take 1 tablet (25 mg total) by mouth daily.  0   ? nitroglycerin (NITROSTAT) 0.4 MG SL tablet Place 1 tablet (0.4 mg total) under the tongue every 5 (five) minutes as needed for chest pain.  0   ? ranitidine (ZANTAC) 75 MG tablet Take 75 mg by mouth 2 (two) times a day as needed for heartburn.     ? senna-docusate (SENNOSIDES-DOCUSATE SODIUM) 8.6-50 mg tablet Take 1 tablet by mouth daily.       No current facility-administered medications for this visit.       Allergies   Allergen Reactions   ? Evista [Raloxifene]      Caused blood clot   ? Fosamax [Alendronate] Nausea And Vomiting        Physical Examination Review of Systems   Vitals:    01/07/20 1013   BP: (!) 166/36   Pulse: 60   Resp: 16     Body mass index is 15.96 kg/m .  Wt Readings from Last 3 Encounters:   01/07/20 (!) 93 lb (42.2 kg)   12/27/19 (!) 93 lb (42.2 kg)   12/18/19 (!) 90 lb 1.6 oz (40.9 kg)       General Appearance:   Pleasant elderly female, appears stated age. no acute distress, very thin body habitus   ENT/Mouth: membranes moist, no apparent gingival bleeding.      EYES:  no scleral icterus, normal conjunctivae   Neck: supple   Respiratory:   lungs are clear to auscultation, no rales or wheezing, equal chest wall expansion    Cardiovascular:   Regular rhythm, normal rate. Normal first and second heart sounds with no murmurs, rubs, or gallops;bilateral brachial artery bruits present. Jugular venous pressure normal, no edema bilaterally    Extremities: no cyanosis or clubbing   Skin: no xanthelasma, warm.    Heme/lymph/ Immunology No apparent bleeding noted.   Neurologic: Alert and oriented. Ambulates with walker, no tremors   Psychiatric: Pleasant, calm, appropriate affect.    A complete 10 system review of systems was performed and is negative except as mentioned in the HPI or below:  General: Weight Loss  Eyes: WNL  Ears/Nose/Throat: WNL  Lungs: WNL  Heart: Leg Swelling  Stomach:  Constipation  Bladder: Frequent Urination at Night  Muscle/Joints: Muscle Weakness, Muscle Pain  Skin: WNL  Nervous System: Daytime Sleepiness, Loss of Balance  Mental Health: Anxiety     Blood: Easy Bruising       Past History   Past Medical History:   Past Medical History:   Diagnosis Date   ? Alopecia 2019   ? Ankle fracture, left, closed, initial encounter    ? Anxiety    ? Back pain 3/4/2016   ? Diastolic congestive heart failure (H) 2018   ? Hyperlipidemia    ? IBS (irritable bowel syndrome)    ? Osteoporosis    ? Other abnormal clinical finding     evidence of old septal scar on EKG   ? Systolic hypertension        Past Surgical History:   Past Surgical History:   Procedure Laterality Date   ? CATARACT EXTRACTION, BILATERAL     ?  SECTION      X 4   ? CHOLECYSTECTOMY     ? TONSILLECTOMY AND ADENOIDECTOMY         Family History:   Family History   Problem Relation Age of Onset   ? Stroke Mother         passed age 75   ? Crohn's disease Father         passed in his 20's from bowel obstructions        Social History:   Social History     Socioeconomic History   ? Marital status:      Spouse name: Not on file   ? Number of children: Not on file   ? Years of education: Not on file   ? Highest education level: Not on file   Occupational History   ? Not on file   Social Needs   ? Financial resource strain: Not on file   ? Food insecurity:     Worry: Not on file     Inability: Not on file   ? Transportation needs:     Medical: Not on file     Non-medical: Not on file   Tobacco Use   ? Smoking status: Never Smoker   ? Smokeless tobacco: Never Used   Substance and Sexual Activity   ? Alcohol use: No     Comment: rarely drinks wine, and drinks wine watered down with ice.   ? Drug use: Yes   ? Sexual activity: Not Currently   Lifestyle   ? Physical activity:     Days per week: Not on file     Minutes per session: Not on file   ? Stress: Not on file   Relationships   ? Social connections:     Talks  on phone: Not on file     Gets together: Not on file     Attends Sabianism service: Not on file     Active member of club or organization: Not on file     Attends meetings of clubs or organizations: Not on file     Relationship status: Not on file   ? Intimate partner violence:     Fear of current or ex partner: Not on file     Emotionally abused: Not on file     Physically abused: Not on file     Forced sexual activity: Not on file   Other Topics Concern   ? Not on file   Social History Narrative     since 2009, has children              Lab Results    Chemistry/lipid CBC Cardiac Enzymes/BNP/TSH/INR   Lab Results   Component Value Date    CHOL 178 07/07/2017    HDL 68 07/07/2017    LDLCALC 96 07/07/2017    TRIG 71 07/07/2017    CREATININE 0.83 12/27/2019    BUN 15 12/27/2019    K 4.2 12/27/2019     12/27/2019     12/27/2019    CO2 26 12/27/2019    Lab Results   Component Value Date    WBC 6.6 12/27/2019    HGB 12.4 12/27/2019    HCT 36.7 12/27/2019    MCV 95 12/27/2019     12/27/2019    Lab Results   Component Value Date    TROPONINI 0.65 (HH) 11/22/2019     (H) 11/21/2019    TSH 1.29 11/25/2019    INR 1.14 (H) 11/22/2019

## 2021-06-28 NOTE — PROGRESS NOTES
Progress Notes by Ivelisse Burr CNP at 3/2/2020  4:10 PM     Author: Ivelisse Burr CNP Service: -- Author Type: Nurse Practitioner    Filed: 3/19/2020 12:03 PM Encounter Date: 3/2/2020 Status: Signed    : Ivelisse Burr CNP (Nurse Practitioner)       Chief Complaint   Patient presents with   ? Abdominal Pain     Pt was seen on [id number] with Moris. Dx with UTI (cefdinir) and hip arthritis (tylenol). Lower back pain is worsening, patient states abdomen feels bloated and firmer due to no BM in 4 days.       ASSESSMENT & PLAN:   Diagnoses and all orders for this visit:    Acute left-sided low back pain without sciatica  -     HM1(CBC and Differential)  -     Urinalysis-UC if Indicated  -     acetaminophen tablet 650 mg (TYLENOL)  -     HM1 (CBC with Diff)        MDM:  The patient's level of mobility and back/abd pain is concerning.  Recommended emergency department for pain control, possible further work-up of abdominal pain, and discussion with  to see if she could be directly placed in transitional care if all else is well. Explained that other abdominal issues may feel like constipation at first, especially in older people.      Patient and family state she has been to transitional care for 2 lengthy stays within the last few months and patient states vehemently she does not want to go to the emergency room nor transitional care.      Is very hesitant to take Tylenol despite level of pain.  She states she does not take pain pills.  Explained that this is more or less required if she wants to stay out of transitional care and that she will get worse if she is unable to get out of bed.  Patient in agreement with increasing amount of Tylenol.    Family here will assist patient in assist her to emergency department if she is not improved with better pain control if can't wait until primary care visit.  Close follow-up.  Recommended suggesting in-home physical therapy if nothing  else.    Abdominal exam is nonfocal and would be consistent with constipation as she is mildly distended, mildly firm, and tender across most of lower abdomen.  Patient does state she is gotten progressively more bloated as she has not had bowel movements over the last 4 days.  She states this feels like constipation and does not want further work-up.    Recommended increasing fruits, fiber, fluids, and daily MiraLAX.      Urine done today does not show UTI.    Patient's pain may be stemming from degenerative disc disease, muscle spasm, pathology stemming from abdomen as primary source of discomfort.      Supportive care discussed.  See discharge instructions below for specific recommendations given.    At the end of the encounter, I discussed results, diagnosis, medications. Discussed red flags for immediate return to clinic/ER, as well as indications for follow up if no improvement. Patient and/or caregiver understood and agreed to plan. Patient was stable for discharge.    SUBJECTIVE    HPI:  HPI  Kristal Cox presents to the walk-in clinic with   Chief Complaint   Patient presents with   ? Abdominal Pain     Pt was seen on [id number] with Moris. Dx with UTI (cefdinir) and hip arthritis (tylenol). Lower back pain is worsening, patient states abdomen feels bloated and firmer due to no BM in 4 days.     Left sided abd pain and back pain.  Wraps around.  Pain comes and goes, currently 5-6/10.  Worse with goes from sitting to standing.      No BM x 4 days - took miralax this morning - 1 capful.  Chronic constipation issues.      Patient was diagnosed with UTI on the 27th.  Unfortunately, according to the lab, no culture was done because urine leaked out of urine cup.      X-rays done for back pain on the 27th showed multiple abnormalities, but no acute fractures.    Taking 1 Tylenol every 6 hours.  Only took 1 this morning.      Patient is hypertensive today, but is also quite painful, especially with  movement.    Denies: Loss of bowel or bladder control, radiating pain down the legs, rectal bleeding, dizziness    Known exposures: None specific.    See ROS for additional symptoms and/or pertinent negatives.       History obtained from the patient.    Past Medical History:   Diagnosis Date   ? Alopecia 8/8/2019   ? Ankle fracture, left, closed, initial encounter    ? Anxiety    ? Back pain 3/4/2016   ? Diastolic congestive heart failure (H) 2/21/2018   ? Hyperlipidemia    ? IBS (irritable bowel syndrome)    ? Osteoporosis    ? Other abnormal clinical finding     evidence of old septal scar on EKG   ? Systolic hypertension        Active Ambulatory (Non-Hospital) Problems    Diagnosis   ? PVC's (premature ventricular contractions)   ? Benign essential hypertension   ? Hyperlipidemia LDL goal <100   ? GERD (gastroesophageal reflux disease)   ? Osteoporosis   ? Vertebral compression fracture (H)       Family History   Problem Relation Age of Onset   ? Stroke Mother         passed age 75   ? Crohn's disease Father         passed in his 20's from bowel obstructions       Social History     Tobacco Use   ? Smoking status: Never Smoker   ? Smokeless tobacco: Never Used   Substance Use Topics   ? Alcohol use: No     Comment: rarely drinks wine, and drinks wine watered down with ice.       Review of Systems   Constitutional: Negative for chills and fever.   Cardiovascular:        Right ankle swelling, worse than normal   Gastrointestinal: Positive for abdominal pain and constipation. Negative for vomiting.   Genitourinary: Positive for frequency (not unusual, but has worsened.  Normally gets up 3 times per night.  Was urinating 5 times per night recently. ).       OBJECTIVE    Vitals:    03/02/20 1642 03/02/20 1817   BP: (!) 221/94 (!) 181/80   Patient Site:  Right Arm   Patient Position:  Sitting   Cuff Size:  Adult Regular   Pulse: 86 68   Resp: 16    Temp: 98.3  F (36.8  C)    TempSrc: Oral    SpO2: 95%        Physical  Exam  Constitutional:       General: She is not in acute distress.     Appearance: She is well-developed.   HENT:      Right Ear: External ear normal.      Left Ear: External ear normal.      Nose: No congestion or rhinorrhea.      Mouth/Throat:      Pharynx: No oropharyngeal exudate or posterior oropharyngeal erythema.   Eyes:      General:         Right eye: No discharge.         Left eye: No discharge.      Conjunctiva/sclera: Conjunctivae normal.   Cardiovascular:      Rate and Rhythm: Normal rate and regular rhythm.   Pulmonary:      Effort: Pulmonary effort is normal.      Breath sounds: Normal breath sounds.   Abdominal:      General: There is distension.      Tenderness: There is abdominal tenderness. There is no right CVA tenderness or left CVA tenderness.   Musculoskeletal: Normal range of motion.      Comments: Visibly painful when going from sitting to standing.  Unable to lie flat on exam table to assess abdomen due to pain in back.  No L-spine, T-spine tenderness.    Skin:     General: Skin is warm and dry.      Capillary Refill: Capillary refill takes less than 2 seconds.   Neurological:      Mental Status: She is alert and oriented to person, place, and time.   Psychiatric:         Mood and Affect: Mood normal.         Behavior: Behavior normal.         Thought Content: Thought content normal.         Judgment: Judgment normal.         Labs:  Recent Results (from the past 240 hour(s))   HM2(CBC w/o Differential)   Result Value Ref Range    WBC 6.1 4.0 - 11.0 thou/uL    RBC 3.74 (L) 3.80 - 5.40 mill/uL    Hemoglobin 11.9 (L) 12.0 - 16.0 g/dL    Hematocrit 35.7 35.0 - 47.0 %    MCV 95 80 - 100 fL    MCH 31.8 27.0 - 34.0 pg    MCHC 33.3 32.0 - 36.0 g/dL    RDW 13.1 11.0 - 14.5 %    Platelets 159 140 - 440 thou/uL    MPV 8.8 7.0 - 10.0 fL   Comprehensive Metabolic Panel   Result Value Ref Range    Sodium 133 (L) 136 - 145 mmol/L    Potassium 4.2 3.5 - 5.0 mmol/L    Chloride 98 98 - 107 mmol/L    CO2 28  22 - 31 mmol/L    Anion Gap, Calculation 7 5 - 18 mmol/L    Glucose 104 70 - 125 mg/dL    BUN 23 8 - 28 mg/dL    Creatinine 0.98 0.60 - 1.10 mg/dL    GFR MDRD Af Amer >60 >60 mL/min/1.73m2    GFR MDRD Non Af Amer 53 (L) >60 mL/min/1.73m2    Bilirubin, Total 0.3 0.0 - 1.0 mg/dL    Calcium 9.1 8.5 - 10.5 mg/dL    Protein, Total 7.0 6.0 - 8.0 g/dL    Albumin 3.6 3.5 - 5.0 g/dL    Alkaline Phosphatase 170 (H) 45 - 120 U/L    AST 39 0 - 40 U/L    ALT 33 0 - 45 U/L   Urinalysis-UC if Indicated   Result Value Ref Range    Color, UA Yellow Colorless, Yellow, Straw, Light Yellow    Clarity, UA Clear Clear    Glucose, UA Negative Negative    Bilirubin, UA Negative Negative    Ketones, UA Negative Negative    Specific Gravity, UA 1.015 1.005 - 1.030    Blood, UA Trace (!) Negative    pH, UA 5.5 5.0 - 8.0    Protein, UA Negative Negative mg/dL    Urobilinogen, UA 0.2 E.U./dL 0.2 E.U./dL, 1.0 E.U./dL    Nitrite, UA Negative Negative    Leukocytes, UA Negative Negative    Bacteria, UA Few (!) None Seen hpf    RBC, UA 0-2 None Seen, 0-2 hpf    WBC, UA None Seen None Seen, 0-5 hpf    Squam Epithel, UA 0-5 None Seen, 0-5 lpf    Hyaline Casts, UA 0-5 0-5, None Seen lpf   BNP(B-type Natriuretic Peptide)   Result Value Ref Range     (H) 0 - 167 pg/mL         Radiology:    Xr Thoracic Spine 2 Vws    Result Date: 2/27/2020  EXAM DATE:         02/27/2020 EXAM: X-RAY THORACIC SPINE, AP AND LATERAL, X-RAY LUMBAR SPINE, AP AND LATERAL LOCATION: [facility] DATE/TIME: 2/27/2020 6:45 PM INDICATION: Age-related osteoporosis without current pathological fracture Dorsalgia, unspecified COMPARISON: Correlation with chest, abdomen and pelvis CT 11/21/2019. IMPRESSION: Thoracic spine: Diffusely demineralized bones. The upper thoracic spine is partially obscured by overlying soft tissue on the lateral view. Approximately 30 degrees of levocurvature from T2 to T10 and 15 degrees of dextrocurvature from T11 to L4. Sagittal  alignment of the thoracic spine is maintained. Moderate to severe chronic compression fractures of T7 and T8 with approximately 65 degrees of segmental kyphosis from T6 to T10. The rest of the visualized thoracic vertebral body heights are within normal limits. If there is high clinical suspicion for acute fracture, it can be better evaluated with cross-sectional imaging with CT or MRI. Mild intervertebral disc height loss and endplate spurring in the thoracic spine. Mild left basilar atelectasis. Lumbar spine: 5 lumbar type vertebrae. 1 mm degenerative spondylolisthesis of L4 on L5. Height loss of L1, new compared to the previous abdomen and pelvis CT. Please correlate with point tenderness over this level. Mild chronic inferior endplate height loss of L4. The rest of vertebral body heights are maintained. No pars defects. Moderate facet arthropathy at L4-L5 and L5-S1. Moderate degenerative changes of the sacroiliac joints. Mild degenerative changes of the right hip joint.     Xr Lumbar Spine 4 Or More Vws    Result Date: 2/27/2020  EXAM DATE:         02/27/2020 EXAM: X-RAY THORACIC SPINE, AP AND LATERAL, X-RAY LUMBAR SPINE, AP AND LATERAL LOCATION: [facility] DATE/TIME: 2/27/2020 6:45 PM INDICATION: Age-related osteoporosis without current pathological fracture Dorsalgia, unspecified COMPARISON: Correlation with chest, abdomen and pelvis CT 11/21/2019. IMPRESSION: Thoracic spine: Diffusely demineralized bones. The upper thoracic spine is partially obscured by overlying soft tissue on the lateral view. Approximately 30 degrees of levocurvature from T2 to T10 and 15 degrees of dextrocurvature from T11 to L4. Sagittal alignment of the thoracic spine is maintained. Moderate to severe chronic compression fractures of T7 and T8 with approximately 65 degrees of segmental kyphosis from T6 to T10. The rest of the visualized thoracic vertebral body heights are within normal limits. If there is high  clinical suspicion for acute fracture, it can be better evaluated with cross-sectional imaging with CT or MRI. Mild intervertebral disc height loss and endplate spurring in the thoracic spine. Mild left basilar atelectasis. Lumbar spine: 5 lumbar type vertebrae. 1 mm degenerative spondylolisthesis of L4 on L5. Height loss of L1, new compared to the previous abdomen and pelvis CT. Please correlate with point tenderness over this level. Mild chronic inferior endplate height loss of L4. The rest of vertebral body heights are maintained. No pars defects. Moderate facet arthropathy at L4-L5 and L5-S1. Moderate degenerative changes of the sacroiliac joints. Mild degenerative changes of the right hip joint.     Xr Pelvis 3 Or More Vws    Result Date: 2/27/2020  EXAM DATE:         02/27/2020 EXAM: X-RAY PELVIS, MINIMUM 3 VIEWS LOCATION: [facility] DATE/TIME: 2/27/2020 6:15 PM INDICATION: Age-related osteoporosis without current pathological fracture dorsalgia, unspecified COMPARISON: CT of the pelvis 11/21/2019. IMPRESSION: Demineralized bones. No acute fracture detected. No dislocation. Mild degenerative osteoarthritis of both femoral acetabular joints. Degenerative changes of the lumbar spine not fully imaged. Multiple calcified phleboliths in the pelvis.       PATIENT INSTRUCTIONS:   Patient Instructions   Recheck in your clinic later this week regarding back pain.      PLEASE take Tylenol 500 mg every 4-6 hours.  It may keep you out of transitional care :-)     Recheck in 2 days or so.  In-home physical therapy may be helpful.    Constipation: Miralax 1 capful in glass of water daily for at least 1 week and several normal bowel movements in a row.     Fruits and green vegetables, caffeinated coffee, prunes (5-6 prunes or prune juice daily)    If no BM another 2 days or so and pain is worsening or you start vomiting or cannot eat at all, go to emergency room for likely high volume enema.    Your back  pain is very low and I do not think it is a kidney infection.    Keep feel elevated as much as possible (coffee table or another chair until special chair arrives).      Urine without infection today.               Patient Education     Treating Constipation    Constipation is a common and often uncomfortable problem. Constipation means you have bowel movements fewer than 3 times per week. Or that you strain to pass hard, dry stool. It can last a short time. Or it can be a problem that never seems to go away. The good news is that it can often be treated and controlled.  Eat more fiber  One of the best ways to help treat constipation is to increase your fiber intake. You can do this either through diet or by using fiber supplements. Fiber (in whole grains, fruits, and vegetables) adds bulk and absorbs water to soften the stool. This helps the stool pass through the colon more easily. When you increase your fiber intake, do it slowly to prevent side effects such as bloating. Also increase the amount of water that you drink. Eating more of these foods can add fiber to your diet:    High-fiber cereals    Whole grains, bran, and brown rice    Vegetables such as carrots, broccoli, and greens    Fresh fruits (especially apples, pears, and dried fruits such as raisins and apricots)    Nuts and legumes (especially beans such as lentils, kidney beans, and lima beans)  Get physically active  Exercise helps improve the working of your colon which helps ease constipation. Try to get some physical activity every day. If you havent been active for a while, talk with your healthcare provider before starting again.  Laxatives  Your healthcare provider may suggest an over-the-counter product to help ease your constipation. He or she may suggest the use of bulk-forming agents or laxatives. Laxatives, if used as directed, are common and safe. Follow directions carefully when using them. See your provider for new-onset constipation, or  long-term constipation, to rule out other causes such as medicines or thyroid disease.  Date Last Reviewed: 7/1/2016 2000-2019 [employer]. [address] [place] [address], [place] [address]. All rights reserved. This information is not intended as a substitute for professional medical care. Always follow your healthcare professional's instructions.

## 2021-06-29 NOTE — PROGRESS NOTES
"Progress Notes by Jin Chopra MD at 7/22/2020  1:30 PM     Author: Jin Chopra MD Service: -- Author Type: Physician    Filed: 7/22/2020  2:03 PM Encounter Date: 7/22/2020 Status: Signed    : Jin Chopra MD (Physician)           The patient has been notified of following:     \"This telephone visit will be conducted via a call between you and your physician/provider. We have found that certain health care needs can be provided without the need for a physical exam.  This service lets us provide the care you need with a phone conversation.  If a prescription is necessary we can send it directly to your pharmacy.  If lab work is needed we can place an order for that and you can then stop by our lab to have the test done at a later time. If during the course of the call the physician/provider feels a telephone visit is not appropriate, you will not be charged for this service.\" Verbal consent has been obtained for this service by care team member:         HEART CARE PHONE ENCOUNTER        The patient has chosen to have the visit conducted as a telephone visit, to reduce risk of exposure given the current status of Coronavirus in our community. This telephone visit is being conducted via a call between the patient and physician/provider. Health care needs are being provided without a physical exam.     Assessment/Recommendations   Assessment:    1. PVCs - on amiodarone. No symptoms  2. Hypothyroidism with elevated TSH. Possibly due to amiodarone use.   3. Fatigue, lack of energy, etc - could be side effect of amiodarone or decreased thyroid function  4. Lymphedema - seems to be improving with leg wraps.    Plan:  1. Discontinue amiodarone  2. Recheck TSH in 10 weeks  3. holter monitor in 8-10 weeks  4. Follow up with me in 3 months after holter results are back.      I have reviewed the note as documented.  This accurately captures the substance of my conversation with the patient.    Total " time of call between patient and provider was 18 minutes   Start Time: 1337   Stop Time: 6743       History of Present Illness/Subjective    Kristaljacob Cox is a 89 y.o. female who is being evaluated via a billable telephone visit.      Ms. Cox has a history of frequent PVCs treated with amiodarone.  She is currently taking 100 mg once daily.  She also has chronic lymphedema that treated with torsemide as well as leg wraps.  Past several weeks she has noted a substantial increase and generalized fatigue and tiredness.  Little motivation or energy to get out and do things that she wants to do.  She does not sleep well at night.  She feels cold most of the time despite the warm weather and not generally using air-conditioning.  She recently had lab work drawn and her TSH was noted to be elevated at 10.4.  Blood pressure tends to run in the 135 to 165 mmHg range.    I have reviewed and updated the patient's Past Medical History, Social History, Family History and Medication List.     Physical Examination not performed given phone encounter Review of Systems                                                Medical History  Surgical History Family History Social History   Past Medical History:   Diagnosis Date   ? Alopecia 2019   ? Ankle fracture, left, closed, initial encounter    ? Anxiety    ? Back pain 3/4/2016   ? Diastolic congestive heart failure (H) 2018   ? Hyperlipidemia    ? IBS (irritable bowel syndrome)    ? Osteoporosis    ? Other abnormal clinical finding     evidence of old septal scar on EKG   ? Systolic hypertension     Past Surgical History:   Procedure Laterality Date   ? CATARACT EXTRACTION, BILATERAL     ?  SECTION      X 4   ? CHOLECYSTECTOMY     ? TONSILLECTOMY AND ADENOIDECTOMY      Family History   Problem Relation Age of Onset   ? Stroke Mother         passed age 75   ? Crohn's disease Father         passed in his 20's from bowel obstructions    Social History      Socioeconomic History   ? Marital status:      Spouse name: Not on file   ? Number of children: Not on file   ? Years of education: Not on file   ? Highest education level: Not on file   Occupational History   ? Not on file   Social Needs   ? Financial resource strain: Not on file   ? Food insecurity     Worry: Not on file     Inability: Not on file   ? Transportation needs     Medical: Not on file     Non-medical: Not on file   Tobacco Use   ? Smoking status: Never Smoker   ? Smokeless tobacco: Never Used   Substance and Sexual Activity   ? Alcohol use: No     Comment: rarely drinks wine, and drinks wine watered down with ice.   ? Drug use: Yes   ? Sexual activity: Not Currently   Lifestyle   ? Physical activity     Days per week: Not on file     Minutes per session: Not on file   ? Stress: Not on file   Relationships   ? Social connections     Talks on phone: Not on file     Gets together: Not on file     Attends [employer] service: Not on file     Active member of club or organization: Not on file     Attends meetings of clubs or organizations: Not on file     Relationship status: Not on file   ? Intimate partner violence     Fear of current or ex partner: Not on file     Emotionally abused: Not on file     Physically abused: Not on file     Forced sexual activity: Not on file   Other Topics Concern   ? Not on file   Social History Narrative     since 2009, has children          Medications  Allergies   Current Outpatient Medications   Medication Sig Dispense Refill   ? acetaminophen (TYLENOL) 500 MG tablet Take 1,000 mg by mouth 3 (three) times a day as needed.            ? aspirin 81 mg chewable tablet Chew 1 tablet (81 mg total) daily.  0   ? calcium, as carbonate, (OS-SAMPSON) 500 mg calcium (1,250 mg) tablet Take 1 tablet by mouth daily.     ? cholecalciferol, vitamin D3, 1,000 unit tablet Take 2,000 Units by mouth daily.     ? furosemide (LASIX) 20 MG tablet Take 1-2 tablets (20-40 mg total) by  mouth daily. Take 1 daily.  May increase to 2 daily with increased swelling as needed. (Patient taking differently: Take 20 mg by mouth daily. Take 1 daily.  May increase to 2 daily with increased swelling as needed.) 60 tablet 11   ? lisinopriL (PRINIVIL,ZESTRIL) 20 MG tablet Take 1 tablet (20 mg total) by mouth daily. 90 tablet 3   ? magnesium chloride (SLOW-MAG) 64 mg TbEC delayed-release tablet Take 1 tablet (64 mg total) by mouth daily.  0   ? melatonin 3 mg Tab tablet Take 1-2 tablets (3-6 mg total) by mouth at bedtime as needed.     ? metoprolol succinate (TOPROL-XL) 25 MG TAKE 1 TABLET(25 MG) BY MOUTH DAILY 90 tablet 0   ? nitroglycerin (NITROSTAT) 0.4 MG SL tablet Place 1 tablet (0.4 mg total) under the tongue every 5 (five) minutes as needed for chest pain.  0   ? artificial tears,hypromellose, (GENTEAL; SYSTANE) 0.3 % Gel Administer 1 drop to both eyes as needed (dry eyes).     ? polyethylene glycol (GLYCOLAX) 17 gram/dose powder Take 17 g by mouth daily. 235 g 11   ? senna-docusate (SENNOSIDES-DOCUSATE SODIUM) 8.6-50 mg tablet Take 1 tablet by mouth daily.       No current facility-administered medications for this visit.     Allergies   Allergen Reactions   ? Evista [Raloxifene]      Caused blood clot   ? Fosamax [Alendronate] Nausea And Vomiting         Lab Results    Chemistry/lipid CBC Cardiac Enzymes/BNP/TSH/INR   Lab Results   Component Value Date    CHOL 178 07/07/2017    HDL 68 07/07/2017    LDLCALC 96 07/07/2017    TRIG 71 07/07/2017    CREATININE 0.92 07/13/2020    BUN 23 07/13/2020    K 4.4 07/13/2020     07/13/2020    CL 99 07/13/2020    CO2 31 07/13/2020    Lab Results   Component Value Date    WBC 5.4 07/13/2020    HGB 12.3 07/13/2020    HCT 36.2 07/13/2020    MCV 99 07/13/2020     07/13/2020    Lab Results   Component Value Date    TROPONINI 0.65 (HH) 11/22/2019     (H) 03/13/2020    TSH 10.41 (H) 07/13/2020    INR 1.14 (H) 11/22/2019

## 2021-06-29 NOTE — PROGRESS NOTES
Progress Notes by Jin Chopra MD at 10/22/2020 11:30 AM     Author: Jin Chopra MD Service: -- Author Type: Physician    Filed: 10/22/2020 12:46 PM Encounter Date: 10/22/2020 Status: Signed    : Jin Chopra MD (Physician)           Thank you, Palak Farias MD, for asking the [facility] team to see Ms. Kristal Cox to Follow-up PVCs.      Assessment/Recommendations   Assessment:    1. PVCs - asymptomatic with a 6% burden off amiodarone.  2. Low body weight  3. Hyperlipidemia  4. Hypothyroid - has not yet started levothyroxine    Plan:  1. Encouraged pt to start thyroid replacement  2. Continue medications as prescribed  3. Follow up in 6 months or sooner if needed.         History of Present Illness   Ms. Kristal Cox is a 89 y.o. female with a significant past history of moderate mitral valve regurgitation, frequent PVCs, hypertension, and hyperlipidemai who presents for follow-up.    I initially met Ms. Cox when she was admitted to the hospital with an ankle fracture on 11/22/19. She was noted to have elevated troponin and frequent PVCs. She did not have any cardiac symptoms. After some discussion we elected to treat her cardiac issues medically and limit diagnostic testing. She was started on amiodarone with significant improvement in her PVCs. At baseline, Kristal ambulates with a walker.     Today, Kristal reports feeling fatigued and with a lack of energy. She is hypothyroid and has been prescribed synthroid, but has not yet started taking this.    Other than noted above, Ms. Cox denies any chest pain/pressure/tightness, shortness of breath at rest or with exertion, light headedness/dizziness, pre-syncope, syncope, lower extremity swelling, palpitations, paroxysmal nocturnal dyspnea (PND), or orthopnea.     Cardiac Problems and Cardiac Diagnostics     Most Recent Cardiac testing:    ECHO (report reviewed):   Echo results:    Results for orders placed during the hospital encounter of 11/21/19   Echo Complete [ECH10] 11/22/2019    Narrative   Normal left ventricular size and systolic function.The estimated left   ventricular ejection fraction is 55%. This represents a normal ejection   fraction. The left ventricular wall thickness is normal. Left ventricular   diastolic function is abnnormal.    Normal right ventricular size and systolic function.    Left atrial volume is severely increased.    Moderate aortic regurgitation.    Moderate (3+) degenearive mitral regurgitation.    Mild tricuspid valve regurgitation. No pulmonary hypertension present.   The estimated systolic pulmonary artery pressure is 35 mmhg.    Estimated central venous pressure equal to 8 mmHg.    No previous study for comparison.             Medications  Allergies   Current Outpatient Medications   Medication Sig Dispense Refill   ? acetaminophen (TYLENOL) 500 MG tablet Take 1,000 mg by mouth 3 (three) times a day as needed.            ? artificial tears,hypromellose, (GENTEAL; SYSTANE) 0.3 % Gel Administer 1 drop to both eyes as needed (dry eyes).     ? aspirin 81 mg chewable tablet Chew 1 tablet (81 mg total) daily.  0   ? calcium, as carbonate, (OS-SAMPSON) 500 mg calcium (1,250 mg) tablet Take 1 tablet by mouth daily.     ? cholecalciferol, vitamin D3, 1,000 unit tablet Take 2,000 Units by mouth daily.     ? furosemide (LASIX) 20 MG tablet Take 1-2 tablets (20-40 mg total) by mouth daily. Take 1 daily.  May increase to 2 daily with increased swelling as needed. (Patient taking differently: Take 20 mg by mouth daily. Take 1 daily.  May increase to 2 daily with increased swelling as needed.) 60 tablet 11   ? lisinopriL (PRINIVIL,ZESTRIL) 20 MG tablet Take 1 tablet (20 mg total) by mouth daily. 90 tablet 3   ? magnesium chloride (SLOW-MAG) 64 mg TbEC delayed-release tablet Take 1 tablet (64 mg total) by mouth daily.  0   ? melatonin 3 mg Tab tablet Take 1-2 tablets (3-6  mg total) by mouth at bedtime as needed.     ? metoprolol succinate (TOPROL-XL) 25 MG Take 1 tablet (25 mg total) by mouth daily. 90 tablet 0   ? nitroglycerin (NITROSTAT) 0.4 MG SL tablet Place 1 tablet (0.4 mg total) under the tongue every 5 (five) minutes as needed for chest pain.  0   ? polyethylene glycol (GLYCOLAX) 17 gram/dose powder Take 17 g by mouth daily. 235 g 11   ? senna-docusate (SENNOSIDES-DOCUSATE SODIUM) 8.6-50 mg tablet Take 1 tablet by mouth daily.     ? levothyroxine (SYNTHROID) 50 MCG tablet Take 1 tablet (50 mcg total) by mouth Daily at 6:00 am. 90 tablet 3   ? mirtazapine (REMERON) 7.5 MG tablet Take 1 tablet (7.5 mg total) by mouth at bedtime. 90 tablet 3     No current facility-administered medications for this visit.       Allergies   Allergen Reactions   ? Evista [Raloxifene]      Caused blood clot   ? Fosamax [Alendronate] Nausea And Vomiting        Physical Examination Review of Systems   Vitals:    10/22/20 1134   BP: (!) 180/100   Pulse: 66   Resp: 14     Body mass index is 16.82 kg/m .  Wt Readings from Last 3 Encounters:   10/22/20 (!) 98 lb (44.5 kg)   10/15/20 (!) 96 lb (43.5 kg)   07/13/20 (!) 98 lb (44.5 kg)       General Appearance:   Pleasant elderly female, appears stated age. no acute distress, very thin body habitus   ENT/Mouth: membranes moist, no apparent gingival bleeding.      EYES:  no scleral icterus, normal conjunctivae   Neck: supple   Respiratory:   lungs are clear to auscultation, no rales or wheezing, equal chest wall expansion. Kyphotic posture.   Cardiovascular:   Regular rhythm, normal rate. Normal first and second heart sounds with no murmurs, rubs, or gallops;bilateral brachial artery bruits present. Jugular venous pressure normal, no edema bilaterally    Extremities: no cyanosis or clubbing   Skin: no xanthelasma, warm.    Heme/lymph/ Immunology No apparent bleeding noted.   Neurologic: Alert and oriented. Ambulates with walker, no tremors   Psychiatric:  Pleasant, calm, appropriate affect.    A complete 10 system review of systems was performed and is negative except as mentioned in the HPI or below:  General: WNL  Eyes: WNL  Ears/Nose/Throat: Nosebleeds  Lungs: Shortness of Breath  Heart: Leg Swelling  Stomach: WNL  Bladder: WNL  Muscle/Joints: Muscle Weakness  Skin: WNL  Nervous System: WNL  Mental Health: WNL     Blood: WNL       Past History   Past Medical History:   Past Medical History:   Diagnosis Date   ? Alopecia 2019   ? Ankle fracture, left, closed, initial encounter    ? Anxiety    ? Back pain 3/4/2016   ? Diastolic congestive heart failure (H) 2018   ? Hyperlipidemia    ? IBS (irritable bowel syndrome)    ? Osteoporosis    ? Other abnormal clinical finding     evidence of old septal scar on EKG   ? Systolic hypertension        Past Surgical History:   Past Surgical History:   Procedure Laterality Date   ? CATARACT EXTRACTION, BILATERAL     ?  SECTION      X 4   ? CHOLECYSTECTOMY     ? TONSILLECTOMY AND ADENOIDECTOMY         Family History:   Family History   Problem Relation Age of Onset   ? Stroke Mother         passed age 75   ? Crohn's disease Father         passed in his 20's from bowel obstructions        Social History:   Social History     Socioeconomic History   ? Marital status:      Spouse name: Not on file   ? Number of children: Not on file   ? Years of education: Not on file   ? Highest education level: Not on file   Occupational History   ? Not on file   Social Needs   ? Financial resource strain: Not on file   ? Food insecurity     Worry: Not on file     Inability: Not on file   ? Transportation needs     Medical: Not on file     Non-medical: Not on file   Tobacco Use   ? Smoking status: Never Smoker   ? Smokeless tobacco: Never Used   Substance and Sexual Activity   ? Alcohol use: No     Comment: rarely drinks wine, and drinks wine watered down with ice.   ? Drug use: Yes   ? Sexual activity: Not Currently    Lifestyle   ? Physical activity     Days per week: Not on file     Minutes per session: Not on file   ? Stress: Not on file   Relationships   ? Social connections     Talks on phone: Not on file     Gets together: Not on file     Attends Baptism service: Not on file     Active member of club or organization: Not on file     Attends meetings of clubs or organizations: Not on file     Relationship status: Not on file   ? Intimate partner violence     Fear of current or ex partner: Not on file     Emotionally abused: Not on file     Physically abused: Not on file     Forced sexual activity: Not on file   Other Topics Concern   ? Not on file   Social History Narrative     since 2009, has children              Lab Results    Chemistry/lipid CBC Cardiac Enzymes/BNP/TSH/INR   Lab Results   Component Value Date    CHOL 153 07/23/2020    HDL 60 07/23/2020    LDLCALC 82 07/23/2020    TRIG 53 07/23/2020    CREATININE 0.92 07/13/2020    BUN 23 07/13/2020    K 4.4 07/13/2020     07/13/2020    CL 99 07/13/2020    CO2 31 07/13/2020    Lab Results   Component Value Date    WBC 5.4 07/13/2020    HGB 12.3 07/13/2020    HCT 36.2 07/13/2020    MCV 99 07/13/2020     07/13/2020    Lab Results   Component Value Date    TROPONINI 0.65 (HH) 11/22/2019     (H) 03/13/2020    TSH 10.34 (H) 10/15/2020    INR 1.14 (H) 11/22/2019

## 2021-06-29 NOTE — PROGRESS NOTES
Progress Notes by Myhre, David J, RN at 5/13/2020 11:00 AM     Author: Myhre, David J, RN Service: -- Author Type: Registered Nurse    Filed: 5/15/2020  2:13 PM Encounter Date: 5/13/2020 Status: Signed    : Myhre, David J, RN (Registered Nurse)       Clinic Care Coordination Contact    Clinic Care Coordination Contact  OUTREACH    Referral Information:  Referral Source: PCP    Primary Diagnosis: Psychosocial    Chief Complaint   Patient presents with   ? Clinic Care Coordination - Initial        Universal Utilization:   Clinic Utilization  Difficulty keeping appointments:: No  Compliance Concerns: No  No-Show Concerns: No  No PCP office visit in Past Year: No  Utilization    Last refreshed: 5/15/2020  1:31 PM:  Hospital Admissions 2           Last refreshed: 5/15/2020  1:31 PM:  ED Visits 2           Last refreshed: 5/15/2020  1:31 PM:  No Show Count (past year) 0              Current as of: 5/15/2020  1:31 PM              Clinical Concerns:  Current Medical Concerns: Patient has a history of vertebral compression fracture, GERD, Osteoporosis, HTN, hyperlipidemia, PVCs, edema, acute low back pain  Patient currently living alone in a townhouse. Patient has overnight caregiver through [facility] to help her get to the a bathroom at night and with other needs. Patient reported a fall in November of 2019 that left her with a fractured ankle. Patient reported ankle healed well. No pain currently. She stated she uses a walker at all times now. Patient said she wears a life alert at all times. Patent has four very supportive children. She stated at least once of her children visits her daily to assist with her needs.   Behaviors Concerns: Patient denied.   Pain  Pain (GOAL):: No  Health Maintenance Reviewed: Up to date  Clinical Pathway:      Medication Management:  Patient's daughter manage her medications. No concerns.     Functional Status:  Dependent ADLs:: Bathing, Ambulation-walker, Dressing, Incontinence,  Toileting  Dependent IADLs:: Cleaning, Cooking, Meal Preparation, Medication Management, Laundry, Shopping, Money Management, Incontinence, Transportation  Bed or wheelchair confined:: No  Mobility Status: Independent w/Device  Fallen 2 or more times in the past year?: Yes  Any fall with injury in the past year?: Yes  As noted, patient has home care aide at night. Her children assist her with all her additional needs. She denied needing any additional in-home services at this time.   Living Situation:  Current living arrangement:: I live alone  Type of residence:: [facility]    Lifestyle & Psychosocial Needs:        Diet:: Regular  Inadequate nutrition (GOAL):: No  Tube Feeding: No  Inadequate activity/exercise (GOAL):: No  Significant changes in sleep pattern (GOAL): No  Transportation means:: Regular car   Family additionally assists with transportation. Patient denied need for [employer] at this time.   Mosque or spiritual beliefs that impact treatment:: No  Mental health DX:: No  Mental health management concern (GOAL):: No  Informal Support system:: Friends, Family, [place] based, Children   Socioeconomic History   ? Marital status:      Spouse name: Not on file   ? Number of children: Not on file   ? Years of education: Not on file   ? Highest education level: Not on file     Tobacco Use   ? Smoking status: Never Smoker   ? Smokeless tobacco: Never Used   Substance and Sexual Activity   ? Alcohol use: No     Comment: rarely drinks wine, and drinks wine watered down with ice.   ? Drug use: Yes   ? Sexual activity: Not Currently          Financial Concerns: Patient recently rented a hospital bed, but it was not covered by Medicare. Patient stated her home care RN said the DME order was not place correctly by PCP. [facility] RN will send a note to patient's PCP regarding this concern.       Resources and Interventions:  Current Resources:   List of home care services:: Skilled Nursing, Home Health Aid,  Occupational Therapy, Physicial Therapy     Supplies used at home:: Compression Stockings, Incontinence Supplies, Gloves, Wipes  Equipment Currently Used at Home: walker, rolling, hospital bed, grab bar, toilet, shower chair, tub bench, grab bar, tub/shower, dressing device    Advance Care Plan/Directive  Advanced Care Plans/Directives on file:: Yes  Type Advanced Care Plans/Directives: Advanced Directive - On File, DNR/DNI    Referrals Placed: None     Goals:   Goals        General    Financial Wellbeing (pt-stated)     Notes - Note created  5/15/2020  1:40 PM by Myhre, David J, RN    Goal Statement: I would like to know if my hospital bed rental will be covered by Medicare in the next 30 days.   Date Goal set: 5/13/20  Barriers: Patient needs hospital bed, but it is currently not being covered by insurance.   Date to Achieve By: 6/13/20  Patient expressed understanding of goal: Verbalized understanding of goal.   Action steps to achieve this goal:  1. I understand the [facility] RN will send a note to Dr. Schmitz regarding the hospital bed not being covered by insurance.   2. My children will also check with the [facility] regarding coverage for my hospital bed rental.   3. I will let the [facility] Community Health Worker, Esperanza know the status of this situation at scheduled outreach telephone call.      Problem Solving (pt-stated)     Notes - Note created  5/15/2020  1:34 PM by Myhre, David J, RN    Goal Statement: I would like to have in-home nail care in the next 2 months.   Date Goal set: 5/13/20  Barriers: Patient is unable to provide nail care for herself.   Strengths: Patient has a strong willingness to improve her current situation.   Date to Achieve By: 7/13/20  Patient expressed understanding of goal: Verbalized understanding of goal.   Action steps to achieve this goal:  1. I understand the [facility] RN will provide my son, Venkat with a resource for in-home nail care.   2. Venkat or my daughter Heather Azul will  schedule appointment with home care agency for nail care, if I approve.   3. I will contact the [facility] Community Health WorkerEsperanza or the [facility] RNTera if I need additional resources.        Reducing Risks (pt-stated)     Notes - Note created  5/15/2020  1:49 PM by Myhre, David J, RN    Goal Statement: I would like to see an ophthalmologist and a dentist in the next 30 days.   Date Goal set: 5/13/20  Barriers: Patient has not been able to see either an ophthalmologist or dentist since the COVID-19 outbreak.   Strengths: Patient has a strong willingness to take care of her health.   Date to Achieve By: 5/13/20  Patient expressed understanding of goal: Verbalized understanding of goal.   Action steps to achieve this goal:  1. My children or I will scheduled appointments with an ophthalmologist and a dentist.     2. I will contact the [facility] CHWEsperanza if I need additional resources for a dental clinic or an ophthalmology clinic  3. I will notify the [facility] CHWEsperanza when I have attended these appointments so this goal can be completed.             Patient/Caregiver understanding: Verbalized understanding of goal and other information discussed during today's assessment.        Future Appointments              In 1 month Palak Schmitz MD [facility]          Plan: [facility] RN will send note to PCP regarding DME order for medical bed. Will send resource for in-home nail care to her son, Venkat. [facility] RN will continue to monitor and will be available as additional nursing needs arise. [facility] CHW will support patient with current goals.

## 2021-06-30 ENCOUNTER — MEDICAL CORRESPONDENCE (OUTPATIENT)
Dept: HEALTH INFORMATION MANAGEMENT | Facility: CLINIC | Age: 86
End: 2021-06-30

## 2021-06-30 ENCOUNTER — COMMUNICATION - HEALTHEAST (OUTPATIENT)
Dept: INTERNAL MEDICINE | Facility: CLINIC | Age: 86
End: 2021-06-30

## 2021-07-04 NOTE — LETTER
Letter by Myhre, David J, RN at      Author: Myhre, David J, RN Service: -- Author Type: --    Filed:  Encounter Date: 6/1/2021 Status: (Other)       CARE COORDINATION  [facility]     June 1, 2021    Kristal M Brooke  [address] [address]      Dear Kristal,  Your Care Team congratulates you on your journey to maintain wellness. This document will help guide you on your journey to maintain a healthy lifestyle.  You can use this to help you overcome any barriers you may encounter.  If you should have any questions or concerns, you can contact the members of your Care Team or contact your Primary Care Clinic for assistance.    Health Maintenance  Health Maintenance Reviewed:      My Access Plan  Medical Emergency 911   Primary Clinic Line Tiesha Barba MD - [phone]   24 Hour Appointment Line [phone] or  [phone] ([phone]) (toll-free)   24 Hour Nurse Line [phone]   Preferred Urgent Care     Preferred Hospital     Preferred Pharmacy [id number] - [place], [place] - [address] AT [facility] [address]     Behavioral Health Crisis Line The National Suicide Prevention Lifeline at 1-326.849.4251 or 911     My Care Team Members  Patient Care Team       Relationship Specialty Notifications Start End    Tiesha Barba MD PCP - General Internal Medicine  5/24/21     Phone: [phone] Fax: [phone]         [address] [place] [address]    Curt Sweet DPM Assigned Musculoskeletal Provider   10/23/20     Phone: [phone] Fax: [phone]         [address] [address]    Jin Chopra MD Assigned Heart and Vascular Provider   10/23/20     Phone: [phone] Fax: [phone]         [address] [place] [address]    Palak Schmitz MD Assigned PCP   3/25/21               Goals        Patient Stated    ? COMPLETED: Financial Wellbeing (pt-stated)      Goal Statement: I  would like to know if my hospital bed rental will be covered by Medicare in the next 30 days.   Date Goal set: 5/13/20  Barriers: Patient needs hospital bed, but it is currently not being covered by insurance.   Date to Achieve By: 6/13/20  Patient expressed understanding of goal: Verbalized understanding of goal.   Action steps to achieve this goal:  1. I understand the [facility] RN will send a note to Dr. Schmitz regarding the hospital bed not being covered by insurance.   2. My children will also check with the Home Care RN, Latasha regarding coverage for my hospital bed rental.   3. I will let the [facility]] Community Health WorkerEsperanza know the status of this situation at scheduled outreach telephone call.      ? COMPLETED: Problem Solving (pt-stated)      Goal Statement: I would like to have in-home nail care in the next 2 months.   Date Goal set: 5/13/20  Barriers: Patient is unable to provide nail care for herself.   Strengths: Patient has a strong willingness to improve her current situation.   Date to Achieve By: 7/13/20  Patient expressed understanding of goal: Verbalized understanding of goal.   Action steps to achieve this goal:  1. I am now seeing a podiatrist for ongoing nail care.  2. Venkat or my daughter Heather Azul will schedule follow up appointments for me with podiatrist.   3. I will contact the [facility]] Community Health WorkerEsperanza or the [facility] RN, Tera if I need additional resources.               It has been your Clinic Care Team's pleasure to work with you on your goals.    Regards,  Your Clinic Care Team

## 2021-07-04 NOTE — PROGRESS NOTES
Progress Notes by Jin Chopra MD at 5/28/2021  1:50 PM     Author: Jin Chopra MD Service: -- Author Type: Physician    Filed: 5/28/2021  2:33 PM Encounter Date: 5/28/2021 Status: Signed    : Jin Chopra MD (Physician)           Thank you, Tiesha Franco MD, for asking the [facility] team to see Ms. Kristal Cox to Follow-up PVCs.      Assessment/Recommendations   Assessment:    1. PVCs - asymptomatic with a 6% burden off amiodarone.  2. Low body weight (mild malnutrition) - stable weight  3. Chronic venous insufficiency - mild swelling today. Improved with wrapping legs.   4. Hypothyroid - on synthroid    Plan:  1. Continue medications as prescribed  2. Wrap legs during the day to prevent swelling  3. Follow up in 6 months or sooner if needed.         History of Present Illness   Ms. Kristal Cox is a 89 y.o. female with a significant past history of moderate mitral valve regurgitation, frequent PVCs, hypertension, and hyperlipidemai who presents for follow-up.    I initially met Ms. Cox when she was admitted to the hospital with an ankle fracture on 11/22/19. She was noted to have elevated troponin and frequent PVCs. She did not have any cardiac symptoms. After some discussion we elected to treat her cardiac issues medically and limit diagnostic testing. She was started on amiodarone with significant improvement in her PVCs. At baseline, Kristal ambulates with a walker.     Today, Kristal reports feeling overall stable. No new chest pain or shortness of breath. No palpitations. Has some LE edema that improves with wrapping.    Other than noted above, Ms. Cox denies any chest pain/pressure/tightness, shortness of breath at rest or with exertion, light headedness/dizziness, pre-syncope, syncope, lower extremity swelling, palpitations, paroxysmal nocturnal dyspnea (PND), or orthopnea.     Cardiac Problems and Cardiac  Diagnostics     Most Recent Cardiac testing:    ECHO (report reviewed):   Echo results:   Results for orders placed during the hospital encounter of 11/21/19   Echo Complete [ECH10] 11/22/2019    Narrative   Normal left ventricular size and systolic function.The estimated left   ventricular ejection fraction is 55%. This represents a normal ejection   fraction. The left ventricular wall thickness is normal. Left ventricular   diastolic function is abnnormal.    Normal right ventricular size and systolic function.    Left atrial volume is severely increased.    Moderate aortic regurgitation.    Moderate (3+) degenearive mitral regurgitation.    Mild tricuspid valve regurgitation. No pulmonary hypertension present.   The estimated systolic pulmonary artery pressure is 35 mmhg.    Estimated central venous pressure equal to 8 mmHg.    No previous study for comparison.             Medications  Allergies   Current Outpatient Medications   Medication Sig Dispense Refill   ? acetaminophen (TYLENOL) 500 MG tablet Take 2 tablets (1,000 mg total) by mouth 2 (two) times a day.  0   ? artificial tears,hypromellose, (GENTEAL; SYSTANE) 0.3 % Gel Administer 1 drop to both eyes as needed (dry eyes).     ? aspirin 81 mg chewable tablet Chew 1 tablet (81 mg total) daily.  0   ? calcium, as carbonate, (OS-SAMPSON) 500 mg calcium (1,250 mg) tablet Take 1 tablet by mouth daily.     ? cholecalciferol, vitamin D3, 1,000 unit tablet Take 2,000 Units by mouth daily.     ? docusate sodium (COLACE) 100 MG capsule Take 1 capsule (100 mg total) by mouth daily. (Patient taking differently: Take 100 mg by mouth every other day. ) 90 capsule 1   ? famotidine (PEPCID) 20 MG tablet Take 1 tablet (20 mg total) by mouth 2 (two) times a day as needed for heartburn. 60 tablet 10   ? furosemide (LASIX) 20 MG tablet Take 1 tablet (20 mg total) by mouth daily. Take 1 daily.  May increase to 2 daily with increased swelling as needed. 90 tablet 1   ?  levothyroxine (SYNTHROID) 50 MCG tablet Take 1 tablet (50 mcg total) by mouth Daily at 6:00 am. 90 tablet 3   ? lisinopriL (PRINIVIL,ZESTRIL) 20 MG tablet Take 1 tablet (20 mg total) by mouth daily. 90 tablet 3   ? magnesium chloride (SLOW-MAG) 64 mg TbEC delayed-release tablet Take 1 tablet (64 mg total) by mouth daily.  0   ? melatonin 3 mg Tab tablet Take 1-2 tablets (3-6 mg total) by mouth at bedtime as needed.     ? metoprolol succinate (TOPROL-XL) 25 MG TAKE 1 TABLET(25 MG) BY MOUTH DAILY 90 tablet 1   ? polyethylene glycol (GLYCOLAX) 17 gram/dose powder Take 17 g by mouth daily as needed. 235 g 11     No current facility-administered medications for this visit.       Allergies   Allergen Reactions   ? Evista [Raloxifene]      Caused blood clot   ? Fosamax [Alendronate] Nausea And Vomiting        Physical Examination Review of Systems   Vitals:    05/28/21 1356   BP: 170/50   Pulse: 80   Resp: 16     Body mass index is 19.59 kg/m .  Wt Readings from Last 3 Encounters:   05/28/21 (!) 97 lb (44 kg)   05/19/21 (!) 99 lb (44.9 kg)   04/06/21 (!) 94 lb 3.2 oz (42.7 kg)       General Appearance:   Pleasant elderly female, appears stated age. no acute distress, very thin body habitus   ENT/Mouth: membranes moist, no apparent gingival bleeding.      EYES:  no scleral icterus, normal conjunctivae   Neck: supple   Respiratory:   lungs are clear to auscultation, no rales or wheezing, equal chest wall expansion. Kyphotic posture.   Cardiovascular:   Regular rhythm, normal rate. Normal first and second heart sounds with no murmurs, rubs, or gallops; Jugular venous pressure normal, no edema bilaterally    Extremities: no cyanosis or clubbing   Skin: no xanthelasma, warm.    Heme/lymph/ Immunology No apparent bleeding noted.   Neurologic: Alert and oriented. Ambulates with walker, no tremors   Psychiatric: Pleasant, calm, appropriate affect.    A complete 10 system review of systems was performed and is negative except as  mentioned in the HPI or below:  General: WNL  Eyes: WNL  Ears/Nose/Throat: Nosebleeds  Lungs: WNL  Heart: Leg Swelling  Stomach: Constipation  Bladder: WNL  Muscle/Joints: WNL  Skin: WNL  Nervous System: WNL  Mental Health: Anxiety     Blood: WNL       Past History   Past Medical History:   Past Medical History:   Diagnosis Date   ? Alopecia 2019   ? Ankle fracture, left, closed, initial encounter    ? Anxiety    ? Back pain 3/4/2016   ? Diastolic congestive heart failure (H) 2018   ? Hyperlipidemia    ? IBS (irritable bowel syndrome)    ? Osteoporosis    ? Other abnormal clinical finding     evidence of old septal scar on EKG   ? Systolic hypertension        Past Surgical History:   Past Surgical History:   Procedure Laterality Date   ? CATARACT EXTRACTION, BILATERAL     ?  SECTION      X 4   ? CHOLECYSTECTOMY     ? TONSILLECTOMY AND ADENOIDECTOMY         Family History:   Family History   Problem Relation Age of Onset   ? Stroke Mother         passed age 75   ? Crohn's disease Father         passed in his 20's from bowel obstructions        Social History:   Social History     Socioeconomic History   ? Marital status:      Spouse name: Not on file   ? Number of children: Not on file   ? Years of education: Not on file   ? Highest education level: Not on file   Occupational History   ? Not on file   Social Needs   ? Financial resource strain: Not on file   ? Food insecurity     Worry: Not on file     Inability: Not on file   ? Transportation needs     Medical: Not on file     Non-medical: Not on file   Tobacco Use   ? Smoking status: Never Smoker   ? Smokeless tobacco: Never Used   Substance and Sexual Activity   ? Alcohol use: No     Comment: rarely drinks wine, and drinks wine watered down with ice.   ? Drug use: Yes   ? Sexual activity: Not Currently   Lifestyle   ? Physical activity     Days per week: Not on file     Minutes per session: Not on file   ? Stress: Not on file    Relationships   ? Social connections     Talks on phone: Not on file     Gets together: Not on file     Attends Mu-ism service: Not on file     Active member of club or organization: Not on file     Attends meetings of clubs or organizations: Not on file     Relationship status: Not on file   ? Intimate partner violence     Fear of current or ex partner: Not on file     Emotionally abused: Not on file     Physically abused: Not on file     Forced sexual activity: Not on file   Other Topics Concern   ? Not on file   Social History Narrative     since 2009, has children              Lab Results    Chemistry/lipid CBC Cardiac Enzymes/BNP/TSH/INR   Lab Results   Component Value Date    CHOL 153 07/23/2020    HDL 60 07/23/2020    LDLCALC 82 07/23/2020    TRIG 53 07/23/2020    CREATININE 0.97 05/19/2021    BUN 32 (H) 05/19/2021    K 4.6 05/19/2021     (L) 05/19/2021    CL 99 05/19/2021    CO2 23 05/19/2021    Lab Results   Component Value Date    WBC 7.3 05/19/2021    HGB 8.6 (L) 05/19/2021    HCT 26.2 (L) 05/19/2021    MCV 96 05/19/2021     05/19/2021    Lab Results   Component Value Date    TROPONINI 0.65 (HH) 11/22/2019     (H) 03/13/2020    TSH 0.70 05/19/2021    INR 1.14 (H) 11/22/2019

## 2021-07-04 NOTE — TELEPHONE ENCOUNTER
Telephone Encounter by Tiesha Barba MD at 6/30/2021  4:01 PM     Author: Tiesha Barba MD Service: -- Author Type: Physician    Filed: 6/30/2021  5:36 PM Encounter Date: 6/21/2021 Status: Signed    : Tiesha Barba MD (Physician)       June 2, 2021, Abdominal ultrasound:  INDICATION: Abdominal swelling and peripheral edema. Suspected ascites.  COMPARISON: CT dated 3/19/2020.  TECHNIQUE: Limited abdominal ultrasound. Color flow with spectral Doppler and waveform analysis performed.     FINDINGS:     GALLBLADDER: Cholecystectomy .     BILE DUCTS: No biliary dilatation. The common duct measures 2 mm.     LIVER: Limited visualization of the liver demonstrates no focal lesion or obvious surface nodularity.     RIGHT KIDNEY: No hydronephrosis.      PANCREAS: The visualized portions are normal.     No ascites.     ABDOMINAL DUPLEX:     The hepatic veins, IVC, portal veins, and splenic vein are patent with flow in the normal direction. Hepatic artery was not visualized.     IMPRESSION:   1.  No ascites.  2.  Normal abdominal liver duplex.  3.  Cholecystectomy with no biliary ductal dilation.    Megaloblastic change, on peripheral smear, done June 2, 2021.  Vitamin B12, and serum folate, normal May 19, 2021.  Serum electrophoresis, normal.  Urine sodium decreased, urine sodium 46.    Hemoglobin 8.6 May 19, 2021, improved to 9.2, June 2.  Ferritin normal at 18, but low.  Transferrin saturation, 5%.  Which is low.  , which is within normal range.    Component      Latest Ref Rng & Units 5/19/2021 5/19/2021           4:06 PM  4:06 PM   Iron      42 - 175 ug/dL 20 (L)    Transferrin      212 - 360 mg/dL 340 340   Transferrin Saturation, Calculated      20 - 50 % 5 (L)    Transferrin IBC, Calculated      313 - 563 ug/dL 425    Ferritin      10 - 130 ng/mL 18      I have answered the patient's daughter, in my chart.    Tiesha Barba MD

## 2021-07-04 NOTE — PATIENT INSTRUCTIONS - HE
Patient Instructions by Jin Chopra MD at 5/28/2021  1:50 PM     Author: Jin Chopra MD Service: -- Author Type: Physician    Filed: 5/28/2021  2:18 PM Encounter Date: 5/28/2021 Status: Signed    : Jin Chopra MD (Physician)       It was a pleasure to meet with you today.      Below is a summary of your visit.   1. Continue your medications as prescribed and without changes.  2. Wrap your legs during the day to prevent swelling  3. Follow up with me in about 6 months or sooner if needed.       Please do not hesitate to call the [facility] clinic with any questions or concerns at [phone]. You can also reach my nurse, Crissy, during normal business hours at [phone].    Sincerely,

## 2021-07-06 ENCOUNTER — COMMUNICATION - HEALTHEAST (OUTPATIENT)
Dept: CARDIOLOGY | Facility: CLINIC | Age: 86
End: 2021-07-06

## 2021-07-06 DIAGNOSIS — I21.4 NSTEMI (NON-ST ELEVATED MYOCARDIAL INFARCTION) (H): ICD-10-CM

## 2021-07-07 ENCOUNTER — COMMUNICATION - HEALTHEAST (OUTPATIENT)
Dept: INTERNAL MEDICINE | Facility: CLINIC | Age: 86
End: 2021-07-07

## 2021-07-07 ENCOUNTER — HOSPITAL ENCOUNTER (INPATIENT)
Dept: MEDSURG UNIT | Facility: HOSPITAL | Age: 86
LOS: 3 days | Discharge: HOME | End: 2021-07-10
Attending: FAMILY MEDICINE | Admitting: FAMILY MEDICINE
Payer: MEDICARE

## 2021-07-07 DIAGNOSIS — E87.6 HYPOKALEMIA: ICD-10-CM

## 2021-07-07 DIAGNOSIS — E87.1 HYPONATREMIA: ICD-10-CM

## 2021-07-07 DIAGNOSIS — D64.9 ANEMIA, UNSPECIFIED TYPE: ICD-10-CM

## 2021-07-07 DIAGNOSIS — D64.9 SYMPTOMATIC ANEMIA: ICD-10-CM

## 2021-07-07 DIAGNOSIS — R60.9 EDEMA, UNSPECIFIED TYPE: ICD-10-CM

## 2021-07-07 LAB
ABO/RH(D): NORMAL
ALBUMIN SERPL-MCNC: 3.9 G/DL (ref 3.5–5)
ALP SERPL-CCNC: 80 U/L (ref 45–120)
ALT SERPL W P-5'-P-CCNC: 11 U/L (ref 0–45)
ANION GAP SERPL CALCULATED.3IONS-SCNC: 10 MMOL/L (ref 5–18)
ANTIBODY SCREEN: NEGATIVE
APTT PPP: 29 SECONDS (ref 24–37)
AST SERPL W P-5'-P-CCNC: 20 U/L (ref 0–40)
BILIRUB DIRECT SERPL-MCNC: 0.2 MG/DL
BILIRUB SERPL-MCNC: 0.4 MG/DL (ref 0–1)
BNP SERPL-MCNC: 921 PG/ML (ref 0–167)
BUN SERPL-MCNC: 26 MG/DL (ref 8–28)
CALCIUM SERPL-MCNC: 9.2 MG/DL (ref 8.5–10.5)
CHLORIDE BLD-SCNC: 97 MMOL/L (ref 98–107)
CO2 SERPL-SCNC: 22 MMOL/L (ref 22–31)
CREAT SERPL-MCNC: 0.91 MG/DL (ref 0.6–1.1)
GFR SERPL CREATININE-BSD FRML MDRD: 57 ML/MIN/1.73M2
GLUCOSE BLD-MCNC: 118 MG/DL (ref 70–125)
HGB BLD-MCNC: 7.6 G/DL (ref 12–16)
INR PPP: 1.05 (ref 0.9–1.1)
LACTATE SERPL-SCNC: 1.2 MMOL/L (ref 0.7–2)
POTASSIUM BLD-SCNC: 4.2 MMOL/L (ref 3.5–5)
PROT SERPL-MCNC: 6.6 G/DL (ref 6–8)
SARS-COV-2 PCR RESULT-HE - HISTORICAL: NEGATIVE
SODIUM SERPL-SCNC: 129 MMOL/L (ref 136–145)

## 2021-07-07 RX ORDER — METOPROLOL SUCCINATE 25 MG/1
TABLET, EXTENDED RELEASE ORAL
Qty: 90 TABLET | Refills: 1 | Status: SHIPPED | OUTPATIENT
Start: 2021-07-07 | End: 2022-01-01

## 2021-07-07 ASSESSMENT — MIFFLIN-ST. JEOR
SCORE: 829.04
SCORE: 839.02

## 2021-07-07 NOTE — ED TRIAGE NOTES
ED Triage Notes by Hortencia Collins RN at 7/7/2021  3:29 PM     Author: Hortencia Collins RN Service: -- Author Type: Registered Nurse    Filed: 7/7/2021  3:31 PM Date of Service: 7/7/2021  3:29 PM Status: Signed    : Hortencia Collins RN (Registered Nurse)       Patients doctor hs been watching her RBCs counts which have been low.  They are not sure why, has not seen a hematologist yet.  Was told to come in for her RBC count being criticaly low today.

## 2021-07-07 NOTE — PROGRESS NOTES
Progress Notes by Jessica Enciso, PharmD at 7/7/2021  6:24 PM     Author: Jessica Enciso, PharmD Service: Pharmacy Author Type: Pharmacist    Filed: 7/7/2021  6:24 PM Date of Service: 7/7/2021  6:24 PM Status: Signed    : Jessica Enciso, PharmD (Pharmacist)       Pharmacy Note - Admission Medication History    Pertinent Provider Information: None     ______________________________________________________________________    Prior To Admission (PTA) med list completed and updated in EMR.       PTA Med List   Medication Sig Last Dose   ? acetaminophen (TYLENOL) 500 MG tablet Take 2 tablets (1,000 mg total) by mouth 2 (two) times a day.    ? artificial tears,hypromellose, (GENTEAL; SYSTANE) 0.3 % Gel Administer 1 drop to both eyes as needed (dry eyes).    ? aspirin 81 mg chewable tablet Chew 1 tablet (81 mg total) daily. (Patient taking differently: Chew 81 mg every evening. ) 7/6/2021 at Unknown time   ? calcium, as carbonate, (OS-SAMPSON) 500 mg calcium (1,250 mg) tablet Take 1 tablet by mouth daily. 7/7/2021 at Unknown time   ? cholecalciferol, vitamin D3, 1,000 unit tablet Take 2,000 Units by mouth daily. 7/7/2021 at Unknown time   ? docusate sodium (COLACE) 100 MG capsule Take 1 capsule (100 mg total) by mouth daily. (Patient taking differently: Take 100 mg by mouth every evening. ) 7/6/2021 at Unknown time   ? famotidine (PEPCID) 20 MG tablet Take 1 tablet (20 mg total) by mouth 2 (two) times a day as needed for heartburn.    ? furosemide (LASIX) 20 MG tablet Take 1 tablet (20 mg total) by mouth daily. Take 1 daily.  May increase to 2 daily with increased swelling as needed. 7/7/2021 at Unknown time   ? levothyroxine (SYNTHROID) 50 MCG tablet Take 1 tablet (50 mcg total) by mouth Daily at 6:00 am. 7/7/2021 at Unknown time   ? lisinopriL (PRINIVIL,ZESTRIL) 20 MG tablet Take 1 tablet (20 mg total) by mouth daily. 7/7/2021 at Unknown time   ? magnesium chloride (SLOW-MAG) 64 mg TbEC delayed-release tablet Take 1  tablet (64 mg total) by mouth daily. 7/7/2021 at Unknown time   ? melatonin 3 mg Tab tablet Take 1-2 tablets (3-6 mg total) by mouth at bedtime as needed. 7/6/2021 at Unknown time   ? metoprolol succinate (TOPROL-XL) 25 MG TAKE 1 TABLET(25 MG) BY MOUTH DAILY (Patient taking differently: Take 25 mg by mouth every evening. ) 7/6/2021 at Unknown time   ? polyethylene glycol (GLYCOLAX) 17 gram/dose powder Take 17 g by mouth daily as needed.        Information source(s): Family member and CareEverywhere/SureScripts  Method of interview communication: in-person    Summary of Changes to PTA Med List  New: None  Discontinued: Mirtazapine  Changed: None    Patient was asked about OTC/herbal products specifically.  PTA med list reflects this.    In the past week, patient estimated taking medication this percent of the time:  greater than 90%.    Allergies were reviewed, assessed, and updated with the patient.      Patient did not bring any medications to the hospital and can't retrieve from home. No multi-dose medications are available for use during hospital stay.     The information provided in this note is only as accurate as the sources available at the time of the update(s).    Thank you for the opportunity to participate in the care of this patient.    Jessica Enciso, PharmD  7/7/2021 6:24 PM

## 2021-07-07 NOTE — ED NOTES
ED Notes by Kolby Juares, RN at 7/7/2021  6:45 PM     Author: Kolby Juares RN Service: -- Author Type: Registered Nurse    Filed: 7/7/2021  6:47 PM Date of Service: 7/7/2021  6:45 PM Status: Signed    : Kolby Juares RN (Registered Nurse)       [facility] ED Handoff Report    ED Chief Complaint:    Chief Complaint   Patient presents with   ? abnormal labs       ED Diagnosis:    1. Anemia, unspecified type          PMH:    Past Medical History:   Diagnosis Date   ? Alopecia 8/8/2019   ? Ankle fracture, left, closed, initial encounter    ? Anxiety    ? Back pain 3/4/2016   ? Diastolic congestive heart failure (H) 2/21/2018   ? Hyperlipidemia    ? IBS (irritable bowel syndrome)    ? Osteoporosis    ? Other abnormal clinical finding     evidence of old septal scar on EKG   ? Systolic hypertension         Code Status:  Prior     Falls Risk: Yes    Current Living Situation/Residence: [facility] Status:  continent    Activity Level:  Modified independent with assistance.    Patients Preferred Language:  English     Needed: No    Infection:  No active infections     Vital Signs:    Vitals:    07/07/21 1833   BP: 164/72   Pulse: (!) 110   Resp: 20   Temp: 98.2  F (36.8  C)   SpO2:         Cardiac Rhythm: ST    Pain Score:  0/10    Is the Patient Confused:  Yes    Last Food or Drink: today    Focused Assessment:  See charting.    Tests Performed:  Lab and Imaging    Abnormal Results:    Abnormal Labs Reviewed   BASIC METABOLIC PANEL - Abnormal; Notable for the following components:       Result Value    Sodium 129 (*)     Chloride 97 (*)     GFR MDRD Non Af Amer 57 (*)     All other components within normal limits    Narrative:     Fasting Glucose reference range is 70-99 mg/dL per  American Diabetes Association (ADA) guidelines.   POCT OCCULT BLOOD STOOL - Abnormal; Notable for the following components:    POC Fecal Occult Bld Positive (*)     All other components  within normal limits       No orders to display        Treatments Provided:  See charting.    Family Dynamics/Concerns: No    Family Updated On Visitor Policy: Yes    Plan of Care Communicated to Family: Yes    Who Was Updated about Plan of Care: Pt's daughters    Belongings Checklist Done and Signed by Patient: Yes    Covid Swab: Asymptomatic    ED Medications:    Medications   sodium chloride 0.9% 0-1,000 mL (has no administration in time range)   pantoprazole 40 mg injection (40 mg Intravenous Given 7/7/21 1751)     And   pantoprazole 40 mg injection (40 mg Intravenous Given 7/7/21 1751)        Kolby Juares RN 7/7/2021 6:45 PM

## 2021-07-07 NOTE — CONSULTS
"Consults by Brook Valencia, RN at 7/7/2021  4:44 PM     Author: Brook Valencia RN Service: -- Author Type: RN, Care Manager    Filed: 7/7/2021  4:46 PM Date of Service: 7/7/2021  4:44 PM Status: Signed    : Brook Valencia RN (RN, Care Manager)     Consult Orders    1. Inpatient consult to Care Management / Social Work [[id number]] ordered by Claire Cortes DO at 07/07/21 1642             Care Management Initial Consult    General Information:  Patient's communication limitations: none  Level of Orientation: A & O x 3     Advance Care Planning: Patient has advance directive, copy in chart   Yes, will do independently    Living Environment:   People in home/Living arrangements: Alone(\"alone, but always someone with her overnight\")  Current residence:  Private residence(\"town house - everything she needs is on 1 level\")      Family/Social Support:  Care provided by/ Primary Caregiver: Self immediate family, personal care attendant  Provides care for someone: No  Description of Support System: Children, Family members, Home care staff     Lifestyle & Psychosocial Needs:        Socioeconomic History   ? Marital status:      Spouse name: Not on file   ? Number of children: Not on file   ? Years of education: Not on file   ? Highest education level: Not on file     Tobacco Use   ? Smoking status: Never Smoker   ? Smokeless tobacco: Never Used   Substance and Sexual Activity   ? Alcohol use: No     Comment: rarely drinks wine, and drinks wine watered down with ice.   ? Drug use: Yes   ? Sexual activity: Not Currently       Functional Status:  Prior to admission ADL limits: Yes bathing, gait disturbance, housekeeping, laundry, meal preparation, shopping, supervision of safety, transportation, dressing, grooming, med assist    Current Resources:   Skilled Home Care Services: Home Health Aid(\"overnight Aide from [facility]\")  Community Resources: Housekeeping/Chore Agency, Other (see comment)(\"overnight " "aide from [facility] [employer]\")  Equipment currently used at home: walker, rolling, hospital bed, grab bar, toilet, shower chair, tub bench, grab bar, tub/shower, dressing device  Supplies currently used at home: Incontinence Supplies, Compression Stockings, Gloves, Wipes    Employment:  Employment Status: RetiredNo No    Financial/Environmental Concerns/Barriers to Discharge:        Values/Beliefs:  Spiritual, Cultural Beliefs, Druze Practices, Values that affect care:                Additional Information:  Kristal lives in a town house alone. She is able to live \"all on the 1 level of the house\". She is sometimes alone for some of the day, but someone is with her overnights. On weekdays she has help from [facility] [employer] from 2200 - 1100. On the weekends one of her 2 daughters stays overnight with her. \"So she is never alone overnight\".    Family sees her daily. One of her children makes a daily dinner meal and brings it to her and stays for a few hours.    She also has housekeeping help.    Likely no discharge needs at this time.    Family to transport at discharge.    Edilia daughter [phone].      Brook Valencia RN       "

## 2021-07-07 NOTE — TELEPHONE ENCOUNTER
Telephone Encounter by Antoni Holcomb, RN at 7/7/2021  2:18 PM     Author: Antoni Holcomb, RN Service: -- Author Type: Registered Nurse    Filed: 7/7/2021  2:21 PM Encounter Date: 7/7/2021 Status: Signed    : Antoni Holcomb RN (Registered Nurse)       Refilled 7/7/21.  Instructions state this is for 90 days.  MAY increase with swelling, PRN.

## 2021-07-08 ENCOUNTER — COMMUNICATION - HEALTHEAST (OUTPATIENT)
Dept: SCHEDULING | Facility: CLINIC | Age: 86
End: 2021-07-08

## 2021-07-08 LAB
ANION GAP SERPL CALCULATED.3IONS-SCNC: 7 MMOL/L (ref 5–18)
BASOPHILS # BLD AUTO: 0 THOU/UL (ref 0–0.2)
BASOPHILS NFR BLD AUTO: 1 % (ref 0–2)
BUN SERPL-MCNC: 19 MG/DL (ref 8–28)
CALCIUM SERPL-MCNC: 8.3 MG/DL (ref 8.5–10.5)
CHLORIDE BLD-SCNC: 99 MMOL/L (ref 98–107)
CO2 SERPL-SCNC: 23 MMOL/L (ref 22–31)
CREAT SERPL-MCNC: 0.76 MG/DL (ref 0.6–1.1)
EOSINOPHIL # BLD AUTO: 0 THOU/UL (ref 0–0.4)
EOSINOPHIL NFR BLD AUTO: 0 % (ref 0–6)
ERYTHROCYTE [DISTWIDTH] IN BLOOD BY AUTOMATED COUNT: 13.4 % (ref 11–14.5)
GFR SERPL CREATININE-BSD FRML MDRD: >60 ML/MIN/1.73M2
GLUCOSE BLD-MCNC: 90 MG/DL (ref 70–125)
HCT VFR BLD AUTO: 20.9 % (ref 35–47)
HGB BLD-MCNC: 6.8 G/DL (ref 12–16)
HGB BLD-MCNC: 9 G/DL (ref 12–16)
HGB BLD-MCNC: 9.4 G/DL (ref 12–16)
IMM GRANULOCYTES # BLD: 0 THOU/UL
IMM GRANULOCYTES NFR BLD: 0 %
LYMPHOCYTES # BLD AUTO: 1.2 THOU/UL (ref 0.8–4.4)
LYMPHOCYTES NFR BLD AUTO: 18 % (ref 20–40)
MCH RBC QN AUTO: 28.6 PG (ref 27–34)
MCHC RBC AUTO-ENTMCNC: 32.5 G/DL (ref 32–36)
MCV RBC AUTO: 88 FL (ref 80–100)
MONOCYTES # BLD AUTO: 0.9 THOU/UL (ref 0–0.9)
MONOCYTES NFR BLD AUTO: 14 % (ref 2–10)
NEUTROPHILS # BLD AUTO: 4.4 THOU/UL (ref 2–7.7)
NEUTROPHILS NFR BLD AUTO: 67 % (ref 50–70)
OSMOLALITY SERPL: 264 MOSM/KG (ref 270–300)
OSMOLALITY UR: 295 MOSM/KG (ref 300–900)
PLATELET # BLD AUTO: 163 THOU/UL (ref 140–440)
PMV BLD AUTO: 9.7 FL (ref 8.5–12.5)
POTASSIUM BLD-SCNC: 3.7 MMOL/L (ref 3.5–5)
RBC # BLD AUTO: 2.38 MILL/UL (ref 3.8–5.4)
SODIUM SERPL-SCNC: 129 MMOL/L (ref 136–145)
SODIUM UR-SCNC: 103 MMOL/L
WBC: 6.5 THOU/UL (ref 4–11)

## 2021-07-08 NOTE — PROGRESS NOTES
Progress Notes by Chad Urena DO at 7/8/2021  7:29 AM     Author: Chad Urena DO Service: Hospitalist Author Type: Physician    Filed: 7/8/2021 10:04 PM Date of Service: 7/8/2021  7:29 AM Status: Addendum    : Chad Urena DO (Physician)    Related Notes: Original Note by Chad Urena DO (Physician) filed at 7/8/2021  1:41 PM       [facility]  Hospitalist Progress Note        Chad Urena DO  07/08/2021        Interval History:      Patient and daughter at bedside are deliberating whether to pursue endoscopic evaluation for GI bleeding, discussed plan of care, verbalized understanding.          Assessment and Plan:        89-year-old female with history of GERD, moderate mitral valve regurgitation admitted to the hospital with symptomatic anemia     Symptomatic anemia Hemoccult positive with dark stools in the ER.  - Transfusion goal greater than 7  - IV PPI  - GI consult, not pursuing EGD. Discussed risks and benefits, including but not limited to death, patient and daughter verbalized understanding.   - Venofer.   - PO Iron and ascorbic acid.   - Omeprazole at discharge.   - Hold baby aspirin     Hyponatremia  - Monitor.   - Fluid restrict 1800cc.      GERD Previously with famotidine  - IV PPI     Hypertension  - Continue home Toprol and lisinopril with hold parameters  - hold lasix     Hypothyroidism--home Synthroid     COVID-19 - negative    Diet:       Diet    Diet NPO     Prophylaxis: SCD's.     Code: No CPR- Do NOT Intubate    Anticipated discharge date: 1-3 days pending patient/daughter decision for further evaluation.   Milestones/needs for discharge: Stable hemoglobin, evaluation decisions from patient and family.   Pending tests or labs: hemoglobin, possible egd.   Status of family updates: updated daughter at bedside.   Length of Stay:  LOS: 1 day                    Physical Exam:           Blood pressure 172/76, pulse 77,  "temperature 97.8  F (36.6  C), temperature source Oral, resp. rate 20, height 5' 4\" (1.626 m), weight 101 lb 3.2 oz (45.9 kg), SpO2 98 %.    Vital Sign Ranges  Temperature Temp  Av  F (36.7  C)  Min: 97.3  F (36.3  C)  Max: 98.7  F (37.1  C)   Blood pressure Systolic (24hrs), Av , Min:110 , Max:194        Diastolic (24hrs), Av, Min:38, Max:88      Pulse Pulse  Av.5  Min: 75  Max: 113   Respirations Resp  Av.8  Min: 8  Max: 41   Pulse oximetry SpO2  Av.3 %  Min: 88 %  Max: 98 %     Vital Signs with Ranges  Temp:  [97.3  F (36.3  C)-98.7  F (37.1  C)] 97.8  F (36.6  C)  Heart Rate:  [] 77  Resp:  [8-41] 20  BP: (110-194)/(38-88) 172/76    I/O Last 3 Shifts:   I/O last 3 completed shifts:  In: 895 [P.O.:540; Blood:355]  Out: 1650 [Urine:1650]    I/O past 24 hours:     Intake/Output Summary (Last 24 hours) at 2021 0729  Last data filed at 2021 0715  Gross per 24 hour   Intake 895 ml   Output 1900 ml   Net -1005 ml     GENERAL: Alert. NAD. Conversational, appropriate. Frail appearing. Kyphosis.   HEENT: Normocephalic. EOMI. No icterus or injection. Nares normal.   LUNGS: Clear to auscultation. No dyspnea at rest.   HEART: Regular rate, murmur present, intermittently tachycardic, . Extremities perfused.   ABDOMEN: Soft, nontender, and nondistended. Positive bowel sounds.   EXTREMITIES: LE edema noted.   NEUROLOGIC: Moves extremities x4, follows commands.          Prior to Admission Medications:        Medications Prior to Admission   Medication Sig Dispense Refill Last Dose   ? acetaminophen (TYLENOL) 500 MG tablet Take 2 tablets (1,000 mg total) by mouth 2 (two) times a day.  0    ? artificial tears,hypromellose, (GENTEAL; SYSTANE) 0.3 % Gel Administer 1 drop to both eyes as needed (dry eyes).      ? aspirin 81 mg chewable tablet Chew 1 tablet (81 mg total) daily. (Patient taking differently: Chew 81 mg every evening. )  0 2021 at Unknown time   ? calcium, as carbonate, " (OS-SAMPSON) 500 mg calcium (1,250 mg) tablet Take 1 tablet by mouth daily.   7/7/2021 at Unknown time   ? cholecalciferol, vitamin D3, 1,000 unit tablet Take 2,000 Units by mouth daily.   7/7/2021 at Unknown time   ? docusate sodium (COLACE) 100 MG capsule Take 1 capsule (100 mg total) by mouth daily. (Patient taking differently: Take 100 mg by mouth every evening. ) 90 capsule 1 7/6/2021 at Unknown time   ? famotidine (PEPCID) 20 MG tablet Take 1 tablet (20 mg total) by mouth 2 (two) times a day as needed for heartburn. 60 tablet 10    ? furosemide (LASIX) 20 MG tablet Take 1 tablet (20 mg total) by mouth daily. Take 1 daily.  May increase to 2 daily with increased swelling as needed. 90 tablet 3 7/7/2021 at Unknown time   ? levothyroxine (SYNTHROID) 50 MCG tablet Take 1 tablet (50 mcg total) by mouth Daily at 6:00 am. 90 tablet 3 7/7/2021 at Unknown time   ? lisinopriL (PRINIVIL,ZESTRIL) 20 MG tablet Take 1 tablet (20 mg total) by mouth daily. 90 tablet 3 7/7/2021 at Unknown time   ? magnesium chloride (SLOW-MAG) 64 mg TbEC delayed-release tablet Take 1 tablet (64 mg total) by mouth daily.  0 7/7/2021 at Unknown time   ? melatonin 3 mg Tab tablet Take 1-2 tablets (3-6 mg total) by mouth at bedtime as needed.   7/6/2021 at Unknown time   ? metoprolol succinate (TOPROL-XL) 25 MG TAKE 1 TABLET(25 MG) BY MOUTH DAILY (Patient taking differently: Take 25 mg by mouth every evening. ) 90 tablet 1 7/6/2021 at Unknown time   ? polyethylene glycol (GLYCOLAX) 17 gram/dose powder Take 17 g by mouth daily as needed. 235 g 11             Medications:        Scheduled Meds:  ? acetaminophen  1,000 mg Oral BID   ? docusate sodium  100 mg Oral DAILY   ? levothyroxine  50 mcg Oral Daily 0600   ? lisinopriL  20 mg Oral DAILY   ? magnesium chloride  64 mg Oral DAILY   ? metoprolol succinate  25 mg Oral QHS   ? pantoprazole  40 mg Intravenous Q12H   ? sodium chloride  2.5 mL Intravenous Line Care     Continuous Infusions:  PRN  Meds:.acetaminophen, acetaminophen, artificial tears(hypromellose), bisacodyL, melatonin, ondansetron **OR** ondansetron         Data:      Lab data reviewed.     Results from last 7 days   Lab Units 07/08/21  0440 07/07/21  1710 07/07/21  1418   LN-SODIUM mmol/L 129* 129* 130*   LN-POTASSIUM mmol/L 3.7 4.2 4.4   LN-CHLORIDE mmol/L 99 97* 96*   LN-CO2 mmol/L 23 22 20*   LN-BLOOD UREA NITROGEN mg/dL 19 26 26   LN-CREATININE mg/dL 0.76 0.91 0.89   LN-CALCIUM mg/dL 8.3* 9.2 9.3   LN-ALBUMIN g/dL  --  3.9  --    LN-PROTEIN TOTAL g/dL  --  6.6  --    LN-BILIRUBIN TOTAL mg/dL  --  0.4  --    LN-ALKALINE PHOSPHATASE U/L  --  80  --    LN-ALT (SGPT) U/L  --  11  --    LN-AST (SGOT) U/L  --  20  --      Results from last 7 days   Lab Units 07/08/21  0440 07/07/21  2202 07/07/21  1418   LN-WHITE BLOOD CELL COUNT thou/uL 6.5  --  6.3   LN-HEMOGLOBIN g/dL 6.8* 7.6* 5.9*   LN-HEMATOCRIT % 20.9*  --  19.0*   LN-PLATELET COUNT thou/uL 163  --  221   LN-NEUTROPHILS RELATIVE PERCENT % 67  --  57   LN-MONOCYTES RELATIVE PERCENT % 14*  --  15*                   Imaging:      Imaging data reviewed.     Dr. Chad Urena DO, LAURA  [facility]  Pager [phone]

## 2021-07-08 NOTE — ED PROVIDER NOTES
ED Provider Notes by Claire Cortes DO at 2021  4:36 PM     Author: Claire Cortes DO Service: Emergency Medicine Author Type: Physician    Filed: 2021  9:51 PM Date of Service: 2021  4:36 PM Status: Signed    : Claire Cortes DO (Physician)       EMERGENCY DEPARTMENT ENCOUNTER      NAME: Kristal Cox  AGE: 89 y.o. female  YOB: 1924  MRN: [id number]  EVALUATION DATE & TIME: 2021  4:19 PM    PCP: Tiesha Barba MD    ED PROVIDER: Ansley Cortes DO      Chief Complaint   Patient presents with   ? abnormal labs         FINAL IMPRESSION:  1. Anemia, unspecified type          ED COURSE & MEDICAL DECISION MAKIN:37 PM Met with patient for initial interview and exam. Discussed initial plan for care for their stay in the emergency department.  5:00 PM Checked in on patient. Consented for transfusion. Preformed rectal exam. Dark brown stool in rectal vault with external hemorrhoids.   5:47 PM Checked in on patient. Updated on positive hemoccult. Patient states she takes   Prevacid for heart burn about twice a week. Daughters state that she complains about her stomach all the time.    6:03 PM Spoke with Dr. Hughes, hospitalist. Med surg tele in-patient bed and was asked to tag GI on admission.     The patient was interviewed and examined.  History in the chart was reviewed.  89 y.o. female presents to the Emergency Department for evaluation of anemia and hemoglobin of 5.9.  She arrives with her daughters.  They have been trending her hemoglobins over the past several months.  She has been slowly dropping.  Has not seen hematology.  Not on any iron pills.  She denies any pain.  She feels tired.  She is on any blood thinning medications.  No smoking, alcohol or chronic NSAID use history.  She is on a baby aspirin nightly.  She had repeat testing today and her hemoglobin was 5.9.  Patient is agreeable to a blood transfusion, does note that she is a DNR/DNI  "and would not like invasive testing.  Patient edematous to her lower extremities on exam, pale, tachycardic.  No significant distress.  Rectal exam with external hemorrhoids, darker appearing stool but no melena.  This was Hemoccult positive.  I would suspect likely a slow bleed from an ulcer.  Family tells me that she takes Prevacid twice a week and complains about her abdomen \"all the time\".  No signs of any acute bleeding.  Patient consented for blood and is agreeable.  She still thinking as she would not want an EGD, no discussed she can talk about this with GI.  Admitted to the hospitalist.    At the conclusion of the encounter I discussed  the results of all of the tests and the disposition with the patient and her daughters.   All questions were answered.  The patient acknowledged understanding and was involved in the decision making regarding the overall care plan.      MEDICATIONS GIVEN IN THE EMERGENCY:  Medications   sodium chloride 0.9% 0-1,000 mL (has no administration in time range)   pantoprazole 40 mg injection (40 mg Intravenous Given 7/7/21 1751)     And   pantoprazole 40 mg injection (40 mg Intravenous Given 7/7/21 1751)       NEW PRESCRIPTIONS STARTED AT TODAY'S ER VISIT  Current Discharge Medication List      CONTINUE these medications which have NOT CHANGED    Details   acetaminophen (TYLENOL) 500 MG tablet Take 2 tablets (1,000 mg total) by mouth 2 (two) times a day.  Refills: 0    Associated Diagnoses: Right-sided chest wall pain      artificial tears,hypromellose, (GENTEAL; SYSTANE) 0.3 % Gel Administer 1 drop to both eyes as needed (dry eyes).      aspirin 81 mg chewable tablet Chew 1 tablet (81 mg total) daily.  Refills: 0    Associated Diagnoses: NSTEMI (non-ST elevated myocardial infarction) (H)      calcium, as carbonate, (OS-SAMPSON) 500 mg calcium (1,250 mg) tablet Take 1 tablet by mouth daily.      cholecalciferol, vitamin D3, 1,000 unit tablet Take 2,000 Units by mouth daily.    "   docusate sodium (COLACE) 100 MG capsule Take 1 capsule (100 mg total) by mouth daily.  Qty: 90 capsule, Refills: 1    Associated Diagnoses: Slow transit constipation      famotidine (PEPCID) 20 MG tablet Take 1 tablet (20 mg total) by mouth 2 (two) times a day as needed for heartburn.  Qty: 60 tablet, Refills: 10    Associated Diagnoses: Dyspepsia      furosemide (LASIX) 20 MG tablet Take 1 tablet (20 mg total) by mouth daily. Take 1 daily.  May increase to 2 daily with increased swelling as needed.  Qty: 90 tablet, Refills: 3    Associated Diagnoses: Edema, unspecified type      levothyroxine (SYNTHROID) 50 MCG tablet Take 1 tablet (50 mcg total) by mouth Daily at 6:00 am.  Qty: 90 tablet, Refills: 3    Associated Diagnoses: Hypothyroidism, unspecified type      lisinopriL (PRINIVIL,ZESTRIL) 20 MG tablet Take 1 tablet (20 mg total) by mouth daily.  Qty: 90 tablet, Refills: 3    Associated Diagnoses: Systolic hypertension      magnesium chloride (SLOW-MAG) 64 mg TbEC delayed-release tablet Take 1 tablet (64 mg total) by mouth daily.  Refills: 0    Associated Diagnoses: PVC's (premature ventricular contractions)      melatonin 3 mg Tab tablet Take 1-2 tablets (3-6 mg total) by mouth at bedtime as needed.    Associated Diagnoses: Insomnia, unspecified type      metoprolol succinate (TOPROL-XL) 25 MG TAKE 1 TABLET(25 MG) BY MOUTH DAILY  Qty: 90 tablet, Refills: 1    Associated Diagnoses: NSTEMI (non-ST elevated myocardial infarction) (H)      mirtazapine (REMERON) 7.5 MG tablet Take 0.5 tablets (3.75 mg total) by mouth at bedtime.  Qty: 15 tablet, Refills: 1    Comments: Take an hour before bedtime.  Associated Diagnoses: Other insomnia; Anxiety      polyethylene glycol (GLYCOLAX) 17 gram/dose powder Take 17 g by mouth daily as needed.  Qty: 235 g, Refills: 11    Associated Diagnoses: Slow transit constipation                =================================================================    HPI    Patient  information was obtained from: Patient and patient's daughter    Use of : N/A        Kristal Cox is a 89 y.o. female who presents abnormal lab value.    Patient reports from clinic with a RBC count of 5.9. Patient states that she been having a low RBC in 04/2021 and has been monitoring it. She states she was being seen in clinic today to check in on it. She mentions they talked about being seen by hematology for this. Patient reports feeling hungry, tired, and mildly short of breath. Patient denies any issues with bleeding or blood in stools. Patient mentions having bilateral lower extremity edema for the past year. She reports taking daily baby Asprin and Tylenol. She denies smoking or alcohol use. She denies nausea, vomiting, chest pain, or any other complaints at this time.    Of note, patient states she is DNR and would prefer to minimize invasive treatment.       REVIEW OF SYSTEMS   Review of Systems   Constitutional: Positive for fatigue. Negative for chills and fever.        Positive increased hungry sensation   HENT: Negative for sore throat and trouble swallowing.    Eyes: Negative for visual disturbance.   Respiratory: Positive for shortness of breath. Negative for cough.    Cardiovascular: Positive for leg swelling (bilateral, chronic). Negative for chest pain.   Gastrointestinal: Negative for abdominal pain, diarrhea, nausea and vomiting.   Genitourinary: Negative for difficulty urinating and dysuria.   Musculoskeletal: Negative for arthralgias.   Skin: Negative for rash.   Neurological: Negative for light-headedness, numbness and headaches.   All other systems reviewed and are negative.       PAST MEDICAL HISTORY:  Past Medical History:   Diagnosis Date   ? Alopecia 8/8/2019   ? Ankle fracture, left, closed, initial encounter    ? Anxiety    ? Back pain 3/4/2016   ? Diastolic congestive heart failure (H) 2/21/2018   ? Hyperlipidemia    ? IBS (irritable bowel syndrome)    ?  Osteoporosis    ? Other abnormal clinical finding     evidence of old septal scar on EKG   ? Systolic hypertension        PAST SURGICAL HISTORY:  Past Surgical History:   Procedure Laterality Date   ? CATARACT EXTRACTION, BILATERAL     ?  SECTION      X 4   ? CHOLECYSTECTOMY     ? TONSILLECTOMY AND ADENOIDECTOMY             CURRENT MEDICATIONS:    No current facility-administered medications on file prior to encounter.      Current Outpatient Medications on File Prior to Encounter   Medication Sig   ? acetaminophen (TYLENOL) 500 MG tablet Take 2 tablets (1,000 mg total) by mouth 2 (two) times a day.   ? artificial tears,hypromellose, (GENTEAL; SYSTANE) 0.3 % Gel Administer 1 drop to both eyes as needed (dry eyes).   ? aspirin 81 mg chewable tablet Chew 1 tablet (81 mg total) daily.   ? calcium, as carbonate, (OS-SAMPSON) 500 mg calcium (1,250 mg) tablet Take 1 tablet by mouth daily.   ? cholecalciferol, vitamin D3, 1,000 unit tablet Take 2,000 Units by mouth daily.   ? docusate sodium (COLACE) 100 MG capsule Take 1 capsule (100 mg total) by mouth daily.   ? famotidine (PEPCID) 20 MG tablet Take 1 tablet (20 mg total) by mouth 2 (two) times a day as needed for heartburn.   ? furosemide (LASIX) 20 MG tablet Take 1 tablet (20 mg total) by mouth daily. Take 1 daily.  May increase to 2 daily with increased swelling as needed.   ? levothyroxine (SYNTHROID) 50 MCG tablet Take 1 tablet (50 mcg total) by mouth Daily at 6:00 am.   ? lisinopriL (PRINIVIL,ZESTRIL) 20 MG tablet Take 1 tablet (20 mg total) by mouth daily.   ? magnesium chloride (SLOW-MAG) 64 mg TbEC delayed-release tablet Take 1 tablet (64 mg total) by mouth daily.   ? melatonin 3 mg Tab tablet Take 1-2 tablets (3-6 mg total) by mouth at bedtime as needed.   ? metoprolol succinate (TOPROL-XL) 25 MG TAKE 1 TABLET(25 MG) BY MOUTH DAILY   ? mirtazapine (REMERON) 7.5 MG tablet Take 0.5 tablets (3.75 mg total) by mouth at bedtime.   ? polyethylene glycol  (GLYCOLAX) 17 gram/dose powder Take 17 g by mouth daily as needed.   ? [DISCONTINUED] furosemide (LASIX) 20 MG tablet Take 1 tablet (20 mg total) by mouth daily. Take 1 daily.  May increase to 2 daily with increased swelling as needed.   ? [DISCONTINUED] levothyroxine (SYNTHROID) 50 MCG tablet Take 1 tablet (50 mcg total) by mouth Daily at 6:00 am.   ? [DISCONTINUED] metoprolol succinate (TOPROL-XL) 25 MG TAKE 1 TABLET(25 MG) BY MOUTH DAILY       ALLERGIES:  Allergies   Allergen Reactions   ? Evista [Raloxifene]      Caused blood clot   ? Fosamax [Alendronate] Nausea And Vomiting       FAMILY HISTORY:  Family History   Problem Relation Age of Onset   ? Stroke Mother         passed age 75   ? Crohn's disease Father         passed in his 20's from bowel obstructions       SOCIAL HISTORY:   Social History     Socioeconomic History   ? Marital status:      Spouse name: None   ? Number of children: None   ? Years of education: None   ? Highest education level: None   Occupational History   ? None   Social Needs   ? Financial resource strain: None   ? Food insecurity     Worry: None     Inability: None   ? Transportation needs     Medical: None     Non-medical: None   Tobacco Use   ? Smoking status: Never Smoker   ? Smokeless tobacco: Never Used   Substance and Sexual Activity   ? Alcohol use: No     Comment: rarely drinks wine, and drinks wine watered down with ice.   ? Drug use: Yes   ? Sexual activity: Not Currently   Lifestyle   ? Physical activity     Days per week: None     Minutes per session: None   ? Stress: None   Relationships   ? Social connections     Talks on phone: None     Gets together: None     Attends Yarsani service: None     Active member of club or organization: None     Attends meetings of clubs or organizations: None     Relationship status: None   ? Intimate partner violence     Fear of current or ex partner: None     Emotionally abused: None     Physically abused: None     Forced sexual  "activity: None   Other Topics Concern   ? None   Social History Narrative     since 2009, has children         PHYSICAL EXAM    VITALS  /77   Pulse (!) 113   Temp 98.4  F (36.9  C) (Oral)   Resp (!) 41   Ht 5' 4\" (1.626 m)   Wt 103 lb 6.4 oz (46.9 kg)   SpO2 97%   BMI 17.75 kg/m    Physical Exam  Vitals signs and nursing note reviewed.   Constitutional:       General: She is not in acute distress.     Appearance: Normal appearance. She is well-developed. She is not diaphoretic.   HENT:      Head: Normocephalic and atraumatic.   Eyes:      Conjunctiva/sclera: Conjunctivae normal.      Pupils: Pupils are equal, round, and reactive to light.   Neck:      Musculoskeletal: Neck supple.      Trachea: No tracheal deviation.   Cardiovascular:      Rate and Rhythm: Regular rhythm. Tachycardia present.      Heart sounds: Normal heart sounds.   Pulmonary:      Effort: Pulmonary effort is normal. No respiratory distress.      Breath sounds: Normal breath sounds.   Abdominal:      General: Bowel sounds are normal. There is no distension.      Palpations: Abdomen is soft.      Tenderness: There is no abdominal tenderness.   Genitourinary:     Comments:  Dark brown stool in rectal vault with external hemorrhoids.   Musculoskeletal: Normal range of motion.      Right lower leg: Edema present.      Left lower leg: Edema present.   Skin:     General: Skin is warm and dry.      Coloration: Skin is pale.   Neurological:      Mental Status: She is alert.            LAB:  All pertinent labs reviewed and interpreted.  Results for orders placed or performed during the hospital encounter of 07/07/21   Basic Metabolic Panel   Result Value Ref Range    Sodium 129 (L) 136 - 145 mmol/L    Potassium 4.2 3.5 - 5.0 mmol/L    Chloride 97 (L) 98 - 107 mmol/L    CO2 22 22 - 31 mmol/L    Anion Gap, Calculation 10 5 - 18 mmol/L    Glucose 118 70 - 125 mg/dL    Calcium 9.2 8.5 - 10.5 mg/dL    BUN 26 8 - 28 mg/dL    Creatinine 0.91 " 0.60 - 1.10 mg/dL    GFR MDRD Af Amer >60 >60 mL/min/1.73m2    GFR MDRD Non Af Amer 57 (L) >60 mL/min/1.73m2   INR   Result Value Ref Range    INR 1.05 0.90 - 1.10   APTT   Result Value Ref Range    PTT 29 24 - 37 seconds   POCT occult blood stool   Result Value Ref Range    POC Fecal Occult Bld Positive (!) Negative    Lot Number [id number]     Expiration Date 11/23     Diluent/Developer Lot Number [id number]     Diluent/Developer Expiration Date 7/2023     Pos Control Valid Control Valid Control    Neg Control Valid Control Valid Control       RADIOLOGY:  Reviewed all pertinent imaging. Please see official radiology report.  No results found.    PROCEDURES:   Critical Care  Performed by: Ansley Cortes DO  Authorized by: Ansley Cortes DO     Total critical care time: 32 minutes  Critical care time was exclusive of separately billable procedures and treating other patients.  Critical care was necessary to treat or prevent imminent or life-threatening deterioration of the following conditions: Anemia requiring blood transfusion and admission  Critical care was time spent personally by me on the following activities: development of treatment plan with patient or surrogate, discussions with consultants, examination of patient, evaluation of patient's response to treatment, obtaining history from patient or surrogate, ordering and performing treatments and interventions, ordering and review of laboratory studies, ordering and review of radiographic studies and re-evaluation of patient's condition, this excludes any separately billable procedures.      I, Guido Parker, am serving as a scribe to document services personally performed by Dr. Cortes based on my observation and the provider's statements to me. I, Ansley Cortes DO attest that Guido Parker is acting in a scribe capacity, has observed my performance of the services and has documented them in accordance with my direction.    Ansley Cortes DO  Emergency  [facility] EMERGENCY DEPARTMENT  [address]  [facility] [address]  Dept: [phone]  Loc: [phone]     Claire Cortes DO  07/07/21 2153

## 2021-07-08 NOTE — H&P
H&P by Saskia Hughes MD at 2021 10:20 PM     Author: Saskia Hughes MD Service: Hospitalist Author Type: Physician    Filed: 2021 12:51 AM Date of Service: 2021 10:20 PM Status: Signed    : aSskia Hughes MD (Physician)       Admission History and Physical   Kristal KEEGAN Cox,  1924, MRN [id number]    PCP: Tiesha Barba MD, [phone]   Code status:  No CPR- Do NOT Intubate       Extended Emergency Contact Information  Primary Emergency Contact: Camilo Cox   [facility]  Home Phone: [phone]  Mobile Phone: [phone]  Relation: Child  Secondary Emergency Contact: Edilia Simpson  Home Phone: [phone]  Relation: Child       Assessment and Plan   89-year-old female with history of GERD, moderate mitral valve regurgitation admitted to the hospital with symptomatic anemia    Symptomatic anemia  --- Acute on subacute.  Suspect some of her lower extremity edema was worsening of her symptomatic anemia.  --- Denies history of melena or hematochezia but Hemoccult positive with dark stools in the ER.  --- Admit, transfuse 1 unit PRBCs  --- No clear coronary artery disease history.  Transfusion goal greater than 7  --- IV PPI  --- Clear liquids tonight, n.p.o. after midnight  --- GI consult for the morning; she is unsure how much work-up she would encounter and is not interested in surgery but is open to discussions of endoscopy  --- Hold baby aspirin    Hyponatremia  ---Volume status somewhat hard to elicit but she does not appear volume overloaded at all.  ---Really at risk for high-output heart failure given her severe anemia  ----Check BNP  --- Hold off on IV fluids given her profound anemia  --- Check urine osm, serum osm, urine sodium  ---hold lasix    GERD  --- Previously with famotidine  ---concern for GI bleed  ---IV PPI two times a day for now    Hypertension  --- Pressures elevated  --- Continue home Toprol and lisinopril with hold parameters  ---holding  lasix    Hypothyroidism--home Synthroid    COVID-19 - negative    DVT prophylaxis; scd  Code status; DNR  Admit inpatient status as expect at greater than two midnight stay for treatment of symptomatic anemia.      Principal Problem:    Anemia         Chief Complaint:  Fatigue, low hemoglobin     HPI:    Kristal Cox is a 89 y.o. old female with a history of IBS and GERD on famotidine as needed came into the emergency room department for further work-up and evaluation of fatigue with low hemoglobin.  She was obtained from the patient.  She is a somewhat equivocal historian.  She tells me that she is just feeling tired.  She is feeling uncomfortable.  She does not want the telemetry monitor on and I assured her that I would stop it.  She lives alone.  Patient has been doctoring with new primary care over the past month and a half.  She has had a steady decline approximately a gram a month and her hemoglobin.  She notes extraordinary fatigue some jumpiness in her leg as well as anxiety.  Today she was at a recheck to discuss plans for her hemoglobin and it was found to be 5.9 and she was advised to come to the emergency room department.  In addition she was found to be hyponatremic.    Patient has had some increase in lower extremity edema.  She thinks that this has been over the past few years or months.  No history of coronary artery disease.  She denies any change in her appetite or change in her weight.  Denies chest pain.  She denies melena or hematochezia but stools were found to be Hemoccult positive in the emergency room department.  She is somewhat equivocal on how much testing she would like to have.  She tells me that she would like to go back to a decent quality of life in her own home.         Medical History  Past Medical History:   Diagnosis Date   ? Alopecia 8/8/2019   ? Ankle fracture, left, closed, initial encounter    ? Anxiety    ? Back pain 3/4/2016   ? Diastolic congestive heart failure  (H) 2018   ? Hyperlipidemia    ? IBS (irritable bowel syndrome)    ? Osteoporosis    ? Other abnormal clinical finding     evidence of old septal scar on EKG   ? Systolic hypertension         Surgical History  She  has a past surgical history that includes Tonsillectomy and adenoidectomy; Cholecystectomy; Cataract extraction, bilateral; and  section.       Social History  Reviewed, and she  reports that she has never smoked. She has never used smokeless tobacco. She reports current drug use. She reports that she does not drink alcohol.  Social History     Social History Narrative     since , has children.  Lives in her own home with help.        Allergies  Allergies   Allergen Reactions   ? Evista [Raloxifene]      Caused blood clot   ? Fosamax [Alendronate] Nausea And Vomiting    Family History  Reviewed, and   Family History   Problem Relation Age of Onset   ? Stroke Mother         passed age 75   ? Crohn's disease Father         passed in his 20's from bowel obstructions     Family Status   Relation Name Status   ? Mother     ? Father             Prior to Admission Medications   Medications Prior to Admission   Medication Sig Dispense Refill Last Dose   ? acetaminophen (TYLENOL) 500 MG tablet Take 2 tablets (1,000 mg total) by mouth 2 (two) times a day.  0    ? artificial tears,hypromellose, (GENTEAL; SYSTANE) 0.3 % Gel Administer 1 drop to both eyes as needed (dry eyes).      ? aspirin 81 mg chewable tablet Chew 1 tablet (81 mg total) daily. (Patient taking differently: Chew 81 mg every evening. )  0 2021 at Unknown time   ? calcium, as carbonate, (OS-SAMPSON) 500 mg calcium (1,250 mg) tablet Take 1 tablet by mouth daily.   2021 at Unknown time   ? cholecalciferol, vitamin D3, 1,000 unit tablet Take 2,000 Units by mouth daily.   2021 at Unknown time   ? docusate sodium (COLACE) 100 MG capsule Take 1 capsule (100 mg total) by mouth daily. (Patient taking  differently: Take 100 mg by mouth every evening. ) 90 capsule 1 7/6/2021 at Unknown time   ? famotidine (PEPCID) 20 MG tablet Take 1 tablet (20 mg total) by mouth 2 (two) times a day as needed for heartburn. 60 tablet 10    ? furosemide (LASIX) 20 MG tablet Take 1 tablet (20 mg total) by mouth daily. Take 1 daily.  May increase to 2 daily with increased swelling as needed. 90 tablet 3 7/7/2021 at Unknown time   ? levothyroxine (SYNTHROID) 50 MCG tablet Take 1 tablet (50 mcg total) by mouth Daily at 6:00 am. 90 tablet 3 7/7/2021 at Unknown time   ? lisinopriL (PRINIVIL,ZESTRIL) 20 MG tablet Take 1 tablet (20 mg total) by mouth daily. 90 tablet 3 7/7/2021 at Unknown time   ? magnesium chloride (SLOW-MAG) 64 mg TbEC delayed-release tablet Take 1 tablet (64 mg total) by mouth daily.  0 7/7/2021 at Unknown time   ? melatonin 3 mg Tab tablet Take 1-2 tablets (3-6 mg total) by mouth at bedtime as needed.   7/6/2021 at Unknown time   ? metoprolol succinate (TOPROL-XL) 25 MG TAKE 1 TABLET(25 MG) BY MOUTH DAILY (Patient taking differently: Take 25 mg by mouth every evening. ) 90 tablet 1 7/6/2021 at Unknown time   ? polyethylene glycol (GLYCOLAX) 17 gram/dose powder Take 17 g by mouth daily as needed. 235 g 11           Review of Systems:  A comprehensive review of systems was negative except as noted.   Physical Exam:  Temp:  [97.6  F (36.4  C)-98.7  F (37.1  C)] 98.7  F (37.1  C)  Heart Rate:  [] 110  Resp:  [8-41] 22  BP: (110-194)/(38-88) 171/79    General Appearance:    Alert, cooperative, frail, no distress, appears stated age   Head:    Normocephalic, without obvious abnormality, atraumatic   Eyes:    conjunctiva/corneas clear, EOM's intact,    Throat: Oropharynx moist   Neck:   Supple,  trachea midline, no adenopathy;     thyroid:  no enlargement/tenderness/nodules;    Back:     Spine with kyphosis   Lungs:     Clear to auscultation bilaterally, without wheezes, crackles, or rhonchi, respirations unlabored    Chest Wall:    No tenderness or deformity    Heart:    Tachycardic, no significant murmers heard   Abdomen:     Soft, non-tender overall, no epigastric pain, no hepatosplenomegaly, no masses   Extremities:   1-2 plus bilateral LE edema, otherwise extremities normal, atraumatic, without cyanosis    Skin:   No open sores, no rashes or lesions, normal turgor and color   Lymph nodes:   Cervical, supraclavicular  nodes normal   Neurologic:   Alert and oriented and grossly intact without focal deficits appreciated        Pertinent Labs  Lab Results: personally reviewed.     Recent Results (from the past 24 hour(s))   Basic Metabolic Panel   Result Value Ref Range    Sodium 130 (L) 136 - 145 mmol/L    Potassium 4.4 3.5 - 5.0 mmol/L    Chloride 96 (L) 98 - 107 mmol/L    CO2 20 (L) 22 - 31 mmol/L    Anion Gap, Calculation 14 5 - 18 mmol/L    Glucose 96 70 - 125 mg/dL    Calcium 9.3 8.5 - 10.5 mg/dL    BUN 26 8 - 28 mg/dL    Creatinine 0.89 0.60 - 1.10 mg/dL    GFR MDRD Af Amer >60 >60 mL/min/1.73m2    GFR MDRD Non Af Amer 59 (L) >60 mL/min/1.73m2   HM1 (CBC with Diff)   Result Value Ref Range    WBC 6.3 4.0 - 11.0 thou/uL    RBC 2.14 (L) 3.80 - 5.40 mill/uL    Hemoglobin 5.9 (LL) 12.0 - 16.0 g/dL    Hematocrit 19.0 (L) 35.0 - 47.0 %    MCV 89 80 - 100 fL    MCH 27.6 27.0 - 34.0 pg    MCHC 31.1 (L) 32.0 - 36.0 g/dL    RDW 13.7 11.0 - 14.5 %    Platelets 221 140 - 440 thou/uL    MPV 9.7 7.0 - 10.0 fL    Neutrophils % 57 50 - 70 %    Lymphocytes % 26 20 - 40 %    Monocytes % 15 (H) 2 - 10 %    Eosinophils % 1 0 - 6 %    Basophils % 1 0 - 2 %    Immature Granulocyte % 0 <=0 %    Neutrophils Absolute 3.6 2.0 - 7.7 thou/uL    Lymphocytes Absolute 1.6 0.8 - 4.4 thou/uL    Monocytes Absolute 1.0 (H) 0.0 - 0.9 thou/uL    Eosinophils Absolute 0.1 0.0 - 0.4 thou/uL    Basophils Absolute 0.0 0.0 - 0.2 thou/uL    Immature Granulocyte Absolute 0.0 <=0.0 thou/uL   Type and Screen   Result Value Ref Range    ABORh O POS     Antibody  Screen Negative Negative   POCT occult blood stool   Result Value Ref Range    POC Fecal Occult Bld Positive (!) Negative    Lot Number [id number]     Expiration Date 11/23     Diluent/Developer Lot Number [id number]     Diluent/Developer Expiration Date 7/2023     Pos Control Valid Control Valid Control    Neg Control Valid Control Valid Control   Basic Metabolic Panel   Result Value Ref Range    Sodium 129 (L) 136 - 145 mmol/L    Potassium 4.2 3.5 - 5.0 mmol/L    Chloride 97 (L) 98 - 107 mmol/L    CO2 22 22 - 31 mmol/L    Anion Gap, Calculation 10 5 - 18 mmol/L    Glucose 118 70 - 125 mg/dL    Calcium 9.2 8.5 - 10.5 mg/dL    BUN 26 8 - 28 mg/dL    Creatinine 0.91 0.60 - 1.10 mg/dL    GFR MDRD Af Amer >60 >60 mL/min/1.73m2    GFR MDRD Non Af Amer 57 (L) >60 mL/min/1.73m2   INR   Result Value Ref Range    INR 1.05 0.90 - 1.10   APTT   Result Value Ref Range    PTT 29 24 - 37 seconds   Hepatic Profile   Result Value Ref Range    Bilirubin, Total 0.4 0.0 - 1.0 mg/dL    Bilirubin, Direct 0.2 <=0.5 mg/dL    Protein, Total 6.6 6.0 - 8.0 g/dL    Albumin 3.9 3.5 - 5.0 g/dL    Alkaline Phosphatase 80 45 - 120 U/L    AST 20 0 - 40 U/L    ALT 11 0 - 45 U/L   Asymptomatic SARS-CoV-2 (COVID-19)-PCR    Specimen: Respiratory   Result Value Ref Range    SARS-CoV-2 PCR Result Negative Negative, Invalid   Crossmatch   Result Value Ref Range    Crossmatch Compatible     Unit Type O Pos     Unit Number [id number]     Status Issued     Component Red Blood Cells     PRODUCT CODE [id number]     Issue Date and Time [id number]     Blood Type 5100     CODING SYSTEM [id number]    Hemoglobin   Result Value Ref Range    Hemoglobin 7.6 (L) 12.0 - 16.0 g/dL   BNP(B-type Natriuretic Peptide)   Result Value Ref Range     (H) 0 - 167 pg/mL   Lactic Acid   Result Value Ref Range    Lactic Acid 1.2 0.7 - 2.0 mmol/L         Pertinent Radiology  Radiology Results: reviewed.      Us Abdomen Duplex Limited    Result Date: 6/2/2021  EXAM: US ABDOMEN  LIMITED, US ABDOMEN DUPLEX LIMITED LOCATION: [facility] DATE/TIME: 6/2/2021 9:07 AM INDICATION: Abdominal swelling and peripheral edema. Suspected ascites. COMPARISON: CT dated 3/19/2020. TECHNIQUE: Limited abdominal ultrasound. Color flow with spectral Doppler and waveform analysis performed.  FINDINGS: GALLBLADDER: Cholecystectomy . BILE DUCTS: No biliary dilatation. The common duct measures 2 mm. LIVER: Limited visualization of the liver demonstrates no focal lesion or obvious surface nodularity. RIGHT KIDNEY: No hydronephrosis. PANCREAS: The visualized portions are normal. No ascites. ABDOMINAL DUPLEX: The hepatic veins, IVC, portal veins, and splenic vein are patent with flow in the normal direction. Hepatic artery was not visualized.     1.  No ascites. 2.  Normal abdominal liver duplex. 3.  Cholecystectomy with no biliary ductal dilation.    Us Abdomen Limited    Result Date: 6/2/2021  EXAM: US ABDOMEN LIMITED, US ABDOMEN DUPLEX LIMITED LOCATION: [facility] DATE/TIME: 6/2/2021 9:07 AM INDICATION: Abdominal swelling and peripheral edema. Suspected ascites. COMPARISON: CT dated 3/19/2020. TECHNIQUE: Limited abdominal ultrasound. Color flow with spectral Doppler and waveform analysis performed.  FINDINGS: GALLBLADDER: Cholecystectomy . BILE DUCTS: No biliary dilatation. The common duct measures 2 mm. LIVER: Limited visualization of the liver demonstrates no focal lesion or obvious surface nodularity. RIGHT KIDNEY: No hydronephrosis. PANCREAS: The visualized portions are normal. No ascites. ABDOMINAL DUPLEX: The hepatic veins, IVC, portal veins, and splenic vein are patent with flow in the normal direction. Hepatic artery was not visualized.     1.  No ascites. 2.  Normal abdominal liver duplex. 3.  Cholecystectomy with no biliary ductal dilation.

## 2021-07-08 NOTE — CONSULTS
Consults by Cam Walker DO at 7/8/2021  8:54 AM     Author: Cam Walker DO Service: Gastroenterology Author Type: Physician    Filed: 7/8/2021 11:51 AM Date of Service: 7/8/2021  8:54 AM Status: Signed    : Cam Walker DO (Physician)     Consult Orders    1. Inpatient consult to Gastroenterology Reason for Consult? Symptomatic anemia - guiac positive - known GERD; Consult priority: Tomorrow (routine); Communication for MD: No phone communication necessary for now [[id number]] ordered by Saskia Hughes MD at 07/07/21 6587    2. Inpatient consult to Gastroenterology Reason for Consult? Anemia, hemoccult positive; Did you contact the consulting MD? No; Consult priority: Today (routine); Communication for MD: No phone communication necessary for now [[id number]] ordered by Claire Cortes DO at 07/07/21 8283             [facility] DIGESTIVE HEALTH CONSULTATION    Kristal Cox   [address] [address]  89 y.o. female  Admission Date/Time: 7/7/2021  4:19 PM    Primary Care Provider:  Tiesha Barba MD    Requesting Physician: Saskia Hughes MD      CHIEF COMPLAINT:   Anemia    REASON FOR THE CONSULT:  Anemia    HPI:     Yesi is a very pleasant 89-year-old woman with history of GERD, mitral valve regurgitation and progressive anemia who was admitted to [facility] for further evaluation of her anemia and blood transfusions.  History is obtained from the chart as well as the patient and one of her daughters.  She has had a drop in her hemoglobin since March.  Her hemoglobin in March 19 was 11.1.  It was 10 in April 8.6 in May and now 5.9 when checked July 7.  She was instructed to go to the emergency room for blood transfusion at that time.  She was transfused 1 unit of blood and hemoglobin initially went to 7.6 before dropping to 6.8.  She was transfused an additional unit after the 6.8.    She has had associated symptoms of fatigue and weakness as her  anemia has progressed.  She does not see overt blood in her stool.  No clear melena or hematochezia.  In the ER she was found to be Hemoccult positive.    She also had iron studies checked in May that were notable for ferritin of 18 and iron saturation of 5%.  She did have a flexible sigmoidoscopy back in  that appears to be normal.  She never had a full colonoscopy. She does not think she had any recent stool tests to screen for colon cancer. She also underwent an EGD in  for dysphagia.  She did have some gastric erosions at that time as well as a tortuous esophagus.  Biopsies from her stomach were negative for H. pylori and consistent with reactive gastropathy.    REVIEW OF SYSTEMS: 13 point review of systems is negative except that noted above.    PAST MEDICAL HISTORY:  Past Medical History:   Diagnosis Date   ? Alopecia 2019   ? Ankle fracture, left, closed, initial encounter    ? Anxiety    ? Back pain 3/4/2016   ? Diastolic congestive heart failure (H) 2018   ? Hyperlipidemia    ? IBS (irritable bowel syndrome)    ? Osteoporosis    ? Other abnormal clinical finding     evidence of old septal scar on EKG   ? Systolic hypertension        PAST SURGICAL HISTORY:  Past Surgical History:   Procedure Laterality Date   ? CATARACT EXTRACTION, BILATERAL     ?  SECTION      X 4   ? CHOLECYSTECTOMY     ? TONSILLECTOMY AND ADENOIDECTOMY         SOCIAL HISTORY:  Social History     Socioeconomic History   ? Marital status:      Spouse name: Not on file   ? Number of children: Not on file   ? Years of education: Not on file   ? Highest education level: Not on file   Occupational History   ? Not on file   Social Needs   ? Financial resource strain: Not on file   ? Food insecurity     Worry: Not on file     Inability: Not on file   ? Transportation needs     Medical: Not on file     Non-medical: Not on file   Tobacco Use   ? Smoking status: Never Smoker   ? Smokeless tobacco: Never Used    Substance and Sexual Activity   ? Alcohol use: No     Comment: rarely drinks wine, and drinks wine watered down with ice.   ? Drug use: Yes   ? Sexual activity: Not Currently   Lifestyle   ? Physical activity     Days per week: Not on file     Minutes per session: Not on file   ? Stress: Not on file   Relationships   ? Social connections     Talks on phone: Not on file     Gets together: Not on file     Attends [employer] service: Not on file     Active member of club or organization: Not on file     Attends meetings of clubs or organizations: Not on file     Relationship status: Not on file   ? Intimate partner violence     Fear of current or ex partner: Not on file     Emotionally abused: Not on file     Physically abused: Not on file     Forced sexual activity: Not on file   Other Topics Concern   ? Not on file   Social History Narrative     since 2009, has children.  Lives in her own home with help.       FAMILY HISTORY:  Family History   Problem Relation Age of Onset   ? Stroke Mother         passed age 75   ? Crohn's disease Father         passed in his 20's from bowel obstructions       MEDS:  Medications Prior to Admission   Medication Sig Dispense Refill Last Dose   ? acetaminophen (TYLENOL) 500 MG tablet Take 2 tablets (1,000 mg total) by mouth 2 (two) times a day.  0    ? artificial tears,hypromellose, (GENTEAL; SYSTANE) 0.3 % Gel Administer 1 drop to both eyes as needed (dry eyes).      ? aspirin 81 mg chewable tablet Chew 1 tablet (81 mg total) daily. (Patient taking differently: Chew 81 mg every evening. )  0 7/6/2021 at Unknown time   ? calcium, as carbonate, (OS-SAMPSON) 500 mg calcium (1,250 mg) tablet Take 1 tablet by mouth daily.   7/7/2021 at Unknown time   ? cholecalciferol, vitamin D3, 1,000 unit tablet Take 2,000 Units by mouth daily.   7/7/2021 at Unknown time   ? docusate sodium (COLACE) 100 MG capsule Take 1 capsule (100 mg total) by mouth daily. (Patient taking differently: Take 100 mg  by mouth every evening. ) 90 capsule 1 7/6/2021 at Unknown time   ? famotidine (PEPCID) 20 MG tablet Take 1 tablet (20 mg total) by mouth 2 (two) times a day as needed for heartburn. 60 tablet 10    ? furosemide (LASIX) 20 MG tablet Take 1 tablet (20 mg total) by mouth daily. Take 1 daily.  May increase to 2 daily with increased swelling as needed. 90 tablet 3 7/7/2021 at Unknown time   ? levothyroxine (SYNTHROID) 50 MCG tablet Take 1 tablet (50 mcg total) by mouth Daily at 6:00 am. 90 tablet 3 7/7/2021 at Unknown time   ? lisinopriL (PRINIVIL,ZESTRIL) 20 MG tablet Take 1 tablet (20 mg total) by mouth daily. 90 tablet 3 7/7/2021 at Unknown time   ? magnesium chloride (SLOW-MAG) 64 mg TbEC delayed-release tablet Take 1 tablet (64 mg total) by mouth daily.  0 7/7/2021 at Unknown time   ? melatonin 3 mg Tab tablet Take 1-2 tablets (3-6 mg total) by mouth at bedtime as needed.   7/6/2021 at Unknown time   ? metoprolol succinate (TOPROL-XL) 25 MG TAKE 1 TABLET(25 MG) BY MOUTH DAILY (Patient taking differently: Take 25 mg by mouth every evening. ) 90 tablet 1 7/6/2021 at Unknown time   ? polyethylene glycol (GLYCOLAX) 17 gram/dose powder Take 17 g by mouth daily as needed. 235 g 11        ALLERGIES/SENSITIVITIES: Evista [raloxifene] and Fosamax [alendronate]    MEDICATIONS:  Current Facility-Administered Medications   Medication Dose Route Frequency Provider Last Rate Last Admin   ? acetaminophen suppository 650 mg (TYLENOL)  650 mg Rectal Q4H PRN Saskia Hughes MD       ? acetaminophen tablet 1,000 mg (TYLENOL)  1,000 mg Oral BID Saskia Hughes MD   1,000 mg at 07/08/21 0842   ? acetaminophen tablet 500-1,000 mg (TYLENOL)  500-1,000 mg Oral Q4H PRN Saskia Hughes MD       ? artificial tears(hypromellose) 0.3 % ophthalmic gel 1 drop (GENTEAL; SYSTANE)  1 drop Both Eyes PRN Saskia Hughes MD       ? bisacodyL suppository 10 mg (DULCOLAX)  10 mg Rectal Daily PRN Saskia Hughes MD       ? docusate sodium capsule 100 mg (COLACE)  100 mg  Oral DAILY Saskia Hughes MD   100 mg at 07/08/21 0842   ? levothyroxine tablet 50 mcg (SYNTHROID, LEVOTHROID)  50 mcg Oral Daily 0600 Saskia Hughes MD   50 mcg at 07/08/21 0600   ? lisinopriL tablet 20 mg (PRINIVIL,ZESTRIL)  20 mg Oral DAILY Saskia Hughes MD   20 mg at 07/08/21 0842   ? magnesium chloride delayed-release tablet 64 mg (SLOW-MAG)  64 mg Oral DAILY Saskia Hughes MD   64 mg at 07/08/21 0842   ? melatonin tablet 3-6 mg  3-6 mg Oral Bedtime PRN Saskia Hughes MD   6 mg at 07/07/21 2303   ? metoprolol succinate 24 hr tablet 25 mg (TOPROL-XL)  25 mg Oral QHS Saskia Hughes MD   25 mg at 07/07/21 2303   ? ondansetron injection 4 mg (ZOFRAN)  4 mg Intravenous Q4H PRN Saskia Hughes MD        Or   ? ondansetron tablet 8 mg (ZOFRAN)  8 mg Oral Q8H PRN Saskia Hughes MD       ? pantoprazole 40 mg injection  40 mg Intravenous Q12H Saskia Hughes MD   40 mg at 07/07/21 2303   ? sodium chloride flush 2.5 mL (NS)  2.5 mL Intravenous Line Care Saskia Hughes MD   2.5 mL at 07/08/21 0842       PHYSICAL EXAM:  Temp:  [97.3  F (36.3  C)-98.7  F (37.1  C)] 97.5  F (36.4  C)  Heart Rate:  [] 94  Resp:  [8-41] 20  BP: (110-194)/(38-88) 162/78  Body mass index is 17.37 kg/m .  GEN: Well developed, well nourished 89 y.o. in no acute distress.  HEENT: sclera anicteric, moist mucous membranes.   LYMPH: No cervical lymphadenopathy  PULM: Nonlabored breathing. Breath sounds equal.   CARDIO: Regular rate  GI: Non-distended. Soft.  Non-tender to palpation.  No guarding. No rebound tenderness.  EXT: warm, + LE edema.   NEURO: Alert. No focal defects.   PSYCH: Mental status appropriate, mood and affect normal.    SKIN: No rashes  MSK: No joint abnormalities    LABORATORY DATA:  Results from last 7 days   Lab Units 07/08/21  0440 07/07/21  2202 07/07/21  1418   LN-WHITE BLOOD CELL COUNT thou/uL 6.5  --  6.3   LN-HEMOGLOBIN g/dL 6.8* 7.6* 5.9*   LN-HEMATOCRIT % 20.9*  --  19.0*   LN-PLATELET COUNT thou/uL 163  --  221       Results from last 7 days   Lab  Units 07/08/21  0440 07/07/21  1710 07/07/21  1418   LN-SODIUM mmol/L 129* 129* 130*   LN-POTASSIUM mmol/L 3.7 4.2 4.4   LN-CHLORIDE mmol/L 99 97* 96*   LN-CO2 mmol/L 23 22 20*   LN-BLOOD UREA NITROGEN mg/dL 19 26 26   LN-CREATININE mg/dL 0.76 0.91 0.89   LN-CALCIUM mg/dL 8.3* 9.2 9.3   LN-ALBUMIN g/dL  --  3.9  --    LN-PROTEIN TOTAL g/dL  --  6.6  --    LN-BILIRUBIN TOTAL mg/dL  --  0.4  --    LN-ALKALINE PHOSPHATASE U/L  --  80  --    LN-ALT (SGPT) U/L  --  11  --    LN-AST (SGOT) U/L  --  20  --      Lab Results   Component Value Date    LIPASE 43 11/21/2019     Results from last 7 days   Lab Units 07/07/21  1710   LN-INR  1.05   LN-PARTIAL THROMBOPLASTIN TIME seconds 29         RELEVANT IMAGING:      No new imaging    ASSESSMENT:     Yesi is a very pleasant 89-year-old woman with history of GERD, mitral valve regurgitation and progressive anemia who are consulted on for the anemia.      # Normocytic anemia  # Symptomatic anemia  -It is not totally clear what the cause of Yesi's anemia is.  Her MCV has been dropping and her iron studies were moving more towards an iron deficiency anemia.  Her FOBT was positive.  She likely has some level of slow bleeding from somewhere in her GI tract.  She has had a prior EGD showing gastric erosions. She has never had a full colonoscopy, only flexible sigmoidoscopies. Possible sources of blood loss would be angioectasias, gastritis, esophagitis, colon polyps, GI malignancy. We discussed options for evaluating/treating this further, including doing nothing, IV iron infusions/transfusions with periodic Hgb checks vs endoscopic evaluaiton with EGD and colonoscopy.   - She is not been on any iron supplementation.    - She is on an aspirin a day.    PLAN:    - After discussing the above options, she does not want to pursue any endoscopic evaluation. She understands that his could lead to a missed diagnosis of cancer and she is ok with that.   - She is agreeable to getting IV  iron with periodic hgb checks.   - Consider IV iron infusion prior to discharge  - She can follow with her PCP or hematology as an outpatient for ongoing medical mgmt of her anemia.   - I would also consider discharging her on omeprazole 20 mg daily and keeping this on indefinitely. This could help downstage proximal GI bleeding.   - I will now sign off. Please call with additional questions or concerns.                Cam Walker, DO  Thank you for the opportunity to participate in the care of this patient.   Please feel free to call me with any questions or concerns.  Phone number [phone].            CC: [facility] HospitalJameson Bernadette Kim, MD

## 2021-07-08 NOTE — PLAN OF CARE
Plan of Care by Soy Cooley RN at 7/8/2021  6:12 AM     Author: Soy Cooley RN Service: -- Author Type: Registered Nurse    Filed: 7/8/2021  6:13 AM Date of Service: 7/8/2021  6:12 AM Status: Signed    : Soy Cooley RN (Registered Nurse)       Problem: Pain  Goal: Patient's pain/discomfort is manageable  Outcome: Progressing     Problem: Daily Care  Goal: Daily care needs are met  Outcome: Progressing     Problem: Anemia  Goal: Hemoglobin stabilized at or near patient's baseline  Outcome: Progressing    Hemoglobin drop from 7.6 to 6.8.  House officer notified, order to transfuse additional unit.  Started at 0600.  Urine sample collected; sent to lab.  VSS, BP is on higher end.

## 2021-07-08 NOTE — PLAN OF CARE
"Plan of Care by Mary Pappas RN at 7/8/2021  2:09 PM     Author: Mary Pappas RN Service: -- Author Type: Registered Nurse    Filed: 7/8/2021  2:44 PM Date of Service: 7/8/2021  2:09 PM Status: Addendum    : Mary Pappas RN (Registered Nurse)    Related Notes: Original Note by Mary Pappas RN (Registered Nurse) filed at 7/8/2021  2:10 PM         Problem: Pain  Goal: Patient's pain/discomfort is manageable  Outcome: Progressing  -Pr denies pain        Problem: Anemia  Goal: Hemoglobin stabilized at or near patient's baseline  Outcome: Progressing  -2nd unit of blood finished transfusing this am. Recheck Hgb 9.0  -Pt denies feeling dizzy or lightheaded, but states she feels \"off and tired\". Hospitalist notified.   -No stools this shift.        -Up in chair for a total of 3 hours today.  -Incontinent of urine. Purewick in place  -A & O X4  -Up with Ast of 1-2.   -BPs 160s-170s/70s. Hospitalist notified. PRN Hydralazine ordered       "

## 2021-07-08 NOTE — PLAN OF CARE
Plan of Care by Sandeep Ignacio RN at 7/7/2021 10:31 PM     Author: Sandeep Ignacio RN Service: -- Author Type: Registered Nurse    Filed: 7/7/2021 10:31 PM Date of Service: 7/7/2021 10:31 PM Status: Signed    : Sandeep Ignacio RN (Registered Nurse)         Problem: Pain  Goal: Patient's pain/discomfort is manageable  Outcome: Progressing   Pt is uncomfortable in bed. Repositioned frequently, pillow support, and foam topper in use.     Problem: Potential for Compromised Skin Integrity  Goal: Skin integrity is maintained or improved  Outcome: Progressing   Red blanchable area on spine noted upon admission.    Problem: Potential for Compromised Skin Integrity  Goal: Nutritional status is improving  Outcome: Progressing   Clear liquids, encouraging intake.    Problem: Urinary Incontinence  Goal: Perineal skin integrity is maintained or improved  Outcome: Progressing   Purewick in place.    Problem: Anemia  Goal: Hemoglobin stabilized at or near patient's baseline  Outcome: Progressing  -Transfused 1 unit of blood. Hgb recheck 7.6  -O2 on 1L, sats improving.     Problem: Discharge Barriers  Goal: Patient's discharge needs are met  Outcome: Progressing     -Pt tachy, sepsis protocol fired. Lactic acid: 1.2.  -BP elevated, MD aware.

## 2021-07-09 ENCOUNTER — COMMUNICATION - HEALTHEAST (OUTPATIENT)
Dept: INTERNAL MEDICINE | Facility: CLINIC | Age: 86
End: 2021-07-09

## 2021-07-09 LAB
ANION GAP SERPL CALCULATED.3IONS-SCNC: 11 MMOL/L (ref 5–18)
BLD PROD TYP BPU: NORMAL
BLD PROD TYP BPU: NORMAL
BLOOD TYPE: 5100
BLOOD TYPE: 5100
BUN SERPL-MCNC: 14 MG/DL (ref 8–28)
CALCIUM SERPL-MCNC: 8.6 MG/DL (ref 8.5–10.5)
CHLORIDE BLD-SCNC: 102 MMOL/L (ref 98–107)
CO2 SERPL-SCNC: 20 MMOL/L (ref 22–31)
CODING SYSTEM: NORMAL
CODING SYSTEM: NORMAL
COMPONENT (HISTORICAL CONVERSION): NORMAL
COMPONENT (HISTORICAL CONVERSION): NORMAL
CREAT SERPL-MCNC: 0.71 MG/DL (ref 0.6–1.1)
CROSSMATCH: NORMAL
CROSSMATCH: NORMAL
ERYTHROCYTE [DISTWIDTH] IN BLOOD BY AUTOMATED COUNT: 13.7 % (ref 11–14.5)
GFR SERPL CREATININE-BSD FRML MDRD: >60 ML/MIN/1.73M2
GLUCOSE BLD-MCNC: 94 MG/DL (ref 70–125)
HCT VFR BLD AUTO: 28 % (ref 35–47)
HGB BLD-MCNC: 9.2 G/DL (ref 12–16)
ISSUE DATE AND TIME: NORMAL
ISSUE DATE AND TIME: NORMAL
MCH RBC QN AUTO: 28.9 PG (ref 27–34)
MCHC RBC AUTO-ENTMCNC: 32.9 G/DL (ref 32–36)
MCV RBC AUTO: 88 FL (ref 80–100)
PLATELET # BLD AUTO: 177 THOU/UL (ref 140–440)
PMV BLD AUTO: 9.9 FL (ref 8.5–12.5)
POTASSIUM BLD-SCNC: 3.5 MMOL/L (ref 3.5–5)
RBC # BLD AUTO: 3.18 MILL/UL (ref 3.8–5.4)
SODIUM SERPL-SCNC: 133 MMOL/L (ref 136–145)
STATUS (HISTORICAL CONVERSION): NORMAL
STATUS (HISTORICAL CONVERSION): NORMAL
UNIT ABO/RH (HISTORICAL CONVERSION): NORMAL
UNIT ABO/RH (HISTORICAL CONVERSION): NORMAL
UNIT NUMBER: NORMAL
UNIT NUMBER: NORMAL
WBC: 6.9 THOU/UL (ref 4–11)

## 2021-07-09 ASSESSMENT — MIFFLIN-ST. JEOR: SCORE: 794.57

## 2021-07-09 NOTE — PLAN OF CARE
Plan of Care by Chevy Dunbar RN at 7/8/2021  9:03 PM     Author: Chevy Dunbar RN Service: -- Author Type: Registered Nurse    Filed: 7/8/2021  9:04 PM Date of Service: 7/8/2021  9:03 PM Status: Signed    : Chevy Dunbar RN (Registered Nurse)         Problem: Pain  Goal: Patient's pain/discomfort is manageable  Outcome: Progressing   Denied  Problem: Safety  Goal: Patient will be injury free during hospitalization  Outcome: Progressing   Bed alarm on, call light within reach of pt.   Problem: Daily Care  Goal: Daily care needs are met  Outcome: Progressing   Assist of one with cares.   Problem: Psychosocial Needs  Goal: Demonstrates ability to cope with hospitalization/illness  Outcome: Progressing   Ambulated on the subramanian with   Sba, able to get in and out of bed indep.

## 2021-07-09 NOTE — PLAN OF CARE
Plan of Care by Beba Acevedo RN at 7/9/2021  1:25 PM     Author: Beba Acevedo RN Service: -- Author Type: Registered Nurse    Filed: 7/9/2021  1:27 PM Date of Service: 7/9/2021  1:25 PM Status: Signed    : Beba Acevedo RN (Registered Nurse)         Problem: Discharge Barriers  Goal: Patient's discharge needs are met  Outcome: Progressing     Pt states doesn't feel well today.  Doesn't know why.  Says no pain or nausea.    Feels weak.  Up to chair with assistance of one staff but weak and slow.    Hemoglobin 9.2 this morning. No visible bleeding, no stool today.

## 2021-07-09 NOTE — PROGRESS NOTES
Progress Notes by Maxim Kim MD at 7/9/2021 10:42 AM     Author: Maxim Kim MD Service: Hospitalist Author Type: Physician    Filed: 7/9/2021  3:50 PM Date of Service: 7/9/2021 10:42 AM Status: Addendum    : Maxim Kim MD (Physician)    Related Notes: Original Note by Maxim Kim MD (Physician) filed at 7/9/2021 11:05 AM       Progress Note    Assessment/Plan  89-year-old female with history of GERD, moderate mitral valve regurgitation admitted to the hospital with symptomatic anemia     Symptomatic anemia  ---  Improving after transfusion of 1 unit of packed RBCs on admission.    Hemoglobin is stable.  ---  GI consulted and patient is not interested in pursuing endoscopies.  Discussed comfort care if hemoglobin continues to drop  --Currently on IV Venofer and p.o. iron and ascorbic acid for increased absorption.  --Change IV to p.o. PPI.  --- Hold baby aspirin     Hyponatremia  --Improving.  ---Volume status somewhat hard to elicit but she does not appear volume overloaded at all.  ---Really at risk for high-output heart failure given her severe anemia  ----Elevated BNP  --- Hold off on IV fluids given her profound anemia  ---  Urine sodium of 103, blood osmolality of 264 urine osmolality of 295.  Likely due to SIADH or fluid overload  --Resume Lasix and monitor sodium level.     GERD  --- Previously with famotidine  ---concern for GI bleed  --Initially treated with IV PPI and now switched to p.o. PPI once daily as per GI recommendation    Essential hypertension  ---  Suboptimal control.  Lasix was held at admission.  --- Continue home Toprol and lisinopril with hold parameters  --- Restart Lasix today and monitor sodium level     Hypothyroidism--home Synthroid    Increased urination  --Likely due to transfusion of packed RBCs  --Patient denies any dysuria or altered mental status  --We will hold off on UA given absence of symptoms.    Malaise  --Unclear etiology  --Continue to monitor  "overnight.  --Order PT OT evaluation since patient lives alone and is high risk for fall.    Underweight  --BMI<19    Barriers to discharge: Gait instability, malaise, sodium level.    Anticipated discharge date: 7/10    Discussed with patient and daughter at bedside.      Subjective  Patient new to me.  Chart reviewed.  Daughter at bedside.  Patient is unstable on her feet as per nursing.  Did have tachycardia on ambulation yesterday.  Has not walked today.  Ordered PT OT evaluation.  Patient does not feel safe to go home.  She feels easily but unable to describe her symptoms.  Denies any chest pain nausea vomiting diarrhea.  Complains of increased urination.  She denies any burning or pain during urination.  Discussed reasons for holding of urinalysis given absence of fever or chills confusion and sepsis.  Daughter is agreeable to the plan.  Restarted Lasix that was held at admission.  Sodium level is improved.  VARGAS globin is stable.  Denies any black stools.    Objective    /84 (Patient Position: Lying)   Pulse 92   Temp 98.2  F (36.8  C) (Oral)   Resp 18   Ht 5' 4\" (1.626 m)   Wt 101 lb 3.2 oz (45.9 kg)   SpO2 94%   BMI 17.37 kg/m    Weight:   101 lb 3.2 oz (45.9 kg)    I/O last 3 completed shifts:  In: 1328 [P.O.:840; I.V.:145; Blood:343]  Out: 250 [Urine:250]  No intake/output data recorded.          Physical Exam  Alert, oriented*3  No pallor, icterus, clubbing, cyanosis  Body mass index is 17.37 kg/m .    No sinus tenderness  Moist membranes  Neck supple  CVS: Pansystolic murmur  Resp: B/L vesicular breath sounds, no wheezing, crackles  Abd: soft, No t/g/r  Neuro: no involuntary movements such as tremors  Vasc: 1+ leg edema     Pertinent Labs   Results from last 7 days   Lab Units 07/09/21  0652 07/08/21  0440 07/07/21  1710   LN-SODIUM mmol/L 133* 129* 129*   LN-POTASSIUM mmol/L 3.5 3.7 4.2   LN-CHLORIDE mmol/L 102 99 97*   LN-CO2 mmol/L 20* 23 22   LN-BLOOD UREA NITROGEN mg/dL 14 19 26 "   LN-CREATININE mg/dL 0.71 0.76 0.91   LN-CALCIUM mg/dL 8.6 8.3* 9.2   LN-ALBUMIN g/dL  --   --  3.9   LN-PROTEIN TOTAL g/dL  --   --  6.6   LN-BILIRUBIN TOTAL mg/dL  --   --  0.4   LN-ALKALINE PHOSPHATASE U/L  --   --  80   LN-ALT (SGPT) U/L  --   --  11   LN-AST (SGOT) U/L  --   --  20     Results from last 7 days   Lab Units 07/09/21  0652 07/08/21  1901 07/08/21  1101 07/08/21  0440 07/07/21  1418 07/07/21  1418   LN-WHITE BLOOD CELL COUNT thou/uL 6.9  --   --  6.5  --  6.3   LN-HEMOGLOBIN g/dL 9.2* 9.4* 9.0* 6.8*   < > 5.9*   LN-HEMATOCRIT % 28.0*  --   --  20.9*  --  19.0*   LN-PLATELET COUNT thou/uL 177  --   --  163  --  221    < > = values in this interval not displayed.     Results from last 7 days   Lab Units 07/07/21  1710   LN-INR  1.05             Pertinent Radiology   Radiology Results: Personally reviewed impression/s  No results found.  EKG Results: not reviewed.

## 2021-07-09 NOTE — PLAN OF CARE
Plan of Care by Aby Dow RN at 7/9/2021  1:37 AM     Author: Aby Dow RN Service: -- Author Type: Registered Nurse    Filed: 7/9/2021  7:06 AM Date of Service: 7/9/2021  1:37 AM Status: Signed    : Aby Dow RN (Registered Nurse)         Problem: Pain  Goal: Patient's pain/discomfort is manageable  Outcome: Progressing     Problem: Psychosocial Needs  Goal: Demonstrates ability to cope with hospitalization/illness  Outcome: Progressing     Problem: Anemia  Goal: Hemoglobin stabilized at or near patient's baseline  Outcome: Progressing     Problem: Potential for Falls  Goal: Patient will remain free of falls  Outcome: Progressing     Patient denies pain.  Denies dizziness.  No s/s of bleeding.  SBA with walker.  Vital signs stable.

## 2021-07-09 NOTE — TELEPHONE ENCOUNTER
Telephone Encounter by Kanwal Gonzalez at 7/9/2021  9:07 AM     Author: Kanwal Gonzalez Service: -- Author Type: --    Filed: 7/9/2021  9:07 AM Encounter Date: 7/9/2021 Status: Signed    : Kanwal Gonzalez       Done in [place] side

## 2021-07-09 NOTE — PROGRESS NOTES
Progress Notes by Denise Mehta SW at 7/9/2021 10:26 AM     Author: Denise Mehta SW Service: -- Author Type:     Filed: 7/9/2021  3:35 PM Date of Service: 7/9/2021 10:26 AM Status: Addendum    : Denise Mehta SW ()    Related Notes: Original Note by Denise Mehta SW () filed at 7/9/2021  3:29 PM       Care Management Follow Up Note    Length of Stay (days) 2     Patient plan of care discussed at Interdisciplinary Rounds: yes          Expected Discharge Date: 07/10/21  Concerns to be Addressed / Barriers to Discharge:  GI signed off, pt does not want a EGD, PT today    Anticipated Discharge Disposition:   Home w/ HC    Anticipated Discharge Services:    Home RN/PT (company TBD) and family will have 24 hr care from home instead for one week after discharge.    Anticipated Discharge DME:      Plan:  Per notes, Pt is from home alone in a [facility], where she mostly lives on one level. She has overnight support: weekdays from [facility] 11PM - 11AM, weekends from her daughters. Housekeeping and meals are provided by family. Dtr Edilia is primary contact if needed. Goal to return home with family transport. CM following.    ANGELCM met with pt and daughter Edilia. Spoke at length regarding home care, palliative care, and hospice options. Multiple unknown questions about how progression of medical needs will go once home.  Pt has had a few different agencies in the home previously.  Advised SW, RN, PT in home. Pt interested in PT and RN (possible SW for ongoing coordination, resources, and education). Home care list printed and given to dtr/pt. CM to f/u with pt/dtr tomorrow for home care agency decision.    Pt chart review, in March, pt was referred to [facility] due to high volume at [facility].    ANGEL Landon

## 2021-07-09 NOTE — PROGRESS NOTES
Progress Notes by Kaitlynn Angel PT at 7/9/2021  2:36 PM     Author: Kaitlynn Angel PT Service: -- Author Type: Physical Therapist    Filed: 7/9/2021  2:37 PM Date of Service: 7/9/2021  2:36 PM Status: Signed    : Kaitlynn Angel PT (Physical Therapist)       Physical Therapy         07/09/21 8640   Visit Specifics   Eval Type Initial eval   Inital PT Consult 07/09/21   Bed/Tabs/Pad Alarm Applied Yes   Subjective Patient Comments agreeable, cold. daughter states patient is doing better this afternoon after sitting up for lunch than earlier this morning   General   Chart Reviewed Yes   PT/OT Patient/Caregiver Stated Goals daughter states goal is return home with 24 hour assist in place for at least the first week   Family/Caregiver Present Yes  (daughter)   Treatment Time   Gait Training 15   Theraputic Activity 10   Home Living   Type of Home Town House   Home Layout Able to live on main level with bedroom/bathroom;Stairs to enter with rails  (always has someone there if she leaves)   ADL Devices   (hospital bed and lift recliner- has been sleeping in recline)   Bathroom Toilet Raised   Bathroom Equipment Grab bars around toilet   Mobility Equipment Walker-front wheeled;Cane   Additional Comments mostly uses FWW    Prior Status   Independent With All ADL's   Needs Assistance With Medication set up;Driving;Bathing;Laundry;Shopping;Cleaning  (has never driven, daughter helps meds/sponge bathes)   Lives With Alone   Receives Help From   (home care- 10PM-11AM Sunday-Friday, family comes Fri/sat)   Device Used Yes   Comments every night kids bring dinner and spend time there   Cognition   Overall Cognitive Status WFL   RLE Assessment   RLE Assessment WFL  (MMT 5/5)   LLE Assessment   LLE Assessment WFL  (MMT 5/5)   Sensory   Sensory Impairment Light touch   Light Touch No apparent deficits   Additional Comments quick screen BLE   Skin Integrity   Edema Generalized swelling  (BLE- in analilia hose here, has  socks and velcro wraps at home)   Transfer    Sit To Stand Stand by assistance   Stand To Sit Stand by assistance    Additional Transfer Toilet   Toilet Stand by assistance   Transfer Comments good safe hand placement   Ambulation    Distance (ft)  10, 80  (to bathroom and then into hallway for walk)   Assistance CGA   Assistive Device Rolling walker   Verbal Cues Posture   Quality of Gait/Comment no LOB   Pattern Decreased pace;Flexed posture   Neuro Re-Ed   Neuro Re-Ed Static Standing Balance   Static Standing Balance   Assistive Device Walker;Other (Comment)  (standing at sink to wash hands in bathroom)   Level of assistance SBA   Duration 1 min   Patient Response tolerated well, no LOB   Fall Risk   Fall Risk Low  (with use of FWW)   Plan   Treatment/Interventions Functional transfer training;Gait/stair training;Strengthening/ROM   PT Frequency Daily   Assessment   Prognosis Fair   Problem List Decreased endurance   Barriers to Discharge Comments lives alone in a town house, but has home care in place and very supportive family   Recommendation   PT Discharge Recommendation Home with assist;Home PT  (per daughter- for first week will have 24 hour assist)   PT Equipment Recommendation Rolling walker / FWW   Treatment Suggestions for Next Session ambulate, activity tolerance   PT Care Plan REVIEWED DAILY Yes, goals remain appropriate

## 2021-07-09 NOTE — TELEPHONE ENCOUNTER
Telephone Encounter by Irena Goode CMA at 7/9/2021  7:49 AM     Author: Irena Goode CMA Service: -- Author Type: Certified Medical Assistant    Filed: 7/9/2021  7:49 AM Encounter Date: 7/9/2021 Status: Signed    : Irena Goode CMA (Certified Medical Assistant)       Please make sure that this pt always has a 40 minute appointment or 60 in the new system. Thanks.

## 2021-07-10 VITALS — BODY MASS INDEX: 15.88 KG/M2 | WEIGHT: 93 LBS | HEIGHT: 64 IN

## 2021-07-10 LAB
ANION GAP SERPL CALCULATED.3IONS-SCNC: 6 MMOL/L (ref 5–18)
BUN SERPL-MCNC: 12 MG/DL (ref 8–28)
CALCIUM SERPL-MCNC: 8.7 MG/DL (ref 8.5–10.5)
CHLORIDE BLD-SCNC: 103 MMOL/L (ref 98–107)
CO2 SERPL-SCNC: 24 MMOL/L (ref 22–31)
CREAT SERPL-MCNC: 0.71 MG/DL (ref 0.6–1.1)
GFR SERPL CREATININE-BSD FRML MDRD: >60 ML/MIN/1.73M2
GLUCOSE BLD-MCNC: 88 MG/DL (ref 70–125)
HGB BLD-MCNC: 9 G/DL (ref 12–16)
POTASSIUM BLD-SCNC: 3.4 MMOL/L (ref 3.5–5)
SODIUM SERPL-SCNC: 133 MMOL/L (ref 136–145)

## 2021-07-10 RX ORDER — FERROUS SULFATE 325(65) MG
1 TABLET ORAL
Qty: 30 TABLET | Refills: 1 | Status: SHIPPED | OUTPATIENT
Start: 2021-07-10 | End: 2021-08-18

## 2021-07-10 RX ORDER — POTASSIUM CHLORIDE 750 MG/1
10 TABLET, EXTENDED RELEASE ORAL DAILY
Qty: 30 TABLET | Refills: 0 | Status: SHIPPED | OUTPATIENT
Start: 2021-07-10 | End: 2021-08-18

## 2021-07-10 ASSESSMENT — MIFFLIN-ST. JEOR: SCORE: 791.85

## 2021-07-10 NOTE — PLAN OF CARE
Plan of Care by Sandeep Ignacio RN at 7/9/2021  9:43 PM     Author: Sandeep Ignacio RN Service: -- Author Type: Registered Nurse    Filed: 7/9/2021  9:44 PM Date of Service: 7/9/2021  9:43 PM Status: Signed    : Sandeep Ignacio RN (Registered Nurse)         Problem: Discharge Barriers  Goal: Patient's discharge needs are met  Outcome: Progressing     No pain. SBA with FWW for transfers. Pt feeling stronger. Using purewick NOC. Anticipate discharge home with home care tomorrow.

## 2021-07-10 NOTE — PLAN OF CARE
Plan of Care by Nataliya Alarcon RN at 7/10/2021  6:30 AM     Author: Nataliya Alarcon RN Service: -- Author Type: Registered Nurse    Filed: 7/10/2021  7:15 AM Date of Service: 7/10/2021  6:30 AM Status: Signed    : Nataliya Alarcon RN (Registered Nurse)         Problem: Pain  Goal: Patient's pain/discomfort is manageable  Outcome: Progressing   Pt denies pain overnight.    Problem: Potential for Falls  Goal: Patient will remain free of falls  Outcome: Progressing   Call light within reach, uses appropriately. Bed alarm on, 2 siderails up, assist of 1 and walker when out of bed, PureWick in place overnight.

## 2021-07-10 NOTE — DISCHARGE SUMMARY
Discharge Summary by Maxim Kim MD at 7/10/2021 10:06 AM     Author: Maxim Kim MD Service: Hospitalist Author Type: Physician    Filed: 7/10/2021 10:22 AM Date of Service: 7/10/2021 10:06 AM Status: Addendum    : Maxim Kim MD (Physician)    Related Notes: Original Note by Maxim Kim MD (Physician) filed at 7/10/2021 10:20 AM         [facility] MEDICINE  DISCHARGE SUMMARY     Primary Care Physician: Tiesha Barba MD  Admission Date: 7/7/2021   Discharge Provider: Maxim Kim Discharge Date: 7/10/2021   Diet: regular diet   Code Status: No CPR- Do NOT Intubate   Activity: activity as tolerated        Condition at Discharge: Stable      REASON FOR PRESENTATION(See Admission Note for Details)   Fatigue and low hemoglobin    PRINCIPAL & ACTIVE DISCHARGE DIAGNOSES   Symptomatic anemia  Hyponatremia   hypokalemia  GERD  Essential hypertension  Hypothyroidism  Malaise  Underweight  Increased urination      SIGNIFICANT FINDINGS (Imaging, labs):   No results found.      PENDING LABS     Pending Labs     Order Current Status    POCT occult blood stool In process          PROCEDURES ( this hospitalization only)      * No surgery found *    RECOMMENDATIONS TO OUTPATIENT PROVIDER FOR F/U VISIT     Follow-up with PCP in 1 week    DISPOSITION     Home with home care    SUMMARY OF HOSPITAL COURSE:      89-year-old female with history of GERD, moderate mitral valve regurgitation admitted to the hospital with symptomatic anemia     Symptomatic anemia  ---  Improving after transfusion of 1 unit of packed RBCs on admission.    Hemoglobin is stable.  ---  GI consulted and patient is not interested in pursuing endoscopies.  Discussed comfort care if hemoglobin continues to drop  --Currently on IV Venofer and p.o. iron and ascorbic acid for increased absorption.  Discharged on oral iron with ascorbic acid  --Change IV to p.o. PPI.  Continue  --- Hold baby  aspirin     Hyponatremia  --Stabilized at 133 with p.o. Lasix  ---Volume status somewhat hard to elicit but she does not appear volume overloaded at all.  ---Really at risk for high-output heart failure given her severe anemia  ----Elevated BNP  --- Hold off on IV fluids given her profound anemia  ---  Urine sodium of 103, blood osmolality of 264 urine osmolality of 295.  Likely due to SIADH or fluid overload    Hypokalemia  --Ordered 40 mEq of potassium chloride before discharge  --Ordered 10 mEq of potassium daily with Lasix.     GERD  --- Previously with famotidine  ---concern for GI bleed  --Initially treated with IV PPI and now switched to p.o. PPI once daily as per GI recommendation     Essential hypertension  ---  Suboptimal control.  Lasix was held at admission.  --- Continue home Toprol and lisinopril with hold parameters  ---  Improving with Lasix.  Recent guideline support tolerating blood pressure less than 200 in patients above 90 years of age.     Hypothyroidism--home Synthroid     Increased urination  --Likely due to transfusion of packed RBCs  --Patient denies any dysuria or altered mental status  --We will hold off on UA given absence of symptoms.     Malaise  --Of unclear etiology but improving  --Order home PT    Underweight  --BMI<19       Discharge Medications with Med changes:        Medication List      START taking these medications    ascorbic acid (vitamin C) 250 MG tablet  Quantity: 30 tablet  Dose: 250 mg  Start taking on: July 11, 2021  Commonly known as: VITAMIN C  250 mg, Oral, DAILY     ferrous sulfate 325 (65 FE) MG tablet  Quantity: 30 tablet  Dose: 1 tablet  325 mg, Oral, Daily with breakfast     omeprazole 20 MG capsule  Quantity: 30 capsule  Dose: 20 mg  Commonly known as: PriLOSEC  20 mg, Oral, Daily before breakfast     potassium chloride 10 MEQ tablet  Quantity: 30 tablet  Dose: 10 mEq  Commonly known as: K-DUR,KLOR-CON  10 mEq, Oral, DAILY, To be taken with p.o. Lasix         CHANGE how you take these medications    docusate sodium 100 MG capsule  Quantity: 90 capsule  Dose: 100 mg  Commonly known as: Colace  100 mg, Oral, DAILY  What changed: when to take this     metoprolol succinate 25 MG  Quantity: 90 tablet  Commonly known as: TOPROL-XL  TAKE 1 TABLET(25 MG) BY MOUTH DAILY  What changed: when to take this        CONTINUE taking these medications    acetaminophen 500 MG tablet  Dose: 1,000 mg  Commonly known as: TYLENOL  1,000 mg, Oral, 2 times daily     artificial tears(hypromellose) 0.3 % Gel  Dose: 1 drop  Commonly known as: GENTEAL; SYSTANE  1 drop, Both Eyes, As needed     calcium (as carbonate) 500 mg calcium (1,250 mg) tablet  Dose: 1 tablet  Commonly known as: OS-SAMPSON  1 tablet, Oral, DAILY     cholecalciferol (vitamin D3) 1,000 unit (25 mcg) tablet  Dose: 2,000 Units  2,000 Units, Oral, DAILY     famotidine 20 MG tablet  Quantity: 60 tablet  Dose: 20 mg  Commonly known as: PEPCID  20 mg, Oral, 2 times daily PRN     furosemide 20 MG tablet  Quantity: 90 tablet  Dose: 20 mg  Commonly known as: Lasix  20 mg, Oral, DAILY, Take 1 daily.  May increase to 2 daily with increased swelling as needed.     levothyroxine 50 MCG tablet  Quantity: 90 tablet  Dose: 50 mcg  Commonly known as: Synthroid  50 mcg, Oral, QDaily     lisinopriL 20 MG tablet  Quantity: 90 tablet  Dose: 20 mg  Commonly known as: PRINIVIL,ZESTRIL  20 mg, Oral, DAILY     magnesium chloride 64 mg Tbec delayed-release tablet  Dose: 64 mg  Commonly known as: SLOW-MAG  64 mg, Oral, DAILY     melatonin 3 mg Tab tablet  Dose: 3-6 mg  3-6 mg, Oral, Bedtime PRN     polyethylene glycol 17 gram/dose powder  Quantity: 235 g  Dose: 17 g  Commonly known as: GLYCOLAX  17 g, Oral, Daily PRN        STOP taking these medications    aspirin 81 mg chewable tablet                Rationale for medication changes:      Potassium chloride for hypokalemia  Omeprazole for GERD and presumed GI bleed  Iron supplementation for iron deficiency  anemia  Aspirin has been discontinued due to presumed GI bleeding.      Consults   GI      Immunizations given this encounter            Anticoagulation Information      Recent INR results:   Results from last 7 days   Lab Units 07/07/21  1710   LN-INR  1.05     Warfarin doses (if applicable) or name of other anticoagulant: Not applicable      Discharge Orders     Discharge Orders   HM2 (CBC W/O DIFF)   Standing Status: Future Standing Exp. Date: 09/10/21   Order Comments: In 1 week     Basic Metabolic Panel   Standing Status: Future Standing Exp. Date: 09/10/21   Order Comments: In 1 week     Referral to [facility]   Referral Priority: Routine Referral Type: Home Health Care   Referral Reason: Evaluation and Treatment   Requested Specialty: Home Health Services   Number of Visits Requested: 1     Activity as tolerated   Order Comments: Rest when tired     Discharge diet - Regular     Call MD:  if symptoms get worse     Discharge Follow Up - [facility]     Order Specific Question Answer Comments   Follow-up in: Within 7 days    Schedule [facility] or hospitals follow-up appointment Follow-up appointment with Primary Care Clinic to be scheduled before leaving the hospital      Examination     Vital Signs in last 24 hours:   Temp:  [97.7  F (36.5  C)-98.9  F (37.2  C)] 97.7  F (36.5  C)  Heart Rate:  [81-88] 88  Resp:  [16-18] 16  BP: (138-186)/(63-81) 186/81  SpO2:  [94 %-98 %] 94 %  General appearance: alert, appears stated age and cooperative   Does not appear to be in distress    Please see EMR for more detailed significant labs, imaging, consultant notes etc.  Total time spent on discharge: 35 minutes    Maxim Kim MD   [facility] Center[facility] Service: Ph:[phone]    CC:Tiesha Barba MD

## 2021-07-10 NOTE — PROGRESS NOTES
Progress Notes by Yazmin Arredondo RN at 7/10/2021  3:48 PM     Author: Yazmin Arredondo RN Service: -- Author Type: RN, Care Manager    Filed: 7/10/2021  8:57 PM Date of Service: 7/10/2021  3:48 PM Status: Addendum    : Yazmin Arredondo RN (RN, Care Manager)    Related Notes: Original Note by Yazmin Arredondo RN (RN, Care Manager) filed at 7/10/2021  3:55 PM       Care Management Discharge Note    Discharge Planning:  Expected Discharge Date:  07/10/2021  Concerns to be Addressed / Barriers to Discharge:  None      Anticipated Discharge Disposition:  Home Care - Other([facility])     Anticipated Discharge Services:  Home Care - PT, Social Work    Anticipated Discharge DME:        Patient/family educated on Medicare website which has current facility and service quality ratings:      Referrals Placed by CM/SW:         Selected Continued Care - Discharged on 7/10/2021 Admission date: 7/7/2021 - Discharge disposition: Home Care - Other    Home Medical Care Coordination complete    Service Provider Selected Services Address Phone Fax Patient Preferred Last Updated    [facility] [address] [place] [address] [address] [id number] [phone] -- Yazmin Arredondo RN 7/10/2021 6053                Education Provided on the Discharge Plan:   Pt/Rep Involved in D/C Plan: Yes    Patient/Family in Agreement with the Plan: Yes   Disposition Comments: Children, Family members, Home care staff  Assessed.  Patient lives in a town house alone, has overnight assistance.  Supportive family.  PT recommendation is Home with assist, Home PT.  Discharge/Return to Home today with [facility] for PT and Social Work.  Family transport. Pt/Rep Involved in D/C Plan: Yes      Yazmin Arredondo RN

## 2021-07-11 PROBLEM — D64.9 SYMPTOMATIC ANEMIA: Status: ACTIVE | Noted: 2021-07-07

## 2021-07-11 PROBLEM — E87.1 HYPONATREMIA: Status: ACTIVE | Noted: 2021-07-08

## 2021-07-11 LAB
BLD PROD TYP BPU: NORMAL
BLOOD TYPE: 5100
CODING SYSTEM: NORMAL
COMPONENT (HISTORICAL CONVERSION): NORMAL
CROSSMATCH: NORMAL
STATUS (HISTORICAL CONVERSION): NORMAL
UNIT ABO/RH (HISTORICAL CONVERSION): NORMAL
UNIT NUMBER: NORMAL

## 2021-07-12 ENCOUNTER — TELEPHONE (OUTPATIENT)
Dept: INTERNAL MEDICINE | Facility: CLINIC | Age: 86
End: 2021-07-12

## 2021-07-12 ENCOUNTER — PATIENT OUTREACH (OUTPATIENT)
Dept: CARE COORDINATION | Facility: CLINIC | Age: 86
End: 2021-07-12

## 2021-07-12 DIAGNOSIS — Z71.89 OTHER SPECIFIED COUNSELING: ICD-10-CM

## 2021-07-12 NOTE — TELEPHONE ENCOUNTER
Reason for Call:  Home Health Care    Julisa with [facility] called regarding (reason for call): verbal    Orders are needed for this patient. PT    PT:  2x week for 5 weeks, 1x week for 3 weeks   Strengthening, gait, balance, transfer and safety    OT: na    Skilled Nursing: na    Pt Provider: Dr Barba    Phone Number Homecare Nurse can be reached at: [phone]    Can we leave a detailed message on this number? YES      Call taken on 7/12/2021 at 3:16 PM by Laura L Goldberg, ARRT

## 2021-07-12 NOTE — PROGRESS NOTES
Clinic Care Coordination Contact  Community Health Worker Initial Outreach            Patient accepts CC: No Daughter stated Mother has lots of help and resources at this time. CTA will close referral at this time.

## 2021-07-13 ENCOUNTER — COMMUNICATION - HEALTHEAST (OUTPATIENT)
Dept: INTERNAL MEDICINE | Facility: CLINIC | Age: 86
End: 2021-07-13

## 2021-07-13 NOTE — TELEPHONE ENCOUNTER
Telephone Encounter by Tong Arora at 7/13/2021 11:28 AM     Author: Tong Arora Service: -- Author Type: --    Filed: 7/13/2021 11:29 AM Encounter Date: 7/13/2021 Status: Signed    : Tong Arora       Received MTM referral from Hospital Discharge Transitions of Care    Patient 's daughter Edilia guzman.  will route to MTM Pharmacist/Provider as an FYI.     Thank you for the Tong muhammad MTM coordinator

## 2021-07-14 PROBLEM — I21.4 NSTEMI (NON-ST ELEVATED MYOCARDIAL INFARCTION) (H): Status: RESOLVED | Noted: 2019-11-21 | Resolved: 2020-01-07

## 2021-07-14 PROBLEM — I50.30 DIASTOLIC CONGESTIVE HEART FAILURE (H): Chronic | Status: RESOLVED | Noted: 2018-02-21 | Resolved: 2019-09-06

## 2021-07-21 ENCOUNTER — OFFICE VISIT (OUTPATIENT)
Dept: INTERNAL MEDICINE | Facility: CLINIC | Age: 86
End: 2021-07-21
Payer: MEDICARE

## 2021-07-21 ENCOUNTER — MEDICAL CORRESPONDENCE (OUTPATIENT)
Dept: HEALTH INFORMATION MANAGEMENT | Facility: CLINIC | Age: 86
End: 2021-07-21

## 2021-07-21 VITALS
HEIGHT: 64 IN | OXYGEN SATURATION: 99 % | HEART RATE: 76 BPM | DIASTOLIC BLOOD PRESSURE: 80 MMHG | BODY MASS INDEX: 16.56 KG/M2 | WEIGHT: 97 LBS | SYSTOLIC BLOOD PRESSURE: 130 MMHG

## 2021-07-21 DIAGNOSIS — D50.0 IRON DEFICIENCY ANEMIA DUE TO CHRONIC BLOOD LOSS: ICD-10-CM

## 2021-07-21 DIAGNOSIS — D64.9 SYMPTOMATIC ANEMIA: ICD-10-CM

## 2021-07-21 DIAGNOSIS — E87.6 HYPOKALEMIA: Primary | ICD-10-CM

## 2021-07-21 DIAGNOSIS — E87.1 HYPONATREMIA: ICD-10-CM

## 2021-07-21 LAB
ANION GAP SERPL CALCULATED.3IONS-SCNC: 10 MMOL/L (ref 5–18)
BUN SERPL-MCNC: 29 MG/DL (ref 8–28)
CALCIUM SERPL-MCNC: 9.5 MG/DL (ref 8.5–10.5)
CHLORIDE BLD-SCNC: 96 MMOL/L (ref 98–107)
CO2 SERPL-SCNC: 22 MMOL/L (ref 22–31)
CREAT SERPL-MCNC: 1.01 MG/DL (ref 0.6–1.1)
ERYTHROCYTE [DISTWIDTH] IN BLOOD BY AUTOMATED COUNT: 17 % (ref 10–15)
GFR SERPL CREATININE-BSD FRML MDRD: 47 ML/MIN/1.73M2
GLUCOSE BLD-MCNC: 90 MG/DL (ref 70–125)
HCT VFR BLD AUTO: 30.6 % (ref 35–47)
HGB BLD-MCNC: 9.8 G/DL (ref 11.7–15.7)
MCH RBC QN AUTO: 29.9 PG (ref 26.5–33)
MCHC RBC AUTO-ENTMCNC: 32 G/DL (ref 31.5–36.5)
MCV RBC AUTO: 93 FL (ref 78–100)
PLATELET # BLD AUTO: 199 10E3/UL (ref 150–450)
POTASSIUM BLD-SCNC: 5 MMOL/L (ref 3.5–5)
RBC # BLD AUTO: 3.28 10E6/UL (ref 3.8–5.2)
SODIUM SERPL-SCNC: 128 MMOL/L (ref 136–145)
WBC # BLD AUTO: 5.5 10E3/UL (ref 4–11)

## 2021-07-21 PROCEDURE — 36415 COLL VENOUS BLD VENIPUNCTURE: CPT | Performed by: NURSE PRACTITIONER

## 2021-07-21 PROCEDURE — 80048 BASIC METABOLIC PNL TOTAL CA: CPT | Performed by: NURSE PRACTITIONER

## 2021-07-21 PROCEDURE — 99214 OFFICE O/P EST MOD 30 MIN: CPT | Performed by: NURSE PRACTITIONER

## 2021-07-21 PROCEDURE — 85027 COMPLETE CBC AUTOMATED: CPT | Performed by: NURSE PRACTITIONER

## 2021-07-21 ASSESSMENT — MIFFLIN-ST. JEOR: SCORE: 809.99

## 2021-07-21 NOTE — PROGRESS NOTES
Clinic Note    Assessment:     Assessment and Plan:  1. Symptomatic anemia/Iron deficiency anemia due to chronic blood loss: Hemoglobin on discharge was 9.0. She received one unit of packed red blood cells, IV Zenofer. Sent home on oral ferrous sulfate with Vitamin C for absorption help. Will recheck a CBC today. Fatigue continues, but is improving. Physical therapy is coming out to the house to help with strengthening. She is a DNR, does not want an endoscopy or colonoscopy. Will need a goals of care discussion going forward for treatment of anemia.   - Follow up with Dr. Barba in one month.   - CBC with platelets    2. Hyponatremia: Sodium on discharge was 133. Will recheck today.   - Basic metabolic panel    3. Hypokalemia: Potassium on discharge was 3.4. Potassium supplementation was added to furosemide regime. Will recheck levels today.        Patient Instructions   Your labs are processing at this time. We will update you with results once they are back.    You will need to follow up with Dr. Barba for a plan going forward for your anemia.    ER if symptoms worsen (shortness of breath, bloody stools, weakness).     Return in about 1 month (around 8/21/2021) for Follow up.         Subjective:      Kristal Cox is a 89 year old female presents today with her Daughter for post hospital discharge follow up.    She is a patient of Dr. Barba.    She was inpatient at [facility] from 07/07/21-07/10/21 for anemia, fatigue, hyponatremia, and hypokalemia.     She received one unit of packed red blood cells, IV Zenofer, and PO Iron, with Vitamin C. Hemoglobin on discharge was 9.0; sodium was 133, potassium was 3.4.    She was started on oral potassium supplementation with her furosemide on discharge. Swelling in her legs has gotten much better.     She reports still having fatigue at home, but is overall feeling somewhat better.    They opted not to pursue a workup to determine where the bleeding is  "coming from.    She continues on daily ferrous sulfate with Vitamin C, and a Omeprazole. She stopped her calcium supplement as she gets full from all the medications she is taking.    She has physical therapy coming out to the house to help with strengthening and mobility.    She is eating and drinking normally at home.    She denies new concerns today.     The following portions of the patient's history were reviewed and updated as appropriate.    Review of Systems:    Review is otherwise negative except for what is mentioned above.     Social Hx:    History   Smoking Status     Never Smoker   Smokeless Tobacco     Never Used         Objective:     Vitals:    07/21/21 1536   BP: 130/80   BP Location: Right arm   Patient Position: Sitting   Pulse: 76   SpO2: 99%   Weight: 44 kg (97 lb)   Height: 1.626 m (5' 4\")       Exam:  General: No apparent distress. Calm. Alert and Oriented. Pt behavior is appropriate.  Head:Atraumatic. Normocephalic.  Lungs: Clear to auscultation, normal respiratory effort and rate.   Heart/Pulses: Regular rate and rhythm, no murmurs, gallops, or rubs. Capillary refill <2 seconds. 1+ edema bilateral lower extremities.    Musculoskeletal: Uses a walker to walk, seated in a chair today.   Neurologic: Interactive, alert, no focal findings.  Skin: Warm, dry.    Patient Active Problem List   Diagnosis     Vertebral compression fracture (H)     GERD (gastroesophageal reflux disease)     Osteoporosis     Benign essential hypertension     Hyperlipidemia LDL goal <100     PVC's (premature ventricular contractions)     Acquired hypothyroidism     Essential hypertension, benign     Symptomatic anemia     Hyponatremia     Hypokalemia     Current Outpatient Medications   Medication Sig Dispense Refill     acetaminophen (TYLENOL) 500 MG tablet [ACETAMINOPHEN (TYLENOL) 500 MG TABLET] Take 2 tablets (1,000 mg total) by mouth 2 (two) times a day.  0     artificial tears,hypromellose, (GENTEAL; SYSTANE) 0.3 % " Gel [ARTIFICIAL TEARS,HYPROMELLOSE, (GENTEAL; SYSTANE) 0.3 % GEL] Administer 1 drop to both eyes as needed (dry eyes).       ascorbic acid, vitamin C, (VITAMIN C) 250 MG tablet [ASCORBIC ACID, VITAMIN C, (VITAMIN C) 250 MG TABLET] Take 1 tablet (250 mg total) by mouth daily. 30 tablet 0     cholecalciferol, vitamin D3, 1,000 unit tablet [CHOLECALCIFEROL, VITAMIN D3, 1,000 UNIT TABLET] Take 2,000 Units by mouth daily.       docusate sodium (COLACE) 100 MG capsule [DOCUSATE SODIUM (COLACE) 100 MG CAPSULE] Take 1 capsule (100 mg total) by mouth daily. 90 capsule 1     ferrous sulfate 325 (65 FE) MG tablet [FERROUS SULFATE 325 (65 FE) MG TABLET] Take 1 tablet (325 mg total) by mouth daily with breakfast. 30 tablet 1     furosemide (LASIX) 20 MG tablet [FUROSEMIDE (LASIX) 20 MG TABLET] Take 1 tablet (20 mg total) by mouth daily. Take 1 daily.  May increase to 2 daily with increased swelling as needed. 90 tablet 3     levothyroxine (SYNTHROID) 50 MCG tablet [LEVOTHYROXINE (SYNTHROID) 50 MCG TABLET] Take 1 tablet (50 mcg total) by mouth Daily at 6:00 am. 90 tablet 3     lisinopriL (PRINIVIL,ZESTRIL) 20 MG tablet [LISINOPRIL (PRINIVIL,ZESTRIL) 20 MG TABLET] Take 1 tablet (20 mg total) by mouth daily. 90 tablet 3     magnesium chloride (SLOW-MAG) 64 mg TbEC delayed-release tablet [MAGNESIUM CHLORIDE (SLOW-MAG) 64 MG TBEC DELAYED-RELEASE TABLET] Take 1 tablet (64 mg total) by mouth daily.  0     magnesium chloride 535 (64 Mg) MG TBEC CR tablet Take 64 mg by mouth       melatonin 3 mg Tab tablet [MELATONIN 3 MG TAB TABLET] Take 1-2 tablets (3-6 mg total) by mouth at bedtime as needed.       metoprolol succinate (TOPROL-XL) 25 MG [METOPROLOL SUCCINATE (TOPROL-XL) 25 MG] TAKE 1 TABLET(25 MG) BY MOUTH DAILY 90 tablet 1     omeprazole (PRILOSEC) 20 MG capsule [OMEPRAZOLE (PRILOSEC) 20 MG CAPSULE] Take 1 capsule (20 mg total) by mouth daily before breakfast. 30 capsule 1     polyethylene glycol (GLYCOLAX) 17 gram/dose powder  [POLYETHYLENE GLYCOL (GLYCOLAX) 17 GRAM/DOSE POWDER] Take 17 g by mouth daily as needed. 235 g 11     potassium chloride (K-DUR,KLOR-CON) 10 MEQ tablet [POTASSIUM CHLORIDE (K-DUR,KLOR-CON) 10 MEQ TABLET] Take 1 tablet (10 mEq total) by mouth daily. To be taken with p.o. Lasix 30 tablet 0     Keyla Walsh, Adult-Geriatric Nurse Practitioner  [facility] - Internal Medicine Team     7/21/2021

## 2021-07-21 NOTE — PATIENT INSTRUCTIONS
Your labs are processing at this time. We will update you with results once they are back.    You will need to follow up with Dr. Barba for a plan going forward for your anemia.    ER if symptoms worsen (shortness of breath, bloody stools, weakness).

## 2021-07-22 NOTE — PROGRESS NOTES
Assessment & Plan     Kristal was seen today for establish care.    Diagnoses and all orders for this visit:    Benign essential hypertension, patient is on lisinopril 20 mg daily.  Blood pressure today 120/60.  She is 99 pounds.  She is 4 feet 11 inches.  She is also on metoprolol 25 mg daily.  -     Basic Metabolic Panel    Hyperlipidemia LDL goal <100, patient is no longer on any lipid-lowering medications.  Last LDL was 82, July 23, 2020.    Acquired hypothyroidism, noted at cardiology visit, October 2020, patient had not started Synthroid.  She is on levothyroxine 50 mcg daily.  Patient also on amiodarone in the past.  -     Thyroid Stimulating Hormone (TSH)    Compression fracture of thoracic vertebra, unspecified thoracic vertebral level, sequela. CT scan abdomen pelvis March 19, 2020 showed a moderate L1 compression deformity, new since November 21, 2019.  She is on calcium, she is on vitamin D 2000 units daily.  Review of the chart, I do not see any previous compression fractures, with admissions.  However x-ray from April 6, 2021 showed several thoracic vertebral body compression fractures.      Xray 4/6/2021  Healed right proximal humeral fracture. Diffuse osteopenia. No rib fracture seen. There are several thoracic vertebral body compression fractures. If this is a clinical concern, recommend AP and lateral thoracic spine which can be compared to the study performed February 2020.     Patient was complaining of pain in the rib area, when she was seen by Dr. Schmitz, April 6, 2021.  She complains of this today, tenderness involving her right chest wall.  She had knocked herself caught against a doorknob, leading to pain on the right side.     Patient also suffered a left talus fracture, in 2019, November.    Slow transit constipation, discussed this today, and gave her the instructions below.  -     docusate sodium (COLACE) 100 MG capsule; ask her to take this daily.  -     polyethylene glycol (GLYCOLAX)  17 gram/dose powder; Take 17 g by mouth daily as needed.    Edema, unspecified type, furosemide (LASIX) 20 MG tablet; Take 1 tablet (20 mg total) by mouth daily. Take 1 daily.  May increase to 2 daily with increased swelling as needed.  Patient has been on this dosing since March 2020.  -     Cancel: US Abdomen Complete; Future  -     US Abdomen Duplex Limited; Future  -     US Abdomen Limited; Future    Lymphedema of both lower extremities: Patient has abdominal swelling, and gross peripheral edema involving her legs. ? ascites. Anemia is worsening. Would also like to review her liver for lesions and or cirrhosis. Previous CT scan abdomen in March 2020 showed pelvic fluid and anasarca, - third spacing.  Echocardiogram, as below, shows normal ejection fraction, November 2019.  Moderate aortic regurgitation.  Moderate degenerative mitral regurgitation.  Mild tricuspid regurgitation.  No pulmonary hypertension.     CT scan of the abdomen pelvis March 19, 2020: Slight increase in free fluid in the pelvis.  Increased body wall anasarca all third spacing.  Trace bilateral pleural effusions, same in size versus a previous scan in November 2019.  No specific notation as to cirrhosis.     Last seen by cardiology October 22, 2020, PVCs, asymptomatic with a 6% burden off amiodarone.  Noted at that time that she was hypothyroid, but the patient had not started Synthroid yet.  Noted also to have any ongoing had resulted in a significant improvement in her PVCs.  -     Ambulatory referral to Home Health, as below.  -     Hepatic Profile  -     Electrophoresis, Protein, Serum  -     Cancel: US Abdomen Complete; Future  -     US Abdomen Duplex Limited; Future  -     US Abdomen Limited; Future    Right-sided chest wall pain, patient has had recent x-ray which did not show any rib fractures.  Discussed using Tylenol, on a regular basis for this, 1000 mg twice a day.  -     acetaminophen (TYLENOL) 500 MG tablet; Take 2 tablets (1,000  mg total) by mouth 2 (two) times a day.    Hyponatremia, sodium has been decreased at 131, April 6, 2021, and on previous blood tests.  Will check serum osmolality, urine osmolality.    Patient is not on any medications which can cause this.        - Basic Metabolic Panel  -     Osmolality  -     Osmolality, Random, Urine  -     Sodium, Random Urine  -     Electrophoresis, Protein, Serum    Other ascites, noted on CT scan as described below.  -     Hepatic Profile    Anemia due to other cause, not classified, hemoglobin has been decreased, as low as 8.6.  Follow-up on this and do labs as below to characterize..  -     HM1(CBC and Differential)  -     Ferritin  -     Iron and Transferrin Iron Binding Capacity  -     Transferrin  -     Vitamin B12  -     Folate, Serum  -     Electrophoresis, Protein, Serum  -     Cancel: US Abdomen Complete; Future  -     US Abdomen Duplex Limited; Future  -     Hemoglobin; Future  -     Cancel: Morphology,Smear Review ([facility])  -     Morphology,Smear Review ([facility]); Future  -     Sedimentation Rate; Future  -     Cancel: Haptoglobin  -     Cancel: Direct Antiglobulin Test  -     Reticulocytes  -     Direct Antiglobulin Test; Future  -     Haptoglobin; Future  -     US Abdomen Limited; Future    Abnormal finding of blood chemistry, unspecified   -     Ferritin  -     Iron and Transferrin Iron Binding Capacity  -     Transferrin  -     Hemoglobin; Future  -     Cancel: Morphology,Smear Review ([facility])  -     Morphology,Smear Review ([facility]); Future  -     Sedimentation Rate; Future  -     Cancel: Haptoglobin  -     Cancel: Direct Antiglobulin Test  -     Reticulocytes    Abdominal swelling  -     Cancel: US Abdomen Complete; Future  -     US Abdomen Duplex Limited; Future  -     US Abdomen Limited; Future    Other cirrhosis of liver (H)   -     US Abdomen Duplex Limited; Future    Patient Instructions:  1. Use Colace (stool softener) one every day to try and help with constipation. If not  "helping enough may increase to one twice a day.   2. I will let you know if you need to increase your lasix for a short time. I need to review your blood results and urine results from today.  3. I discussed using a small dose of something called Citalopram in the morning to help with your anxiety. I will hold off on this for now, due to your sodium being low. I will know more when I see your blood and urine results.       The face-to-face encounter occurred on date: 5/19/2021   Face to Face encounter was with: Tiesha Barba     Please provide brief clinical summary of reason for visit and need for home care. Patient has significant edema of both lower extremities. She needed treatment for this, at home due to her being home bound, around end May, beginning of June last year. Has velcro wraps at home and has been using them, but is wondering if they are worn out and may need new equipment.  Also fell because the velcro wraps got stuck together. She has worsened leg edema, for the last three weeks, and she has gained 5 pounds since 4/6.2021.       Please identify which of the following home health disciplines the patient will need AND describe the skilled services that you would like the home health agency to perform: PHYSICAL THERAPY: other: Lymphedema treatment.     Homebound Status (describe the functional limitations that support this patient is confined to his/her home. Medicaid recipients are not required to be homebound.):unable to leave home without caregiver, frail, compression fractures in back, leg edema, anemia and fatigue, uses a 2 wheeled walker.     Name of physician who will be responsible for the ongoing home health plan of care (CMS requires the referring physician to provide the specific name of the community physician instead of a title, such as \"PCP\"): Tiesha Barba MD     60 minutes spent on the date of the encounter doing chart review, review of outside records, review of test " "results, patient visit, documentation and discussion with family, daughter is present, and would like us to call with her, with any concerns, at [phone].  Patient gives her permission.  There is a signed release\".  Daughter's name is Ronna.  {   No follow-ups on file.    Tiesha Barba MD  [facility]    Sunita Cox is 89 y.o. and presents today for the following health issues     HPI   Here to establish primary care.     Patient lives in hospital, in a townhouse.  Home instead are involved.  They stay with her from 10 PM to 11 AM, Sunday to Thursday.  The children bring dinner over for her.  Her daughters take alternate weekends.  After her compression fracture, she has to stay on the main level, where there is a bedroom and bathroom and kitchen.  There are bars in the bathroom.  She has a life alert.  The daughter says her medications in a pillbox, a.m. and p.m.    Patient has swelling of both legs, which she says has been present for the last year.  She would like to do lymphedema treatment in the home.  She has compression [facility] and Velcro wraps for over her legs, and of her knees.  However the wraps, loose and have caused her to fall.    Patient was receiving physical therapy until recently, ruptured 3 weeks ago.    Mirtazapine made her groggy.  Has difficulty with sleep.  Using melatonin, 6 mg.  Does not sleep well.  Gets up at 2:00 4:00 and 6 AM.  Says she is on magnesium for her heart.    Patient has kyphosis.  Daughter notes that she sat down really hard on a chair, in February 2021, had therapy at home for this.      Results for NEIDA COX (MRN [id number]) as of 5/19/2021 15:03   Ref. Range 7/13/2020 14:04 3/19/2021 14:43 4/6/2021 14:41   Hemoglobin Latest Ref Range: 12.0 - 16.0 g/dL 12.3 11.1 (L) 10.0 (L)     Results for NEIDA COX (MRN [id number]) as of 5/19/2021 15:03   " Ref. Range 7/13/2020 14:04 7/23/2020 14:28 10/15/2020 14:02 12/28/2020 14:38 3/19/2021 14:43   TSH Latest Ref Range: 0.30 - 5.00 uIU/mL 10.41 (H) 14.13 (H) 10.34 (H) 1.41 0.53       Xray 4/6/2021  Healed right proximal humeral fracture. Diffuse osteopenia. No rib fracture seen.      There are several thoracic vertebral body compression fractures. If this is a clinical concern, recommend AP and lateral thoracic spine which can be compared to the study performed February 2020.    CT Abdomen /Pelvis 3/19/2020  1.  Moderate amount of stool throughout the colon but no findings to suggest bowel obstruction or localized bowel inflammation.  2.  Slight increase in free fluid in the pelvis. Increased body wall anasarca/third spacing.  3.  Trace bilateral pleural effusions are similar in size to November 2019.  4.  Moderate L1 compression deformity is new from 11/21/2019.  5.  Unchanged dystrophic calcifications and peripheral cystic associated with the left ovary.    HEPATOBILIARY: Status post cholecystectomy. Mild enlargement of the common bile duct and central intrahepatic ducts, similar to prior likely physiologic. No choledocholithiasis. Small focus of enhancement in the left hepatic lobe near the anterior capsule is unchanged reflecting a small benign hemangioma. There are no new liver lesions.    TTE November 22, 2019:    Normal left ventricular size and systolic function.The estimated left ventricular ejection fraction is 55%. This represents a normal ejection fraction. The left ventricular wall thickness is normal. Left ventricular diastolic function is abnnormal.    Normal right ventricular size and systolic function.    Left atrial volume is severely increased.    Moderate aortic regurgitation.    Moderate (3+) degenearive mitral regurgitation.    Mild tricuspid valve regurgitation. No pulmonary hypertension present. The estimated systolic pulmonary artery pressure is 35 mmhg.    Estimated central venous pressure  "equal to 8 mmHg.    No previous study for comparison.         Past Medical History:   Diagnosis Date     Alopecia 2019     Ankle fracture, left, closed, initial encounter      Anxiety      Back pain 3/4/2016     Diastolic congestive heart failure (H) 2018     Hyperlipidemia      IBS (irritable bowel syndrome)      Osteoporosis      Other abnormal clinical finding     evidence of old septal scar on EKG     Systolic hypertension      Past Surgical History:   Procedure Laterality Date     CATARACT EXTRACTION, BILATERAL        SECTION      X 4     CHOLECYSTECTOMY       TONSILLECTOMY AND ADENOIDECTOMY       Allergies   Allergen Reactions     Evista [Raloxifene]      Caused blood clot     Fosamax [Alendronate] Nausea And Vomiting     Social History     Tobacco Use     Smoking status: Never Smoker     Smokeless tobacco: Never Used   Substance Use Topics     Alcohol use: No     Comment: rarely drinks wine, and drinks wine watered down with ice.     Drug use: Yes     Family History   Problem Relation Age of Onset     Stroke Mother         passed age 75     Crohn's disease Father         passed in his 20's from bowel obstructions       Review of Systems   Fatigue, right-sided chest wall pain, back pain.  Rest the review systems is negative.        Objective    /60 (Patient Site: Right Arm, Patient Position: Sitting, Cuff Size: Adult Small)   Pulse 72   Temp 98.2  F (36.8  C) (Oral)   Resp 14   Ht 4' 11\" (1.499 m)   Wt (!) 99 lb (44.9 kg)   BMI 20.00 kg/m    Body mass index is 20 kg/m .  Physical Exam   Patient is interactive, alert, able to tell me her story.  She has kyphosis.  She has a tender right chest.  Decreased abduction of her right shoulder.  Significant edema up to her knees, both legs.  Chest is clear, no crackles, no wheezing.  Uses a 2 wheeled walker, has some difficulty with gait.  Proximal weakness.            "

## 2021-07-30 ENCOUNTER — MYC MEDICAL ADVICE (OUTPATIENT)
Dept: INTERNAL MEDICINE | Facility: CLINIC | Age: 86
End: 2021-07-30

## 2021-07-30 DIAGNOSIS — E87.6 HYPOKALEMIA: ICD-10-CM

## 2021-07-30 DIAGNOSIS — D64.9 SYMPTOMATIC ANEMIA: ICD-10-CM

## 2021-07-30 NOTE — TELEPHONE ENCOUNTER
"Refill Request  Medication name: Pending Prescriptions:                       Disp   Refills    potassium chloride ER (KLOR-CON M) 10 MEQ*30 tab*0            Sig: Take 1 tablet (10 mEq) by mouth daily    vitamin C (ASCORBIC ACID) 250 MG TABS tab*30 tab*0            Sig: Take 1 tablet (250 mg) by mouth daily    Who prescribed the medication: ***  Last refill on medication: ***  Requested Pharmacy: {Requested Pharmacy:[id number]}  Last appointment with PCP: ***  Next appointment: {Blank single:21621::\"Not due\",\"Patient due for appointment\",\"Patient will call back to schedule appointment\",\"Left message for patient\",\"Appointment scheduled for ***\"}        "

## 2021-08-06 NOTE — PATIENT INSTRUCTIONS - HE
Patient Instructions by Palak Schmitz MD at 7/13/2020  1:40 PM     Author: Palak Schmitz MD Service: -- Author Type: Physician    Filed: 7/13/2020  1:58 PM Encounter Date: 7/13/2020 Status: Signed    : Palak Schmitz MD (Physician)         Patient Education     Your Health Risk Assessment indicates you feel you are not in good physical health.    A healthy lifestyle helps keep the body fit and the mind alert. It helps protect you from disease, helps you fight disease, and helps prevent chronic disease (disease that doesn't go away) from getting worse. This is important as you get older and begin to notice twinges in muscles and joints and a decline in the strength and stamina you once took for granted. A healthy lifestyle includes good healthcare, good nutrition, weight control, recreation, and regular exercise. Avoid harmful substances and do what you can to keep safe. Another part of a healthy lifestyle is stay mentally active and socially involved.    Good healthcare     Have a wellness visit every year.     If you have new symptoms, let us know right away. Don't wait until the next checkup.     Take medicines exactly as prescribed and keep your medicines in a safe place. Tell us if your medicine causes problems.   Healthy diet and weight control     Eat 3 or 4 small, nutritious, low-fat, high-fiber meals a day. Include a variety of fruits, vegetables, and whole-grain foods.     Make sure you get enough calcium in your diet. Calcium, vitamin D, and exercise help prevent osteoporosis (bone thinning).     If you live alone, try eating with others when you can. That way you get a good meal and have company while you eat it.     Try to keep a healthy weight. If you eat more calories than your body uses for energy, it will be stored as fat and you will gain weight.     Recreation   Recreation is not limited to sports and team events. It includes any activity that provides relaxation, interest,  enjoyment, and exercise. Recreation provides an outlet for physical, mental, and social energy. It can give a sense of worth and achievement. It can help you stay healthy.       Patient Education     Exercise for a Healthier Heart  You may wonder how you can improve the health of your heart. If youre thinking about exercise, youre on the right track. You dont need to become an athlete, but you do need a certain amount of brisk exercise to help strengthen your heart. If you have been diagnosed with a heart condition, your doctor may recommend exercise to help stabilize your condition. To help make exercise a habit, choose safe, fun activities.       Be sure to check with your health care provider before starting an exercise program.    Why exercise?  Exercising regularly offers many healthy rewards. It can help you do all of the following:    Improve your blood cholesterol levels to help prevent further heart trouble    Lower your blood pressure to help prevent a stroke or heart attack    Control diabetes, or reduce your risk of getting this disease    Improve your heart and lung function    Reach and maintain a healthy weight    Make your muscles stronger and more limber so you can stay active    Prevent falls and fractures by slowing the loss of bone mass (osteoporosis)    Manage stress better  Exercise tips  Ease into your routine. Set small goals. Then build on them.  Exercise on most days. Aim for a total of 150 or more minutes of moderate to  vigorous intensity activity each week. Consider 40 minutes, 3 to 4 times a week. For best results, activity should last for 40 minutes on average. It is OK to work up to the 40 minute period over time. Examples of moderate-intensity activity is walking one mile in 15 minutes or 30 to 45 minutes of yard work.  Step up your daily activity level. Along with your exercise program, try being more active throughout the day. Walk instead of drive. Do more household tasks or yard  work.  Choose one or more activities you enjoy. Walking is one of the easiest things you can do. You can also try swimming, riding a bike, or taking an exercise class.  Stop exercising and call your doctor if you:    Have chest pain or feel dizzy or lightheaded    Feel burning, tightness, pressure, or heaviness in your chest, neck, shoulders, back, or arms    Have unusual shortness of breath    Have increased joint or muscle pain    Have palpitations or an irregular heartbeat      3932-1011 [employer]. [address] [place] [id number]. All rights reserved. This information is not intended as a substitute for professional medical care. Always follow your healthcare professional's instructions.         Patient Education   Activities of Daily Living  Your Health Risk Assessment indicates you have difficulties with activities of daily living such as eating, getting dressed, grooming, bathing, walking, or using the toilet. Please make a follow up appointment for us to address this issue in more detail.     Patient Education   Instrumental Activities of Daily Living  Your Health Risk Assessment indicates you have difficulties with instrumental activities of daily living which include laundry, housekeeping, banking, shopping, using the telephone, food preparation, transportation, or taking your own medications. Please make a follow up appointment for us to address this issue in more detail.    [employer] has resources available on the following website: [email or web address].org/[email or web address]     Also, here is a local agency that provides help with meals and other assistance:   [facility] Line: 135.903.7891     Patient Education   Signs of Hearing Loss  Hearing loss is a problem shared by many people. In fact, it is one of the most common health conditions, particularly as people age. Most people over age 65 have some hearing loss, and by age 80, almost everyone does. Because hearing loss  usually occurs slowly over the years, you may not realize your hearing ability has gotten worse.       Have your hearing checked  Contact your [facility] care provider if you:    Have to strain to hear normal conversation.    Have to watch other peoples faces very carefully to follow what theyre saying.    Need to ask people to repeat what theyve said.    Often misunderstand what people are saying.    Turn the volume of the television or radio up so high that others complain.    Feel that people are mumbling when theyre talking to you.    Find that the effort to hear leaves you feeling tired and irritated.    Notice, when using the phone, that you hear better with 1 ear than the other.    4877-6699 [employer]. [address] [place] [address], [place] [address]. All rights reserved. This information is not intended as a substitute for professional medical care. Always follow your healthcare professional's instructions.         Patient Education   Urinary Incontinence, Female (Adult)  Urinary incontinence means loss of control of the bladder. This problem affects many women, especially as they get older. If you have incontinence, you may be embarrassed to ask for help. But know that this problem can be treated.  Types of Incontinence  There are different types of incontinence. Two of the main types are described here. You can have more than one type.    Stress incontinence. With this type, urine leaks when pressure (stress) is put on the bladder. This may happen when you cough, sneeze, or laugh. Stress incontinence most often occurs because the pelvic floor muscles that support the bladder and urethra are weak. This can happen after pregnancy and vaginal childbirth or a hysterectomy. It can also be due to excess body weight or hormone changes.    Urge incontinence (also called overactive bladder). With this type, a sudden urge to urinate is felt often. This may happen even though there may not be much urine in the  bladder. The need to urinate often during the night is common. Urge incontinence most often occurs because of bladder spasms. This may be due to bladder irritation or infection. Damage to bladder nerves or pelvic muscles, constipation, and certain medicines can also lead to urge incontinence.  Treatment of urinary incontinence depends on the cause. Further evaluation is needed to find the type you have. This will likely include an exam and certain tests. Based on the results, you and your healthcare provider can then plan treatment. Until a diagnosis is made, the home care tips below can help relieve symptoms.  Home care    Do pelvic floor muscle exercises, if they are prescribed. The pelvic floor muscles help support the bladder and urethra. Many women find that their symptoms improve when doing special exercises that strengthen these muscles. To do the exercises contract the muscles you would use to stop your stream of urine, but do this when youre not urinating. Hold for 10 seconds, then relax. Repeat 10 to 20 times in a row, at least 3 times a day. Your provider may give you other instructions for how to do the exercises and how often.    Keep a bladder diary. This helps track how often and how much you urinate over a set period of time. Bring this diary with you to your next visit with the provider. The information can help your provider learn more about your bladder problem.    Lose weight, if advised to by your provider. Excess weight puts pressure on the bladder. Your provider can help you create a weight-loss plan thats right for you. This may include exercising more and making certain diet changes.    Don't consume foods and drinks that may irritate the bladder. These can include alcohol and caffeinated drinks.    Quit smoking. Smoking and other tobacco use can lead to chronic cough that strains the pelvic floor muscles. Smoking may also damage the bladder and urethra. Talk with your provider about  treatments or methods you can use to quit smoking.    If drinking large amounts of fluid causes you to have symptoms, you may be advised to limit your fluid intake. You may also be advised to drink most of your fluids during the day and to limit fluids at night.    If youre worried about urine leakage or accidents, you may wear absorbent pads to catch urine. Change the pads often. This helps reduce discomfort. It may also reduce the risk of skin or bladder infections.  Follow-up care  Follow up with your healthcare provider, or as directed. It may take some to find the right treatment for your problem. Your treatment plan may include special therapies or medicines. Certain procedures or surgery may also be options. Be sure to discuss any questions you have with your provider.  When to seek medical advice  Call the healthcare provider right away if any of these occur:    Fever of 100.4 F (38 C) or higher, or as directed by your provider    Bladder pain or fullness    Abdominal swelling    Nausea or vomiting    Back pain    Weakness, dizziness or fainting  Date Last Reviewed: 10/1/2017    0408-0274 [employer]. [address] [place] [address]. All rights reserved. This information is not intended as a substitute for professional medical care. Always follow your healthcare professional's instructions.     Patient Education   Your Health Risk Assessment indicates you feel you are not in good emotional health.    Recreation   Recreation is not limited to sports and team events. It includes any activity that provides relaxation, interest, enjoyment, and exercise. Recreation provides an outlet for physical, mental, and social energy. It can give a sense of worth and achievement. It can help you stay healthy.    Mental Exercise and Social Involvement  Mental and emotional health is as important as physical health. Keep in touch with friends and family. Stay as active as possible. Continue to learn  and challenge yourself.   Things you can do to stay mentally active are:    Learn something new, like a foreign language or musical instrument.     Play SCRABBLE or do crossword puzzles. If you cannot find people to play these games with you at home, you can play them with others on your computer through the Internet.     Join a games club--anything from card games to chess or checkers or lawn bowling.     Start a new hobby.     Go back to school.     Volunteer.     Read.     Keep up with world events.       Patient Education   Preventing Falls in the Home  As you get older, falls are more likely. Thats because your reaction time slows. Your muscles and joints may also get stiffer, making them less flexible. Illness, medications, and vision changes can also affect your balance. A fall could leave you unable to live on your own. To make your home safer, follow these tips:    Floors    Put nonskid pads under area rugs.    Remove throw rugs.    Replace worn floor coverings.    Tack carpets firmly to each step on carpeted stairs. Put nonskid strips on the edges of uncarpeted stairs.    Keep floors and stairs free of clutter and cords.    Arrange furniture so there are clear pathways.    Clean up any spills right away.    Bathrooms    Install grab bars in the tub or shower.    Apply nonskid strips or put a nonskid rubber mat in the tub or shower.    Sit on a bath chair to bathe.    Use bathmats with nonskid backing.    Lighting    Keep a flashlight in each room.    Put a nightlight along the pathway between the bedroom and the bathroom.    8476-4996 [employer]. [address] [place] [address], [place] [address]. All rights reserved. This information is not intended as a substitute for professional medical care. Always follow your healthcare professional's instructions.           Advance Directive  Patients advance directive was discussed and I am comfortable with the patients wishes.  Patient Education    Personalized Prevention Plan  You are due for the preventive services outlined below.  Your care team is available to assist you in scheduling these services.  If you have already completed any of these items, please share that information with your care team to update in your medical record.  Health Maintenance   Topic Date Due   ? HEART FAILURE ACTION PLAN  04/[birth year]24   ? DXA SCAN  04/04/1989   ? ZOSTER VACCINES (2 of 3) 07/16/2009   ? LIPID  07/07/2018   ? TD 18+ HE  01/01/2021   ? INFLUENZA VACCINE RULE BASED (1) 08/01/2020   ? BMP  09/13/2020   ? ALT  03/13/2021   ? CBC  03/13/2021   ? MEDICARE ANNUAL WELLNESS VISIT  07/13/2021   ? FALL RISK ASSESSMENT  07/13/2021   ? ADVANCE CARE PLANNING  07/03/2022   ? TSH  Completed   ? PNEUMOCOCCAL IMMUNIZATION 65+ LOW/MEDIUM RISK  Completed

## 2021-08-09 RX ORDER — POTASSIUM CHLORIDE 750 MG/1
10 TABLET, EXTENDED RELEASE ORAL DAILY
Qty: 30 TABLET | Refills: 0 | OUTPATIENT
Start: 2021-08-09

## 2021-08-09 NOTE — TELEPHONE ENCOUNTER
Results for NEIDA MEDRANO (MRN [id number]) as of 8/9/2021 12:48   Ref. Range 7/21/2021 16:03   Sodium Latest Ref Range: 136 - 145 mmol/L 128 (L)   Potassium Latest Ref Range: 3.5 - 5.0 mmol/L 5.0   Chloride Latest Ref Range: 98 - 107 mmol/L 96 (L)   Carbon Dioxide Latest Ref Range: 22 - 31 mmol/L 22   Urea Nitrogen Latest Ref Range: 8 - 28 mg/dL 29 (H)   Creatinine Latest Ref Range: 0.60 - 1.10 mg/dL 1.01   GFR Estimate Latest Ref Range: >60 mL/min/1.73m2 47 (L)     I have refused the potassium replacement as potassium was high at 5, 7/21/2021.    Patient will be seeing me 8/18/2021 and I will repeat BMP at that time.    Tiesha Barba MD

## 2021-08-18 ENCOUNTER — OFFICE VISIT (OUTPATIENT)
Dept: INTERNAL MEDICINE | Facility: CLINIC | Age: 86
End: 2021-08-18
Payer: MEDICARE

## 2021-08-18 VITALS
OXYGEN SATURATION: 98 % | SYSTOLIC BLOOD PRESSURE: 170 MMHG | WEIGHT: 94 LBS | HEIGHT: 64 IN | DIASTOLIC BLOOD PRESSURE: 90 MMHG | BODY MASS INDEX: 16.05 KG/M2 | HEART RATE: 78 BPM

## 2021-08-18 DIAGNOSIS — R10.13 EPIGASTRIC PAIN: ICD-10-CM

## 2021-08-18 DIAGNOSIS — D50.0 IRON DEFICIENCY ANEMIA DUE TO CHRONIC BLOOD LOSS: ICD-10-CM

## 2021-08-18 DIAGNOSIS — D64.9 SYMPTOMATIC ANEMIA: ICD-10-CM

## 2021-08-18 DIAGNOSIS — E87.1 HYPONATREMIA: ICD-10-CM

## 2021-08-18 DIAGNOSIS — N18.31 STAGE 3A CHRONIC KIDNEY DISEASE (H): ICD-10-CM

## 2021-08-18 DIAGNOSIS — E87.6 HYPOKALEMIA: Primary | ICD-10-CM

## 2021-08-18 PROBLEM — N18.30 CHRONIC KIDNEY DISEASE, STAGE 3 (H): Status: ACTIVE | Noted: 2021-08-18

## 2021-08-18 LAB
ANION GAP SERPL CALCULATED.3IONS-SCNC: 12 MMOL/L (ref 5–18)
BASOPHILS # BLD AUTO: 0 10E3/UL (ref 0–0.2)
BASOPHILS NFR BLD AUTO: 1 %
BUN SERPL-MCNC: 21 MG/DL (ref 8–28)
CALCIUM SERPL-MCNC: 10.1 MG/DL (ref 8.5–10.5)
CHLORIDE BLD-SCNC: 97 MMOL/L (ref 98–107)
CO2 SERPL-SCNC: 25 MMOL/L (ref 22–31)
CREAT SERPL-MCNC: 0.97 MG/DL (ref 0.6–1.1)
EOSINOPHIL # BLD AUTO: 0.1 10E3/UL (ref 0–0.7)
EOSINOPHIL NFR BLD AUTO: 1 %
ERYTHROCYTE [DISTWIDTH] IN BLOOD BY AUTOMATED COUNT: 18.6 % (ref 10–15)
FERRITIN SERPL-MCNC: 157 NG/ML (ref 10–130)
GFR SERPL CREATININE-BSD FRML MDRD: 49 ML/MIN/1.73M2
GLUCOSE BLD-MCNC: 120 MG/DL (ref 70–125)
HCT VFR BLD AUTO: 35.4 % (ref 35–47)
HGB BLD-MCNC: 11.3 G/DL (ref 11.7–15.7)
IMM GRANULOCYTES # BLD: 0 10E3/UL
IMM GRANULOCYTES NFR BLD: 0 %
IRON SATN MFR SERPL: 32 % (ref 20–50)
IRON SERPL-MCNC: 106 UG/DL (ref 42–175)
LYMPHOCYTES # BLD AUTO: 1.4 10E3/UL (ref 0.8–5.3)
LYMPHOCYTES NFR BLD AUTO: 31 %
MCH RBC QN AUTO: 30.1 PG (ref 26.5–33)
MCHC RBC AUTO-ENTMCNC: 31.9 G/DL (ref 31.5–36.5)
MCV RBC AUTO: 94 FL (ref 78–100)
MONOCYTES # BLD AUTO: 0.7 10E3/UL (ref 0–1.3)
MONOCYTES NFR BLD AUTO: 14 %
NEUTROPHILS # BLD AUTO: 2.4 10E3/UL (ref 1.6–8.3)
NEUTROPHILS NFR BLD AUTO: 53 %
OSMOLALITY SERPL: 279 MOSM/KG (ref 270–300)
OSMOLALITY UR: 229 MOSM/KG (ref 300–900)
PLATELET # BLD AUTO: 175 10E3/UL (ref 150–450)
POTASSIUM BLD-SCNC: 4.3 MMOL/L (ref 3.5–5)
RBC # BLD AUTO: 3.75 10E6/UL (ref 3.8–5.2)
SODIUM SERPL-SCNC: 134 MMOL/L (ref 136–145)
SODIUM UR-SCNC: 44 MMOL/L
TIBC SERPL-MCNC: 329 UG/DL (ref 313–563)
TRANSFERRIN SERPL-MCNC: 263 MG/DL (ref 212–360)
WBC # BLD AUTO: 4.6 10E3/UL (ref 4–11)

## 2021-08-18 PROCEDURE — 83540 ASSAY OF IRON: CPT | Performed by: INTERNAL MEDICINE

## 2021-08-18 PROCEDURE — 85025 COMPLETE CBC W/AUTO DIFF WBC: CPT | Performed by: INTERNAL MEDICINE

## 2021-08-18 PROCEDURE — 99215 OFFICE O/P EST HI 40 MIN: CPT | Performed by: INTERNAL MEDICINE

## 2021-08-18 PROCEDURE — 84300 ASSAY OF URINE SODIUM: CPT | Performed by: INTERNAL MEDICINE

## 2021-08-18 PROCEDURE — 82728 ASSAY OF FERRITIN: CPT | Performed by: INTERNAL MEDICINE

## 2021-08-18 PROCEDURE — 84466 ASSAY OF TRANSFERRIN: CPT | Performed by: INTERNAL MEDICINE

## 2021-08-18 PROCEDURE — 80048 BASIC METABOLIC PNL TOTAL CA: CPT | Performed by: INTERNAL MEDICINE

## 2021-08-18 PROCEDURE — 36415 COLL VENOUS BLD VENIPUNCTURE: CPT | Performed by: INTERNAL MEDICINE

## 2021-08-18 PROCEDURE — 83930 ASSAY OF BLOOD OSMOLALITY: CPT | Performed by: INTERNAL MEDICINE

## 2021-08-18 PROCEDURE — 83935 ASSAY OF URINE OSMOLALITY: CPT | Performed by: INTERNAL MEDICINE

## 2021-08-18 RX ORDER — FERROUS SULFATE 325(65) MG
325 TABLET ORAL
Qty: 90 TABLET | Refills: 1 | Status: SHIPPED | OUTPATIENT
Start: 2021-08-18 | End: 2022-01-01

## 2021-08-18 ASSESSMENT — MIFFLIN-ST. JEOR: SCORE: 796.38

## 2021-08-18 NOTE — PROGRESS NOTES
Assessment & Plan     Hypokalemia, while the patient was in hospital, her potassium level was decreased at 3.4.  Sodium level was also decreased at 133.  When she was seen July 21, 2021 at [facility], by Keyla Walsh, patient sodium level was low at 128, and potassium was high at 5.  Discussed with patient and her daughter, that she should discontinue the potassium.  I will check a BMP today, to see if her sodium level remains low.  And also check her potassium level.  - Basic metabolic panel  (Ca, Cl, CO2, Creat, Gluc, K, Na, BUN)  - Basic metabolic panel  (Ca, Cl, CO2, Creat, Gluc, K, Na, BUN)    Iron deficiency anemia due to chronic blood loss, patient had to be admitted to hospital, July 7, 2021.  Hemoglobin was decreased to 6.8.  On discharge this was 9.  Which was seen on the 21st, this was 9.8.  Patient is continued with iron, daily.  She is also taking vitamin C, 250 mg daily.  She is drinking the iron with orange juice.  She has black stools, which is probably related to the iron.  Discussed with patient and her daughter today.  Patient is taking docusate 100 mg every evening, she had constipation prior to this.  Now she has very soft stool, no diarrhea.  Discussed with her that this is probably preferable to going back to constipation, particularly as the iron is very constipating.  Discussed with the plan would be going forward.  Discussed that as long as her hemoglobin remains above 8 she would not need to go into the hospital acutely unless she has symptoms.  Discussed in long-term, that she may wish for us to refer her to hematology, for intravenous iron infusions, to keep her hemoglobin stable, if her hemoglobin keeps dropping.  Patient had declined follow-up by gastroenterology, while she was in the hospital, in terms of doing procedures.  While she was in hospital they discussed comfort care with her.  I also discussed that she would have the option of going on hospice, if that is what  she wishes, if she decides that she does not wish to keep on going back into the hospital for transfusions.  We will have to see what happens over time.  - CBC with platelets and differential   - Ferritin  - Iron and iron binding capacity  - omeprazole (PRILOSEC) 20 MG DR capsule  Dispense: 90 capsule; Refill: 1  - CBC with platelets and differential  - Ferritin  - Iron binding panel  - Iron binding panel    Hyponatremia, as above, will follow up on BMP.    - Basic metabolic panel  (Ca, Cl, CO2, Creat, Gluc, K, Na, BUN)  - Osmolality urine  - Osmolality  - Basic metabolic panel  (Ca, Cl, CO2, Creat, Gluc, K, Na, BUN)  - Osmolality urine  - Osmolality  - Sodium random urine    Stage 3a chronic kidney disease, estimated GFR of 47, July 21, 2021, potassium of 5.  Discussed with the patient today.  Previous kidney function was normal.  We will recheck again.    Symptomatic anemia  - ferrous sulfate (FEROSUL) 325 (65 Fe) MG tablet  Dispense: 90 tablet; Refill: 1    Epigastric pain, patient was placed on omeprazole while she was in hospital, and has had significant improvement in her abdominal pain.  Overall she looks much better than when she first was seen in the clinic by me.  - omeprazole (PRILOSEC) 20 MG DR capsule  Dispense: 90 capsule; Refill: 1    64 minutes spent on the date of the encounter doing chart review, review of outside records, review of test results, patient visit, documentation and discussion with family, daughter.      MEDICATIONS:   Orders Placed This Encounter   Medications     ferrous sulfate (FEROSUL) 325 (65 Fe) MG tablet     Sig: Take 1 tablet (325 mg) by mouth daily (with breakfast)     Dispense:  90 tablet     Refill:  1     omeprazole (PRILOSEC) 20 MG DR capsule     Sig: Take 1 capsule (20 mg) by mouth daily before breakfast     Dispense:  90 capsule     Refill:  1     Medications Discontinued During This Encounter   Medication Reason     magnesium chloride (SLOW-MAG) 64 mg TbEC  delayed-release tablet (duplicate, continue with magnesium)      potassium chloride (K-DUR,KLOR-CON) 10 MEQ tablet Therapy completed     ferrous sulfate 325 (65 FE) MG tablet Reorder     omeprazole (PRILOSEC) 20 MG capsule Reorder          - Continue other medications without change  There are no Patient Instructions on file for this visit.    Return in about 4 weeks (around 9/15/2021), or Follow with Dr. Barba, 30 minutes.    Tiesha Barba MD  [facility]    Sunita Giraldo is a 89 year old who presents for the following health issues     HPI   Visit with me July 7, 2021.  Anemia due to other cause, not classified, I had a long discussion with the family today, 2 daughters were present during the clinic visit.  Had a long discussion regarding her anemia, my concern, that if she has further decreases in her hemoglobin, she would need to go to the emergency department, for blood transfusions, and to circumvent that need, I would like to refer her to hematology, and we discussed that hematology, if she wished to be referred, would consider treatment such as intravenous iron, or other treatments, to improve her anemia, dependent on what they think is causing the anemia.  We spent a long time on this discussion, and the patient did not feel that she wants to go through with further investigations.  I also had a discussion with the patient and her family, regarding the possibility of hospice.  Her quality of life is really reduced.  She lives on her own, but her family have some services, through [facility] (there is always somebody with her from 10 PM to 8:00, and again from 8 until 11], family delivers dinner to each day, and spend time with her on the weekends.  Patient has significant fatigue, and lack of energy.  She is also feeling anxious.  She would like to have some help with the symptoms.  Discussed with her, that if we could improve her hemoglobin, we could  improve some of her symptoms of fatigue.  Hemoglobin from today came back at 5.9.  We called the patient, and her daughters, and asked for them to take her to the emergency department.  They have taken the patient to [facility], as this is closer to their home.     I also discussed with the patient, that her current iron studies, do not necessarily look like an iron deficiency, although this may still be possible.  Her ferritin level is 18, on the low end of normal.  The percentage saturation is very decreased at 5%, TIBC, is normal, however on the higher end of the range.  However she has abdominal issues, and I am not sure if she would tolerate it oral iron.  -     HM1(CBC and Differential)     Hypothyroidism, unspecified type, TSH when checked, May 19, 2021, normal at 0.7.  -     levothyroxine (SYNTHROID) 50 MCG tablet; Take 1 tablet (50 mcg total) by mouth Daily at 6:00 am.     Other insomnia, patient talks about anxiety, and needing some help with anxiety.  She really wishes to have something short acting, that she could take as needed.  I am concerned about giving her a benzodiazepine, because of the increased risk of falling.  Discussed with the patient, that we could use a small dose of mirtazapine, to help with sleep, and and anxiety.  She does have some issues with sleep during the night.  She sleeps in a recliner.  Apparently she has hospital bed at home, but this is uncomfortable for her to use this.  She also has a lot of pain, her whole body is painful, and I also discussed, that hospice would be helpful, in terms of pain control, help with anxiety, and increased services.  Patient is not interested in taking many medications, she currently takes Tylenol extra strength, 1000 mg at bedtime.  She does not take pain medications at other times of the day.  My plan would be for her to take Tylenol extra strength, 1000 mg, or 650 mg [regular strength], as her weight is 47 kg, in the morning, to help her with  pain.  -     mirtazapine (REMERON) 7.5 MG tablet; Take 0.5 tablets (3.75 mg total) by mouth at bedtime.     Anxiety, will try a small dose of Remeron at bedtime.  -     mirtazapine (REMERON) 7.5 MG tablet; Take 0.5 tablets (3.75 mg total) by mouth at bedtime.     Vitamin B12 deficiency (non anemic), vitamin B12 is on the low end of normal, therefore checking methylmalonic acid.  -     Methylmalonic Acid (MMA), Quantitative     Benign essential hypertension, blood pressure today is actually 110/80.     Hyponatremia, sodium level has been as low as 131, April 6, 2021.  Latest sodium level is 134, May 19, 2021.  Checking this again today.  She is on Lasix 20 mg daily, for peripheral edema.  -     Basic Metabolic Panel    July 7, 2021, asked the patient to go into the emergency department, due to low hemoglobin.  Patient was discharged July 10, 2021.  Patient was transfused with 1 unit of packed red blood cells.  GI was consulted, but patient not interested in pursuing endoscopies.  Comfort care was discussed if hemoglobin continues to drop.  Discharged on oral iron with ascorbic acid.  Patient was given IV Venofer?.  Patient also had hyponatremia. Urine sodium of 103, blood osmolality of 264 urine osmolality of 295.  Likely due to SIADH or fluid overload.  Patient had hypokalemia.    Patient was seen by Keyla Walsh July 21, 2021. Noted that PT was coming out to her house.  BMP done that day, sodium 128, potassium of 5, creatinine increased to 1.01 [usual is 0.71], and estimated GFR decreased to 47, usually is more than 60.  Hemoglobin of 9.8.  When she went into the hospital hemoglobin 6.8.    Results for NEIDA MEDRANO (MRN [id number]) as of 8/18/2021 14:02   Ref. Range 7/9/2021 06:52 7/10/2021 07:03 7/11/2021 00:04 7/21/2021 16:03   Sodium Latest Ref Range: 136 - 145 mmol/L 133 (L) 133 (L)  128 (L)   Potassium Latest Ref Range: 3.5 - 5.0 mmol/L 3.5 3.4 (L)  5.0   Chloride Latest Ref Range: 98 - 107  "mmol/L 102 103  96 (L)   Carbon Dioxide Latest Ref Range: 22 - 31 mmol/L 20 (L) 24  22   Urea Nitrogen Latest Ref Range: 8 - 28 mg/dL 14 12  29 (H)   Creatinine Latest Ref Range: 0.60 - 1.10 mg/dL 0.71 0.71  1.01   GFR Estimate Latest Ref Range: >60 mL/min/1.73m2 >60 >60  47 (L)   GFR Estimate If Black Latest Ref Range: >60 mL/min/1.73m2 >60 >60       Results for NEIDA MEDRANO (MRN [id number]) as of 8/18/2021 14:15   Ref. Range 5/19/2021 16:06   TSH Latest Ref Range: 0.30 - 5.00 uIU/mL 0.70     Results for NEIDA MEDRANO (MRN [id number]) as of 8/18/2021 14:15   Ref. Range 5/19/2021 16:06   Transferrin Latest Ref Range: 212 - 360 mg/dL 340     Results for NEIDA MEDRANO (MRN [id number]) as of 8/18/2021 14:15   Ref. Range 5/19/2021 16:06   Transferrin IBC Latest Ref Range: 313 - 563 ug/dL 425     Results for NEIDA MEDRANO (MRN [id number]) as of 8/18/2021 14:15   Ref. Range 5/19/2021 16:06   Ferritin Latest Ref Range: 10 - 130 ng/mL 18       Review of Systems   Rest of the review systems is negative.      Objective    BP (!) 170/90 (BP Location: Right arm, Patient Position: Sitting)   Pulse 78   Ht 1.626 m (5' 4\")   Wt 42.6 kg (94 lb)   SpO2 98%   BMI 16.14 kg/m    Body mass index is 16.14 kg/m .  Physical Exam   Patient is interactive and alert and able to tell me her story.  She is not distressed.  No peripheral edema.    "

## 2021-08-21 ENCOUNTER — HEALTH MAINTENANCE LETTER (OUTPATIENT)
Age: 86
End: 2021-08-21

## 2021-09-03 ENCOUNTER — MEDICAL CORRESPONDENCE (OUTPATIENT)
Dept: HEALTH INFORMATION MANAGEMENT | Facility: CLINIC | Age: 86
End: 2021-09-03

## 2021-09-14 NOTE — TELEPHONE ENCOUNTER
..Reason for Call:  Home Health Care    Jannette, RN with [facility] called regarding (reason for call): [place]     Orders are needed for this patient. Skilled nursing     PT: NONE    OT: NONE    Skilled Nursing: Okay to check daily weight and then Jannette is needing parameters from today's OV with Dr. Palomares.     Pt Provider: Tiesha Barba MD    Phone Number Homecare Nurse can be reached at: [phone]    Can we leave a detailed message on this number? YES     Call taken on 9/14/2021 at 12:53 PM by BRIDGER Olivia

## 2021-09-14 NOTE — PROGRESS NOTES
Internal Medicine Office Visit  [facility]   Patient Name: Kristal Cox  Patient Age: 89 year old  YOB: 1924  MRN: [id number]    Date of Visit: 9/14/2021  Patient presents with:  Follow Up: edema, feet bilateral           Assessment / Plan / Medical Decision Making:    Problem List Items Addressed This Visit        Circulatory    Systolic hypertension     Blood pressure not controlled, however, reviewing her BP readings from the past 6 months she has some lability. Will increase the furosemide for 3-5 days which should help with blood pressure due to extra fluid             Musculoskeletal and Integumentary    Lymphedema of both lower extremities - Primary     - Patient/family asks about seeing PT again as the LE edema has not been as well controlled as it was in the past, referral placed. She is homebound due to frailty, use of a 2-wheeled walker   - Add an extra 20 mg furosemide in the afternoon x 3-5 days until weight is back to baseline weight          Relevant Orders    HOME CARE NURSING REFERRAL    Comprehensive metabolic panel (Completed)           I am having Kristal Cox maintain her cholecalciferol, melatonin, artificial tear, levothyroxine, lisinopril, polyethylene glycol, acetaminophen, docusate sodium, metoprolol succinate ER, furosemide, magnesium chloride, vitamin C, ferrous sulfate, and omeprazole.          Orders Placed This Encounter   Procedures     Comprehensive metabolic panel     HOME CARE NURSING REFERRAL   Followup: No follow-ups on file. earlier if needed.    36 minutes spent on the date of the encounter doing chart review, patient visit and documentation     Jazzmine Palomares NP, CNP        HPI:  Kristal Cox is a 89 year old year old who presents to the office today for LE swelling, here with her daughter. [facility] applies compression wraps but not always appropriately. In the past she had a home health PT that helped with the  wraps. In the past 2 weeks the swelling has been worse and she has more tenderness, worse in the right lower leg. She denies redness, fever/chills, or shortness of breath.  Her medication list furosemide she may take an extra dose if she has increased swelling but she has not taken this given her history of hyponatremia.     Blood pressure is elevated today. She states that her readings are high in the morning and improved in the afternoon.         Health Maintenance Review  Health Maintenance   Topic Date Due     HF ACTION PLAN  Never done     ANNUAL REVIEW OF HM ORDERS  Never done     HEPATITIS B IMMUNIZATION (1 of 3 - Risk 3-dose series) Never done     ZOSTER IMMUNIZATION (2 of 3) 07/16/2009     DTAP/TDAP/TD IMMUNIZATION (2 - Td or Tdap) 04/12/2021     MEDICARE ANNUAL WELLNESS VISIT  07/13/2021     LIPID  07/23/2021     INFLUENZA VACCINE (1) 09/01/2021     BMP  03/14/2022     FALL RISK ASSESSMENT  05/19/2022     CBC  08/18/2022     ALT  09/14/2022     ADVANCE CARE PLANNING  07/13/2025     TSH W/FREE T4 REFLEX  Completed     PHQ-2  Completed     Pneumococcal Vaccine: 65+ Years  Completed     COVID-19 Vaccine  Completed     IPV IMMUNIZATION  Aged Out     MENINGITIS IMMUNIZATION  Aged Out       Current Scheduled Meds:  Outpatient Encounter Medications as of 9/14/2021   Medication Sig Dispense Refill     acetaminophen (TYLENOL) 500 MG tablet [ACETAMINOPHEN (TYLENOL) 500 MG TABLET] Take 2 tablets (1,000 mg total) by mouth 2 (two) times a day.  0     artificial tears,hypromellose, (GENTEAL; SYSTANE) 0.3 % Gel [ARTIFICIAL TEARS,HYPROMELLOSE, (GENTEAL; SYSTANE) 0.3 % GEL] Administer 1 drop to both eyes as needed (dry eyes).       cholecalciferol, vitamin D3, 1,000 unit tablet [CHOLECALCIFEROL, VITAMIN D3, 1,000 UNIT TABLET] Take 2,000 Units by mouth daily.       docusate sodium (COLACE) 100 MG capsule [DOCUSATE SODIUM (COLACE) 100 MG CAPSULE] Take 1 capsule (100 mg total) by mouth daily. 90 capsule 1     ferrous sulfate  (FEROSUL) 325 (65 Fe) MG tablet Take 1 tablet (325 mg) by mouth daily (with breakfast) 90 tablet 1     furosemide (LASIX) 20 MG tablet [FUROSEMIDE (LASIX) 20 MG TABLET] Take 1 tablet (20 mg total) by mouth daily. Take 1 daily.  May increase to 2 daily with increased swelling as needed. 90 tablet 3     levothyroxine (SYNTHROID) 50 MCG tablet [LEVOTHYROXINE (SYNTHROID) 50 MCG TABLET] Take 1 tablet (50 mcg total) by mouth Daily at 6:00 am. 90 tablet 3     lisinopriL (PRINIVIL,ZESTRIL) 20 MG tablet [LISINOPRIL (PRINIVIL,ZESTRIL) 20 MG TABLET] Take 1 tablet (20 mg total) by mouth daily. 90 tablet 3     magnesium chloride 535 (64 Mg) MG TBEC CR tablet Take 64 mg by mouth       melatonin 3 mg Tab tablet [MELATONIN 3 MG TAB TABLET] Take 1-2 tablets (3-6 mg total) by mouth at bedtime as needed.       metoprolol succinate (TOPROL-XL) 25 MG [METOPROLOL SUCCINATE (TOPROL-XL) 25 MG] TAKE 1 TABLET(25 MG) BY MOUTH DAILY 90 tablet 1     omeprazole (PRILOSEC) 20 MG DR capsule Take 1 capsule (20 mg) by mouth daily before breakfast 90 capsule 1     polyethylene glycol (GLYCOLAX) 17 gram/dose powder [POLYETHYLENE GLYCOL (GLYCOLAX) 17 GRAM/DOSE POWDER] Take 17 g by mouth daily as needed. 235 g 11     vitamin C (ASCORBIC ACID) 250 MG TABS tablet Take 1 tablet (250 mg) by mouth daily 30 tablet 0     No facility-administered encounter medications on file as of 9/14/2021.         Objective / Physical Examination:  Vitals:    09/14/21 1047 09/14/21 1122   BP: (!) 170/110 (!) 170/60   Pulse: 74    SpO2: 97%    Weight: 44.9 kg (99 lb)      Wt Readings from Last 3 Encounters:   09/14/21 44.9 kg (99 lb)   08/18/21 42.6 kg (94 lb)   07/21/21 44 kg (97 lb)     Body mass index is 16.99 kg/m .     General: pt is alert and oriented   Respiratory: Clear to auscultation bilaterally. Normal inspiratory and expiratory effort  Cardiovascular: Regular rate and rhythm. No murmurs, rubs, or gallops. 2+ bilateral edema. No calf tenderness  Skin: no  warmth/erythema of lower legs

## 2021-09-15 PROBLEM — I89.0 LYMPHEDEMA OF BOTH LOWER EXTREMITIES: Status: ACTIVE | Noted: 2021-01-01

## 2021-09-15 NOTE — ASSESSMENT & PLAN NOTE
Blood pressure not controlled, however, reviewing her BP readings from the past 6 months she has some lability. Will increase the furosemide for 3-5 days which should help with blood pressure due to extra fluid

## 2021-09-15 NOTE — ASSESSMENT & PLAN NOTE
- Patient/family asks about seeing PT again as the LE edema has not been as well controlled as it was in the past, referral placed. She is homebound due to frailty, use of a 2-wheeled walker   - Add an extra 20 mg furosemide in the afternoon x 3-5 days until weight is back to baseline weight

## 2021-09-22 NOTE — TELEPHONE ENCOUNTER
Daughter is following up on referral placed by Jazzmine Palomares for home care on 9/14/21    [facility] has not received the order.  Daughter was advised that the order placed is incorrect.    Needs to indicate Lymphedema Specialist

## 2021-09-22 NOTE — TELEPHONE ENCOUNTER
Let her know I placed a new order for home care with a lymphedema specialist. Let me know if they have any other issues. I'm not sure why the first order didn't go through as I indicated that a lymphedema specialist was needed.

## 2021-09-27 NOTE — TELEPHONE ENCOUNTER
Luz Newby calling from [facility] in regards to recently placed order for home care.  Stating they can meet requirement for patient to be seen tomorrow or Wednesday, but will need new order as previous order is out of approved date range per insurance guidelines.  Please enter new order for home care and they will set up time for her to be seen within 48 hours.    Any questions, please call Luz Newby at [phone] ext [phone]

## 2021-09-29 NOTE — TELEPHONE ENCOUNTER
Reason for Call:  Home Health Care    Leta with [facility]   Homecare called regarding (reason for call): verbal    Orders are needed for this patient. PT    PT: 1x week for 2 weeks 1 every other week for 4 weeks, mobility and manage edema, fall prevention, pain management.    OT:  Eval and Treat focus on lympedema    Skilled Nursing: na    Pt Provider: Dr Barba    Phone Number Homecare Nurse can be reached at: [phone]    Can we leave a detailed message on this number? YES        Call taken on 9/29/2021 at 9:45 AM by Laura L Goldberg, ARRT

## 2021-09-29 NOTE — TELEPHONE ENCOUNTER
I approve      PT: 1x week for 2 weeks 1 every other week for 4 weeks, mobility and manage edema, fall prevention, pain management.     OT:  Eval and Treat focus on lympedema

## 2021-09-30 NOTE — PATIENT INSTRUCTIONS
Continue your current furosemide dosing.    Continue daily weights on your scale at home.    Start the lymphedema wraps through homecare.    Your blood work is processing, results will be released via my chart once they are back.    Follow up in two weeks, please let us know if you are up 2 pounds overnight, or 5 pounds in one week.

## 2021-09-30 NOTE — PROGRESS NOTES
Clinic Note    Assessment:     Assessment and Plan:  1. Essential hypertension, benign: Blood pressure today in office was 150/70. She continues on Metoprolol, Lisinopril. Will recheck kidney function.     2. Lymphedema of both lower extremities: Will recheck labs today. She continues on Furosemide 20mg daily. Home care to come out and do lymphedema wraps. Encouraged her to elevate her legs during the day when seated.   - Basic metabolic panel  (Ca, Cl, CO2, Creat, Gluc, K, Na, BUN); Future    3. Stage 3 chronic kidney disease, unspecified whether stage 3a or 3b CKD: hx of this. Will recheck kidney function.   - Basic metabolic panel  (Ca, Cl, CO2, Creat, Gluc, K, Na, BUN); Future    4. Hyponatremia: history of this, will recheck today.        Patient Instructions   Continue your current furosemide dosing.    Continue daily weights on your scale at home.    Start the lymphedema wraps through homecare.    Your blood work is processing, results will be released via my chart once they are back.    Follow up in two weeks, please let us know if you are up 2 pounds overnight, or 5 pounds in one week.     Return in about 2 weeks (around 10/14/2021) for Follow up.         Subjective:      Kristal Cox is a 89 year old female presents today with her daughter for follow up of her bilateral lower extremity lymphedema.    Home care came out, will be starting her lymphedema wraps.    She reports that she has been weighing herself daily. She last was on a five day course of furosemide, weight on day one without furosemide 40mg was 98.2; today was 100.8 pounds on her scale at home. She currently is taking furosemide 20mg daily.    She has a history of low potassium and sodium with higher doses of furosemide. Diet has been stable.    She denies that her legs hurt her. Her compression stockings are not fitting her anymore due to the excessive swelling. She denies any chest pain or chest pressure, no increased shortness of  breath.     The following portions of the patient's history were reviewed and updated as appropriate.    Review of Systems:    Review is otherwise negative except for what is mentioned above.     Social Hx:    History   Smoking Status     Never Smoker   Smokeless Tobacco     Never Used         Objective:     Vitals:    09/30/21 1437   BP: (!) 150/70   Pulse: 76   SpO2: 98%   Weight: 48.1 kg (106 lb)       Exam:  General: No apparent distress. Calm. Alert and Oriented X3. Pt behavior is appropriate.  Head:Atraumatic. Normocephalic.  Chest/Lungs: Equal chest rise and fall, able to talk in full sentences without shortness of breath.   Musculoskeletal: 2+ non-pitting edema bilaterally. No increased warmth,or redness of legs. No open or weeping areas.   Neurologic: Interactive, alert, no focal findings.  Skin: Warm, dry.      Patient Active Problem List   Diagnosis     Vertebral compression fracture (H)     GERD (gastroesophageal reflux disease)     Osteoporosis     Benign essential hypertension     Hyperlipidemia LDL goal <100     PVC's (premature ventricular contractions)     Acquired hypothyroidism     Essential hypertension, benign     Symptomatic anemia     Hyponatremia     Hypokalemia     Systolic hypertension     Chronic kidney disease, stage 3     Lymphedema of both lower extremities     Current Outpatient Medications   Medication Sig Dispense Refill     acetaminophen (TYLENOL) 500 MG tablet [ACETAMINOPHEN (TYLENOL) 500 MG TABLET] Take 2 tablets (1,000 mg total) by mouth 2 (two) times a day.  0     artificial tears,hypromellose, (GENTEAL; SYSTANE) 0.3 % Gel [ARTIFICIAL TEARS,HYPROMELLOSE, (GENTEAL; SYSTANE) 0.3 % GEL] Administer 1 drop to both eyes as needed (dry eyes).       cholecalciferol, vitamin D3, 1,000 unit tablet [CHOLECALCIFEROL, VITAMIN D3, 1,000 UNIT TABLET] Take 2,000 Units by mouth daily.       docusate sodium (COLACE) 100 MG capsule [DOCUSATE SODIUM (COLACE) 100 MG CAPSULE] Take 1 capsule (100 mg  total) by mouth daily. 90 capsule 1     ferrous sulfate (FEROSUL) 325 (65 Fe) MG tablet Take 1 tablet (325 mg) by mouth daily (with breakfast) 90 tablet 1     furosemide (LASIX) 20 MG tablet [FUROSEMIDE (LASIX) 20 MG TABLET] Take 1 tablet (20 mg total) by mouth daily. Take 1 daily.  May increase to 2 daily with increased swelling as needed. 90 tablet 3     levothyroxine (SYNTHROID) 50 MCG tablet [LEVOTHYROXINE (SYNTHROID) 50 MCG TABLET] Take 1 tablet (50 mcg total) by mouth Daily at 6:00 am. 90 tablet 3     lisinopriL (PRINIVIL,ZESTRIL) 20 MG tablet [LISINOPRIL (PRINIVIL,ZESTRIL) 20 MG TABLET] Take 1 tablet (20 mg total) by mouth daily. 90 tablet 3     magnesium chloride 535 (64 Mg) MG TBEC CR tablet Take 64 mg by mouth       melatonin 3 mg Tab tablet [MELATONIN 3 MG TAB TABLET] Take 1-2 tablets (3-6 mg total) by mouth at bedtime as needed.       metoprolol succinate (TOPROL-XL) 25 MG [METOPROLOL SUCCINATE (TOPROL-XL) 25 MG] TAKE 1 TABLET(25 MG) BY MOUTH DAILY 90 tablet 1     omeprazole (PRILOSEC) 20 MG DR capsule Take 1 capsule (20 mg) by mouth daily before breakfast 90 capsule 1     polyethylene glycol (GLYCOLAX) 17 gram/dose powder [POLYETHYLENE GLYCOL (GLYCOLAX) 17 GRAM/DOSE POWDER] Take 17 g by mouth daily as needed. 235 g 11     vitamin C (ASCORBIC ACID) 250 MG TABS tablet Take 1 tablet (250 mg) by mouth daily 30 tablet 0     Keyla Walsh, Adult-Geriatric Nurse Practitioner  [facility] - Internal Medicine Team     9/30/2021

## 2021-10-11 NOTE — TELEPHONE ENCOUNTER
Reason for Call:  Home Health Care Verbal PT orders for Lymphedema therapy.    Charissa @ [facility] called regarding (reason for call): Verbal PT orders for Lymphedema therapy.    Orders are needed for this patient. Verbal PT orders for Lymphedema therapy.    PT: 3 X week for 2 weeks, 2 X week for 3 weeks, 1 X week for 2 weeks.    Pt Provider: Tiesha Barba MD    Phone Number Homecare Nurse can be reached at: [phone]    Can we leave a detailed message on this number? YES    Best Time: ANY    Call taken on 10/11/2021 at 4:12 PM by Linette Epperson

## 2021-10-15 NOTE — PROGRESS NOTES
Clinic Note    Assessment:     Assessment and Plan:  1. Essential hypertension, benign: Blood pressure today in office was 128/68. She continues on Lisinopril. Stable.   - CBC with platelets and differential; Future    2. Lymphedema of both lower extremities: She has a lymphedema nurse coming out to her place. Legs were wrapped this AM, swelling appears to be going down. Continue wraps as instructed.     3. Stage 3a chronic kidney disease (H)/Iron deficiency anemia due to chronic blood loss: With a hx of this with anemia. Will recheck her hemoglobin today. BMP was last rechecked two weeks ago, was stable.   - CBC with platelets and differential; Future    4. Hypothyroidism, unspecified type: Hx of this, will recheck TSH today.   - TSH with free T4 reflex; Future       Patient Instructions   Your labs are processing, I will release results on my chart once they are back.    Continue with the Lymphedema clinic.    Follow up if having worsening symptoms before December.     Return in about 2 months (around 12/15/2021) for Follow up.         Subjective:      Kristal Cox is a 89 year old female presents today for follow up of her lymphedema.    She reports that the lymphedema nurse has started coming out three times per week for the next two weeks, then will be out twice weekly for 2 weeks, and then once weekly for a week.    Her legs were just wrapped this AM per the nurse. She reports that she is feeling somewhat better since they were wrapped.    She reports that she has not taken any extra furosemide. This has not really helped in the past.     They would like her hemoglobin and TSH rechecked today.     The following portions of the patient's history were reviewed and updated as appropriate.    Review of Systems:    Review is otherwise negative except for what is mentioned above.     Social Hx:    History   Smoking Status     Never Smoker   Smokeless Tobacco     Never Used         Objective:     Vitals:     10/15/21 1332   BP: 128/68   BP Location: Right arm   Patient Position: Sitting   Cuff Size: Adult Regular   Pulse: 77   SpO2: 97%   Weight: 46.1 kg (101 lb 9.6 oz)       Exam:  General: No apparent distress. Calm. Alert and Oriented X3. Pt behavior is appropriate.  Head:Atraumatic. Normocephalic.  Chest/Lungs: Equal chest rise and fall, able to talk in full sentences   Musculoskeletal: Walks with a walker. Bilateral lower extremities 2+ edema, wrapped with ace wraps.   Neurologic: Interactive, alert, no focal findings.  Skin: Warm, dry.      Patient Active Problem List   Diagnosis     Vertebral compression fracture (H)     GERD (gastroesophageal reflux disease)     Osteoporosis     Benign essential hypertension     Hyperlipidemia LDL goal <100     PVC's (premature ventricular contractions)     Acquired hypothyroidism     Essential hypertension, benign     Symptomatic anemia     Hyponatremia     Hypokalemia     Systolic hypertension     Chronic kidney disease, stage 3 (H)     Lymphedema of both lower extremities     Current Outpatient Medications   Medication Sig Dispense Refill     acetaminophen (TYLENOL) 500 MG tablet [ACETAMINOPHEN (TYLENOL) 500 MG TABLET] Take 2 tablets (1,000 mg total) by mouth 2 (two) times a day.  0     artificial tears,hypromellose, (GENTEAL; SYSTANE) 0.3 % Gel [ARTIFICIAL TEARS,HYPROMELLOSE, (GENTEAL; SYSTANE) 0.3 % GEL] Administer 1 drop to both eyes as needed (dry eyes).       cholecalciferol, vitamin D3, 1,000 unit tablet [CHOLECALCIFEROL, VITAMIN D3, 1,000 UNIT TABLET] Take 2,000 Units by mouth daily.       docusate sodium (COLACE) 100 MG capsule [DOCUSATE SODIUM (COLACE) 100 MG CAPSULE] Take 1 capsule (100 mg total) by mouth daily. 90 capsule 1     ferrous sulfate (FEROSUL) 325 (65 Fe) MG tablet Take 1 tablet (325 mg) by mouth daily (with breakfast) 90 tablet 1     furosemide (LASIX) 20 MG tablet [FUROSEMIDE (LASIX) 20 MG TABLET] Take 1 tablet (20 mg total) by mouth daily. Take 1  daily.  May increase to 2 daily with increased swelling as needed. 90 tablet 3     levothyroxine (SYNTHROID) 50 MCG tablet [LEVOTHYROXINE (SYNTHROID) 50 MCG TABLET] Take 1 tablet (50 mcg total) by mouth Daily at 6:00 am. 90 tablet 3     lisinopriL (PRINIVIL,ZESTRIL) 20 MG tablet [LISINOPRIL (PRINIVIL,ZESTRIL) 20 MG TABLET] Take 1 tablet (20 mg total) by mouth daily. 90 tablet 3     magnesium chloride 535 (64 Mg) MG TBEC CR tablet Take 64 mg by mouth       melatonin 3 mg Tab tablet [MELATONIN 3 MG TAB TABLET] Take 1-2 tablets (3-6 mg total) by mouth at bedtime as needed.       metoprolol succinate (TOPROL-XL) 25 MG [METOPROLOL SUCCINATE (TOPROL-XL) 25 MG] TAKE 1 TABLET(25 MG) BY MOUTH DAILY 90 tablet 1     omeprazole (PRILOSEC) 20 MG DR capsule Take 1 capsule (20 mg) by mouth daily before breakfast 90 capsule 1     polyethylene glycol (GLYCOLAX) 17 gram/dose powder [POLYETHYLENE GLYCOL (GLYCOLAX) 17 GRAM/DOSE POWDER] Take 17 g by mouth daily as needed. 235 g 11     vitamin C (ASCORBIC ACID) 250 MG TABS tablet Take 1 tablet (250 mg) by mouth daily 30 tablet 0     Keyla Walsh, Adult-Geriatric Nurse Practitioner  [facility] - Internal Medicine Team     10/15/2021

## 2021-10-15 NOTE — PATIENT INSTRUCTIONS
Your labs are processing, I will release results on my chart once they are back.    Continue with the Lymphedema clinic.    Follow up if having worsening symptoms before December.

## 2021-10-25 NOTE — PATIENT INSTRUCTIONS
Thank you for choosing us for your care. I have placed an order for a prescription so that you can start treatment. View your full visit summary for details by clicking on the link below. Your pharmacist will able to address any questions you may have about the medication.      If you're not feeling better within 2-3 weeks please schedule an appointment.  You can schedule an appointment right here in [facility], or call [phone]  If the visit is for the same symptoms as your eVisit, we'll refund the cost of your eVisit if seen within seven days.

## 2021-10-26 NOTE — TELEPHONE ENCOUNTER
Reason for Call:  Home Health Care    Kolby with [facility] called regarding (reason for call): Physicians orders for patient    Orders are needed for this patient. PT ONLY, patient had another episode of Exacerbation will be needing freq 1 week 1, 2 week 1 and skip a week and one more visit. Please advise.    PT: YES    OT: NONE    Skilled Nursing: NONE    Pt Provider: RADHA    Phone Number Homecare Nurse can be reached at: [phone]    Can we leave a detailed message on this number? YES    Phone number patient can be reached at: [place] number on file [phone] (home)    Best Time: ANY    Call taken on 10/26/2021 at 4:38 PM by Huber Estrada

## 2021-10-26 NOTE — TELEPHONE ENCOUNTER
I approve    PT ONLY, patient had another episode of Exacerbation will be needing freq 1 week 1, 2 week 1 and skip a week and one more visit. Please advise.

## 2021-11-24 NOTE — TELEPHONE ENCOUNTER
Reason for Call:  Home Health Care    Kolby PT with [facility] called regarding (reason for call): Verbal     Orders are needed for this patient. PT     PT:   1x a week 3 weeks   Strength, balance and gait training.     OT: NONE     Skilled Nursing: NONE     Pt Provider: Tiesha Barba MD    Phone Number Homecare Nurse can be reached at: [phone]    Can we leave a detailed message on this number? YES      Call taken on 11/24/2021 at 1:38 PM by BRIDGER Olivia            1x a week 3 weeks   Strength, balance and gait training.

## 2021-12-15 NOTE — PROGRESS NOTES
Assessment & Plan     Systolic hypertension, blood pressure is a bit up in 148/66.  Patient is on Lasix 20 mg daily, levothyroxine 50 mcg daily, lisinopril 20 mg daily.  She is also on some magnesium 1 tablet daily.  Metoprolol 25 mg every evening.  - lisinopril (ZESTRIL) 20 MG tablet  Dispense: 90 tablet; Refill: 3    Slow transit constipation, patient is using a stool softener 1 a day and seems to be controlling this.  She takes MiraLAX as needed.  - docusate sodium (COLACE) 100 MG capsule  Dispense: 90 capsule; Refill: 1    Need for vaccination, she is given the Covid booster today.  - COVID-19,PF,PFIZER (12+ Yrs PURPLE LABEL)    Essential hypertension, benign, as above.  Patient has had hypokalemia and hyponatremia in the past.  Current BMP [September 30, 2021] shows sodium of 136, potassium of 4.3, estimated GFR at 54, which is better than it was August 18, 2021 [49].  - Basic metabolic panel  (Ca, Cl, CO2, Creat, Gluc, K, Na, BUN)  - Basic metabolic panel  (Ca, Cl, CO2, Creat, Gluc, K, Na, BUN)    Acquired hypothyroidism, TSH elevated at 10.34, October 15, 2020.  Normal at 1.18, October 15, 2021.  However this may have to be checked in future, to make sure that this is not decreasing.    Iron deficiency anemia due to chronic blood loss, patient is on iron as described below.  She has had significant decrease in hemoglobin in the past, and was hospitalized July 7, 2021, and transfused.  We have previously discussed, what she would want to long-term.  She would require transfusion, for hemoglobin of less than 7, or and or if she had symptoms.  If her hemoglobin, is persistently low, would consider referral to hematology for intravenous transfusions.  Patient declined other procedures, such as referral to gastroenterology for endoscopies.    She does also have a bloody nose, which occurred recently, involving her right nostril.  She has had this before, and has required cauterization.  This might also lead to  her iron deficiency anemia.  If this recurs and/or if she is unable to stop the bleeding, she should go to the emergency department for cautery.    - CBC with platelets and differential  - CBC with platelets and differential    Leg swelling, wrapping which the patient has been receiving at home, will treat as well.  However family discussed with me, that they dismissed her from home care, as she had done pretty well.  The swelling has returned.  Family have all all organizing a company called [employer], private pay, to continue with wrapping her legs, to control her leg edema.  They will also be checking her weight and her blood pressure.  Patient lives in a townhouse.  They have arranged for people to be with her most of the time.  She does have difficulty with tightening of the Velcro, with the compression garments.    She hurt her back at the end of September.  She could not get up by herself.  She had physical therapy and it sounds like Occupational Therapy at that time.  Physical therapy has just completed, yesterday.    68 minutes spent on the date of the encounter doing chart review, review of outside records, review of test results, patient visit and documentation, as detailed.  Also discussion with family who are present during the clinic visit today.      MEDICATIONS:   Orders Placed This Encounter   Medications     lisinopril (ZESTRIL) 20 MG tablet     Sig: Take 1 tablet (20 mg) by mouth daily     Dispense:  90 tablet     Refill:  3     docusate sodium (COLACE) 100 MG capsule     Sig: Take 1 capsule (100 mg) by mouth daily     Dispense:  90 capsule     Refill:  1     Medications Discontinued During This Encounter   Medication Reason     traMADol (ULTRAM) 50 MG tablet Therapy completed     lisinopriL (PRINIVIL,ZESTRIL) 20 MG tablet Reorder     docusate sodium (COLACE) 100 MG capsule Reorder          - Continue other medications without change  There are no Patient Instructions on file for this  "visit.    Return in about 3 months (around 3/15/2022) for Follow up, with me.    Tiesha Barba MD  [facility]    Sunita Giraldo is a 89 year old who presents for the following health issues     HPI   Here for follow up.    I saw the patient August 18, 2021: Patient has significant edema of her lower extremities, lymphedema.  She was seen by internal medicine, Dr. Palomares, September 14, 2021.  Lasix, 20 mg, [extra], in the afternoon for 3 to 5 days was prescribed.  Patient was seen by Linda Rangel, September 30, 2021.  She was seen again October 15, and at that time reported that home care was coming and wrapping up her legs, 3 times a week, for 2 weeks.  They opted to continue with once a week.      Office visit 8/18/2021:  \"Hypokalemia, while the patient was in hospital, her potassium level was decreased at 3.4.  Sodium level was also decreased at 133.  When she was seen July 21, 2021 at [facility], by Keyla Walsh, patient sodium level was low at 128, and potassium was high at 5.  Discussed with patient and her daughter, that she should discontinue the potassium.  I will check a BMP today, to see if her sodium level remains low.  And also check her potassium level.     Iron deficiency anemia due to chronic blood loss, patient had to be admitted to hospital, July 7, 2021.  Hemoglobin was decreased to 6.8.  On discharge this was 9.  Which was seen on the 21st, this was 9.8.  Patient is continued with iron, daily.  She is also taking vitamin C, 250 mg daily.  She is drinking the iron with orange juice.  She has black stools, which is probably related to the iron.   Discussed that as long as her hemoglobin remains above 8 she would not need to go into the hospital acutely unless she has symptoms.  Discussed in long-term, that she may wish for us to refer her to hematology, for intravenous iron infusions, to keep her hemoglobin stable, if her hemoglobin " keeps dropping.  Patient had declined follow-up by gastroenterology, while she was in the hospital, in terms of doing procedures.  While she was in hospital they discussed comfort care with her.  I also discussed that she would have the option of going on hospice, if that is what she wishes, if she decides that she does not wish to keep on going back into the hospital for transfusions.  We will have to see what happens over time.      Results for NEIDA MEDRANO (MRN [id number]) as of 12/15/2021 13:09   Ref. Range 8/18/2021 14:42 9/14/2021 11:39 9/30/2021 15:21   Sodium Latest Ref Range: 136 - 145 mmol/L 134 (L) 136 136   Potassium Latest Ref Range: 3.5 - 5.0 mmol/L 4.3 4.4 4.3   Chloride Latest Ref Range: 98 - 107 mmol/L 97 (L) 100 99   Carbon Dioxide Latest Ref Range: 22 - 31 mmol/L 25 23 25   Urea Nitrogen Latest Ref Range: 8 - 28 mg/dL 21 25 25   Creatinine Latest Ref Range: 0.60 - 1.10 mg/dL 0.97 0.85 0.89   GFR Estimate Latest Ref Range: >60 mL/min/1.73m2 49 (L) 58 (L) 54 (L)   Calcium Latest Ref Range: 8.5 - 10.5 mg/dL 10.1 10.0 9.9     Results for NEIDA MEDRANO (MRN [id number]) as of 12/15/2021 13:09   Ref. Range 10/15/2021 13:57   WBC Latest Ref Range: 4.0 - 11.0 10e3/uL 5.5   Hemoglobin Latest Ref Range: 11.7 - 15.7 g/dL 10.8 (L)   Hematocrit Latest Ref Range: 35.0 - 47.0 % 33.6 (L)   Platelet Count Latest Ref Range: 150 - 450 10e3/uL 160   RBC Count Latest Ref Range: 3.80 - 5.20 10e6/uL 3.32 (L)   MCV Latest Ref Range: 78 - 100 fL 101 (H)       Results for NEIDA MEDRANO (MRN [id number]) as of 12/15/2021 13:09   Ref. Range 8/18/2021 14:42   Ferritin Latest Ref Range: 10 - 130 ng/mL 157 (H)   Iron Latest Ref Range: 42 - 175 ug/dL 106   Osmolality Blood Latest Ref Range: 270 - 300 mOsm/kg 279   Osmolality Urine Latest Ref Range: 300 - 900 mOsm/kg 229 (L)   Transferrin Latest Ref Range: 212 - 360 mg/dL 263   Transferrin IBC Latest Ref Range: 313 - 563 ug/dL 329   Transferrin Saturation  Latest Ref Range: 20 - 50 % 32     Results for NEIDA MEDRANO (MRN [id number]) as of 12/15/2021 13:09   Ref. Range 5/19/2021 16:06   Vitamin B12 Latest Ref Range: 213 - 816 pg/mL 361       Review of Systems   Rest the review of systems is negative.      Objective    BP (!) 148/66 (BP Location: Right arm, Patient Position: Sitting, Cuff Size: Adult Small)   Pulse 83   Wt 49.4 kg (109 lb)   SpO2 97%   BMI 18.71 kg/m    Body mass index is 18.71 kg/m .  Physical Exam   Patient is interactive and alert, not distressed.  Kyphotic.  Chest is clear.  No murmurs audible in her heart.  Patient has hearing aids.

## 2021-12-17 NOTE — RESULT ENCOUNTER NOTE
Your kidney function is stable, your estimated kidney function is 54, which is still, mild chronic kidney disease, but better than it was in the past [47], normal is more than 60.  Your sodium level is a little low at 135, however I am not concerned about that.  Your blood count has improved, it is currently 11.6, which is slightly low, normal is 11.7-15.7. This is good.    Tiesha Barba MD

## 2022-01-01 ENCOUNTER — OFFICE VISIT (OUTPATIENT)
Dept: INTERNAL MEDICINE | Facility: CLINIC | Age: 87
End: 2022-01-01
Payer: MEDICARE

## 2022-01-01 ENCOUNTER — APPOINTMENT (OUTPATIENT)
Dept: CT IMAGING | Facility: HOSPITAL | Age: 87
DRG: 177 | End: 2022-01-01
Attending: EMERGENCY MEDICINE
Payer: MEDICARE

## 2022-01-01 ENCOUNTER — APPOINTMENT (OUTPATIENT)
Dept: OCCUPATIONAL THERAPY | Facility: HOSPITAL | Age: 87
DRG: 177 | End: 2022-01-01
Payer: MEDICARE

## 2022-01-01 ENCOUNTER — APPOINTMENT (OUTPATIENT)
Dept: OCCUPATIONAL THERAPY | Facility: HOSPITAL | Age: 87
DRG: 177 | End: 2022-01-01
Attending: HOSPITALIST
Payer: MEDICARE

## 2022-01-01 ENCOUNTER — E-VISIT (OUTPATIENT)
Dept: INTERNAL MEDICINE | Facility: CLINIC | Age: 87
End: 2022-01-01
Payer: MEDICARE

## 2022-01-01 ENCOUNTER — DOCUMENTATION ONLY (OUTPATIENT)
Dept: OTHER | Facility: CLINIC | Age: 87
End: 2022-01-01

## 2022-01-01 ENCOUNTER — APPOINTMENT (OUTPATIENT)
Dept: SPEECH THERAPY | Facility: HOSPITAL | Age: 87
DRG: 177 | End: 2022-01-01
Attending: HOSPITALIST
Payer: MEDICARE

## 2022-01-01 ENCOUNTER — MEDICAL CORRESPONDENCE (OUTPATIENT)
Dept: HEALTH INFORMATION MANAGEMENT | Facility: CLINIC | Age: 87
End: 2022-01-01

## 2022-01-01 ENCOUNTER — APPOINTMENT (OUTPATIENT)
Dept: SPEECH THERAPY | Facility: HOSPITAL | Age: 87
DRG: 177 | End: 2022-01-01
Payer: MEDICARE

## 2022-01-01 ENCOUNTER — OFFICE VISIT (OUTPATIENT)
Dept: CARDIOLOGY | Facility: CLINIC | Age: 87
End: 2022-01-01
Payer: MEDICARE

## 2022-01-01 ENCOUNTER — NURSE TRIAGE (OUTPATIENT)
Dept: NURSING | Facility: CLINIC | Age: 87
End: 2022-01-01
Payer: MEDICARE

## 2022-01-01 ENCOUNTER — TELEPHONE (OUTPATIENT)
Dept: INTERNAL MEDICINE | Facility: CLINIC | Age: 87
End: 2022-01-01

## 2022-01-01 ENCOUNTER — APPOINTMENT (OUTPATIENT)
Dept: RADIOLOGY | Facility: HOSPITAL | Age: 87
DRG: 177 | End: 2022-01-01
Attending: EMERGENCY MEDICINE
Payer: MEDICARE

## 2022-01-01 ENCOUNTER — APPOINTMENT (OUTPATIENT)
Dept: PHYSICAL THERAPY | Facility: HOSPITAL | Age: 87
DRG: 177 | End: 2022-01-01
Attending: HOSPITALIST
Payer: MEDICARE

## 2022-01-01 ENCOUNTER — PATIENT OUTREACH (OUTPATIENT)
Dept: CARE COORDINATION | Facility: CLINIC | Age: 87
End: 2022-01-01

## 2022-01-01 ENCOUNTER — MYC MEDICAL ADVICE (OUTPATIENT)
Dept: INTERNAL MEDICINE | Facility: CLINIC | Age: 87
End: 2022-01-01
Payer: MEDICARE

## 2022-01-01 ENCOUNTER — TELEPHONE (OUTPATIENT)
Dept: INTERNAL MEDICINE | Facility: CLINIC | Age: 87
End: 2022-01-01
Payer: MEDICARE

## 2022-01-01 ENCOUNTER — APPOINTMENT (OUTPATIENT)
Dept: PHYSICAL THERAPY | Facility: HOSPITAL | Age: 87
DRG: 177 | End: 2022-01-01
Payer: MEDICARE

## 2022-01-01 ENCOUNTER — MEDICAL CORRESPONDENCE (OUTPATIENT)
Dept: HEALTH INFORMATION MANAGEMENT | Facility: CLINIC | Age: 87
End: 2022-01-01
Payer: MEDICARE

## 2022-01-01 ENCOUNTER — HOSPITAL ENCOUNTER (INPATIENT)
Facility: HOSPITAL | Age: 87
LOS: 4 days | Discharge: HOSPICE | DRG: 177 | End: 2022-08-21
Attending: EMERGENCY MEDICINE | Admitting: HOSPITALIST
Payer: MEDICARE

## 2022-01-01 VITALS
RESPIRATION RATE: 16 BRPM | WEIGHT: 97 LBS | DIASTOLIC BLOOD PRESSURE: 50 MMHG | HEART RATE: 80 BPM | SYSTOLIC BLOOD PRESSURE: 170 MMHG | BODY MASS INDEX: 19.56 KG/M2 | HEIGHT: 59 IN

## 2022-01-01 VITALS — HEART RATE: 103 BPM | OXYGEN SATURATION: 96 % | DIASTOLIC BLOOD PRESSURE: 90 MMHG | SYSTOLIC BLOOD PRESSURE: 158 MMHG

## 2022-01-01 VITALS
WEIGHT: 116 LBS | DIASTOLIC BLOOD PRESSURE: 56 MMHG | HEART RATE: 77 BPM | BODY MASS INDEX: 19.91 KG/M2 | OXYGEN SATURATION: 97 % | SYSTOLIC BLOOD PRESSURE: 138 MMHG

## 2022-01-01 VITALS
DIASTOLIC BLOOD PRESSURE: 62 MMHG | WEIGHT: 99 LBS | TEMPERATURE: 97.5 F | HEART RATE: 70 BPM | SYSTOLIC BLOOD PRESSURE: 112 MMHG | BODY MASS INDEX: 16.9 KG/M2 | OXYGEN SATURATION: 97 % | HEIGHT: 64 IN

## 2022-01-01 VITALS
HEART RATE: 71 BPM | BODY MASS INDEX: 18.98 KG/M2 | OXYGEN SATURATION: 96 % | WEIGHT: 110.6 LBS | SYSTOLIC BLOOD PRESSURE: 140 MMHG | RESPIRATION RATE: 16 BRPM | TEMPERATURE: 98.2 F | DIASTOLIC BLOOD PRESSURE: 74 MMHG

## 2022-01-01 VITALS
OXYGEN SATURATION: 97 % | DIASTOLIC BLOOD PRESSURE: 80 MMHG | HEART RATE: 64 BPM | DIASTOLIC BLOOD PRESSURE: 100 MMHG | SYSTOLIC BLOOD PRESSURE: 120 MMHG | BODY MASS INDEX: 16.73 KG/M2 | HEART RATE: 66 BPM | SYSTOLIC BLOOD PRESSURE: 180 MMHG | HEIGHT: 64 IN | RESPIRATION RATE: 14 BRPM | HEIGHT: 64 IN | WEIGHT: 98 LBS | WEIGHT: 102 LBS | BODY MASS INDEX: 17.42 KG/M2

## 2022-01-01 VITALS
DIASTOLIC BLOOD PRESSURE: 58 MMHG | BODY MASS INDEX: 16.17 KG/M2 | SYSTOLIC BLOOD PRESSURE: 164 MMHG | HEART RATE: 88 BPM | WEIGHT: 94.2 LBS | OXYGEN SATURATION: 97 %

## 2022-01-01 VITALS
SYSTOLIC BLOOD PRESSURE: 181 MMHG | TEMPERATURE: 98.3 F | RESPIRATION RATE: 16 BRPM | OXYGEN SATURATION: 95 % | HEART RATE: 68 BPM | DIASTOLIC BLOOD PRESSURE: 80 MMHG

## 2022-01-01 VITALS
HEART RATE: 82 BPM | BODY MASS INDEX: 19.91 KG/M2 | RESPIRATION RATE: 16 BRPM | WEIGHT: 116 LBS | DIASTOLIC BLOOD PRESSURE: 70 MMHG | SYSTOLIC BLOOD PRESSURE: 162 MMHG

## 2022-01-01 VITALS
HEIGHT: 64 IN | WEIGHT: 98 LBS | BODY MASS INDEX: 16.73 KG/M2 | HEART RATE: 56 BPM | SYSTOLIC BLOOD PRESSURE: 150 MMHG | DIASTOLIC BLOOD PRESSURE: 60 MMHG | OXYGEN SATURATION: 97 %

## 2022-01-01 VITALS
SYSTOLIC BLOOD PRESSURE: 168 MMHG | DIASTOLIC BLOOD PRESSURE: 80 MMHG | BODY MASS INDEX: 17.24 KG/M2 | HEART RATE: 72 BPM | HEIGHT: 64 IN | WEIGHT: 101 LBS | OXYGEN SATURATION: 98 %

## 2022-01-01 VITALS
SYSTOLIC BLOOD PRESSURE: 138 MMHG | BODY MASS INDEX: 16.22 KG/M2 | DIASTOLIC BLOOD PRESSURE: 60 MMHG | HEART RATE: 60 BPM | WEIGHT: 95 LBS | HEIGHT: 64 IN | OXYGEN SATURATION: 97 %

## 2022-01-01 VITALS
HEART RATE: 74 BPM | OXYGEN SATURATION: 98 % | DIASTOLIC BLOOD PRESSURE: 80 MMHG | BODY MASS INDEX: 16.39 KG/M2 | WEIGHT: 96 LBS | SYSTOLIC BLOOD PRESSURE: 142 MMHG | HEIGHT: 64 IN

## 2022-01-01 VITALS
TEMPERATURE: 98.7 F | DIASTOLIC BLOOD PRESSURE: 67 MMHG | HEART RATE: 80 BPM | WEIGHT: 110 LBS | BODY MASS INDEX: 19.49 KG/M2 | SYSTOLIC BLOOD PRESSURE: 127 MMHG | OXYGEN SATURATION: 94 % | HEIGHT: 63 IN | RESPIRATION RATE: 16 BRPM

## 2022-01-01 VITALS
HEIGHT: 64 IN | HEART RATE: 70 BPM | DIASTOLIC BLOOD PRESSURE: 62 MMHG | OXYGEN SATURATION: 98 % | WEIGHT: 94.7 LBS | BODY MASS INDEX: 16.17 KG/M2 | SYSTOLIC BLOOD PRESSURE: 104 MMHG

## 2022-01-01 VITALS
SYSTOLIC BLOOD PRESSURE: 166 MMHG | BODY MASS INDEX: 15.88 KG/M2 | DIASTOLIC BLOOD PRESSURE: 36 MMHG | HEART RATE: 60 BPM | HEIGHT: 64 IN | WEIGHT: 93 LBS | RESPIRATION RATE: 16 BRPM

## 2022-01-01 VITALS
HEART RATE: 72 BPM | BODY MASS INDEX: 19.96 KG/M2 | TEMPERATURE: 98.2 F | WEIGHT: 99 LBS | HEIGHT: 59 IN | DIASTOLIC BLOOD PRESSURE: 60 MMHG | RESPIRATION RATE: 14 BRPM | SYSTOLIC BLOOD PRESSURE: 120 MMHG

## 2022-01-01 VITALS
SYSTOLIC BLOOD PRESSURE: 160 MMHG | DIASTOLIC BLOOD PRESSURE: 80 MMHG | HEIGHT: 64 IN | WEIGHT: 95.19 LBS | BODY MASS INDEX: 16.25 KG/M2

## 2022-01-01 VITALS
WEIGHT: 99.8 LBS | SYSTOLIC BLOOD PRESSURE: 173 MMHG | HEART RATE: 59 BPM | DIASTOLIC BLOOD PRESSURE: 89 MMHG | BODY MASS INDEX: 17.13 KG/M2

## 2022-01-01 VITALS
OXYGEN SATURATION: 99 % | BODY MASS INDEX: 16.73 KG/M2 | SYSTOLIC BLOOD PRESSURE: 102 MMHG | DIASTOLIC BLOOD PRESSURE: 60 MMHG | WEIGHT: 98 LBS | HEIGHT: 64 IN | TEMPERATURE: 96.6 F | HEART RATE: 70 BPM

## 2022-01-01 DIAGNOSIS — R23.8 SKIN BREAKDOWN: ICD-10-CM

## 2022-01-01 DIAGNOSIS — R09.02 HYPOXIA: ICD-10-CM

## 2022-01-01 DIAGNOSIS — Z23 HIGH PRIORITY FOR 2019-NCOV VACCINE: ICD-10-CM

## 2022-01-01 DIAGNOSIS — E03.9 ACQUIRED HYPOTHYROIDISM: ICD-10-CM

## 2022-01-01 DIAGNOSIS — I21.4 NSTEMI (NON-ST ELEVATED MYOCARDIAL INFARCTION) (H): ICD-10-CM

## 2022-01-01 DIAGNOSIS — G89.29 CHRONIC BILATERAL THORACIC BACK PAIN: ICD-10-CM

## 2022-01-01 DIAGNOSIS — M54.6 CHRONIC BILATERAL THORACIC BACK PAIN: ICD-10-CM

## 2022-01-01 DIAGNOSIS — I10 ESSENTIAL HYPERTENSION, BENIGN: ICD-10-CM
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
## 2022-01-01 DIAGNOSIS — D50.0 IRON DEFICIENCY ANEMIA DUE TO CHRONIC BLOOD LOSS: ICD-10-CM

## 2022-01-01 DIAGNOSIS — H10.33 ACUTE BACTERIAL CONJUNCTIVITIS OF BOTH EYES: Primary | ICD-10-CM

## 2022-01-01 DIAGNOSIS — K59.01 SLOW TRANSIT CONSTIPATION: ICD-10-CM

## 2022-01-01 DIAGNOSIS — S22.31XA CLOSED FRACTURE OF ONE RIB OF RIGHT SIDE, INITIAL ENCOUNTER: ICD-10-CM

## 2022-01-01 DIAGNOSIS — R07.81 RIB PAIN: ICD-10-CM

## 2022-01-01 DIAGNOSIS — I89.0 LYMPHEDEMA: ICD-10-CM

## 2022-01-01 DIAGNOSIS — E44.1 MILD MALNUTRITION (H): ICD-10-CM

## 2022-01-01 DIAGNOSIS — Z13.29 SCREENING FOR ENDOCRINE, NUTRITIONAL, METABOLIC AND IMMUNITY DISORDER: ICD-10-CM

## 2022-01-01 DIAGNOSIS — Z13.0 SCREENING FOR ENDOCRINE, NUTRITIONAL, METABOLIC AND IMMUNITY DISORDER: ICD-10-CM

## 2022-01-01 DIAGNOSIS — J69.0 ASPIRATION PNEUMONIA OF BOTH LOWER LOBES, UNSPECIFIED ASPIRATION PNEUMONIA TYPE (H): ICD-10-CM

## 2022-01-01 DIAGNOSIS — J30.2 SEASONAL ALLERGIC RHINITIS, UNSPECIFIED TRIGGER: ICD-10-CM

## 2022-01-01 DIAGNOSIS — Z13.21 SCREENING FOR ENDOCRINE, NUTRITIONAL, METABOLIC AND IMMUNITY DISORDER: ICD-10-CM

## 2022-01-01 DIAGNOSIS — H10.11 ALLERGIC CONJUNCTIVITIS, RIGHT: Primary | ICD-10-CM

## 2022-01-01 DIAGNOSIS — F41.9 ANXIETY: ICD-10-CM

## 2022-01-01 DIAGNOSIS — I89.0 LYMPHEDEMA OF BOTH LOWER EXTREMITIES: ICD-10-CM

## 2022-01-01 DIAGNOSIS — R06.02 SOB (SHORTNESS OF BREATH): ICD-10-CM

## 2022-01-01 DIAGNOSIS — E87.1 HYPONATREMIA: ICD-10-CM

## 2022-01-01 DIAGNOSIS — Z13.228 SCREENING FOR ENDOCRINE, NUTRITIONAL, METABOLIC AND IMMUNITY DISORDER: ICD-10-CM

## 2022-01-01 DIAGNOSIS — I89.0 LYMPHEDEMA: Primary | ICD-10-CM

## 2022-01-01 DIAGNOSIS — R10.13 EPIGASTRIC PAIN: ICD-10-CM

## 2022-01-01 DIAGNOSIS — L29.9 ITCHING: ICD-10-CM

## 2022-01-01 DIAGNOSIS — J01.40 ACUTE NON-RECURRENT PANSINUSITIS: ICD-10-CM

## 2022-01-01 DIAGNOSIS — I49.3 PVC'S (PREMATURE VENTRICULAR CONTRACTIONS): Primary | ICD-10-CM

## 2022-01-01 LAB
ALBUMIN SERPL-MCNC: 3 G/DL (ref 3.5–5)
ALBUMIN UR-MCNC: NEGATIVE MG/DL
ANION GAP SERPL CALCULATED.3IONS-SCNC: 12 MMOL/L (ref 5–18)
ANION GAP SERPL CALCULATED.3IONS-SCNC: 13 MMOL/L (ref 5–18)
ANION GAP SERPL CALCULATED.3IONS-SCNC: 14 MMOL/L (ref 7–15)
ANION GAP SERPL CALCULATED.3IONS-SCNC: 8 MMOL/L (ref 5–18)
ANION GAP SERPL CALCULATED.3IONS-SCNC: 9 MMOL/L (ref 5–18)
APPEARANCE UR: CLEAR
ATRIAL RATE - MUSE: 110 BPM
BACTERIA #/AREA URNS HPF: ABNORMAL /HPF
BASOPHILS # BLD AUTO: 0.1 10E3/UL (ref 0–0.2)
BASOPHILS # BLD AUTO: 0.1 10E3/UL (ref 0–0.2)
BASOPHILS NFR BLD AUTO: 1 %
BASOPHILS NFR BLD AUTO: 1 %
BILIRUB UR QL STRIP: NEGATIVE
BUN SERPL-MCNC: 10 MG/DL (ref 8–28)
BUN SERPL-MCNC: 15.3 MG/DL (ref 8–23)
BUN SERPL-MCNC: 16 MG/DL (ref 8–28)
BUN SERPL-MCNC: 17 MG/DL (ref 8–28)
BUN SERPL-MCNC: 20 MG/DL (ref 8–28)
C REACTIVE PROTEIN LHE: 5.7 MG/DL (ref 0–?)
CALCIUM SERPL-MCNC: 9.1 MG/DL (ref 8.5–10.5)
CALCIUM SERPL-MCNC: 9.3 MG/DL (ref 8.5–10.5)
CALCIUM SERPL-MCNC: 9.3 MG/DL (ref 8.5–10.5)
CALCIUM SERPL-MCNC: 9.5 MG/DL (ref 8.2–9.6)
CALCIUM SERPL-MCNC: 9.9 MG/DL (ref 8.5–10.5)
CHLORIDE BLD-SCNC: 100 MMOL/L (ref 98–107)
CHLORIDE BLD-SCNC: 94 MMOL/L (ref 98–107)
CHLORIDE BLD-SCNC: 97 MMOL/L (ref 98–107)
CHLORIDE BLD-SCNC: 99 MMOL/L (ref 98–107)
CHLORIDE SERPL-SCNC: 89 MMOL/L (ref 98–107)
CO2 SERPL-SCNC: 21 MMOL/L (ref 22–31)
CO2 SERPL-SCNC: 25 MMOL/L (ref 22–31)
CO2 SERPL-SCNC: 25 MMOL/L (ref 22–31)
CO2 SERPL-SCNC: 26 MMOL/L (ref 22–31)
COLOR UR AUTO: ABNORMAL
CREAT SERPL-MCNC: 0.74 MG/DL (ref 0.51–0.95)
CREAT SERPL-MCNC: 0.74 MG/DL (ref 0.6–1.1)
CREAT SERPL-MCNC: 0.81 MG/DL (ref 0.6–1.1)
CREAT SERPL-MCNC: 0.81 MG/DL (ref 0.6–1.1)
CREAT SERPL-MCNC: 0.82 MG/DL (ref 0.6–1.1)
DEPRECATED HCO3 PLAS-SCNC: 24 MMOL/L (ref 22–29)
DIASTOLIC BLOOD PRESSURE - MUSE: NORMAL MMHG
EOSINOPHIL # BLD AUTO: 0.4 10E3/UL (ref 0–0.7)
EOSINOPHIL # BLD AUTO: 0.5 10E3/UL (ref 0–0.7)
EOSINOPHIL NFR BLD AUTO: 7 %
EOSINOPHIL NFR BLD AUTO: 8 %
ERYTHROCYTE [DISTWIDTH] IN BLOOD BY AUTOMATED COUNT: 12.3 % (ref 10–15)
ERYTHROCYTE [DISTWIDTH] IN BLOOD BY AUTOMATED COUNT: 12.5 % (ref 10–15)
ERYTHROCYTE [DISTWIDTH] IN BLOOD BY AUTOMATED COUNT: 12.6 % (ref 10–15)
ERYTHROCYTE [DISTWIDTH] IN BLOOD BY AUTOMATED COUNT: 12.7 % (ref 10–15)
ERYTHROCYTE [SEDIMENTATION RATE] IN BLOOD BY WESTERGREN METHOD: 22 MM/HR (ref 0–20)
FERRITIN SERPL-MCNC: 78 NG/ML (ref 10–130)
GFR SERPL CREATININE-BSD FRML MDRD: 64 ML/MIN/1.73M2
GFR SERPL CREATININE-BSD FRML MDRD: 65 ML/MIN/1.73M2
GFR SERPL CREATININE-BSD FRML MDRD: 66 ML/MIN/1.73M2
GFR SERPL CREATININE-BSD FRML MDRD: 73 ML/MIN/1.73M2
GFR SERPL CREATININE-BSD FRML MDRD: 73 ML/MIN/1.73M2
GLUCOSE BLD-MCNC: 100 MG/DL (ref 70–125)
GLUCOSE BLD-MCNC: 109 MG/DL (ref 70–125)
GLUCOSE BLD-MCNC: 87 MG/DL (ref 70–125)
GLUCOSE BLD-MCNC: 91 MG/DL (ref 70–125)
GLUCOSE SERPL-MCNC: 111 MG/DL (ref 70–99)
GLUCOSE UR STRIP-MCNC: NEGATIVE MG/DL
HCT VFR BLD AUTO: 32.3 % (ref 35–47)
HCT VFR BLD AUTO: 33.6 % (ref 35–47)
HCT VFR BLD AUTO: 34.4 % (ref 35–47)
HCT VFR BLD AUTO: 37.6 % (ref 35–47)
HGB BLD-MCNC: 10.5 G/DL (ref 11.7–15.7)
HGB BLD-MCNC: 11.6 G/DL (ref 11.7–15.7)
HGB BLD-MCNC: 11.6 G/DL (ref 11.7–15.7)
HGB BLD-MCNC: 11.9 G/DL (ref 11.7–15.7)
HGB BLD-MCNC: 12.4 G/DL (ref 11.7–15.7)
HGB UR QL STRIP: NEGATIVE
HOLD SPECIMEN: NORMAL
HYALINE CASTS: 1 /LPF
IMM GRANULOCYTES # BLD: 0 10E3/UL
IMM GRANULOCYTES # BLD: 0 10E3/UL
IMM GRANULOCYTES NFR BLD: 0 %
IMM GRANULOCYTES NFR BLD: 0 %
INTERPRETATION ECG - MUSE: NORMAL
IRON SATN MFR SERPL: 22 % (ref 15–46)
IRON SERPL-MCNC: 68 UG/DL (ref 35–180)
KETONES UR STRIP-MCNC: NEGATIVE MG/DL
LACTATE SERPL-SCNC: 0.8 MMOL/L (ref 0.7–2)
LEUKOCYTE ESTERASE UR QL STRIP: NEGATIVE
LYMPHOCYTES # BLD AUTO: 1.5 10E3/UL (ref 0.8–5.3)
LYMPHOCYTES # BLD AUTO: 2.1 10E3/UL (ref 0.8–5.3)
LYMPHOCYTES NFR BLD AUTO: 26 %
LYMPHOCYTES NFR BLD AUTO: 32 %
MCH RBC QN AUTO: 33.2 PG (ref 26.5–33)
MCH RBC QN AUTO: 33.3 PG (ref 26.5–33)
MCH RBC QN AUTO: 33.8 PG (ref 26.5–33)
MCH RBC QN AUTO: 34.5 PG (ref 26.5–33)
MCHC RBC AUTO-ENTMCNC: 32.5 G/DL (ref 31.5–36.5)
MCHC RBC AUTO-ENTMCNC: 33 G/DL (ref 31.5–36.5)
MCHC RBC AUTO-ENTMCNC: 33.7 G/DL (ref 31.5–36.5)
MCHC RBC AUTO-ENTMCNC: 35.4 G/DL (ref 31.5–36.5)
MCV RBC AUTO: 101 FL (ref 78–100)
MCV RBC AUTO: 104 FL (ref 78–100)
MCV RBC AUTO: 97 FL (ref 78–100)
MCV RBC AUTO: 99 FL (ref 78–100)
MONOCYTES # BLD AUTO: 0.8 10E3/UL (ref 0–1.3)
MONOCYTES # BLD AUTO: 0.9 10E3/UL (ref 0–1.3)
MONOCYTES NFR BLD AUTO: 12 %
MONOCYTES NFR BLD AUTO: 16 %
NEUTROPHILS # BLD AUTO: 3 10E3/UL (ref 1.6–8.3)
NEUTROPHILS # BLD AUTO: 3 10E3/UL (ref 1.6–8.3)
NEUTROPHILS NFR BLD AUTO: 46 %
NEUTROPHILS NFR BLD AUTO: 51 %
NITRATE UR QL: NEGATIVE
P AXIS - MUSE: NORMAL DEGREES
PH UR STRIP: 7 [PH] (ref 5–7)
PLATELET # BLD AUTO: 163 10E3/UL (ref 150–450)
PLATELET # BLD AUTO: 171 10E3/UL (ref 150–450)
PLATELET # BLD AUTO: 189 10E3/UL (ref 150–450)
PLATELET # BLD AUTO: 219 10E3/UL (ref 150–450)
POTASSIUM BLD-SCNC: 3.4 MMOL/L (ref 3.5–5)
POTASSIUM BLD-SCNC: 4 MMOL/L (ref 3.5–5)
POTASSIUM BLD-SCNC: 4.2 MMOL/L (ref 3.5–5)
POTASSIUM BLD-SCNC: 4.8 MMOL/L (ref 3.5–5)
POTASSIUM SERPL-SCNC: 4.5 MMOL/L (ref 3.4–5.3)
PR INTERVAL - MUSE: NORMAL MS
PROCALCITONIN SERPL-MCNC: 0.02 NG/ML (ref 0–0.49)
QRS DURATION - MUSE: 140 MS
QT - MUSE: 372 MS
QTC - MUSE: 500 MS
R AXIS - MUSE: -44 DEGREES
RBC # BLD AUTO: 3.11 10E6/UL (ref 3.8–5.2)
RBC # BLD AUTO: 3.45 10E6/UL (ref 3.8–5.2)
RBC # BLD AUTO: 3.48 10E6/UL (ref 3.8–5.2)
RBC # BLD AUTO: 3.73 10E6/UL (ref 3.8–5.2)
RBC URINE: 2 /HPF
SARS-COV-2 RNA RESP QL NAA+PROBE: NEGATIVE
SODIUM SERPL-SCNC: 127 MMOL/L (ref 136–145)
SODIUM SERPL-SCNC: 128 MMOL/L (ref 136–145)
SODIUM SERPL-SCNC: 132 MMOL/L (ref 136–145)
SODIUM SERPL-SCNC: 134 MMOL/L (ref 136–145)
SODIUM SERPL-SCNC: 135 MMOL/L (ref 136–145)
SP GR UR STRIP: 1.01 (ref 1–1.03)
SQUAMOUS EPITHELIAL: <1 /HPF
SYSTOLIC BLOOD PRESSURE - MUSE: NORMAL MMHG
T AXIS - MUSE: 99 DEGREES
TIBC SERPL-MCNC: 308 UG/DL (ref 240–430)
TSH SERPL DL<=0.005 MIU/L-ACNC: 0.95 UIU/ML (ref 0.3–5)
UROBILINOGEN UR STRIP-MCNC: <2 MG/DL
VENTRICULAR RATE- MUSE: 109 BPM
WBC # BLD AUTO: 12.6 10E3/UL (ref 4–11)
WBC # BLD AUTO: 5.9 10E3/UL (ref 4–11)
WBC # BLD AUTO: 6.4 10E3/UL (ref 4–11)
WBC # BLD AUTO: 8.6 10E3/UL (ref 4–11)
WBC URINE: 1 /HPF

## 2022-01-01 PROCEDURE — 99214 OFFICE O/P EST MOD 30 MIN: CPT | Performed by: INTERNAL MEDICINE

## 2022-01-01 PROCEDURE — 36415 COLL VENOUS BLD VENIPUNCTURE: CPT | Performed by: HOSPITALIST

## 2022-01-01 PROCEDURE — 96375 TX/PRO/DX INJ NEW DRUG ADDON: CPT

## 2022-01-01 PROCEDURE — 36415 COLL VENOUS BLD VENIPUNCTURE: CPT | Performed by: STUDENT IN AN ORGANIZED HEALTH CARE EDUCATION/TRAINING PROGRAM

## 2022-01-01 PROCEDURE — 250N000013 HC RX MED GY IP 250 OP 250 PS 637: Performed by: CLINICAL NURSE SPECIALIST

## 2022-01-01 PROCEDURE — 97535 SELF CARE MNGMENT TRAINING: CPT | Mod: GO

## 2022-01-01 PROCEDURE — 120N000001 HC R&B MED SURG/OB

## 2022-01-01 PROCEDURE — 99239 HOSP IP/OBS DSCHRG MGMT >30: CPT | Performed by: HOSPITALIST

## 2022-01-01 PROCEDURE — 73610 X-RAY EXAM OF ANKLE: CPT | Mod: RT

## 2022-01-01 PROCEDURE — 250N000011 HC RX IP 250 OP 636: Performed by: INTERNAL MEDICINE

## 2022-01-01 PROCEDURE — 99215 OFFICE O/P EST HI 40 MIN: CPT | Performed by: NURSE PRACTITIONER

## 2022-01-01 PROCEDURE — 99285 EMERGENCY DEPT VISIT HI MDM: CPT | Mod: 25

## 2022-01-01 PROCEDURE — 97110 THERAPEUTIC EXERCISES: CPT | Mod: GO

## 2022-01-01 PROCEDURE — 96361 HYDRATE IV INFUSION ADD-ON: CPT

## 2022-01-01 PROCEDURE — 250N000013 HC RX MED GY IP 250 OP 250 PS 637: Performed by: STUDENT IN AN ORGANIZED HEALTH CARE EDUCATION/TRAINING PROGRAM

## 2022-01-01 PROCEDURE — 80048 BASIC METABOLIC PNL TOTAL CA: CPT | Performed by: STUDENT IN AN ORGANIZED HEALTH CARE EDUCATION/TRAINING PROGRAM

## 2022-01-01 PROCEDURE — 92526 ORAL FUNCTION THERAPY: CPT | Mod: GN

## 2022-01-01 PROCEDURE — 258N000003 HC RX IP 258 OP 636: Performed by: HOSPITALIST

## 2022-01-01 PROCEDURE — 97166 OT EVAL MOD COMPLEX 45 MIN: CPT | Mod: GO

## 2022-01-01 PROCEDURE — 80048 BASIC METABOLIC PNL TOTAL CA: CPT | Performed by: NURSE PRACTITIONER

## 2022-01-01 PROCEDURE — 250N000013 HC RX MED GY IP 250 OP 250 PS 637: Performed by: HOSPITALIST

## 2022-01-01 PROCEDURE — 92611 MOTION FLUOROSCOPY/SWALLOW: CPT | Mod: GN

## 2022-01-01 PROCEDURE — 85025 COMPLETE CBC W/AUTO DIFF WBC: CPT | Performed by: NURSE PRACTITIONER

## 2022-01-01 PROCEDURE — 250N000011 HC RX IP 250 OP 636: Performed by: HOSPITALIST

## 2022-01-01 PROCEDURE — 83550 IRON BINDING TEST: CPT | Performed by: NURSE PRACTITIONER

## 2022-01-01 PROCEDURE — 99233 SBSQ HOSP IP/OBS HIGH 50: CPT | Performed by: NURSE PRACTITIONER

## 2022-01-01 PROCEDURE — 99233 SBSQ HOSP IP/OBS HIGH 50: CPT | Performed by: STUDENT IN AN ORGANIZED HEALTH CARE EDUCATION/TRAINING PROGRAM

## 2022-01-01 PROCEDURE — 82040 ASSAY OF SERUM ALBUMIN: CPT | Performed by: STUDENT IN AN ORGANIZED HEALTH CARE EDUCATION/TRAINING PROGRAM

## 2022-01-01 PROCEDURE — 73030 X-RAY EXAM OF SHOULDER: CPT | Mod: RT

## 2022-01-01 PROCEDURE — 99233 SBSQ HOSP IP/OBS HIGH 50: CPT | Performed by: CLINICAL NURSE SPECIALIST

## 2022-01-01 PROCEDURE — 36415 COLL VENOUS BLD VENIPUNCTURE: CPT | Performed by: NURSE PRACTITIONER

## 2022-01-01 PROCEDURE — 250N000011 HC RX IP 250 OP 636: Performed by: CLINICAL NURSE SPECIALIST

## 2022-01-01 PROCEDURE — 93005 ELECTROCARDIOGRAM TRACING: CPT | Performed by: EMERGENCY MEDICINE

## 2022-01-01 PROCEDURE — 91305 COVID-19,PF,PFIZER (12+ YRS): CPT | Performed by: NURSE PRACTITIONER

## 2022-01-01 PROCEDURE — G1010 CDSM STANSON: HCPCS

## 2022-01-01 PROCEDURE — 96366 THER/PROPH/DIAG IV INF ADDON: CPT

## 2022-01-01 PROCEDURE — 85027 COMPLETE CBC AUTOMATED: CPT | Performed by: EMERGENCY MEDICINE

## 2022-01-01 PROCEDURE — 250N000013 HC RX MED GY IP 250 OP 250 PS 637: Performed by: INTERNAL MEDICINE

## 2022-01-01 PROCEDURE — 99214 OFFICE O/P EST MOD 30 MIN: CPT | Performed by: NURSE PRACTITIONER

## 2022-01-01 PROCEDURE — 99223 1ST HOSP IP/OBS HIGH 75: CPT | Mod: AI | Performed by: HOSPITALIST

## 2022-01-01 PROCEDURE — 73502 X-RAY EXAM HIP UNI 2-3 VIEWS: CPT

## 2022-01-01 PROCEDURE — 250N000013 HC RX MED GY IP 250 OP 250 PS 637: Performed by: EMERGENCY MEDICINE

## 2022-01-01 PROCEDURE — 99214 OFFICE O/P EST MOD 30 MIN: CPT | Mod: 25 | Performed by: NURSE PRACTITIONER

## 2022-01-01 PROCEDURE — 84145 PROCALCITONIN (PCT): CPT | Performed by: EMERGENCY MEDICINE

## 2022-01-01 PROCEDURE — 81001 URINALYSIS AUTO W/SCOPE: CPT | Performed by: EMERGENCY MEDICINE

## 2022-01-01 PROCEDURE — 96367 TX/PROPH/DG ADDL SEQ IV INF: CPT

## 2022-01-01 PROCEDURE — 97110 THERAPEUTIC EXERCISES: CPT | Mod: GP

## 2022-01-01 PROCEDURE — 36415 COLL VENOUS BLD VENIPUNCTURE: CPT | Performed by: EMERGENCY MEDICINE

## 2022-01-01 PROCEDURE — 85018 HEMOGLOBIN: CPT | Performed by: NURSE PRACTITIONER

## 2022-01-01 PROCEDURE — 83605 ASSAY OF LACTIC ACID: CPT | Performed by: HOSPITALIST

## 2022-01-01 PROCEDURE — 97162 PT EVAL MOD COMPLEX 30 MIN: CPT | Mod: GP

## 2022-01-01 PROCEDURE — 99221 1ST HOSP IP/OBS SF/LOW 40: CPT | Performed by: CLINICAL NURSE SPECIALIST

## 2022-01-01 PROCEDURE — 96127 BRIEF EMOTIONAL/BEHAV ASSMT: CPT | Performed by: NURSE PRACTITIONER

## 2022-01-01 PROCEDURE — 84443 ASSAY THYROID STIM HORMONE: CPT | Performed by: NURSE PRACTITIONER

## 2022-01-01 PROCEDURE — 250N000011 HC RX IP 250 OP 636: Performed by: EMERGENCY MEDICINE

## 2022-01-01 PROCEDURE — 258N000003 HC RX IP 258 OP 636: Performed by: EMERGENCY MEDICINE

## 2022-01-01 PROCEDURE — U0005 INFEC AGEN DETEC AMPLI PROBE: HCPCS | Performed by: EMERGENCY MEDICINE

## 2022-01-01 PROCEDURE — 97530 THERAPEUTIC ACTIVITIES: CPT | Mod: GP

## 2022-01-01 PROCEDURE — 82728 ASSAY OF FERRITIN: CPT | Performed by: NURSE PRACTITIONER

## 2022-01-01 PROCEDURE — 99233 SBSQ HOSP IP/OBS HIGH 50: CPT | Performed by: INTERNAL MEDICINE

## 2022-01-01 PROCEDURE — 85652 RBC SED RATE AUTOMATED: CPT | Performed by: STUDENT IN AN ORGANIZED HEALTH CARE EDUCATION/TRAINING PROGRAM

## 2022-01-01 PROCEDURE — 99421 OL DIG E/M SVC 5-10 MIN: CPT | Performed by: NURSE PRACTITIONER

## 2022-01-01 PROCEDURE — C9803 HOPD COVID-19 SPEC COLLECT: HCPCS

## 2022-01-01 PROCEDURE — 71250 CT THORAX DX C-: CPT | Mod: MG

## 2022-01-01 PROCEDURE — 0054A COVID-19,PF,PFIZER (12+ YRS): CPT | Performed by: NURSE PRACTITIONER

## 2022-01-01 PROCEDURE — 96365 THER/PROPH/DIAG IV INF INIT: CPT

## 2022-01-01 PROCEDURE — 82310 ASSAY OF CALCIUM: CPT | Performed by: EMERGENCY MEDICINE

## 2022-01-01 PROCEDURE — 85027 COMPLETE CBC AUTOMATED: CPT | Performed by: STUDENT IN AN ORGANIZED HEALTH CARE EDUCATION/TRAINING PROGRAM

## 2022-01-01 PROCEDURE — 86140 C-REACTIVE PROTEIN: CPT | Performed by: STUDENT IN AN ORGANIZED HEALTH CARE EDUCATION/TRAINING PROGRAM

## 2022-01-01 RX ORDER — PROCHLORPERAZINE 25 MG
12.5 SUPPOSITORY, RECTAL RECTAL EVERY 12 HOURS PRN
Status: DISCONTINUED | OUTPATIENT
Start: 2022-01-01 | End: 2022-01-01 | Stop reason: HOSPADM

## 2022-01-01 RX ORDER — OLOPATADINE HYDROCHLORIDE 1 MG/ML
1 SOLUTION/ DROPS OPHTHALMIC 2 TIMES DAILY
Qty: 10 ML | Refills: 3 | Status: ON HOLD | OUTPATIENT
Start: 2022-01-01 | End: 2022-01-01

## 2022-01-01 RX ORDER — LIDOCAINE 4 G/G
2 PATCH TOPICAL
Status: DISCONTINUED | OUTPATIENT
Start: 2022-01-01 | End: 2022-01-01 | Stop reason: HOSPADM

## 2022-01-01 RX ORDER — AMPICILLIN AND SULBACTAM 2; 1 G/1; G/1
3 INJECTION, POWDER, FOR SOLUTION INTRAMUSCULAR; INTRAVENOUS ONCE
Status: COMPLETED | OUTPATIENT
Start: 2022-01-01 | End: 2022-01-01

## 2022-01-01 RX ORDER — TRAMADOL HYDROCHLORIDE 50 MG/1
50 TABLET ORAL EVERY 6 HOURS PRN
Status: DISCONTINUED | OUTPATIENT
Start: 2022-01-01 | End: 2022-01-01 | Stop reason: HOSPADM

## 2022-01-01 RX ORDER — AMLODIPINE BESYLATE 5 MG/1
5 TABLET ORAL ONCE
Status: DISCONTINUED | OUTPATIENT
Start: 2022-01-01 | End: 2022-01-01 | Stop reason: HOSPADM

## 2022-01-01 RX ORDER — MORPHINE SULFATE 20 MG/ML
5 SOLUTION ORAL EVERY 4 HOURS PRN
Qty: 15 ML | Refills: 0 | Status: SHIPPED | OUTPATIENT
Start: 2022-01-01

## 2022-01-01 RX ORDER — METOPROLOL SUCCINATE 25 MG/1
25 TABLET, EXTENDED RELEASE ORAL AT BEDTIME
COMMUNITY
Start: 2022-01-01

## 2022-01-01 RX ORDER — AMOXICILLIN 250 MG
1 CAPSULE ORAL 2 TIMES DAILY PRN
Status: DISCONTINUED | OUTPATIENT
Start: 2022-01-01 | End: 2022-01-01 | Stop reason: HOSPADM

## 2022-01-01 RX ORDER — HYDRALAZINE HYDROCHLORIDE 20 MG/ML
10 INJECTION INTRAMUSCULAR; INTRAVENOUS EVERY 6 HOURS PRN
Status: DISCONTINUED | OUTPATIENT
Start: 2022-01-01 | End: 2022-01-01 | Stop reason: HOSPADM

## 2022-01-01 RX ORDER — LORAZEPAM 2 MG/ML
.25-.5 CONCENTRATE ORAL EVERY 4 HOURS PRN
Qty: 30 ML | Refills: 0 | Status: SHIPPED | OUTPATIENT
Start: 2022-01-01

## 2022-01-01 RX ORDER — ALBUTEROL SULFATE 0.83 MG/ML
3 SOLUTION RESPIRATORY (INHALATION)
Status: DISCONTINUED | OUTPATIENT
Start: 2022-01-01 | End: 2022-01-01 | Stop reason: HOSPADM

## 2022-01-01 RX ORDER — HALOPERIDOL 2 MG/ML
.5-1 SOLUTION ORAL EVERY 6 HOURS PRN
Qty: 10 ML | Refills: 0 | Status: SHIPPED | OUTPATIENT
Start: 2022-01-01

## 2022-01-01 RX ORDER — METOPROLOL SUCCINATE 25 MG/1
TABLET, EXTENDED RELEASE ORAL
Qty: 90 TABLET | Refills: 1 | Status: SHIPPED | OUTPATIENT
Start: 2022-01-01 | End: 2022-01-01

## 2022-01-01 RX ORDER — HYDROXYZINE HYDROCHLORIDE 25 MG/1
12.5-25 TABLET, FILM COATED ORAL EVERY 8 HOURS PRN
Qty: 30 TABLET | Refills: 0 | Status: SHIPPED | OUTPATIENT
Start: 2022-01-01

## 2022-01-01 RX ORDER — GUAIFENESIN 600 MG/1
600 TABLET, EXTENDED RELEASE ORAL 2 TIMES DAILY PRN
COMMUNITY
Start: 2022-01-01

## 2022-01-01 RX ORDER — KETOROLAC TROMETHAMINE 15 MG/ML
15 INJECTION, SOLUTION INTRAMUSCULAR; INTRAVENOUS ONCE
Status: COMPLETED | OUTPATIENT
Start: 2022-01-01 | End: 2022-01-01

## 2022-01-01 RX ORDER — BISACODYL 10 MG
10 SUPPOSITORY, RECTAL RECTAL DAILY PRN
Qty: 2 SUPPOSITORY | Refills: 0 | Status: SHIPPED | OUTPATIENT
Start: 2022-01-01

## 2022-01-01 RX ORDER — OLANZAPINE 10 MG/2ML
2.5 INJECTION, POWDER, FOR SOLUTION INTRAMUSCULAR 2 TIMES DAILY PRN
Status: DISCONTINUED | OUTPATIENT
Start: 2022-01-01 | End: 2022-01-01 | Stop reason: HOSPADM

## 2022-01-01 RX ORDER — HYDROMORPHONE HCL IN WATER/PF 6 MG/30 ML
0.2 PATIENT CONTROLLED ANALGESIA SYRINGE INTRAVENOUS
Status: DISCONTINUED | OUTPATIENT
Start: 2022-01-01 | End: 2022-01-01 | Stop reason: HOSPADM

## 2022-01-01 RX ORDER — AMOXICILLIN 250 MG
2 CAPSULE ORAL 2 TIMES DAILY PRN
Status: DISCONTINUED | OUTPATIENT
Start: 2022-01-01 | End: 2022-01-01 | Stop reason: HOSPADM

## 2022-01-01 RX ORDER — POLYETHYLENE GLYCOL 3350 17 G/17G
17 POWDER, FOR SOLUTION ORAL DAILY PRN
COMMUNITY
Start: 2022-01-01

## 2022-01-01 RX ORDER — OLOPATADINE HYDROCHLORIDE 1 MG/ML
1 SOLUTION/ DROPS OPHTHALMIC 2 TIMES DAILY PRN
Status: DISCONTINUED | OUTPATIENT
Start: 2022-01-01 | End: 2022-01-01 | Stop reason: HOSPADM

## 2022-01-01 RX ORDER — METOPROLOL SUCCINATE 25 MG/1
25 TABLET, EXTENDED RELEASE ORAL AT BEDTIME
Status: DISCONTINUED | OUTPATIENT
Start: 2022-01-01 | End: 2022-01-01 | Stop reason: HOSPADM

## 2022-01-01 RX ORDER — NALOXONE HYDROCHLORIDE 0.4 MG/ML
0.4 INJECTION, SOLUTION INTRAMUSCULAR; INTRAVENOUS; SUBCUTANEOUS
Status: DISCONTINUED | OUTPATIENT
Start: 2022-01-01 | End: 2022-01-01 | Stop reason: HOSPADM

## 2022-01-01 RX ORDER — MORPHINE SULFATE 20 MG/ML
5 SOLUTION ORAL EVERY 4 HOURS PRN
Status: DISCONTINUED | OUTPATIENT
Start: 2022-01-01 | End: 2022-01-01 | Stop reason: HOSPADM

## 2022-01-01 RX ORDER — HYDROXYZINE HCL 25 MG
12.5-25 TABLET ORAL EVERY 8 HOURS PRN
Status: DISCONTINUED | OUTPATIENT
Start: 2022-01-01 | End: 2022-01-01 | Stop reason: HOSPADM

## 2022-01-01 RX ORDER — ACETAMINOPHEN 650 MG/1
650 SUPPOSITORY RECTAL EVERY 4 HOURS PRN
Qty: 4 SUPPOSITORY | Refills: 0 | Status: SHIPPED | OUTPATIENT
Start: 2022-01-01

## 2022-01-01 RX ORDER — LEVOTHYROXINE SODIUM 50 UG/1
50 TABLET ORAL DAILY
Qty: 90 TABLET | Refills: 3 | Status: ON HOLD | OUTPATIENT
Start: 2022-01-01 | End: 2022-01-01

## 2022-01-01 RX ORDER — TRAMADOL HYDROCHLORIDE 50 MG/1
25-50 TABLET ORAL EVERY 6 HOURS PRN
Qty: 30 TABLET | Refills: 0 | Status: SHIPPED | OUTPATIENT
Start: 2022-01-01

## 2022-01-01 RX ORDER — PANTOPRAZOLE SODIUM 40 MG/1
40 TABLET, DELAYED RELEASE ORAL AT BEDTIME
Status: DISCONTINUED | OUTPATIENT
Start: 2022-01-01 | End: 2022-01-01 | Stop reason: HOSPADM

## 2022-01-01 RX ORDER — NALOXONE HYDROCHLORIDE 0.4 MG/ML
0.2 INJECTION, SOLUTION INTRAMUSCULAR; INTRAVENOUS; SUBCUTANEOUS
Status: DISCONTINUED | OUTPATIENT
Start: 2022-01-01 | End: 2022-01-01 | Stop reason: HOSPADM

## 2022-01-01 RX ORDER — HYDRALAZINE HYDROCHLORIDE 20 MG/ML
5 INJECTION INTRAMUSCULAR; INTRAVENOUS EVERY 6 HOURS PRN
Status: DISCONTINUED | OUTPATIENT
Start: 2022-01-01 | End: 2022-01-01

## 2022-01-01 RX ORDER — DOXYCYCLINE 100 MG/1
100 CAPSULE ORAL 2 TIMES DAILY
Qty: 20 CAPSULE | Refills: 0 | Status: ON HOLD | OUTPATIENT
Start: 2022-01-01 | End: 2022-01-01

## 2022-01-01 RX ORDER — SENNOSIDES 8.6 MG
650-1300 CAPSULE ORAL
COMMUNITY

## 2022-01-01 RX ORDER — FUROSEMIDE 20 MG
20 TABLET ORAL DAILY
Qty: 90 TABLET | Refills: 3 | Status: ON HOLD | OUTPATIENT
Start: 2022-01-01 | End: 2022-01-01

## 2022-01-01 RX ORDER — HYDROXYZINE HYDROCHLORIDE 10 MG/1
10 TABLET, FILM COATED ORAL
Qty: 30 TABLET | Refills: 0 | Status: ON HOLD | OUTPATIENT
Start: 2022-01-01 | End: 2022-01-01

## 2022-01-01 RX ORDER — AMLODIPINE BESYLATE 5 MG/1
5 TABLET ORAL ONCE
Status: COMPLETED | OUTPATIENT
Start: 2022-01-01 | End: 2022-01-01

## 2022-01-01 RX ORDER — LEVOTHYROXINE SODIUM 50 UG/1
50 TABLET ORAL
COMMUNITY
Start: 2022-01-01

## 2022-01-01 RX ORDER — LIDOCAINE 40 MG/G
CREAM TOPICAL
Status: DISCONTINUED | OUTPATIENT
Start: 2022-01-01 | End: 2022-01-01 | Stop reason: HOSPADM

## 2022-01-01 RX ORDER — LIDOCAINE 4 G/G
2 PATCH TOPICAL EVERY 24 HOURS
Qty: 10 PATCH | Refills: 0 | Status: SHIPPED | OUTPATIENT
Start: 2022-01-01

## 2022-01-01 RX ORDER — AMPICILLIN AND SULBACTAM 2; 1 G/1; G/1
3 INJECTION, POWDER, FOR SOLUTION INTRAMUSCULAR; INTRAVENOUS EVERY 6 HOURS
Status: DISCONTINUED | OUTPATIENT
Start: 2022-01-01 | End: 2022-01-01

## 2022-01-01 RX ORDER — CALCITONIN SALMON 200 [IU]/.09ML
1 SPRAY, METERED NASAL DAILY
Status: DISCONTINUED | OUTPATIENT
Start: 2022-01-01 | End: 2022-01-01 | Stop reason: HOSPADM

## 2022-01-01 RX ORDER — ACETAMINOPHEN 325 MG/1
325 TABLET ORAL EVERY 8 HOURS PRN
Status: DISCONTINUED | OUTPATIENT
Start: 2022-01-01 | End: 2022-01-01 | Stop reason: HOSPADM

## 2022-01-01 RX ORDER — ACETAMINOPHEN 325 MG/1
650 TABLET ORAL ONCE
Status: DISCONTINUED | OUTPATIENT
Start: 2022-01-01 | End: 2022-01-01

## 2022-01-01 RX ORDER — OLOPATADINE HYDROCHLORIDE 1 MG/ML
1 SOLUTION/ DROPS OPHTHALMIC
COMMUNITY
Start: 2022-01-01

## 2022-01-01 RX ORDER — ACETAMINOPHEN 325 MG/1
975 TABLET ORAL EVERY 8 HOURS
Status: DISCONTINUED | OUTPATIENT
Start: 2022-01-01 | End: 2022-01-01 | Stop reason: HOSPADM

## 2022-01-01 RX ORDER — FERROUS SULFATE 325(65) MG
325 TABLET ORAL EVERY OTHER DAY
Status: DISCONTINUED | OUTPATIENT
Start: 2022-01-01 | End: 2022-01-01 | Stop reason: HOSPADM

## 2022-01-01 RX ORDER — PROCHLORPERAZINE MALEATE 5 MG
5 TABLET ORAL EVERY 6 HOURS PRN
Status: DISCONTINUED | OUTPATIENT
Start: 2022-01-01 | End: 2022-01-01 | Stop reason: HOSPADM

## 2022-01-01 RX ORDER — ALBUTEROL SULFATE 90 UG/1
2 AEROSOL, METERED RESPIRATORY (INHALATION) EVERY 6 HOURS PRN
Qty: 18 G | Refills: 1 | Status: SHIPPED | OUTPATIENT
Start: 2022-01-01

## 2022-01-01 RX ORDER — SENNOSIDES A AND B 8.6 MG/1
1-2 TABLET, FILM COATED ORAL 2 TIMES DAILY PRN
Qty: 100 TABLET | Refills: 0 | Status: SHIPPED | OUTPATIENT
Start: 2022-01-01

## 2022-01-01 RX ORDER — FERROUS SULFATE 325(65) MG
325 TABLET ORAL 2 TIMES DAILY WITH MEALS
Status: DISCONTINUED | OUTPATIENT
Start: 2022-01-01 | End: 2022-01-01

## 2022-01-01 RX ORDER — METOPROLOL TARTRATE 1 MG/ML
2.5 INJECTION, SOLUTION INTRAVENOUS EVERY 12 HOURS
Status: DISCONTINUED | OUTPATIENT
Start: 2022-01-01 | End: 2022-01-01

## 2022-01-01 RX ORDER — AMLODIPINE BESYLATE 5 MG/1
10 TABLET ORAL DAILY
Status: DISCONTINUED | OUTPATIENT
Start: 2022-01-01 | End: 2022-01-01 | Stop reason: HOSPADM

## 2022-01-01 RX ORDER — ATROPINE SULFATE 10 MG/ML
1-2 SOLUTION/ DROPS OPHTHALMIC EVERY 4 HOURS PRN
Qty: 5 ML | Refills: 0 | Status: SHIPPED | OUTPATIENT
Start: 2022-01-01

## 2022-01-01 RX ORDER — CLONIDINE 0.1 MG/24H
1 PATCH, EXTENDED RELEASE TRANSDERMAL WEEKLY
Status: DISCONTINUED | OUTPATIENT
Start: 2022-01-01 | End: 2022-01-01 | Stop reason: HOSPADM

## 2022-01-01 RX ORDER — SODIUM CHLORIDE 9 MG/ML
INJECTION, SOLUTION INTRAVENOUS CONTINUOUS
Status: ACTIVE | OUTPATIENT
Start: 2022-01-01 | End: 2022-01-01

## 2022-01-01 RX ORDER — FERROUS SULFATE 325(65) MG
325 TABLET ORAL
Qty: 90 TABLET | Refills: 1 | Status: SHIPPED | OUTPATIENT
Start: 2022-01-01 | End: 2022-01-01

## 2022-01-01 RX ORDER — DOCUSATE SODIUM 100 MG/1
200 CAPSULE, LIQUID FILLED ORAL DAILY
Qty: 180 CAPSULE | Refills: 3 | Status: SHIPPED | OUTPATIENT
Start: 2022-01-01

## 2022-01-01 RX ORDER — METOPROLOL SUCCINATE 25 MG/1
TABLET, EXTENDED RELEASE ORAL
Qty: 90 TABLET | Refills: 1 | Status: ON HOLD | OUTPATIENT
Start: 2022-01-01 | End: 2022-01-01

## 2022-01-01 RX ORDER — LEVOTHYROXINE SODIUM 25 UG/1
50 TABLET ORAL
Status: DISCONTINUED | OUTPATIENT
Start: 2022-01-01 | End: 2022-01-01 | Stop reason: HOSPADM

## 2022-01-01 RX ORDER — POLYMYXIN B SULFATE AND TRIMETHOPRIM 1; 10000 MG/ML; [USP'U]/ML
1-2 SOLUTION OPHTHALMIC EVERY 4 HOURS
Qty: 10 ML | Refills: 0 | Status: SHIPPED | OUTPATIENT
Start: 2022-01-01 | End: 2022-01-01

## 2022-01-01 RX ORDER — HYDROMORPHONE HCL IN WATER/PF 6 MG/30 ML
0.2 PATIENT CONTROLLED ANALGESIA SYRINGE INTRAVENOUS EVERY 12 HOURS PRN
Status: DISCONTINUED | OUTPATIENT
Start: 2022-01-01 | End: 2022-01-01

## 2022-01-01 RX ADMIN — AMPICILLIN SODIUM AND SULBACTAM SODIUM 3 G: 2; 1 INJECTION, POWDER, FOR SOLUTION INTRAMUSCULAR; INTRAVENOUS at 02:11

## 2022-01-01 RX ADMIN — AMPICILLIN SODIUM AND SULBACTAM SODIUM 3 G: 2; 1 INJECTION, POWDER, FOR SOLUTION INTRAMUSCULAR; INTRAVENOUS at 04:16

## 2022-01-01 RX ADMIN — HYDRALAZINE HYDROCHLORIDE 5 MG: 20 INJECTION, SOLUTION INTRAMUSCULAR; INTRAVENOUS at 20:16

## 2022-01-01 RX ADMIN — TRAMADOL HYDROCHLORIDE 50 MG: 50 TABLET, COATED ORAL at 02:10

## 2022-01-01 RX ADMIN — ACETAMINOPHEN 975 MG: 325 TABLET ORAL at 21:56

## 2022-01-01 RX ADMIN — LIDOCAINE 2 PATCH: 246 PATCH TOPICAL at 08:18

## 2022-01-01 RX ADMIN — HYDROMORPHONE HYDROCHLORIDE 0.2 MG: 0.2 INJECTION, SOLUTION INTRAMUSCULAR; INTRAVENOUS; SUBCUTANEOUS at 09:29

## 2022-01-01 RX ADMIN — TRAMADOL HYDROCHLORIDE 50 MG: 50 TABLET, COATED ORAL at 11:53

## 2022-01-01 RX ADMIN — ACETAMINOPHEN 975 MG: 325 TABLET ORAL at 09:21

## 2022-01-01 RX ADMIN — HYDROMORPHONE HYDROCHLORIDE 0.2 MG: 0.2 INJECTION, SOLUTION INTRAMUSCULAR; INTRAVENOUS; SUBCUTANEOUS at 21:47

## 2022-01-01 RX ADMIN — LIDOCAINE 2 PATCH: 246 PATCH TOPICAL at 09:21

## 2022-01-01 RX ADMIN — AMPICILLIN SODIUM AND SULBACTAM SODIUM 3 G: 2; 1 INJECTION, POWDER, FOR SOLUTION INTRAMUSCULAR; INTRAVENOUS at 15:43

## 2022-01-01 RX ADMIN — AMPICILLIN SODIUM AND SULBACTAM SODIUM 3 G: 2; 1 INJECTION, POWDER, FOR SOLUTION INTRAMUSCULAR; INTRAVENOUS at 02:56

## 2022-01-01 RX ADMIN — AMPICILLIN SODIUM AND SULBACTAM SODIUM 3 G: 2; 1 INJECTION, POWDER, FOR SOLUTION INTRAMUSCULAR; INTRAVENOUS at 20:22

## 2022-01-01 RX ADMIN — HYDROMORPHONE HYDROCHLORIDE 0.2 MG: 0.2 INJECTION, SOLUTION INTRAMUSCULAR; INTRAVENOUS; SUBCUTANEOUS at 03:47

## 2022-01-01 RX ADMIN — LEVOTHYROXINE SODIUM 50 MCG: 0.03 TABLET ORAL at 09:21

## 2022-01-01 RX ADMIN — AMPICILLIN SODIUM AND SULBACTAM SODIUM 3 G: 2; 1 INJECTION, POWDER, FOR SOLUTION INTRAMUSCULAR; INTRAVENOUS at 14:55

## 2022-01-01 RX ADMIN — ACETAMINOPHEN 975 MG: 325 TABLET ORAL at 21:01

## 2022-01-01 RX ADMIN — SODIUM CHLORIDE 500 ML: 9 INJECTION, SOLUTION INTRAVENOUS at 01:34

## 2022-01-01 RX ADMIN — METOPROLOL SUCCINATE 25 MG: 25 TABLET, EXTENDED RELEASE ORAL at 21:56

## 2022-01-01 RX ADMIN — LEVOTHYROXINE SODIUM 50 MCG: 0.03 TABLET ORAL at 06:16

## 2022-01-01 RX ADMIN — AMPICILLIN SODIUM AND SULBACTAM SODIUM 3 G: 2; 1 INJECTION, POWDER, FOR SOLUTION INTRAMUSCULAR; INTRAVENOUS at 08:15

## 2022-01-01 RX ADMIN — AMLODIPINE BESYLATE 10 MG: 5 TABLET ORAL at 08:19

## 2022-01-01 RX ADMIN — AMLODIPINE BESYLATE 5 MG: 5 TABLET ORAL at 12:32

## 2022-01-01 RX ADMIN — FERROUS SULFATE TAB 325 MG (65 MG ELEMENTAL FE) 325 MG: 325 (65 FE) TAB at 08:19

## 2022-01-01 RX ADMIN — ACETAMINOPHEN 975 MG: 325 TABLET ORAL at 12:34

## 2022-01-01 RX ADMIN — Medication 1 MG: at 23:36

## 2022-01-01 RX ADMIN — ACETAMINOPHEN 975 MG: 325 TABLET ORAL at 15:02

## 2022-01-01 RX ADMIN — HYDRALAZINE HYDROCHLORIDE 10 MG: 20 INJECTION, SOLUTION INTRAMUSCULAR; INTRAVENOUS at 08:17

## 2022-01-01 RX ADMIN — FERROUS SULFATE TAB 325 MG (65 MG ELEMENTAL FE) 325 MG: 325 (65 FE) TAB at 15:03

## 2022-01-01 RX ADMIN — HYDROMORPHONE HYDROCHLORIDE 1 MG: 2 TABLET ORAL at 12:34

## 2022-01-01 RX ADMIN — CLONIDINE 1 PATCH: 0.1 PATCH TRANSDERMAL at 09:02

## 2022-01-01 RX ADMIN — Medication 1 MG: at 23:46

## 2022-01-01 RX ADMIN — Medication 1 MG: at 20:29

## 2022-01-01 RX ADMIN — FERROUS SULFATE TAB 325 MG (65 MG ELEMENTAL FE) 325 MG: 325 (65 FE) TAB at 19:23

## 2022-01-01 RX ADMIN — ACETAMINOPHEN 975 MG: 325 TABLET ORAL at 21:27

## 2022-01-01 RX ADMIN — AMOXICILLIN AND CLAVULANATE POTASSIUM 1 TABLET: 875; 125 TABLET, FILM COATED ORAL at 19:56

## 2022-01-01 RX ADMIN — SODIUM CHLORIDE: 9 INJECTION, SOLUTION INTRAVENOUS at 05:40

## 2022-01-01 RX ADMIN — AMLODIPINE BESYLATE 10 MG: 5 TABLET ORAL at 09:21

## 2022-01-01 RX ADMIN — HYDRALAZINE HYDROCHLORIDE 5 MG: 20 INJECTION, SOLUTION INTRAMUSCULAR; INTRAVENOUS at 08:36

## 2022-01-01 RX ADMIN — HYDROMORPHONE HYDROCHLORIDE 0.2 MG: 0.2 INJECTION, SOLUTION INTRAMUSCULAR; INTRAVENOUS; SUBCUTANEOUS at 22:59

## 2022-01-01 RX ADMIN — KETOROLAC TROMETHAMINE 15 MG: 15 INJECTION, SOLUTION INTRAMUSCULAR; INTRAVENOUS at 02:08

## 2022-01-01 RX ADMIN — AMOXICILLIN AND CLAVULANATE POTASSIUM 1 TABLET: 875; 125 TABLET, FILM COATED ORAL at 11:53

## 2022-01-01 RX ADMIN — AMPICILLIN SODIUM AND SULBACTAM SODIUM 3 G: 2; 1 INJECTION, POWDER, FOR SOLUTION INTRAMUSCULAR; INTRAVENOUS at 09:23

## 2022-01-01 RX ADMIN — LIDOCAINE 2 PATCH: 246 PATCH TOPICAL at 08:13

## 2022-01-01 RX ADMIN — HYDROMORPHONE HYDROCHLORIDE 0.2 MG: 0.2 INJECTION, SOLUTION INTRAMUSCULAR; INTRAVENOUS; SUBCUTANEOUS at 08:34

## 2022-01-01 RX ADMIN — AMPICILLIN SODIUM AND SULBACTAM SODIUM 3 G: 2; 1 INJECTION, POWDER, FOR SOLUTION INTRAMUSCULAR; INTRAVENOUS at 21:41

## 2022-01-01 RX ADMIN — Medication 1 MG: at 21:56

## 2022-01-01 RX ADMIN — PANTOPRAZOLE SODIUM 40 MG: 40 TABLET, DELAYED RELEASE ORAL at 20:03

## 2022-01-01 RX ADMIN — LIDOCAINE 1 PATCH: 246 PATCH TOPICAL at 08:17

## 2022-01-01 RX ADMIN — FERROUS SULFATE TAB 325 MG (65 MG ELEMENTAL FE) 325 MG: 325 (65 FE) TAB at 18:12

## 2022-01-01 RX ADMIN — CALCITONIN SALMON 1 SPRAY: 200 SPRAY, METERED NASAL at 09:22

## 2022-01-01 RX ADMIN — AMPICILLIN SODIUM AND SULBACTAM SODIUM 3 G: 2; 1 INJECTION, POWDER, FOR SOLUTION INTRAMUSCULAR; INTRAVENOUS at 08:47

## 2022-01-01 RX ADMIN — SODIUM CHLORIDE: 9 INJECTION, SOLUTION INTRAVENOUS at 04:15

## 2022-01-01 RX ADMIN — AMPICILLIN SODIUM AND SULBACTAM SODIUM 3 G: 2; 1 INJECTION, POWDER, FOR SOLUTION INTRAMUSCULAR; INTRAVENOUS at 09:47

## 2022-01-01 RX ADMIN — FERROUS SULFATE TAB 325 MG (65 MG ELEMENTAL FE) 325 MG: 325 (65 FE) TAB at 11:48

## 2022-01-01 RX ADMIN — AMPICILLIN SODIUM AND SULBACTAM SODIUM 3 G: 2; 1 INJECTION, POWDER, FOR SOLUTION INTRAMUSCULAR; INTRAVENOUS at 20:16

## 2022-01-01 RX ADMIN — ACETAMINOPHEN 325 MG: 325 TABLET ORAL at 23:36

## 2022-01-01 RX ADMIN — LIDOCAINE 2 PATCH: 246 PATCH TOPICAL at 08:40

## 2022-01-01 RX ADMIN — LEVOTHYROXINE SODIUM 50 MCG: 0.03 TABLET ORAL at 06:43

## 2022-01-01 RX ADMIN — AMPICILLIN SODIUM AND SULBACTAM SODIUM 3 G: 2; 1 INJECTION, POWDER, FOR SOLUTION INTRAMUSCULAR; INTRAVENOUS at 02:25

## 2022-01-01 RX ADMIN — ACETAMINOPHEN 325 MG: 325 TABLET ORAL at 23:46

## 2022-01-01 RX ADMIN — METOPROLOL SUCCINATE 25 MG: 25 TABLET, EXTENDED RELEASE ORAL at 20:26

## 2022-01-01 RX ADMIN — ACETAMINOPHEN 975 MG: 325 TABLET ORAL at 06:43

## 2022-01-01 RX ADMIN — ACETAMINOPHEN 975 MG: 325 TABLET ORAL at 06:16

## 2022-01-01 RX ADMIN — AMOXICILLIN AND CLAVULANATE POTASSIUM 1 TABLET: 875; 125 TABLET, FILM COATED ORAL at 08:19

## 2022-01-01 RX ADMIN — METOPROLOL SUCCINATE 25 MG: 25 TABLET, EXTENDED RELEASE ORAL at 20:03

## 2022-01-01 RX ADMIN — CALCITONIN SALMON 1 SPRAY: 200 SPRAY, METERED NASAL at 10:29

## 2022-01-01 RX ADMIN — PANTOPRAZOLE SODIUM 40 MG: 40 TABLET, DELAYED RELEASE ORAL at 20:26

## 2022-01-01 ASSESSMENT — ACTIVITIES OF DAILY LIVING (ADL)
ADLS_ACUITY_SCORE: 49
CONCENTRATING,_REMEMBERING_OR_MAKING_DECISIONS_DIFFICULTY: YES
TOILETING: 1-->ASSISTANCE (EQUIPMENT/PERSON) NEEDED (NOT DEVELOPMENTALLY APPROPRIATE)
ADLS_ACUITY_SCORE: 53
CHANGE_IN_FUNCTIONAL_STATUS_SINCE_ONSET_OF_CURRENT_ILLNESS/INJURY: YES
ADLS_ACUITY_SCORE: 53
DRESSING/BATHING_DIFFICULTY: YES
SWALLOWING: 2-->DIFFICULTY SWALLOWING FOODS
ADLS_ACUITY_SCORE: 35
ADLS_ACUITY_SCORE: 53
TOILETING: 1-->ASSISTANCE (EQUIPMENT/PERSON) NEEDED
ADLS_ACUITY_SCORE: 59
ADLS_ACUITY_SCORE: 53
DEPENDENT_IADLS:: CLEANING;COOKING;LAUNDRY;SHOPPING;MEAL PREPARATION;MEDICATION MANAGEMENT;TRANSPORTATION
WEAR_GLASSES_OR_BLIND: YES
ADLS_ACUITY_SCORE: 53
ADLS_ACUITY_SCORE: 53
ADLS_ACUITY_SCORE: 37
TRANSFERRING: 1-->ASSISTANCE (EQUIPMENT/PERSON) NEEDED (NOT DEVELOPMENTALLY APPROPRIATE)
ADLS_ACUITY_SCORE: 53
ADLS_ACUITY_SCORE: 59
DIFFICULTY_EATING/SWALLOWING: YES
ADLS_ACUITY_SCORE: 60
SWALLOWING: 2-->DIFFICULTY SWALLOWING LIQUIDS
ADLS_ACUITY_SCORE: 53
EATING: 1-->ASSISTANCE (EQUIPMENT/PERSON) NEEDED
ADLS_ACUITY_SCORE: 53
ADLS_ACUITY_SCORE: 53
ADLS_ACUITY_SCORE: 35
WALKING_OR_CLIMBING_STAIRS_DIFFICULTY: YES
ADLS_ACUITY_SCORE: 53
EQUIPMENT_CURRENTLY_USED_AT_HOME: WALKER, ROLLING
WALKING_OR_CLIMBING_STAIRS: AMBULATION DIFFICULTY, DEPENDENT
EATING/SWALLOWING: SWALLOWING LIQUIDS;SWALLOWING SOLID FOOD
ADLS_ACUITY_SCORE: 60
ADLS_ACUITY_SCORE: 53
ADLS_ACUITY_SCORE: 59
ADLS_ACUITY_SCORE: 53
ADLS_ACUITY_SCORE: 49
ADLS_ACUITY_SCORE: 53
ADLS_ACUITY_SCORE: 53
ADLS_ACUITY_SCORE: 37
ADLS_ACUITY_SCORE: 53
NUMBER_OF_TIMES_PATIENT_HAS_FALLEN_WITHIN_LAST_SIX_MONTHS: 1
DOING_ERRANDS_INDEPENDENTLY_DIFFICULTY: YES
ADLS_ACUITY_SCORE: 49
ADLS_ACUITY_SCORE: 37
ADLS_ACUITY_SCORE: 37
TRANSFERRING: 1-->ASSISTANCE (EQUIPMENT/PERSON) NEEDED
ADLS_ACUITY_SCORE: 49
DRESS: 1-->ASSISTANCE (EQUIPMENT/PERSON) NEEDED
TOILETING_ASSISTANCE: TOILETING DIFFICULTY, ASSISTANCE 1 PERSON
ADLS_ACUITY_SCORE: 53
FALL_HISTORY_WITHIN_LAST_SIX_MONTHS: YES
ADLS_ACUITY_SCORE: 60
ADLS_ACUITY_SCORE: 53
ADLS_ACUITY_SCORE: 60
ADLS_ACUITY_SCORE: 53
TOILETING_ISSUES: YES
ADLS_ACUITY_SCORE: 53
ADLS_ACUITY_SCORE: 53
ADLS_ACUITY_SCORE: 49
ADLS_ACUITY_SCORE: 53
ADLS_ACUITY_SCORE: 53
ADLS_ACUITY_SCORE: 49
ADLS_ACUITY_SCORE: 49
ADLS_ACUITY_SCORE: 53
DRESSING/BATHING: BATHING DIFFICULTY, ASSISTANCE 1 PERSON
VISION_MANAGEMENT: YES
ADLS_ACUITY_SCORE: 53
ADLS_ACUITY_SCORE: 37
ADLS_ACUITY_SCORE: 53
ADLS_ACUITY_SCORE: 37
ADLS_ACUITY_SCORE: 37
BATHING: 1-->ASSISTANCE NEEDED
ADLS_ACUITY_SCORE: 53
ADLS_ACUITY_SCORE: 35
ADLS_ACUITY_SCORE: 60
DRESS: 1-->ASSISTANCE (EQUIPMENT/PERSON) NEEDED (NOT DEVELOPMENTALLY APPROPRIATE)
ADLS_ACUITY_SCORE: 35

## 2022-01-01 ASSESSMENT — ANXIETY QUESTIONNAIRES
GAD7 TOTAL SCORE: 5
1. FEELING NERVOUS, ANXIOUS, OR ON EDGE: SEVERAL DAYS
7. FEELING AFRAID AS IF SOMETHING AWFUL MIGHT HAPPEN: SEVERAL DAYS
GAD7 TOTAL SCORE: 5
5. BEING SO RESTLESS THAT IT IS HARD TO SIT STILL: NOT AT ALL
2. NOT BEING ABLE TO STOP OR CONTROL WORRYING: SEVERAL DAYS
8. IF YOU CHECKED OFF ANY PROBLEMS, HOW DIFFICULT HAVE THESE MADE IT FOR YOU TO DO YOUR WORK, TAKE CARE OF THINGS AT HOME, OR GET ALONG WITH OTHER PEOPLE?: SOMEWHAT DIFFICULT
GAD7 TOTAL SCORE: 4
GAD7 TOTAL SCORE: 4
5. BEING SO RESTLESS THAT IT IS HARD TO SIT STILL: NOT AT ALL
6. BECOMING EASILY ANNOYED OR IRRITABLE: NOT AT ALL
1. FEELING NERVOUS, ANXIOUS, OR ON EDGE: SEVERAL DAYS
GAD7 TOTAL SCORE: 4
IF YOU CHECKED OFF ANY PROBLEMS ON THIS QUESTIONNAIRE, HOW DIFFICULT HAVE THESE PROBLEMS MADE IT FOR YOU TO DO YOUR WORK, TAKE CARE OF THINGS AT HOME, OR GET ALONG WITH OTHER PEOPLE: SOMEWHAT DIFFICULT
3. WORRYING TOO MUCH ABOUT DIFFERENT THINGS: SEVERAL DAYS
4. TROUBLE RELAXING: SEVERAL DAYS
GAD7 TOTAL SCORE: 5
7. FEELING AFRAID AS IF SOMETHING AWFUL MIGHT HAPPEN: NOT AT ALL
GAD7 TOTAL SCORE: 4
2. NOT BEING ABLE TO STOP OR CONTROL WORRYING: SEVERAL DAYS
7. FEELING AFRAID AS IF SOMETHING AWFUL MIGHT HAPPEN: NOT AT ALL
7. FEELING AFRAID AS IF SOMETHING AWFUL MIGHT HAPPEN: SEVERAL DAYS
8. IF YOU CHECKED OFF ANY PROBLEMS, HOW DIFFICULT HAVE THESE MADE IT FOR YOU TO DO YOUR WORK, TAKE CARE OF THINGS AT HOME, OR GET ALONG WITH OTHER PEOPLE?: NOT DIFFICULT AT ALL
6. BECOMING EASILY ANNOYED OR IRRITABLE: NOT AT ALL
3. WORRYING TOO MUCH ABOUT DIFFERENT THINGS: SEVERAL DAYS
4. TROUBLE RELAXING: SEVERAL DAYS

## 2022-01-01 ASSESSMENT — PATIENT HEALTH QUESTIONNAIRE - PHQ9
10. IF YOU CHECKED OFF ANY PROBLEMS, HOW DIFFICULT HAVE THESE PROBLEMS MADE IT FOR YOU TO DO YOUR WORK, TAKE CARE OF THINGS AT HOME, OR GET ALONG WITH OTHER PEOPLE: NOT DIFFICULT AT ALL
SUM OF ALL RESPONSES TO PHQ QUESTIONS 1-9: 6
10. IF YOU CHECKED OFF ANY PROBLEMS, HOW DIFFICULT HAVE THESE PROBLEMS MADE IT FOR YOU TO DO YOUR WORK, TAKE CARE OF THINGS AT HOME, OR GET ALONG WITH OTHER PEOPLE: VERY DIFFICULT
SUM OF ALL RESPONSES TO PHQ QUESTIONS 1-9: 6
SUM OF ALL RESPONSES TO PHQ QUESTIONS 1-9: 6
SUM OF ALL RESPONSES TO PHQ QUESTIONS 1-9: 14
SUM OF ALL RESPONSES TO PHQ QUESTIONS 1-9: 14
10. IF YOU CHECKED OFF ANY PROBLEMS, HOW DIFFICULT HAVE THESE PROBLEMS MADE IT FOR YOU TO DO YOUR WORK, TAKE CARE OF THINGS AT HOME, OR GET ALONG WITH OTHER PEOPLE: NOT DIFFICULT AT ALL

## 2022-01-01 ASSESSMENT — ENCOUNTER SYMPTOMS
NECK PAIN: 1
BACK PAIN: 1

## 2022-02-09 NOTE — TELEPHONE ENCOUNTER
Kristal is not my primary patient, she was a [facility] patient.    If she is having worsening swelling bilaterally, shortness of breath, cough, chest pain, or chest pressure, she should be seen for follow up. Or they should update her Cardiologist.

## 2022-02-09 NOTE — TELEPHONE ENCOUNTER
Reason for Call:  Other FYI    Detailed comments: Isabel from [facility] calling with an FYI - Pt has a history of congestive heart failure & lymphedema, pt has daily weights and today she gained 2.2 pounds in 1 day. She hasn't done that for the last 2 months. Its a change for her. Calling to let provider know.      Phone Number Patient can be reached at: Home number on file [phone] (home)    Best Time: any    Can we leave a detailed message on this number? Not Applicable    Call taken on 2/9/2022 at 11:24 AM by Huber Estrada

## 2022-02-23 NOTE — PATIENT INSTRUCTIONS
Increase your Colace to 2 tablets a day.  This should help get your stool softer.    Your your sore on your back closely.  Having worsening pain, redness, swelling, drainage, warmth please update me.    Your labs are processing at this time, all results of MyChart once are back.    Follow-up as discussed today.

## 2022-02-24 NOTE — PROGRESS NOTES
Clinic Note    Assessment:     Assessment and Plan:  1. Iron deficiency anemia due to chronic blood loss: will recheck her hemoglobin today. She remains on iron. Stable.   - omeprazole (PRILOSEC) 20 MG DR capsule; Take 1 capsule (20 mg) by mouth daily before breakfast  Dispense: 90 capsule; Refill: 1  - CBC with platelets and differential; Future  - Ferritin; Future    2. Epigastric pain: She continues on Omeprazole. Stable.   - omeprazole (PRILOSEC) 20 MG DR capsule; Take 1 capsule (20 mg) by mouth daily before breakfast  Dispense: 90 capsule; Refill: 1    3. Slow transit constipation: will increase her colace dose as she is still is having hard stools. Discussed trying to drink more water, move more, and eat more fiber.   - docusate sodium (COLACE) 100 MG capsule; Take 2 capsules (200 mg) by mouth daily  Dispense: 180 capsule; Refill: 3  - Follow up if symptoms persist or worsen.     4. Essential hypertension, benign: Blood pressure today was well controlled at 138/56. She continues on Lisinopril. Stable.     5. Lymphedema of both lower extremities: Stable in nature, continue with daily leg wrapping. She continues on Furosemide 20mg daily, add an extra dose if up 5 pounds over night, or swelling is worse in her lower legs for three days, then go back to 20mg.    6. Skin breakdown: upper back area. Skin appears to be slightly breaking down, skin is slightly wet and macerated. Encouraged them to try to keep this area open to air when seated. Also check her recliner as this appears to be a pressure area.     7. Screening for endocrine, nutritional, metabolic and immunity disorder  - Basic metabolic panel  (Ca, Cl, CO2, Creat, Gluc, K, Na, BUN);      Patient Instructions   Increase your Colace to 2 tablets a day.  This should help get your stool softer.    Your your sore on your back closely.  Having worsening pain, redness, swelling, drainage, warmth please update me.    Your labs are processing at this time, all  results of MyChart once are back.    Follow-up as discussed today.    Return if symptoms worsen or fail to improve.         Subjective:      Kristal Cox is a 89 year old female presents today with her daughter for follow up.    She reports overall doing well. Her daughter did notice that her mom had a bit of a red rash on her upper back, that she would like looked at today.    Her bilateral leg lymphedema has been stable. They continue to wrap her lower legs on a daily basis.    She is due for a recheck of her hemoglobin.    She reports that her stools still have been somewhat hard. We will try placing her on an increased dose of colace. Also encouraged her to eat more fiber, move more, and drink more water duing the day.    They deny other concerns today.     The following portions of the patient's history were reviewed and updated as appropriate.    Review of Systems:    Review is otherwise negative except for what is mentioned above.     Social Hx:    History   Smoking Status     Never Smoker   Smokeless Tobacco     Never Used         Objective:     Vitals:    02/23/22 1328   BP: 138/56   BP Location: Right arm   Patient Position: Sitting   Cuff Size: Adult Small   Pulse: 77   SpO2: 97%   Weight: 52.6 kg (116 lb)     Physical Exam  Vitals and nursing note reviewed.   Constitutional:       General: She is not in acute distress.     Appearance: Normal appearance. She is normal weight. She is not ill-appearing, toxic-appearing or diaphoretic.   HENT:      Head: Normocephalic and atraumatic.   Cardiovascular:      Rate and Rhythm: Normal rate and regular rhythm.      Pulses: Normal pulses.      Heart sounds: Normal heart sounds. No murmur heard.    No friction rub. No gallop.   Pulmonary:      Effort: Pulmonary effort is normal.      Breath sounds: Normal breath sounds.   Abdominal:      General: Abdomen is flat. Bowel sounds are normal. There is distension.      Palpations: Abdomen is soft.      Tenderness:  There is no abdominal tenderness.   Musculoskeletal:      Right lower leg: Edema present.      Left lower leg: Edema present.   Skin:     General: Skin is warm and dry.      Capillary Refill: Capillary refill takes less than 2 seconds.          Neurological:      General: No focal deficit present.      Mental Status: She is alert and oriented to person, place, and time. Mental status is at baseline.   Psychiatric:         Mood and Affect: Mood normal.         Behavior: Behavior normal.         Thought Content: Thought content normal.         Judgment: Judgment normal.         Patient Active Problem List   Diagnosis     Vertebral compression fracture (H)     GERD (gastroesophageal reflux disease)     Osteoporosis     Benign essential hypertension     Hyperlipidemia LDL goal <100     PVC's (premature ventricular contractions)     Acquired hypothyroidism     Essential hypertension, benign     Symptomatic anemia     Hyponatremia     Hypokalemia     Systolic hypertension     Chronic kidney disease, stage 3 (H)     Lymphedema of both lower extremities     Current Outpatient Medications   Medication Sig Dispense Refill     acetaminophen (TYLENOL) 500 MG tablet [ACETAMINOPHEN (TYLENOL) 500 MG TABLET] Take 2 tablets (1,000 mg total) by mouth 2 (two) times a day.  0     artificial tears,hypromellose, (GENTEAL; SYSTANE) 0.3 % Gel [ARTIFICIAL TEARS,HYPROMELLOSE, (GENTEAL; SYSTANE) 0.3 % GEL] Administer 1 drop to both eyes as needed (dry eyes).       cholecalciferol, vitamin D3, 1,000 unit tablet [CHOLECALCIFEROL, VITAMIN D3, 1,000 UNIT TABLET] Take 2,000 Units by mouth daily.       docusate sodium (COLACE) 100 MG capsule Take 2 capsules (200 mg) by mouth daily 180 capsule 3     ferrous sulfate (FEROSUL) 325 (65 Fe) MG tablet Take 1 tablet (325 mg) by mouth daily (with breakfast) 90 tablet 1     furosemide (LASIX) 20 MG tablet [FUROSEMIDE (LASIX) 20 MG TABLET] Take 1 tablet (20 mg total) by mouth daily. Take 1 daily.  May  increase to 2 daily with increased swelling as needed. 90 tablet 3     levothyroxine (SYNTHROID/LEVOTHROID) 50 MCG tablet Take 1 tablet (50 mcg) by mouth daily 90 tablet 3     lisinopril (ZESTRIL) 20 MG tablet Take 1 tablet (20 mg) by mouth daily 90 tablet 3     magnesium chloride 535 (64 Mg) MG TBEC CR tablet Take 64 mg by mouth       melatonin 3 mg Tab tablet [MELATONIN 3 MG TAB TABLET] Take 1-2 tablets (3-6 mg total) by mouth at bedtime as needed.       metoprolol succinate ER (TOPROL-XL) 25 MG 24 hr tablet TAKE 1 TABLET(25 MG) BY MOUTH DAILY 90 tablet 1     omeprazole (PRILOSEC) 20 MG DR capsule Take 1 capsule (20 mg) by mouth daily before breakfast 90 capsule 1     polyethylene glycol (GLYCOLAX) 17 gram/dose powder [POLYETHYLENE GLYCOL (GLYCOLAX) 17 GRAM/DOSE POWDER] Take 17 g by mouth daily as needed. 235 g 11     vitamin C (ASCORBIC ACID) 250 MG TABS tablet Take 1 tablet (250 mg) by mouth daily 30 tablet 0     Keyla Walsh, Adult-Geriatric Nurse Practitioner  [facility] - Internal Medicine Team     2/23/2022         Answers for HPI/ROS submitted by the patient on 2/23/2022  Do you check your blood pressure regularly outside of the clinic?: Yes  Are your blood pressures ever more than 140 on the top number (systolic) OR more than 90 on the bottom number (diastolic)? (For example, greater than 140/90): Yes  Are you following a low salt diet?: Yes  Since last visit, patient describes the following symptoms:: Anxiety, Constipation, Depression, Dry skin, Fatigue, Weight gain  Weight gain:: Less than 5 lbs.  How many servings of fruits and vegetables do you eat daily?: 2-3  On average, how many sweetened beverages do you drink each day (Examples: soda, juice, sweet tea, etc.  Do NOT count diet or artificially sweetened beverages)?: 0  How many minutes a day do you exercise enough to make your heart beat faster?: 9 or less  How many days a week do you exercise enough to make your heart beat faster?: 3 or  less  How many days per week do you miss taking your medication?: 0  What is the reason for your visit today?: Follow up  When did your symptoms begin?: More than a month  Are your symptoms:: Staying the same  Have you had these symptoms before?: Yes  Have you tried or received treatment for these symptoms before?: Yes

## 2022-03-02 NOTE — TELEPHONE ENCOUNTER
Reason for Call:  Other     Detailed comments: Isabel Chen @ [facility] called to let PCP know that patient had a 2 pound weight gain overnight. Isabel stated that she was suppose to inform PCP when patient had a weight gain of 2 pounds or more in 1 day. Patient is not have any chest pain or trouble breathing.  Pt states her legs are not anymore swollen than they usually are.  Patient will be seeing her cardiologist today so they may make some medication changes.      Best Time: ANY    Can we leave a detailed message on this number? YES    Call taken on 3/2/2022 at 9:19 AM by Linette Epperson

## 2022-03-02 NOTE — PROGRESS NOTES
Thank you, Keyla Torres, for asking the [facility] team to see Ms. Kristal Cox to evaluate Follow Up (PVCs)        Assessment/Recommendations   Assessment:    1. PVCs - asymptomatic with a 6% burden off amiodarone. No recent symptoms  2. Low body weight (mild malnutrition) - stable weight  3. Chronic venous insufficiency with chronic lymphedema- has previously improved with lymphedema therapy but returned when therapy was completed. Currently legs are not weeping and are not bothersome so will defer changes in treatment.  4. Hypothyroid - on synthroid     Plan:  1.  continue medications without changes. Continue diuretic and okay to use extra dose PRN for signs of increased fluid retention.  2. Consider referral to vascular medicine or PT/OT lymphedema care if legs become more problematic.  3. Follow up in 6 months or sooner if needed.         History of Present Illness   Ms. Kristal Cox is a 89 year old female with a significant past history of moderate mitral valve regurgitation, frequent PVCs, hypertension, and hyperlipidemai who presents for follow-up.     I initially met Ms. Cox when she was admitted to the hospital with an ankle fracture on 11/22/19. She was noted to have elevated troponin and frequent PVCs. She did not have any cardiac symptoms. After some discussion we elected to treat her cardiac issues medically and limit diagnostic testing. She was started on amiodarone with significant improvement in her PVCs. At baseline, Kristal ambulates with a walker.    Kristal presents today with her daughter. She has chronic LE edema that is overall stable. Not painful and no weeping or ulcers. She has limited mobility.    Other than noted above, Ms. Cox denies any chest pain/pressure/tightness, shortness of breath at rest or with exertion, light headedness/dizziness, pre-syncope, syncope, lower extremity swelling, palpitations, paroxysmal nocturnal  dyspnea (PND), or orthopnea.     Cardiac Problems and Cardiac Diagnostics     Most Recent Cardiac testing:  ECG dated 11/21/19 (personaly reviewed and interpreted): sinus rhythm with frequent PVCs in a pattern of bigeminy.    ECHO (report reviewed):   TTE 11/22/19    Normal left ventricular size and systolic function.The estimated left ventricular ejection fraction is 55%. This represents a normal ejection fraction. The left ventricular wall thickness is normal. Left ventricular diastolic function is abnnormal.    Normal right ventricular size and systolic function.    Left atrial volume is severely increased.    Moderate aortic regurgitation.    Moderate (3+) degenearive mitral regurgitation.    Mild tricuspid valve regurgitation. No pulmonary hypertension present. The estimated systolic pulmonary artery pressure is 35 mmhg.    Estimated central venous pressure equal to 8 mmHg.    No previous study for comparison.         Medications  Allergies   Current Outpatient Medications   Medication Sig Dispense Refill     acetaminophen (TYLENOL) 500 MG tablet [ACETAMINOPHEN (TYLENOL) 500 MG TABLET] Take 2 tablets (1,000 mg total) by mouth 2 (two) times a day.  0     artificial tears,hypromellose, (GENTEAL; SYSTANE) 0.3 % Gel [ARTIFICIAL TEARS,HYPROMELLOSE, (GENTEAL; SYSTANE) 0.3 % GEL] Administer 1 drop to both eyes as needed (dry eyes).       cholecalciferol, vitamin D3, 1,000 unit tablet [CHOLECALCIFEROL, VITAMIN D3, 1,000 UNIT TABLET] Take 2,000 Units by mouth daily.       docusate sodium (COLACE) 100 MG capsule Take 2 capsules (200 mg) by mouth daily 180 capsule 3     ferrous sulfate (FEROSUL) 325 (65 Fe) MG tablet Take 1 tablet (325 mg) by mouth daily (with breakfast) (Patient taking differently: Take 325 mg by mouth daily (with breakfast) Take 2 daily) 90 tablet 1     furosemide (LASIX) 20 MG tablet [FUROSEMIDE (LASIX) 20 MG TABLET] Take 1 tablet (20 mg total) by mouth daily. Take 1 daily.  May increase to 2 daily  with increased swelling as needed. 90 tablet 3     levothyroxine (SYNTHROID/LEVOTHROID) 50 MCG tablet Take 1 tablet (50 mcg) by mouth daily 90 tablet 3     lisinopril (ZESTRIL) 20 MG tablet Take 1 tablet (20 mg) by mouth daily 90 tablet 3     magnesium chloride 535 (64 Mg) MG TBEC CR tablet Take 64 mg by mouth       melatonin 3 mg Tab tablet [MELATONIN 3 MG TAB TABLET] Take 1-2 tablets (3-6 mg total) by mouth at bedtime as needed.       metoprolol succinate ER (TOPROL-XL) 25 MG 24 hr tablet TAKE 1 TABLET(25 MG) BY MOUTH DAILY 90 tablet 1     omeprazole (PRILOSEC) 20 MG DR capsule Take 1 capsule (20 mg) by mouth daily before breakfast 90 capsule 1     polyethylene glycol (GLYCOLAX) 17 gram/dose powder [POLYETHYLENE GLYCOL (GLYCOLAX) 17 GRAM/DOSE POWDER] Take 17 g by mouth daily as needed. 235 g 11     vitamin C (ASCORBIC ACID) 250 MG TABS tablet Take 1 tablet (250 mg) by mouth daily 30 tablet 0      Allergies   Allergen Reactions     Evista [Raloxifene] Unknown     Caused blood clot     Fosamax [Alendronic Acid] Nausea and Vomiting        Physical Examination Review of Systems   Vitals: BP (!) 162/70 (BP Location: Left arm, Patient Position: Sitting, Cuff Size: Adult Regular)   Pulse 82   Resp 16   Wt 52.6 kg (116 lb)   BMI 19.91 kg/m    BMI= Body mass index is 19.91 kg/m .  Wt Readings from Last 3 Encounters:   03/02/22 52.6 kg (116 lb)   02/23/22 52.6 kg (116 lb)   12/15/21 49.4 kg (109 lb)       General Appearance:   Pleasant female, appears stated age. no acute distress, mildly cachectic appearance   ENT/Mouth: membranes moist, no apparent gingival bleeding.      EYES:  no scleral icterus, normal conjunctivae   Neck: no carotid bruits. supple   Respiratory:   lungs are clear to auscultation, no rales or wheezing, equal chest wall expansion    Cardiovascular:   Regular rhythm, normal rate. A 2/6 holosystolic murmur. 2-3+ BLE pitting edema into hips.   Abdomen/GI:  Soft, non-tender   Extremities: no cyanosis or  clubbing   Skin: no xanthelasma, warm.    Heme/lymph/ Immunology No apparent bleeding noted.   Neurologic: Alert and oriented. no tremors   Psychiatric: Pleasant, calm, appropriate affect.         Please refer above for cardiac ROS details.       Past History   Past Medical History:   Past Medical History:   Diagnosis Date     Alopecia 2019     Ankle fracture, left, closed, initial encounter      Anxiety      Back pain 3/4/2016     Diastolic congestive heart failure (H) 2018     Hyperlipidemia      IBS (irritable bowel syndrome)      Osteoporosis      Other abnormal clinical finding     evidence of old septal scar on EKG     Systolic hypertension         Past Surgical History:   Past Surgical History:   Procedure Laterality Date     CATARACT EXTRACTION, BILATERAL        SECTION      X 4     CHOLECYSTECTOMY       TONSILLECTOMY & ADENOIDECTOMY          Family History:   Family History   Problem Relation Age of Onset     Cerebrovascular Disease Mother         passed age 75     Crohn's Disease Father         passed in his 20's from bowel obstructions        Social History:   Social History     Socioeconomic History     Marital status:      Spouse name: Not on file     Number of children: Not on file     Years of education: Not on file     Highest education level: Not on file   Occupational History     Not on file   Tobacco Use     Smoking status: Never Smoker     Smokeless tobacco: Never Used   Substance and Sexual Activity     Alcohol use: No     Comment: Alcoholic Drinks/day: rarely drinks wine, and drinks wine watered down with ice.     Drug use: Yes     Sexual activity: Not Currently   Other Topics Concern     Not on file   Social History Narrative     since , has children     since , has children.  Lives in her own home with help.     Social Determinants of Health     Financial Resource Strain: Not on file   Food Insecurity: Not on file   Transportation Needs: Not on  file   Physical Activity: Not on file   Stress: Not on file   Social Connections: Not on file   Intimate Partner Violence: Not on file   Housing Stability: Not on file            Lab Results    Chemistry/lipid CBC Cardiac Enzymes/BNP/TSH/INR   Lab Results   Component Value Date    CHOL 153 07/23/2020    HDL 60 07/23/2020    TRIG 53 07/23/2020    BUN 20 02/23/2022     (L) 02/23/2022    CO2 25 02/23/2022    Lab Results   Component Value Date    WBC 5.9 02/23/2022    HGB 10.5 (L) 02/23/2022    HCT 32.3 (L) 02/23/2022     (H) 02/23/2022     02/23/2022    Lab Results   Component Value Date    TROPONINI 0.65 (HH) 11/22/2019     (H) 07/07/2021    TSH 1.18 10/15/2021    INR 1.05 07/07/2021

## 2022-03-02 NOTE — PATIENT INSTRUCTIONS
It was a pleasure to meet with you today.      Below is a summary of your visit.   1. Continue your medications without changes.  2. If your legs become sore or start to weep fluid we can consider a referral to lymphedema or vascular medicine.  3. Follow up with me in 6 months or sooner if needed.     Please do not hesitate to call the [facility] clinic with any questions or concerns at [phone]. You can also reach my nurse, Crissy, during normal business hours at [phone].    Sincerely,

## 2022-03-02 NOTE — LETTER
3/2/2022    Keyla Walsh, CNP  [address] [place] [address]    RE: Kristal Cox       Dear Colleague,     I had the pleasure of seeing Kristal Cox in the [facility].      Thank you, Keyla Torres, for asking the [facility] team to see Ms. Kristal Cox to evaluate Follow Up (PVCs)        Assessment/Recommendations   Assessment:    1. PVCs - asymptomatic with a 6% burden off amiodarone. No recent symptoms  2. Low body weight (mild malnutrition) - stable weight  3. Chronic venous insufficiency with chronic lymphedema- has previously improved with lymphedema therapy but returned when therapy was completed. Currently legs are not weeping and are not bothersome so will defer changes in treatment.  4. Hypothyroid - on synthroid     Plan:  1.  continue medications without changes. Continue diuretic and okay to use extra dose PRN for signs of increased fluid retention.  2. Consider referral to vascular medicine or PT/OT lymphedema care if legs become more problematic.  3. Follow up in 6 months or sooner if needed.         History of Present Illness   Ms. Kristal Cox is a 89 year old female with a significant past history of moderate mitral valve regurgitation, frequent PVCs, hypertension, and hyperlipidemai who presents for follow-up.     I initially met Ms. Cox when she was admitted to the hospital with an ankle fracture on 11/22/19. She was noted to have elevated troponin and frequent PVCs. She did not have any cardiac symptoms. After some discussion we elected to treat her cardiac issues medically and limit diagnostic testing. She was started on amiodarone with significant improvement in her PVCs. At baseline, Kristal ambulates with a walker.    Kristal presents today with her daughter. She has chronic LE edema that is overall stable. Not painful and no weeping or ulcers. She has limited mobility.    Other than noted above,  Ms. Cox denies any chest pain/pressure/tightness, shortness of breath at rest or with exertion, light headedness/dizziness, pre-syncope, syncope, lower extremity swelling, palpitations, paroxysmal nocturnal dyspnea (PND), or orthopnea.     Cardiac Problems and Cardiac Diagnostics     Most Recent Cardiac testing:  ECG dated 11/21/19 (personaly reviewed and interpreted): sinus rhythm with frequent PVCs in a pattern of bigeminy.    ECHO (report reviewed):   TTE 11/22/19    Normal left ventricular size and systolic function.The estimated left ventricular ejection fraction is 55%. This represents a normal ejection fraction. The left ventricular wall thickness is normal. Left ventricular diastolic function is abnnormal.    Normal right ventricular size and systolic function.    Left atrial volume is severely increased.    Moderate aortic regurgitation.    Moderate (3+) degenearive mitral regurgitation.    Mild tricuspid valve regurgitation. No pulmonary hypertension present. The estimated systolic pulmonary artery pressure is 35 mmhg.    Estimated central venous pressure equal to 8 mmHg.    No previous study for comparison.         Medications  Allergies   Current Outpatient Medications   Medication Sig Dispense Refill     acetaminophen (TYLENOL) 500 MG tablet [ACETAMINOPHEN (TYLENOL) 500 MG TABLET] Take 2 tablets (1,000 mg total) by mouth 2 (two) times a day.  0     artificial tears,hypromellose, (GENTEAL; SYSTANE) 0.3 % Gel [ARTIFICIAL TEARS,HYPROMELLOSE, (GENTEAL; SYSTANE) 0.3 % GEL] Administer 1 drop to both eyes as needed (dry eyes).       cholecalciferol, vitamin D3, 1,000 unit tablet [CHOLECALCIFEROL, VITAMIN D3, 1,000 UNIT TABLET] Take 2,000 Units by mouth daily.       docusate sodium (COLACE) 100 MG capsule Take 2 capsules (200 mg) by mouth daily 180 capsule 3     ferrous sulfate (FEROSUL) 325 (65 Fe) MG tablet Take 1 tablet (325 mg) by mouth daily (with breakfast) (Patient taking differently: Take 325 mg  by mouth daily (with breakfast) Take 2 daily) 90 tablet 1     furosemide (LASIX) 20 MG tablet [FUROSEMIDE (LASIX) 20 MG TABLET] Take 1 tablet (20 mg total) by mouth daily. Take 1 daily.  May increase to 2 daily with increased swelling as needed. 90 tablet 3     levothyroxine (SYNTHROID/LEVOTHROID) 50 MCG tablet Take 1 tablet (50 mcg) by mouth daily 90 tablet 3     lisinopril (ZESTRIL) 20 MG tablet Take 1 tablet (20 mg) by mouth daily 90 tablet 3     magnesium chloride 535 (64 Mg) MG TBEC CR tablet Take 64 mg by mouth       melatonin 3 mg Tab tablet [MELATONIN 3 MG TAB TABLET] Take 1-2 tablets (3-6 mg total) by mouth at bedtime as needed.       metoprolol succinate ER (TOPROL-XL) 25 MG 24 hr tablet TAKE 1 TABLET(25 MG) BY MOUTH DAILY 90 tablet 1     omeprazole (PRILOSEC) 20 MG DR capsule Take 1 capsule (20 mg) by mouth daily before breakfast 90 capsule 1     polyethylene glycol (GLYCOLAX) 17 gram/dose powder [POLYETHYLENE GLYCOL (GLYCOLAX) 17 GRAM/DOSE POWDER] Take 17 g by mouth daily as needed. 235 g 11     vitamin C (ASCORBIC ACID) 250 MG TABS tablet Take 1 tablet (250 mg) by mouth daily 30 tablet 0      Allergies   Allergen Reactions     Evista [Raloxifene] Unknown     Caused blood clot     Fosamax [Alendronic Acid] Nausea and Vomiting        Physical Examination Review of Systems   Vitals: BP (!) 162/70 (BP Location: Left arm, Patient Position: Sitting, Cuff Size: Adult Regular)   Pulse 82   Resp 16   Wt 52.6 kg (116 lb)   BMI 19.91 kg/m    BMI= Body mass index is 19.91 kg/m .  Wt Readings from Last 3 Encounters:   03/02/22 52.6 kg (116 lb)   02/23/22 52.6 kg (116 lb)   12/15/21 49.4 kg (109 lb)       General Appearance:   Pleasant female, appears stated age. no acute distress, mildly cachectic appearance   ENT/Mouth: membranes moist, no apparent gingival bleeding.      EYES:  no scleral icterus, normal conjunctivae   Neck: no carotid bruits. supple   Respiratory:   lungs are clear to auscultation, no rales  or wheezing, equal chest wall expansion    Cardiovascular:   Regular rhythm, normal rate. A 2/6 holosystolic murmur. 2-3+ BLE pitting edema into hips.   Abdomen/GI:  Soft, non-tender   Extremities: no cyanosis or clubbing   Skin: no xanthelasma, warm.    Heme/lymph/ Immunology No apparent bleeding noted.   Neurologic: Alert and oriented. no tremors   Psychiatric: Pleasant, calm, appropriate affect.         Please refer above for cardiac ROS details.       Past History   Past Medical History:   Past Medical History:   Diagnosis Date     Alopecia 2019     Ankle fracture, left, closed, initial encounter      Anxiety      Back pain 3/4/2016     Diastolic congestive heart failure (H) 2018     Hyperlipidemia      IBS (irritable bowel syndrome)      Osteoporosis      Other abnormal clinical finding     evidence of old septal scar on EKG     Systolic hypertension         Past Surgical History:   Past Surgical History:   Procedure Laterality Date     CATARACT EXTRACTION, BILATERAL        SECTION      X 4     CHOLECYSTECTOMY       TONSILLECTOMY & ADENOIDECTOMY          Family History:   Family History   Problem Relation Age of Onset     Cerebrovascular Disease Mother         passed age 75     Crohn's Disease Father         passed in his 20's from bowel obstructions        Social History:   Social History     Socioeconomic History     Marital status:      Spouse name: Not on file     Number of children: Not on file     Years of education: Not on file     Highest education level: Not on file   Occupational History     Not on file   Tobacco Use     Smoking status: Never Smoker     Smokeless tobacco: Never Used   Substance and Sexual Activity     Alcohol use: No     Comment: Alcoholic Drinks/day: rarely drinks wine, and drinks wine watered down with ice.     Drug use: Yes     Sexual activity: Not Currently   Other Topics Concern     Not on file   Social History Narrative     since , has children      since 2009, has children.  Lives in her own home with help.     Social Determinants of Health     Financial Resource Strain: Not on file   Food Insecurity: Not on file   Transportation Needs: Not on file   Physical Activity: Not on file   Stress: Not on file   Social Connections: Not on file   Intimate Partner Violence: Not on file   Housing Stability: Not on file            Lab Results    Chemistry/lipid CBC Cardiac Enzymes/BNP/TSH/INR   Lab Results   Component Value Date    CHOL 153 07/23/2020    HDL 60 07/23/2020    TRIG 53 07/23/2020    BUN 20 02/23/2022     (L) 02/23/2022    CO2 25 02/23/2022    Lab Results   Component Value Date    WBC 5.9 02/23/2022    HGB 10.5 (L) 02/23/2022    HCT 32.3 (L) 02/23/2022     (H) 02/23/2022     02/23/2022    Lab Results   Component Value Date    TROPONINI 0.65 (HH) 11/22/2019     (H) 07/07/2021    TSH 1.18 10/15/2021    INR 1.05 07/07/2021                    Thank you for allowing me to participate in the care of your patient.      Sincerely,     Jin Chopra MD     [facility]  cc:   Jin Chopra MD  [address] [place] [address]

## 2022-05-04 NOTE — PATIENT INSTRUCTIONS
Thank you for choosing us for your care. I have placed an order for a prescription so that you can start treatment. View your full visit summary for details by clicking on the link below. Your pharmacist will able to address any questions you may have about the medication.     If you're not feeling better within 5-7 days, please schedule an appointment.  You can schedule an appointment right here in [facility], or call [phone]  If the visit is for the same symptoms as your eVisit, we'll refund the cost of your eVisit if seen within seven days.

## 2022-05-25 NOTE — PATIENT INSTRUCTIONS
Pataday eye drops, two drops each eye twice daily AM and PM for the eye itching.    If this doesn't work, see your ophthalmologist.    Increase your Tylenol to 1000mg three times daily Am, afternoon, and PM.    Okay to use topical creams such as Voltaren, Biofreeze, or Delta 8 CBD pain cream to your ribs or painful areas as needed. Hands would be okay as well.    Try the hydroxyzine as needed at bedtime for itching. Continue using your creams.    For the post nasal drip, consider a daily Zyrtec, or try some Mucinex over the counter.     I have refilled your furosemide and levothyroxine.     I also have referred you for home care, for the lymphedema wraps.    Let's see you back in three months for follow up, before then if anything comes up.

## 2022-05-25 NOTE — PROGRESS NOTES
Assessment & Plan     Essential hypertension, benign: Blood pressure today was 140/74. She continues on lisinopril. Will recheck kidney function today. Stable.  - Basic metabolic panel  (Ca, Cl, CO2, Creat, Gluc, K, Na, BUN)  - Continue lisinopril dosing.    Acquired hypothyroidism: She is due for a TSH recheck. Continue levothyroxine. Stable.   - levothyroxine (SYNTHROID/LEVOTHROID) 50 MCG tablet  Dispense: 90 tablet; Refill: 3  - TSH with free T4 reflex    Lymphedema of both lower extremities: Chronic lymphedema. She is wearing her wraps on a daily basis, is in need of new ones, will have home care come out to help her with this. She continues on furosemide for the edema. Stable.   - Home Care Referral    Itching: Skin and scalp areas. Will try oral hydroxyzine to help with this. They are also using sarna lotion. She is drinking water.   - hydrOXYzine (ATARAX) 10 MG tablet  Dispense: 30 tablet; Refill: 0    Seasonal allergic rhinitis, unspecified trigger: Right eye is not infected, but sclera is red. Will tray pataday eye drops. Ophthalmology follow up if symptoms persist.   - olopatadine (PATANOL) 0.1 % ophthalmic solution  Dispense: 10 mL; Refill: 3    Iron deficiency anemia due to chronic blood loss: will recheck labs.  - CBC with platelets and differential  - Iron and iron binding capacity  - Continue Iron supplement.     High priority for 2019-nCoV vaccine: Fourth dose given today.  - COVID-19,PF,PFIZER (12+ Yrs GRAY LABEL)      Return in about 3 months (around 8/25/2022) for Follow up.    Keyla York CNP  [facility]    Sunita Giraldo is a 89 year old who presents for the following health issues  accompanied by her daughter.    The patient presents today for follow up of multiple medical conditions.    She reports that her ribs are still hurting her when she is sitting in her recliner. With her back posture, and kyphosis, she turns to the side frequently,  causing her ribs to dig into her stomach. Discussed that she should try to take an extra dose of Tylenol to help with pain control in the afternoon, so a total of three doses, or 3000mg daily. She can also try topical biofreeze, CBD cream, or voltaren gel to help with her discomfort.    She needs new compression wraps ordered for her lymphedema, will send out home care to help with this.    Leg edema has been stable. She does have private duty care now at home. Daughter also helps out.    She would like her fourth COVID shot today.    She reports that her eye infection went away, but her right eye continues to be irritated and very itchy. The Ketofen eye drops did not help much. Will try prescription pataday for this. Encouraged her to see her ophthalmologist if this continues after using the pataday.    She also continues to have some itchy skin, scalp areas. They are using sarna topically already. Scalp they are using baby shampoo to wash her hair. No new changes in soaps, perfumes, there is never a rash associated with the itching. Liver function at last check was normal. Will trial hydroxyzine to help with itching, especially at bedtime.    She also has a continued chronic cough. At times she will expectorate thick phlegm, discussed trying OTC mucinex for this.     She is due for follow up lab work today, will check this.        History of Present Illness       CKD: She uses over the counter pain medication, including Extra strength tylenol, two times daily.    COPD:  She presents for follow up of COPD.  Overall, COPD symptoms are stable since last visit. She has same as usual fatigue or shortness of breath with exertion and same as usual shortness of breath at rest.  She sometimes coughs and does not have change in sputum. No recent fever. She can walk the length of 1-2 rooms without stopping to rest. She can not walk a flight of stairs without resting. The patient has had no ED, urgent care, or hospital  admissions because of COPD since the last visit.     Mental Health Follow-up:  Patient presents to follow-up on Depression & Anxiety.Patient's depression since last visit has been:  No change  The patient is not having other symptoms associated with depression.  Patient's anxiety since last visit has been:  No change  The patient is not having other symptoms associated with anxiety.  Any significant life events: No  Patient is not feeling anxious or having panic attacks.  Patient has no concerns about alcohol or drug use.    Hypothyroidism:     Since last visit, patient describes the following symptoms::  Anxiety, Constipation, Depression, Dry skin and Fatigue    Vascular Disease:  She presents for follow up of vascular disease.  She never takes nitroglycerin. She is not taking daily aspirin.    Reason for visit:  Seen regularly for lymphedema,  chronic pain, anemia, eye irritation (evisitfollow-up)    She eats 2-3 servings of fruits and vegetables daily.She consumes 0 sweetened beverage(s) daily.She exercises with enough effort to increase her heart rate 9 or less minutes per day.  She exercises with enough effort to increase her heart rate 3 or less days per week.   She is taking medications regularly.    Today's PHQ-9         PHQ-9 Total Score: 6    PHQ-9 Q9 Thoughts of better off dead/self-harm past 2 weeks :   Not at all    How difficult have these problems made it for you to do your work, take care of things at home, or get along with other people: Not difficult at all  Today's PATRICIA-7 Score: 4         Review of Systems   Constitutional, HEENT, cardiovascular, pulmonary, GI, , musculoskeletal, neuro, skin, endocrine and psych systems are negative, except as otherwise noted.      Objective    BP (!) 140/74   Pulse 71   Temp 98.2  F (36.8  C) (Oral)   Resp 16   Wt 50.2 kg (110 lb 9.6 oz)   SpO2 96%   BMI 18.98 kg/m    Body mass index is 18.98 kg/m .  Physical Exam   GENERAL: healthy, alert and no  distress  NEENT: Mouth and nose normal. Right eye, sclera is irritated and red, no drainage noted.  NECK: no adenopathy, no asymmetry, masses, or scars and thyroid normal to palpation  RESP: lungs clear to auscultation - no rales, rhonchi or wheezes  CV: regular rate and rhythm, normal S1 S2, no S3 or S4, no murmur, click or rub, peripheral pulses strong  MS: no gross musculoskeletal defects noted, 2+ pitting edema  SKIN: no suspicious lesions or rashes  NEURO: Normal strength and tone, mentation intact and speech normal  PSYCH: mentation appears normal, affect normal/bright       34yM with no pmh, nkda, former smoker, p/w R leg pain X 3 days a/w full body tingling sensation and feeling "out of it", lightheaded, mildly sob. no hx coagulopathy no leg swelling cough back pain cp pleurisy. nkda. no pmh. pt states he takes testosterone.

## 2022-05-26 NOTE — TELEPHONE ENCOUNTER
Robert is calling and asking if pt has a caregiver to assist with wound cares on the days that [facility] does not go out to see pt. Please give Robert a call back.    June Hidalgo

## 2022-05-26 NOTE — TELEPHONE ENCOUNTER
She does, either her daughter is there, or she does have care 24/7 through home instead I believe.     I think what they are looking for is for someone to go out and measure her for new compression wraps for her lymphedema. She does not have any open areas right now.

## 2022-05-27 NOTE — TELEPHONE ENCOUNTER
Prior Authorization Request   Who s requesting:  Pharmacy  Pharmacy Name and Location: [facility]  Medication Name: hydroxyzine hci 10Mg Tabs  Insurance Plan: Medicare  Key:  [id number]

## 2022-06-01 NOTE — TELEPHONE ENCOUNTER
I tried to call Bulmaro back but do not believe I had the correct number as [facility]. We will wait for Bulmaro to return our call. Please give him the okay for verbal okay when he calls back.  Kylie Hopkins CMA ............... 3:07 PM, 06/01/22

## 2022-06-01 NOTE — TELEPHONE ENCOUNTER
Reason for Call:  Other call back and returning call    Detailed comments: Bulmaro from [facility] called today.  Wants to request a delay in home care services for patient per patients request.  Needs to ask Dr. York at [facility].  Please contact him back.  Okay to leave a vm.  Thank you.    Phone Number Patient can be reached at: Other phone number:  [phone]*    Best Time: any    Can we leave a detailed message on this number? YES    Call taken on 6/1/2022 at 9:40 AM by Jaylene Nixon

## 2022-06-01 NOTE — TELEPHONE ENCOUNTER
Home Care RN needs verbal orders for:  Skilled nursing visits once a week for 2 weeks, then once every other week for 6 weeks, for cardiac monitoring, patient safety and pain and any other condition that occurs during episode.   Also 3 prn visits.     Message will be forwarded to Keyla WILBURN to leave message on voice mail (secured line).    Jovanna Quevedo RN Triage Nurse Advisor 5:53 PM 6/1/2022

## 2022-06-08 NOTE — TELEPHONE ENCOUNTER
I attempted to reach Bulmaro as well, and DENISSE was in [facility]. Encounter closed as we do not have correct information.

## 2022-07-26 NOTE — TELEPHONE ENCOUNTER
Reason for Call:  Other Update    Detailed comments: Patient's weight today is 102.6 lbs.  Also discharged today from Home care w/goals met.    Phone Number Patient can be reached at: Other phone number:  [phone]-Shanelle*    Best Time: anytime    Can we leave a detailed message on this number? Not Applicable    Call taken on 7/26/2022 at 2:43 PM by NAMAN SOTO

## 2022-08-12 NOTE — PATIENT INSTRUCTIONS
Doxycycline 100mg twice daily x 10 days for your sinuses and phlegm.    Continue the mucinex twice daily.    Continue the Tylenol two tablets three times per day. Supplement this with Tramadol 25-50 (0.5-1 tablet) up to  four times per day as needed for pain control.    Hydroxyzine 12.5mg-25mg (0.5 to 1 tablet) up to three times daily as needed for anxiety.    Increase your Furosemide to 40mg (2-20mg tablets) for the next three days, then resume dosing of 20mg daily.    Try an egg crate mattress or memory foam twin mattress topper in her chair for comfort.     Follow up as scheduled in September, before then if not getting better.

## 2022-08-12 NOTE — PROGRESS NOTES
Assessment & Plan     Acute non-recurrent pansinusitis: Will treat with Doxycycline as she has a penicillin allergy. Rest, continue Mucinex. Follow up if symptoms persist.   - doxycycline hyclate (VIBRAMYCIN) 100 MG capsule  Dispense: 20 capsule; Refill: 0    SOB (shortness of breath): Related to phlegm, post nasal drip, and inability to clear this. Will try an albuterol inhaler to help this.   - albuterol (PROAIR HFA/PROVENTIL HFA/VENTOLIN HFA) 108 (90 Base) MCG/ACT inhaler  Dispense: 18 g; Refill: 1    Anxiety: Related to breathing. Will try hydroxyzine PRN. Follow up if symptoms persist.   - hydrOXYzine (ATARAX) 25 MG tablet  Dispense: 30 tablet; Refill: 0    Lymphedema: Bilateral lower extremities. She continues to wear her wraps. She has trouble elevating her legs as this makes her uncomfortable. Will try a small three day addition of furosemide (40mg daily x 3 days) then resume 20mg daily. Try to elevate lets as able. Follow up if symptoms persist.     Rib pain/Chronic bilateral thoracic back pain: Chronic kyphosis of thoracic spine area, ribs are shifted forward from this. Will try treating the Tylenol three times per day and Tramadol. Follow up if symptoms persist or worsen.   - traMADol (ULTRAM) 50 MG tablet  Dispense: 30 tablet; Refill: 0    Iron deficiency anemia due to chronic blood loss: will recheck labs today.   - Hemoglobin    Hyponatremia: Labs rechecked today.   - Basic metabolic panel  (Ca, Cl, CO2, Creat, Gluc, K, Na, BUN)       Depression Screening Follow Up    PHQ 8/12/2022   PHQ-9 Total Score 14   Q9: Thoughts of better off dead/self-harm past 2 weeks Not at all     Last PHQ-9 8/12/2022   1.  Little interest or pleasure in doing things 2   2.  Feeling down, depressed, or hopeless 2   3.  Trouble falling or staying asleep, or sleeping too much 3   4.  Feeling tired or having little energy 3   5.  Poor appetite or overeating 0   6.  Feeling bad about yourself 1   7.  Trouble concentrating 1    8.  Moving slowly or restless 2   Q9: Thoughts of better off dead/self-harm past 2 weeks 0   PHQ-9 Total Score 14       Follow Up Actions Taken  Crisis resource information provided in After Visit Summary  Patient declined referral.       Return in about 1 month (around 9/14/2022).    Keyla York CNP  [facility] MISTY Giraldo is a 89 year old accompanied by her son and daughter, presenting for the following health issues:  Follow Up (F/U)      The patient presents today with her Daughter and Son for follow up.    She reports that her ribs really have been bothering her. She is not able to sleep in her recliner anymore, she has been sleeping upright on her couch, with her feet dependent.    She reports having post nasal drip that is getting caught in her throat, making it very hard for her to clear it. She gets anxious when she cannot clear this, and gets short of breath. She is not sleeping well due to this.    She continues to keep her lymphedema wraps on, but her legs continue to swell as she is not elevating them during the day, or moving much. Discussed the importance of this. She reports just not being comfortable when her legs are elevated. Her lymphedema nurse will come back out to see her.    She is very hesitant to take medications. Discussed she needs to, to make her fell better.    She has been eating and drinking okay at home.     History of Present Illness       Back Pain:  She presents for follow up of back pain. Patient's back pain is a chronic problem.  Location of back pain:  Right middle of back, left middle of back, left hip and left side of waist  Description of back pain: dull ache and sharp  Back pain spreads: nowhere    Since patient first noticed back pain, pain is: always present, but gets better and worse  Does back pain interfere with her job:  Not applicable      COPD:  She presents for follow up of COPD.  Overall, COPD symptoms are  slightly worse since last visit. She has more than usual fatigue or shortness of breath with exertion and same as usual shortness of breath at rest.  She often coughs and does have change in sputum. No recent fever. She can walk the length of 3-5 rooms without stopping to rest. She can not walk a flight of stairs without resting. The patient has had no ED, urgent care, or hospital admissions because of COPD since the last visit.     Mental Health Follow-up:  Patient presents to follow-up on Depression & Anxiety.Patient's depression since last visit has been:  No change  The patient is not having other symptoms associated with depression.  Patient's anxiety since last visit has been:  Worse  The patient is not having other symptoms associated with anxiety.  Any significant life events: health concerns  Patient is feeling anxious or having panic attacks.  Patient has no concerns about alcohol or drug use.    Reason for visit:  The past several weeks have had increased back and hip pain, increased swelling in legs because do not get elevated,  cannot sleep, more out of breathe when moving, more fatigued, her phlegm production has gotten worse, almost choking her at times,    She eats 4 or more servings of fruits and vegetables daily.She consumes 0 sweetened beverage(s) daily.She exercises with enough effort to increase her heart rate 9 or less minutes per day.  She exercises with enough effort to increase her heart rate 3 or less days per week.   She is taking medications regularly.    Today's PHQ-9         PHQ-9 Total Score: 14    PHQ-9 Q9 Thoughts of better off dead/self-harm past 2 weeks :   Not at all    How difficult have these problems made it for you to do your work, take care of things at home, or get along with other people: Very difficult  Today's PATRICIA-7 Score: 5       Review of Systems   Constitutional, HEENT, cardiovascular, pulmonary, GI, , musculoskeletal, neuro, skin, endocrine and psych systems are  negative, except as otherwise noted.      Objective    BP (!) 158/90   Pulse 103   SpO2 96%   There is no height or weight on file to calculate BMI.  Physical Exam   GENERAL: healthy, alert and no distress  EYES: Eyes grossly normal to inspection, PERRL and conjunctivae and sclerae normal  HENT: normal cephalic/atraumatic, ear canals and TM's normal, nose red, back of throat is red, greenish drainage noted.  NECK: no adenopathy, no asymmetry, masses, or scars and thyroid normal to palpation  RESP: lungs clear to auscultation - no rales, rhonchi or wheezes  CV: regular rate and rhythm, normal S1 S2, no S3 or S4, no murmur, click or rub, 3+ lower leg swelling  MS: Kyphosis of upper back, shifts her forward in her wheelchair  SKIN: no suspicious lesions or rashes  PSYCH: mentation appears normal, fatigued        .  ..

## 2022-08-17 PROBLEM — S22.31XA CLOSED FRACTURE OF ONE RIB OF RIGHT SIDE, INITIAL ENCOUNTER: Status: ACTIVE | Noted: 2022-01-01
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
## 2022-08-17 PROBLEM — J69.0 ASPIRATION PNEUMONIA OF BOTH LOWER LOBES, UNSPECIFIED ASPIRATION PNEUMONIA TYPE (H): Status: ACTIVE | Noted: 2022-01-01

## 2022-08-17 PROBLEM — R09.02 HYPOXIA: Status: ACTIVE | Noted: 2022-01-01

## 2022-08-17 NOTE — PHARMACY-ADMISSION MEDICATION HISTORY
Pharmacy Note - Admission Medication History    Pertinent Provider Information: Family is currently trying to switch patient from Tylenol 500mg 2 tablets BID to Tylenol arthritis 2 tablets TID as recommended by provider although home health RN recommended limiting Tylenol to 3g/day so have not been taking that much.      ______________________________________________________________________    Prior To Admission (PTA) med list completed and updated in EMR.       PTA Med List   Medication Sig Note Last Dose     acetaminophen (TYLENOL 8 HOUR ARTHRITIS PAIN) 650 MG CR tablet Take 650-1,300 mg by mouth 2 times daily Take 2 tablets by mouth every morning and 1 tablet every afternoon  8/16/2022 at Unknown time     acetaminophen (TYLENOL) 500 MG tablet [ACETAMINOPHEN (TYLENOL) 500 MG TABLET] Take 2 tablets (1,000 mg total) by mouth 2 (two) times a day. (Patient taking differently: Take 1,000 mg by mouth At Bedtime)  8/16/2022 at Unknown time     albuterol (PROAIR HFA/PROVENTIL HFA/VENTOLIN HFA) 108 (90 Base) MCG/ACT inhaler Inhale 2 puffs into the lungs every 6 hours as needed for shortness of breath / dyspnea or wheezing  Unknown at Hasn't used yet     artificial tears,hypromellose, (GENTEAL; SYSTANE) 0.3 % Gel Place 1 drop into both eyes 4 times daily  8/16/2022 at Unknown time     cholecalciferol, vitamin D3, 1,000 unit tablet Take 2,000 Units by mouth At Bedtime  8/16/2022 at PM     docusate sodium (COLACE) 100 MG capsule Take 2 capsules (200 mg) by mouth daily (Patient taking differently: Take 200 mg by mouth At Bedtime)  8/16/2022 at PM     doxycycline hyclate (VIBRAMYCIN) 100 MG capsule Take 1 capsule (100 mg) by mouth 2 times daily 8/17/2022: Prescribed 8/12/22 for 10 days 8/16/2022 at Unknown time     ferrous sulfate (FEROSUL) 325 (65 Fe) MG tablet Take 1 tablet (325 mg) by mouth 2 times daily Take 2 daily (Patient taking differently: Take 325 mg by mouth 2 times daily (with meals))  8/16/2022 at Unknown time      furosemide (LASIX) 20 MG tablet Take 1 tablet (20 mg) by mouth daily 8/17/2022: Took 2 tablets daily for 3 days 8/12-8/14/22 then decreased back to 1 tablet daily 8/17/2022 at Unknown time     guaiFENesin (MUCINEX) 600 MG 12 hr tablet Take 600 mg by mouth 2 times daily as needed for congestion  8/16/2022 at AM     hydrOXYzine (ATARAX) 25 MG tablet Take 0.5-1 tablets (12.5-25 mg) by mouth every 8 hours as needed for anxiety (Patient taking differently: Take 12.5 mg by mouth 2 times daily)  8/16/2022 at Unknown time     levothyroxine (SYNTHROID/LEVOTHROID) 50 MCG tablet Take 1 tablet (50 mcg) by mouth daily (Patient taking differently: Take 50 mcg by mouth daily before breakfast)  8/16/2022 at AM     lisinopril (ZESTRIL) 20 MG tablet Take 1 tablet (20 mg) by mouth daily  8/16/2022 at AM     magnesium chloride 535 (64 Mg) MG TBEC CR tablet Take 64 mg by mouth daily  8/16/2022 at AM     melatonin 3 mg Tab tablet [MELATONIN 3 MG TAB TABLET] Take 1-2 tablets (3-6 mg total) by mouth at bedtime as needed. (Patient taking differently: Take 6 mg by mouth At Bedtime)  Past Week at HS     metoprolol succinate ER (TOPROL XL) 25 MG 24 hr tablet TAKE 1 TABLET(25 MG) BY MOUTH DAILY (Patient taking differently: Take 25 mg by mouth At Bedtime)  8/16/2022 at PM     olopatadine (PATANOL) 0.1 % ophthalmic solution Place 1 drop into both eyes 2 times daily (Patient taking differently: Place 1 drop into both eyes 2 times daily as needed for allergies)  Unknown at PRN     omeprazole (PRILOSEC) 20 MG DR capsule Take 1 capsule (20 mg) by mouth daily before breakfast (Patient taking differently: Take 20 mg by mouth At Bedtime)  8/16/2022 at PM     polyethylene glycol (GLYCOLAX) 17 gram/dose powder [POLYETHYLENE GLYCOL (GLYCOLAX) 17 GRAM/DOSE POWDER] Take 17 g by mouth daily as needed. (Patient taking differently: Take 17 g by mouth daily as needed for constipation)  More than a month at PRN     traMADol (ULTRAM) 50 MG tablet Take 0.5-1  tablets (25-50 mg) by mouth every 6 hours as needed for severe pain 8/17/2022: Has only taken 2 tablets, 1 Saturday & 1 Sunday 8/14/2022     vitamin C (ASCORBIC ACID) 250 MG TABS tablet Take 1 tablet (250 mg) by mouth daily  8/16/2022 at AM       Information source(s): Family member and CareEverywhere/[employer]. Interviewed with daughter  Method of interview communication: in-person with surgical mask and faceshield    Summary of Changes to PTA Med List  New: none  Discontinued: Ketotifen (MD to review), hydroxyzine 10mg (MD to review), Polytrim (completed)  Changed: Added sig to Tylenol arthritis, Mucinex, magnesium, melatonin 3-6mg HS PRN to 6mg HS, Patanol BID to BID PRN, omeprazole AM to HS    Patient was asked about OTC/herbal products specifically.  PTA med list reflects this.    Allergies were reviewed, assessed, and updated with the patient.      Medications available for use during hospital stay: artificial tears.     The information provided in this note is only as accurate as the sources available at the time of the update(s).    Thank you for the opportunity to participate in the care of this patient.    Keely Wilson [facility]  8/17/2022 12:15 PM

## 2022-08-17 NOTE — CONSULTS
[facility] ACUTE PAIN SERVICE    ([facility], [facility], [facility])   Consult Note    Date of Admission:  8/17/2022  Date of Consult: 08/17/22    Physician requesting consult: Morales Collier MD   Reason for consult: rib fracture, back pain     Assessment/Plan:     Kristal Cox is a 89 year old female who was admitted on 8/17/2022.   Pain Service is asked to see the patient for rib fracture, back pain. Admitted for evaluation after a fall in her home. History of chronic pain (multiple chronic compression fractures), lymphedema, HTN, CHF, PVCs, GERD, Hypothyroidism, Stage III CKD, acute upon chronic hyponatremia, cognitive disorder.  Upon examination in ED patient with acute respiratory failure with hypoxia (sats 84% in ED), combination of pneumonitis, acute pain, 3 cm left adnexal mass and 0.7 cm pulmonary nodule.  (Discussed with daughter not planning to pursue findings)   Patient fell when walking with an aide to [place] when tripped and fell on her right side.  She hit her head and could not get up due to right-sided body pain.    Fracture of right 8th rib right side, degenerative disc disease neck, spine, arthritis ankle, head CT negative for trauma, presumed chronic small vessel ischemic changes.   There is a healed fracture deformity of the proximal right humerus. Possible additional fracture deformity of the distal right clavicle, presumably chronic, though poorly assessed. shoulder, chronic compression fractures.      Patient dosing did not awaken for examination.  Met with patient's daughter Edilia who was able to provide excellent history.  Patient with longstanding difficulty with compression fractures and pain.  She has not been on opioids and only recently been prescribed tramadol and took one (50 mg tablet) Saturday and one tablet again on Sunday.  Edilia describes decline over past 3 weeks with increased pain and increased lymphedema.  She has also been having difficulty with  increased phlegm.  They have had home services for over 2 years, gradually they have increased from overnight support to 24 hour care since last November.      Daughter states that patient has been able to ambulate with standby assistance and use of walker.  She identifies that patient had ate a good meal last PM, hot dogs, beans.  She has had some difficulty with swallowing over the years and has had GI workups in the past and perhaps had some sort of stricture daughter wasn't sure.  She does endorse history of GERD.      Discussed with daughter the concept of Palliative Care and possible need for hospice support at this time.  Daughter states they have considered this in the past and had declined.  Daughter is interested in meeting with Palliative Care to get more information about options that would be available for Kristal.  (Consult placed)    PLAN:   1) Pain is consistent with acute upon chronic pain.  Acute pain from fall with rib fracture with history of chronic pain associated with degenerative arthritis of spine and joints, chronic compression fractures.    2)Multimodal Medication Therapy  Topical: lidocaine patch 2 every hs, alternat with  voltaren cream and lidocaine cream   NSAID'S: avoid with age and chronic kidney disease  Muscle Relaxants: consider (hold for now)  Adjuvants: gabapentin 100 mg every hs acetaminophen 975 mg tid  Antidepressants/anxiolytics: none  Opioids: hydromorphone 1 mg every 4 hours prn   IV Pain medication: none  3)Non-medication interventions  Acupuncture consult- as available Tuesday and Thursday (please evaluate)  Integrative consult - please offer  4)Constipation Prophylaxis  Daily stool softener/laxative  5) Follow up   -Opioid prescriber has been none  -Discharge Recommendations - We recommend prescribing the following at the time of discharge: TBD          History of Present Illness (HPI):       Kristal Cox is a 89 year old female with acute upon chronic pain.   The pain is reported to be acute pain associated with fall with history of chronic pain and known compression fractures.    Patient was asleep, did not awaken for full examination and pain history. Reviewed chart and discussed with patient's daughter.      MN  pulled from system on 08/17/22. Last refill on 8/12/22. This indicated 2 opioid prescriptions over this past year.    8/12/22 Tramadol 50 mg 30 for 7 days (took 2 tablets since prescribed)  10/25/22 Tramadol 50 mg 21 for 5 days (daughter states patient never took any of this prescription)  Prescribed by Primary Provider         Medical History  Patient Active Problem List    Diagnosis Date Noted     Hypoxia 08/17/2022     Priority: Medium     Fall, initial encounter 08/17/2022     Priority: Medium     Closed fracture of one rib of right side, initial encounter 08/17/2022     Priority: Medium     Aspiration pneumonia of both lower lobes, unspecified aspiration pneumonia type (H) 08/17/2022     Priority: Medium     Lymphedema of both lower extremities 09/15/2021     Priority: Medium     Chronic kidney disease, stage 3 (H) 08/18/2021     Priority: Medium     Hypokalemia      Priority: Medium     Essential hypertension, benign      Priority: Medium     Hyponatremia 07/08/2021     Priority: Medium     Symptomatic anemia 07/07/2021     Priority: Medium     Acquired hypothyroidism 11/16/2020     Priority: Medium     PVC's (premature ventricular contractions)      Priority: Medium     Benign essential hypertension      Priority: Medium     Hyperlipidemia LDL goal <100      Priority: Medium     Systolic hypertension 11/15/2016     Priority: Medium     GERD (gastroesophageal reflux disease)      Priority: Medium     Osteoporosis      Priority: Medium     Vertebral compression fracture (H) 03/04/2016     Priority: Medium        Surgical History  She  has a past surgical history that includes tonsillectomy & adenoidectomy; Cholecystectomy; Cataract Extraction,  "Bilateral; and  Section.     Past Surgical History:   Procedure Laterality Date     CATARACT EXTRACTION, BILATERAL        SECTION      X 4     CHOLECYSTECTOMY       TONSILLECTOMY & ADENOIDECTOMY         Allergies  Allergies   Allergen Reactions     Evista [Raloxifene] Unknown     Caused blood clot     Fosamax [Alendronic Acid] Nausea and Vomiting       Prior to Admission Medications   (Not in a hospital admission)      Social History  Reviewed, and she  reports that she has never smoked. She has never used smokeless tobacco. She reports current drug use. She reports that she does not drink alcohol.  Social History     Tobacco Use     Smoking status: Never Smoker     Smokeless tobacco: Never Used   Substance Use Topics     Alcohol use: No     Comment: Alcoholic Drinks/day: rarely drinks wine, and drinks wine watered down with ice.       Family History  Reviewed, and family history includes Cerebrovascular Disease in her mother; Crohn's Disease in her father.    Review of Systems  Complete ROS reviewed, unless noted, all other systems reviewed and found to be negative.        Objective:     Physical Exam:  BP (!) 176/83   Pulse 100   Temp 98.4  F (36.9  C) (Oral)   Resp (!) 40   Ht 1.6 m (5' 3\")   Wt 49.9 kg (110 lb)   SpO2 94%   BMI 19.49 kg/m    Weight:   Weight change:   Body mass index is 19.49 kg/m .      General Appearance:  Asleep, respirations smooth and even, frail elderly female              Imaging Reviewed   XR Pelvis and Hip Left 2 Views    Result Date: 2022  EXAM: XR PELVIS AND HIP LEFT 2 VIEWS LOCATION: [facility] DATE/TIME: 2022 12:46 AM INDICATION: Fall with injury. Pain. COMPARISON: 1. X-ray pelvis 3 views 2020 at 1742 hours. 2. CT abdomen and pelvis with IV contrast 3/19/2020.     IMPRESSION: Demineralization of the visualized bones. Degenerative changes lower lumbar spine and joints of the pelvis including the left hip joint. No " fracture, dislocation or x-ray evidence of avascular necrosis. Coarse calcifications in the left ovary, unchanged.    XR Shoulder Right G/E 3 Views    Result Date: 8/17/2022  EXAM: XR SHOULDER RIGHT G/E 3 VIEWS LOCATION: [facility] DATE/TIME: 8/17/2022 12:46 AM INDICATION: Right shoulder pain after fall. COMPARISON: Right humeral radiographs 07/04/2019.     IMPRESSION: There is a healed fracture deformity of the proximal right humerus. Possible additional fracture deformity of the distal right clavicle, presumably chronic, though poorly assessed. Moderate osteoarthritic changes.     Ankle XR, G/E 3 views, right    Result Date: 8/17/2022  EXAM: XR ANKLE RIGHT G/E 3 VIEWS LOCATION: [facility] DATE/TIME: 8/17/2022 12:46 AM INDICATION: Fall with injury. Pain. COMPARISON: None.     IMPRESSION: No fracture or dislocation involving the right ankle. Demineralization of the visualized bones. Ankle mortise is symmetric. Talar dome is smooth. Plantar calcaneal spur. Diffuse soft tissue swelling right leg and the visualized right foot. No  soft tissue gas or opaque foreign body.    Cervical spine CT w/o contrast    Result Date: 8/17/2022  EXAM: CT CERVICAL SPINE W/O CONTRAST, CT LUMBAR SPINE W/O CONTRAST, CT THORACIC SPINE W/O CONTRAST LOCATION: [facility] DATE/TIME: 8/17/2022 1:23 AM INDICATION: Fall, midline C-spine pain, thoracic and lumbar pain. COMPARISON: CT chest, abdomen and pelvis 11/21/2019; x-ray lumbar spine 2/27/2020 TECHNIQUE: 1) Routine CT Cervical Spine without IV contrast. Multiplanar reformats. Dose reduction techniques were used. 2) Routine CT Thoracic Spine without IV contrast. Multiplanar reformats. Dose reduction techniques were used. 3) Routine CT Lumbar Spine without IV contrast. Multiplanar reformats. Dose reduction techniques were used. FINDINGS: CERVICAL SPINE CT: VERTEBRA: Cervical vertebra are normal in height. Mild  alterations in the lateral alignment likely relate to degenerative changes. No fracture or posttraumatic subluxation. CANAL/FORAMINA: High-grade foraminal stenosis on the right at C5 and on the left at C5 and C5-C6. PARASPINAL: No extraspinal abnormality. THORACIC SPINE CT: VERTEBRA: There are chronic high-grade compression fractures at T7 and T8. Moderate age-indeterminate compression fractures at T6 and T9. Moderate age-indeterminate burst type compression fracture at T11. No subluxation. CANAL/FORAMINA: High-grade foraminal stenosis bilaterally at T9-T10. No appreciable central canal stenosis. PARASPINAL: Bilateral pleural effusions. LUMBAR SPINE CT: VERTEBRA: There are chronic compression fractures along the superior and inferior endplates at L1 and the inferior endplate at L4. Additional lumbar vertebra are normal in height. There are mild alterations in the lateral alignment which are presumably degenerative. No definite acute fracture or posttraumatic subluxation. CANAL/FORAMINA: No canal or neural foraminal stenosis. PARASPINAL: Evidence of a 3 cm left adnexal low attenuating mass lesion. Nonspecific central low attenuating changes in the uterus.     IMPRESSION: CERVICAL SPINE CT: 1.  No fracture or posttraumatic subluxation. 2.  Degenerative changes as highlighted above. THORACIC SPINE CT: 1.  Age-indeterminate compression deformities at T6, T9 and T11 are favored to be chronic. If indicated, MRI could be performed to better evaluate the age of these abnormalities. 2.  There are high-grade chronic compression fractures at T7 and T8. 3.  There are bilateral pleural effusions. LUMBAR SPINE CT: 1.  No acute fracture or posttraumatic subluxation. 2.  No high-grade spinal canal or neural foraminal stenosis. 3.  Left adnexal mass and indeterminate low attenuating changes in the uterus. Pelvic ultrasound is recommended for further evaluation.    Chest CT w/o contrast    Result Date: 8/17/2022  EXAM: CT CHEST W/O  CONTRAST LOCATION: [facility] DATE/TIME: 8/17/2022 1:27 AM INDICATION: fall, anterior rib pain, eval for fx COMPARISON: Shoulder x-ray performed today TECHNIQUE: CT chest without IV contrast. Multiplanar reformats were obtained. Dose reduction techniques were used. CONTRAST: None. FINDINGS: LUNGS AND PLEURA: There are scattered groundglass opacities throughout the lungs as well as a 0.7 cm pulmonary nodule in the right upper lobe. Given abrupt groundglass opacities, this likely reflects a multifocal infectious/inflammatory process. Scattered pulmonary nodules are present as a sub-4 mm pulmonary nodule in the left upper lobe. There are bibasilar consolidations. There is bronchial wall thickening within the bilateral lower lobes. MEDIASTINUM/AXILLAE: No lymphadenopathy. No thoracic aortic aneurysm. There appears to be fluid within the esophagus. This predisposes the patient to aspiration. CORONARY ARTERY CALCIFICATION: None. UPPER ABDOMEN: No significant finding. MUSCULOSKELETAL: Old right clavicular fracture. As a faint linear lucency involving the right posterior aspect of the eighth rib. Multiple thoracic compression fractures, better seen on concurrently performed CT thoracic spine.     IMPRESSION: 1.  Possible nondisplaced fracture involving the posterior aspect of the right eighth rib. Correlation with point tenderness. 2.  Scattered groundglass opacities in the lungs likely reflecting an infectious/inflammatory process. Bibasilar consolidations likely reflecting aspiration. 3.  0.7 cm pulmonary nodule in the right upper lobe. REFERENCE: Guidelines for Management of Incidental Pulmonary Nodules Detected on CT Images: From the Fleischner Society 2017. Guidelines apply to incidental nodules in patients who are 35 years or older. Guidelines do not apply to lung cancer screening, patients with immunosuppression, or patients with known primary cancer. MULTIPLE NODULES Nodule size <6 mm  Low-risk patients: No follow-up needed. High-risk patients: Optional follow-up at 12 months. Nodule size 6 mm or larger Low-risk patients: Follow-up CT at 3-6 months, then consider CT at 18-24 months. High-risk patients: Follow-up CT at 3-6 months, then at 18-24 months if no change. -Use most suspicious nodule as guide to management. Consider referral to lung nodule clinic. 1.      Head CT w/o contrast    Result Date: 8/17/2022  EXAM: CT HEAD W/O CONTRAST LOCATION: [facility] DATE/TIME: 8/17/2022 1:23 AM INDICATION: Head injury COMPARISON: None. TECHNIQUE: Routine CT Head without IV contrast. Multiplanar reformats. Dose reduction techniques were used. FINDINGS: INTRACRANIAL CONTENTS: No intracranial hemorrhage, extraaxial collection, or mass effect.  No CT evidence of acute infarct. Mild to moderate presumed chronic small vessel ischemic changes. Mild to moderate generalized volume loss. No hydrocephalus. VISUALIZED ORBITS/SINUSES/MASTOIDS: No intraorbital abnormality. No paranasal sinus mucosal disease. No middle ear or mastoid effusion. BONES/SOFT TISSUES: No acute abnormality.     IMPRESSION: 1.  No acute intracranial hemorrhage or calvarial fracture. 2.  Mild to moderate volume loss and presumed chronic small vessel ischemic changes.    Lumbar spine CT w/o contrast    Result Date: 8/17/2022  EXAM: CT CERVICAL SPINE W/O CONTRAST, CT LUMBAR SPINE W/O CONTRAST, CT THORACIC SPINE W/O CONTRAST LOCATION: [facility] DATE/TIME: 8/17/2022 1:23 AM INDICATION: Fall, midline C-spine pain, thoracic and lumbar pain. COMPARISON: CT chest, abdomen and pelvis 11/21/2019; x-ray lumbar spine 2/27/2020 TECHNIQUE: 1) Routine CT Cervical Spine without IV contrast. Multiplanar reformats. Dose reduction techniques were used. 2) Routine CT Thoracic Spine without IV contrast. Multiplanar reformats. Dose reduction techniques were used. 3) Routine CT Lumbar Spine without IV contrast.  Multiplanar reformats. Dose reduction techniques were used. FINDINGS: CERVICAL SPINE CT: VERTEBRA: Cervical vertebra are normal in height. Mild alterations in the lateral alignment likely relate to degenerative changes. No fracture or posttraumatic subluxation. CANAL/FORAMINA: High-grade foraminal stenosis on the right at C5 and on the left at C5 and C5-C6. PARASPINAL: No extraspinal abnormality. THORACIC SPINE CT: VERTEBRA: There are chronic high-grade compression fractures at T7 and T8. Moderate age-indeterminate compression fractures at T6 and T9. Moderate age-indeterminate burst type compression fracture at T11. No subluxation. CANAL/FORAMINA: High-grade foraminal stenosis bilaterally at T9-T10. No appreciable central canal stenosis. PARASPINAL: Bilateral pleural effusions. LUMBAR SPINE CT: VERTEBRA: There are chronic compression fractures along the superior and inferior endplates at L1 and the inferior endplate at L4. Additional lumbar vertebra are normal in height. There are mild alterations in the lateral alignment which are presumably degenerative. No definite acute fracture or posttraumatic subluxation. CANAL/FORAMINA: No canal or neural foraminal stenosis. PARASPINAL: Evidence of a 3 cm left adnexal low attenuating mass lesion. Nonspecific central low attenuating changes in the uterus.     IMPRESSION: CERVICAL SPINE CT: 1.  No fracture or posttraumatic subluxation. 2.  Degenerative changes as highlighted above. THORACIC SPINE CT: 1.  Age-indeterminate compression deformities at T6, T9 and T11 are favored to be chronic. If indicated, MRI could be performed to better evaluate the age of these abnormalities. 2.  There are high-grade chronic compression fractures at T7 and T8. 3.  There are bilateral pleural effusions. LUMBAR SPINE CT: 1.  No acute fracture or posttraumatic subluxation. 2.  No high-grade spinal canal or neural foraminal stenosis. 3.  Left adnexal mass and indeterminate low attenuating changes  in the uterus. Pelvic ultrasound is recommended for further evaluation.    CT Thoracic Spine w/o Contrast    Result Date: 8/17/2022  EXAM: CT CERVICAL SPINE W/O CONTRAST, CT LUMBAR SPINE W/O CONTRAST, CT THORACIC SPINE W/O CONTRAST LOCATION: [facility] DATE/TIME: 8/17/2022 1:23 AM INDICATION: Fall, midline C-spine pain, thoracic and lumbar pain. COMPARISON: CT chest, abdomen and pelvis 11/21/2019; x-ray lumbar spine 2/27/2020 TECHNIQUE: 1) Routine CT Cervical Spine without IV contrast. Multiplanar reformats. Dose reduction techniques were used. 2) Routine CT Thoracic Spine without IV contrast. Multiplanar reformats. Dose reduction techniques were used. 3) Routine CT Lumbar Spine without IV contrast. Multiplanar reformats. Dose reduction techniques were used. FINDINGS: CERVICAL SPINE CT: VERTEBRA: Cervical vertebra are normal in height. Mild alterations in the lateral alignment likely relate to degenerative changes. No fracture or posttraumatic subluxation. CANAL/FORAMINA: High-grade foraminal stenosis on the right at C5 and on the left at C5 and C5-C6. PARASPINAL: No extraspinal abnormality. THORACIC SPINE CT: VERTEBRA: There are chronic high-grade compression fractures at T7 and T8. Moderate age-indeterminate compression fractures at T6 and T9. Moderate age-indeterminate burst type compression fracture at T11. No subluxation. CANAL/FORAMINA: High-grade foraminal stenosis bilaterally at T9-T10. No appreciable central canal stenosis. PARASPINAL: Bilateral pleural effusions. LUMBAR SPINE CT: VERTEBRA: There are chronic compression fractures along the superior and inferior endplates at L1 and the inferior endplate at L4. Additional lumbar vertebra are normal in height. There are mild alterations in the lateral alignment which are presumably degenerative. No definite acute fracture or posttraumatic subluxation. CANAL/FORAMINA: No canal or neural foraminal stenosis. PARASPINAL: Evidence of a 3 cm  left adnexal low attenuating mass lesion. Nonspecific central low attenuating changes in the uterus.     IMPRESSION: CERVICAL SPINE CT: 1.  No fracture or posttraumatic subluxation. 2.  Degenerative changes as highlighted above. THORACIC SPINE CT: 1.  Age-indeterminate compression deformities at T6, T9 and T11 are favored to be chronic. If indicated, MRI could be performed to better evaluate the age of these abnormalities. 2.  There are high-grade chronic compression fractures at T7 and T8. 3.  There are bilateral pleural effusions. LUMBAR SPINE CT: 1.  No acute fracture or posttraumatic subluxation. 2.  No high-grade spinal canal or neural foraminal stenosis. 3.  Left adnexal mass and indeterminate low attenuating changes in the uterus. Pelvic ultrasound is recommended for further evaluation.      Labs Reviewed Personally By Myself  Results for orders placed or performed during the hospital encounter of 08/17/22   Head CT w/o contrast     Status: None    Narrative    EXAM: CT HEAD W/O CONTRAST  LOCATION: [facility]  DATE/TIME: 8/17/2022 1:23 AM    INDICATION: Head injury  COMPARISON: None.  TECHNIQUE: Routine CT Head without IV contrast. Multiplanar reformats. Dose reduction techniques were used.    FINDINGS:  INTRACRANIAL CONTENTS: No intracranial hemorrhage, extraaxial collection, or mass effect.  No CT evidence of acute infarct. Mild to moderate presumed chronic small vessel ischemic changes. Mild to moderate generalized volume loss. No hydrocephalus.     VISUALIZED ORBITS/SINUSES/MASTOIDS: No intraorbital abnormality. No paranasal sinus mucosal disease. No middle ear or mastoid effusion.    BONES/SOFT TISSUES: No acute abnormality.      Impression    IMPRESSION:  1.  No acute intracranial hemorrhage or calvarial fracture.  2.  Mild to moderate volume loss and presumed chronic small vessel ischemic changes.   Cervical spine CT w/o contrast     Status: None    Narrative    EXAM: CT  CERVICAL SPINE W/O CONTRAST, CT LUMBAR SPINE W/O CONTRAST, CT THORACIC SPINE W/O CONTRAST  LOCATION: [facility]  DATE/TIME: 8/17/2022 1:23 AM    INDICATION: Fall, midline C-spine pain, thoracic and lumbar pain.  COMPARISON: CT chest, abdomen and pelvis 11/21/2019; x-ray lumbar spine 2/27/2020  TECHNIQUE:  1) Routine CT Cervical Spine without IV contrast. Multiplanar reformats. Dose reduction techniques were used.   2) Routine CT Thoracic Spine without IV contrast. Multiplanar reformats. Dose reduction techniques were used.   3) Routine CT Lumbar Spine without IV contrast. Multiplanar reformats. Dose reduction techniques were used.     FINDINGS:    CERVICAL SPINE CT:  VERTEBRA: Cervical vertebra are normal in height. Mild alterations in the lateral alignment likely relate to degenerative changes. No fracture or posttraumatic subluxation.     CANAL/FORAMINA: High-grade foraminal stenosis on the right at C5 and on the left at C5 and C5-C6.    PARASPINAL: No extraspinal abnormality.    THORACIC SPINE CT:  VERTEBRA: There are chronic high-grade compression fractures at T7 and T8. Moderate age-indeterminate compression fractures at T6 and T9. Moderate age-indeterminate burst type compression fracture at T11. No subluxation.     CANAL/FORAMINA: High-grade foraminal stenosis bilaterally at T9-T10. No appreciable central canal stenosis.    PARASPINAL: Bilateral pleural effusions.    LUMBAR SPINE CT:  VERTEBRA: There are chronic compression fractures along the superior and inferior endplates at L1 and the inferior endplate at L4. Additional lumbar vertebra are normal in height. There are mild alterations in the lateral alignment which are presumably   degenerative. No definite acute fracture or posttraumatic subluxation.     CANAL/FORAMINA: No canal or neural foraminal stenosis.    PARASPINAL: Evidence of a 3 cm left adnexal low attenuating mass lesion. Nonspecific central low attenuating changes in  the uterus.      Impression    IMPRESSION:  CERVICAL SPINE CT:  1.  No fracture or posttraumatic subluxation.  2.  Degenerative changes as highlighted above.    THORACIC SPINE CT:  1.  Age-indeterminate compression deformities at T6, T9 and T11 are favored to be chronic. If indicated, MRI could be performed to better evaluate the age of these abnormalities.  2.  There are high-grade chronic compression fractures at T7 and T8.  3.  There are bilateral pleural effusions.    LUMBAR SPINE CT:  1.  No acute fracture or posttraumatic subluxation.  2.  No high-grade spinal canal or neural foraminal stenosis.  3.  Left adnexal mass and indeterminate low attenuating changes in the uterus. Pelvic ultrasound is recommended for further evaluation.   CT Thoracic Spine w/o Contrast     Status: None    Narrative    EXAM: CT CERVICAL SPINE W/O CONTRAST, CT LUMBAR SPINE W/O CONTRAST, CT THORACIC SPINE W/O CONTRAST  LOCATION: [facility]  DATE/TIME: 8/17/2022 1:23 AM    INDICATION: Fall, midline C-spine pain, thoracic and lumbar pain.  COMPARISON: CT chest, abdomen and pelvis 11/21/2019; x-ray lumbar spine 2/27/2020  TECHNIQUE:  1) Routine CT Cervical Spine without IV contrast. Multiplanar reformats. Dose reduction techniques were used.   2) Routine CT Thoracic Spine without IV contrast. Multiplanar reformats. Dose reduction techniques were used.   3) Routine CT Lumbar Spine without IV contrast. Multiplanar reformats. Dose reduction techniques were used.     FINDINGS:    CERVICAL SPINE CT:  VERTEBRA: Cervical vertebra are normal in height. Mild alterations in the lateral alignment likely relate to degenerative changes. No fracture or posttraumatic subluxation.     CANAL/FORAMINA: High-grade foraminal stenosis on the right at C5 and on the left at C5 and C5-C6.    PARASPINAL: No extraspinal abnormality.    THORACIC SPINE CT:  VERTEBRA: There are chronic high-grade compression fractures at T7 and T8. Moderate  age-indeterminate compression fractures at T6 and T9. Moderate age-indeterminate burst type compression fracture at T11. No subluxation.     CANAL/FORAMINA: High-grade foraminal stenosis bilaterally at T9-T10. No appreciable central canal stenosis.    PARASPINAL: Bilateral pleural effusions.    LUMBAR SPINE CT:  VERTEBRA: There are chronic compression fractures along the superior and inferior endplates at L1 and the inferior endplate at L4. Additional lumbar vertebra are normal in height. There are mild alterations in the lateral alignment which are presumably   degenerative. No definite acute fracture or posttraumatic subluxation.     CANAL/FORAMINA: No canal or neural foraminal stenosis.    PARASPINAL: Evidence of a 3 cm left adnexal low attenuating mass lesion. Nonspecific central low attenuating changes in the uterus.      Impression    IMPRESSION:  CERVICAL SPINE CT:  1.  No fracture or posttraumatic subluxation.  2.  Degenerative changes as highlighted above.    THORACIC SPINE CT:  1.  Age-indeterminate compression deformities at T6, T9 and T11 are favored to be chronic. If indicated, MRI could be performed to better evaluate the age of these abnormalities.  2.  There are high-grade chronic compression fractures at T7 and T8.  3.  There are bilateral pleural effusions.    LUMBAR SPINE CT:  1.  No acute fracture or posttraumatic subluxation.  2.  No high-grade spinal canal or neural foraminal stenosis.  3.  Left adnexal mass and indeterminate low attenuating changes in the uterus. Pelvic ultrasound is recommended for further evaluation.   Lumbar spine CT w/o contrast     Status: None    Narrative    EXAM: CT CERVICAL SPINE W/O CONTRAST, CT LUMBAR SPINE W/O CONTRAST, CT THORACIC SPINE W/O CONTRAST  LOCATION: [facility]  DATE/TIME: 8/17/2022 1:23 AM    INDICATION: Fall, midline C-spine pain, thoracic and lumbar pain.  COMPARISON: CT chest, abdomen and pelvis 11/21/2019; x-ray lumbar spine  2/27/2020  TECHNIQUE:  1) Routine CT Cervical Spine without IV contrast. Multiplanar reformats. Dose reduction techniques were used.   2) Routine CT Thoracic Spine without IV contrast. Multiplanar reformats. Dose reduction techniques were used.   3) Routine CT Lumbar Spine without IV contrast. Multiplanar reformats. Dose reduction techniques were used.     FINDINGS:    CERVICAL SPINE CT:  VERTEBRA: Cervical vertebra are normal in height. Mild alterations in the lateral alignment likely relate to degenerative changes. No fracture or posttraumatic subluxation.     CANAL/FORAMINA: High-grade foraminal stenosis on the right at C5 and on the left at C5 and C5-C6.    PARASPINAL: No extraspinal abnormality.    THORACIC SPINE CT:  VERTEBRA: There are chronic high-grade compression fractures at T7 and T8. Moderate age-indeterminate compression fractures at T6 and T9. Moderate age-indeterminate burst type compression fracture at T11. No subluxation.     CANAL/FORAMINA: High-grade foraminal stenosis bilaterally at T9-T10. No appreciable central canal stenosis.    PARASPINAL: Bilateral pleural effusions.    LUMBAR SPINE CT:  VERTEBRA: There are chronic compression fractures along the superior and inferior endplates at L1 and the inferior endplate at L4. Additional lumbar vertebra are normal in height. There are mild alterations in the lateral alignment which are presumably   degenerative. No definite acute fracture or posttraumatic subluxation.     CANAL/FORAMINA: No canal or neural foraminal stenosis.    PARASPINAL: Evidence of a 3 cm left adnexal low attenuating mass lesion. Nonspecific central low attenuating changes in the uterus.      Impression    IMPRESSION:  CERVICAL SPINE CT:  1.  No fracture or posttraumatic subluxation.  2.  Degenerative changes as highlighted above.    THORACIC SPINE CT:  1.  Age-indeterminate compression deformities at T6, T9 and T11 are favored to be chronic. If indicated, MRI could be performed  to better evaluate the age of these abnormalities.  2.  There are high-grade chronic compression fractures at T7 and T8.  3.  There are bilateral pleural effusions.    LUMBAR SPINE CT:  1.  No acute fracture or posttraumatic subluxation.  2.  No high-grade spinal canal or neural foraminal stenosis.  3.  Left adnexal mass and indeterminate low attenuating changes in the uterus. Pelvic ultrasound is recommended for further evaluation.   Chest CT w/o contrast     Status: None    Narrative    EXAM: CT CHEST W/O CONTRAST  LOCATION: [facility]  DATE/TIME: 8/17/2022 1:27 AM    INDICATION: fall, anterior rib pain, eval for fx  COMPARISON: Shoulder x-ray performed today  TECHNIQUE: CT chest without IV contrast. Multiplanar reformats were obtained. Dose reduction techniques were used.  CONTRAST: None.    FINDINGS:   LUNGS AND PLEURA: There are scattered groundglass opacities throughout the lungs as well as a 0.7 cm pulmonary nodule in the right upper lobe. Given abrupt groundglass opacities, this likely reflects a multifocal infectious/inflammatory process.   Scattered pulmonary nodules are present as a sub-4 mm pulmonary nodule in the left upper lobe. There are bibasilar consolidations. There is bronchial wall thickening within the bilateral lower lobes.    MEDIASTINUM/AXILLAE: No lymphadenopathy. No thoracic aortic aneurysm. There appears to be fluid within the esophagus. This predisposes the patient to aspiration.    CORONARY ARTERY CALCIFICATION: None.    UPPER ABDOMEN: No significant finding.    MUSCULOSKELETAL: Old right clavicular fracture. As a faint linear lucency involving the right posterior aspect of the eighth rib. Multiple thoracic compression fractures, better seen on concurrently performed CT thoracic spine.      Impression    IMPRESSION:   1.  Possible nondisplaced fracture involving the posterior aspect of the right eighth rib. Correlation with point tenderness.  2.  Scattered  groundglass opacities in the lungs likely reflecting an infectious/inflammatory process. Bibasilar consolidations likely reflecting aspiration.  3.  0.7 cm pulmonary nodule in the right upper lobe.    REFERENCE:  Guidelines for Management of Incidental Pulmonary Nodules Detected on CT Images: From the Fleischner Society 2017.   Guidelines apply to incidental nodules in patients who are 35 years or older.  Guidelines do not apply to lung cancer screening, patients with immunosuppression, or patients with known primary cancer.    MULTIPLE NODULES  Nodule size <6 mm  Low-risk patients: No follow-up needed.  High-risk patients: Optional follow-up at 12 months.    Nodule size 6 mm or larger  Low-risk patients: Follow-up CT at 3-6 months, then consider CT at 18-24 months.  High-risk patients: Follow-up CT at 3-6 months, then at 18-24 months if no change.  -Use most suspicious nodule as guide to management.    Consider referral to lung nodule clinic.  1.       XR Shoulder Right G/E 3 Views     Status: None    Narrative    EXAM: XR SHOULDER RIGHT G/E 3 VIEWS  LOCATION: [facility]  DATE/TIME: 8/17/2022 12:46 AM    INDICATION: Right shoulder pain after fall.  COMPARISON: Right humeral radiographs 07/04/2019.      Impression    IMPRESSION: There is a healed fracture deformity of the proximal right humerus.     Possible additional fracture deformity of the distal right clavicle, presumably chronic, though poorly assessed.    Moderate osteoarthritic changes.    XR Pelvis and Hip Left 2 Views     Status: None    Narrative    EXAM: XR PELVIS AND HIP LEFT 2 VIEWS  LOCATION: [facility]  DATE/TIME: 8/17/2022 12:46 AM    INDICATION: Fall with injury. Pain.  COMPARISON:   1. X-ray pelvis 3 views 2/27/2020 at 1742 hours.  2. CT abdomen and pelvis with IV contrast 3/19/2020.      Impression    IMPRESSION: Demineralization of the visualized bones. Degenerative changes lower lumbar spine  and joints of the pelvis including the left hip joint. No fracture, dislocation or x-ray evidence of avascular necrosis. Coarse calcifications in the left   ovary, unchanged.   Ankle XR, G/E 3 views, right     Status: None    Narrative    EXAM: XR ANKLE RIGHT G/E 3 VIEWS  LOCATION: [facility]  DATE/TIME: 8/17/2022 12:46 AM    INDICATION: Fall with injury. Pain.  COMPARISON: None.      Impression    IMPRESSION: No fracture or dislocation involving the right ankle. Demineralization of the visualized bones. Ankle mortise is symmetric. Talar dome is smooth. Plantar calcaneal spur. Diffuse soft tissue swelling right leg and the visualized right foot. No   soft tissue gas or opaque foreign body.   CBC (+ platelets, no diff)     Status: Abnormal   Result Value Ref Range    WBC Count 12.6 (H) 4.0 - 11.0 10e3/uL    RBC Count 3.45 (L) 3.80 - 5.20 10e6/uL    Hemoglobin 11.9 11.7 - 15.7 g/dL    Hematocrit 33.6 (L) 35.0 - 47.0 %    MCV 97 78 - 100 fL    MCH 34.5 (H) 26.5 - 33.0 pg    MCHC 35.4 31.5 - 36.5 g/dL    RDW 12.3 10.0 - 15.0 %    Platelet Count 219 150 - 450 10e3/uL   Basic metabolic panel     Status: Abnormal   Result Value Ref Range    Sodium 128 (L) 136 - 145 mmol/L    Potassium 4.2 3.5 - 5.0 mmol/L    Chloride 94 (L) 98 - 107 mmol/L    Carbon Dioxide (CO2) 26 22 - 31 mmol/L    Anion Gap 8 5 - 18 mmol/L    Urea Nitrogen 16 8 - 28 mg/dL    Creatinine 0.81 0.60 - 1.10 mg/dL    Calcium 9.3 8.5 - 10.5 mg/dL    Glucose 109 70 - 125 mg/dL    GFR Estimate 65 >60 mL/min/1.73m2   UA with Microscopic reflex to Culture     Status: Abnormal    Specimen: Urine, Clean Catch   Result Value Ref Range    Color Urine Light Yellow Colorless, Straw, Light Yellow, Yellow    Appearance Urine Clear Clear    Glucose Urine Negative Negative mg/dL    Bilirubin Urine Negative Negative    Ketones Urine Negative Negative mg/dL    Specific Gravity Urine 1.011 1.001 - 1.030    Blood Urine Negative Negative    pH Urine 7.0  5.0 - 7.0    Protein Albumin Urine Negative Negative mg/dL    Urobilinogen Urine <2.0 <2.0 mg/dL    Nitrite Urine Negative Negative    Leukocyte Esterase Urine Negative Negative    Bacteria Urine Few (A) None Seen /HPF    RBC Urine 2 <=2 /HPF    WBC Urine 1 <=5 /HPF    Squamous Epithelials Urine <1 <=1 /HPF    Hyaline Casts Urine 1 <=2 /LPF    Narrative    Urine Culture not indicated   Asymptomatic COVID-19 Virus (Coronavirus) by PCR Nasopharyngeal     Status: Normal    Specimen: Nasopharyngeal; Swab   Result Value Ref Range    SARS CoV2 PCR Negative Negative    Narrative    Testing was performed using the Xpert Xpress SARS-CoV-2 Assay on the   Cepheid Gene-Xpert Instrument Systems. Additional information about   this Emergency Use Authorization (EUA) assay can be found via the Lab   Guide. This test should be ordered for the detection of SARS-CoV-2 in   individuals who meet SARS-CoV-2 clinical and/or epidemiological   criteria. Test performance is unknown in asymptomatic patients. This   test is for in vitro diagnostic use under the FDA EUA for   laboratories certified under CLIA to perform high complexity testing.   This test has not been FDA cleared or approved. A negative result   does not rule out the presence of PCR inhibitors in the specimen or   target RNA in concentration below the limit of detection for the   assay. The possibility of a false negative should be considered if   the patient's recent exposure or clinical presentation suggests   COVID-19. This test was validated by the [facility]. This laboratory is certified under the Clinical Laboratory Improvement Amendments of 1988 (CLIA-88) as qualified to perform high complexity laboratory testing.     Procalcitonin     Status: Normal   Result Value Ref Range    Procalcitonin 0.02 0.00 - 0.49 ng/mL        Total time spent 65 minutes with greater than 50% in consultation, education and coordination of care.     Also  discussed with R    Thank you for this consultation.      Shana VELASQUEZ, CNS-BC, DNP  Acute Care Pain Management Program  [facility] ([place], [place], [place])   With questions call [phone]  Preference if for Sophia Sauceda  Click HERE to page Jeanne

## 2022-08-17 NOTE — CONSULTS
[facility] - Palliative Care Consultation Note    Patient: Kristal Cox  Date of Admission:  8/17/2022    Requesting Clinician / Team: Jeanne Sauceda  Reason for consult: Goals of Care (discuss hospice)    Summary/Recommendations:    Goals of care  Met in pts ER room, pt sleeping and dtr Edilia at the bedside. Did gather some preliminary pt and family background from marely Yeboah. Did introduce self and the role/reason for the palliative care consult. Additionally, did attempt early in the visit clarify who were pts primary health care agents to ensure was talking with the correct family member. Dtr Edilia suggests that it is her 2 older brothers, none of whom are currently here. Pts son Camilo is reportedly on his way, advised she call him and ask him to bring the advance directive (not done; appreciate CM assistance).    Per dtr Edilia, Kristal lives at home and has 24 hour [facility] care. She had been doing fairly well yet in the last few weeks was having more secretions/phlegm, pain, and anxiety. She has a history of several fractures yet she does not like to take pain medication (even Tylenol) as she does not want to feel like a zombie. She has not wanted to have hospitalization as she did not want to go to a TCU. She was seen last week and diagnosed with a sinus infection and given Tramadol (has not taken much). About a year ago, they had some discussion re: palliative care/hospice but did not pursue. They have recently been having some discussions as she has been declining but have not pursued gathering any information so far. Per dtr. Yolis has been frustrated and said she has not been sure how much longer she wanted to live. Spent some time explaining hospice. Reviewed with dtr recommendation to talk in the family, with pt, and consider an informational hospice consult. They can let the nurse know who can reach a hospitalist to place the order (for CM who would work with family selecting a  hospice).     Did lay out a few care decisions for the family and pt to consider. Discussed that she is getting evaluation of her underlying health issues; uncertain how long she will be in the ER, timing of admission to a floor. Discussed that it might be helpful to have an informational hospice consult (dtr thinks she might want hospice at home, with [facility], yet does worry as staff sometimes do not make their shifts, and pt may require more care than has had). Also discussed if she had a change for the worse, whether to escalate care to the ICU (will discuss further with family, think through, no decision right now), discussed letting the nursing staff know who is the primary family member to contact in an emergency. Also discussed comfort care (if pt and family did not want further evaluation, again no decision).      Provided dtr Edilia with the Palliative Care contact information. Might be beneficial to have another Palliative Care visit tomorrow with health care agents/family, and discuss in greater detail, findings and care preferences. Nothing is scheduled at this time. This palliative care provider is off tomorrow. Will ask for a colleague to touch base.       Advanced care planning  -Previous Healthcare Directive: Pt has reportedly completed an advance directive. Is not present now. Encouraged dtr to talk with brother Camilo, re: bringing in a copy. Also appreciate CM assistance with obtaining this if possible.   -Surrogate Medical Decision Maker: Needing further clarification; dtr Edilia indicates the older 2 brothers Camilo and Venkat are however no documentation at this time time.   -CODE STATUS: DNR/DNI    Symptom management-Palliative Care not involved in symptom mgmt at this time. However, pt has a hx of anxiety and was recent given new prescriptions for Tramadol and Hydroxyzine (not advised in advance age). Would not recommend these in an older pt. Unsure if contributed to the fall. Pt does not  historically like to take many medications. Does not want to feel like a 'zombie.' Dilaudid 1 mg PO = 4 mg OME  -Appreciate the Acute Pain Team evaluation and recommendations for pain now (believe will help with anxiety as well).   -Recommend consider Healing Touch/Aromatherapy and Acupuncture consultations (did not discuss these today), optimize time with family and caregivers, visit from , and consider hospice consultation.     Psychosocial and support needs  Pt is [place] and dtr believes would welcome a  tomorrow   Believe family might benefit from Palliative Care LICSW and  tomorrow, for psychosocial support.     Case discussed with RN, LUCIA, electronic communication with Pain Consultant.      Thank you for the opportunity to participate in the care of this patient and family.      During regular M-F work hours -- if you are not sure who specifically to contact -- please contact us by calling [phone].      Palliative Care Assessment    Information gathered today from: medical chart, family.  Kristal Cox is a 89 year old female with a history of hypertension, hyperlipidemia, osteoporosis, PVC, hyperthyroidism, CKD who presented to the ED on Aug. 17th with a fall.  Admitted to the hospital with fall.   Previous hospitalizations: None recently  Other specialities following this admission: Acute Pain   Recent changes: Per ER, patient arrived via EMS from home for a witnessed fall. The patient was walking from her living room to her bedroom when she slipped on her slipper. Patient does not know if she had a loss of consciousness but does state that she could not get up. Patient endorses feeling pain in her chest wall, while breathing, in her shoulders and neck, hips, and back. Patient denies jaw pain or any other complaints at this time.     Today, the patient was seen for: Goals of care    Acute Pain Team note relevant to Palliative Care consultation  Discussed with  daughter the concept of Palliative Care and possible need for hospice support at this time. Daughter states they have considered this in the past and had declined.  Daughter is interested in meeting with Palliative Care to get more information about options that would be available for Kristal.  (Consult placed)    Recent PCP provider note from Aug. 12th  Acute non-recurrent pansinusitis: Will treat with Doxycycline as she has a penicillin allergy. Rest, continue Mucinex. Follow up if symptoms persist.   - doxycycline hyclate (VIBRAMYCIN) 100 MG capsule  Dispense: 20 capsule; Refill: 0     SOB (shortness of breath): Related to phlegm, post nasal drip, and inability to clear this. Will try an albuterol inhaler to help this.   - albuterol (PROAIR HFA/PROVENTIL HFA/VENTOLIN HFA) 108 (90 Base) MCG/ACT inhaler  Dispense: 18 g; Refill: 1     Anxiety: Related to breathing. Will try hydroxyzine PRN. Follow up if symptoms persist.   - hydrOXYzine (ATARAX) 25 MG tablet  Dispense: 30 tablet; Refill: 0     Lymphedema: Bilateral lower extremities. She continues to wear her wraps. She has trouble elevating her legs as this makes her uncomfortable. Will try a small three day addition of furosemide (40mg daily x 3 days) then resume 20mg daily. Try to elevate lets as able. Follow up if symptoms persist.      Rib pain/Chronic bilateral thoracic back pain: Chronic kyphosis of thoracic spine area, ribs are shifted forward from this. Will try treating the Tylenol three times per day and Tramadol. Follow up if symptoms persist or worsen.   - traMADol (ULTRAM) 50 MG tablet  Dispense: 30 tablet; Refill: 0    Summary of Palliative Encounter:  I  discussed the reason for Palliative Care Referral and our role in symptom management, patient family communication and understanding their choices for medical treatment and providing  guidance in making difficult decisions in the framework of focusing on patient comfort and quality of life.     Reviewed current conditions and treatments including .        Prognosis, Goals, and/or Advance Care Planning were addressed today: Yes    Mood, coping, and/or meaning in the context of serious illness were addressed today: Yes, with dtr    Functional Status:     Functional Status two weeks prior to hospitalization: , PPS:   40%- 1. Mainly in bed; 2. Unable to do most activity, extensive disease; 3. Mainly assistance; 4. Normal or reduced; 5. Full or drowsy +/- confusion    Functional status Current:  , PPS:   30%- 1. Totally bed bound; 2. Unable to do any activity, extensive disease; 3. Total care; 4. Normal or reduced; 5. Full or drowsy +/- confusion    Prognosis, Goals, & Planning:   Prognosis (quality & quantity): Uncertain, fracture, fall, ? aspiration pneumonia   High risk of death within one year?Possibly    Patient's decision making preferences: Believe with family, trying to discern who are the primary decision-maker.        Capacity evaluation:    Pt Kristal does not reportedly have full capacity at this time to make a decision (dtr mentioned that  she has been confused at times over the last few weeks). Has multiple acute possible etiologies.  This provider had limited access to pt today. Advise assessment tomorrow.             I have concerns about the patient/family's health literacy today: Do not believe so           Patient has a completed Health Care Directive: Yes, reportedly has done one; trying to obtain      Code status: DNR/DNI    Social:     Relationships: . Has 4 children and 12 grandchildren. Is Mu-ism      Key Palliative Symptom Data:   Pt was sleeping when provider was in the room. Per dtr and friend, she later was alert, talking, comfortable. Provider unable to assess as nursing staff in the room, ? Placing a purwick and getting cleaned up    ROS:  Comprehensive ROS is reviewed and is negative except as here & per HPI:      Past Medical History:  Past Medical History:   Diagnosis  "Date     Alopecia 2019     Ankle fracture, left, closed, initial encounter      Anxiety      Back pain 3/4/2016     Diastolic congestive heart failure (H) 2018     Hyperlipidemia      IBS (irritable bowel syndrome)      Osteoporosis      Other abnormal clinical finding     evidence of old septal scar on EKG     Systolic hypertension         Past Surgical History:  Past Surgical History:   Procedure Laterality Date     CATARACT EXTRACTION, BILATERAL        SECTION      X 4     CHOLECYSTECTOMY       TONSILLECTOMY & ADENOIDECTOMY           Family History:  Family History   Problem Relation Age of Onset     Cerebrovascular Disease Mother         passed age 75     Crohn's Disease Father         passed in his 20's from bowel obstructions        Allergies:  Allergies   Allergen Reactions     Evista [Raloxifene] Unknown     Caused blood clot     Fosamax [Alendronic Acid] Nausea and Vomiting        Medications:  I have reviewed this patient's medication profile and medications from this hospitalization.       acetaminophen  975 mg Oral Q8H     ampicillin-sulbactam (UNASYN) IV  3 g Intravenous Q6H     lidocaine  2 patch Transdermal Q24H     lidocaine   Transdermal Q8H CLAUDIA     sodium chloride (PF)  3 mL Intracatheter Q8H        PRN MEDS:   acetaminophen, albuterol, guaiFENesin, HYDROmorphone, lidocaine 4%, lidocaine (buffered or not buffered), melatonin, - MEDICATION INSTRUCTIONS -, prochlorperazine **OR** prochlorperazine **OR** prochlorperazine, senna-docusate **OR** senna-docusate, sodium chloride (PF)     Morphine Equivalent Daily Dose: 4 mg OME    Physical Exam:  BP (!) 149/67   Pulse 76   Temp 98.4  F (36.9  C) (Oral)   Resp (!) 40   Ht 1.6 m (5' 3\")   Wt 49.9 kg (110 lb)   SpO2 96%   BMI 19.49 kg/m        General Appearance: Resting comfortably, in NAD    Data reviewed:    Data     EXAM: CT CHEST W/O CONTRAST  LOCATION: [facility]  DATE/TIME: 2022 1:27 " AM     INDICATION: fall, anterior rib pain, eval for fx  COMPARISON: Shoulder x-ray performed today  TECHNIQUE: CT chest without IV contrast. Multiplanar reformats were obtained. Dose reduction techniques were used.  CONTRAST: None.     FINDINGS:   LUNGS AND PLEURA: There are scattered groundglass opacities throughout the lungs as well as a 0.7 cm pulmonary nodule in the right upper lobe. Given abrupt groundglass opacities, this likely reflects a multifocal infectious/inflammatory process.   Scattered pulmonary nodules are present as a sub-4 mm pulmonary nodule in the left upper lobe. There are bibasilar consolidations. There is bronchial wall thickening within the bilateral lower lobes.     MEDIASTINUM/AXILLAE: No lymphadenopathy. No thoracic aortic aneurysm. There appears to be fluid within the esophagus. This predisposes the patient to aspiration.     CORONARY ARTERY CALCIFICATION: None.     UPPER ABDOMEN: No significant finding.     MUSCULOSKELETAL: Old right clavicular fracture. As a faint linear lucency involving the right posterior aspect of the eighth rib. Multiple thoracic compression fractures, better seen on concurrently performed CT thoracic spine.                                                                      IMPRESSION:   1.  Possible nondisplaced fracture involving the posterior aspect of the right eighth rib. Correlation with point tenderness.  2.  Scattered groundglass opacities in the lungs likely reflecting an infectious/inflammatory process. Bibasilar consolidations likely reflecting aspiration.  3.  0.7 cm pulmonary nodule in the right upper lobe.     REFERENCE:  Guidelines for Management of Incidental Pulmonary Nodules Detected on CT Images: From the Fleischner Society 2017.   Guidelines apply to incidental nodules in patients who are 35 years or older.  Guidelines do not apply to lung cancer screening, patients with immunosuppression, or patients with known primary cancer.     MULTIPLE  NODULES  Nodule size <6 mm  Low-risk patients: No follow-up needed.  High-risk patients: Optional follow-up at 12 months.     Nodule size 6 mm or larger  Low-risk patients: Follow-up CT at 3-6 months, then consider CT at 18-24 months.  High-risk patients: Follow-up CT at 3-6 months, then at 18-24 months if no change.  -Use most suspicious nodule as guide to management.     Consider referral to lung nodule clinic.      Lab Results   Component Value Date/Time    ALBUMIN 4.1 09/14/2021 11:39 AM     Wt Readings from Last 3 Encounters:   08/17/22 49.9 kg (110 lb)   05/25/22 50.2 kg (110 lb 9.6 oz)   03/02/22 52.6 kg (116 lb)         TTS: I have personally spent a total of 80 minutes  today on the unit in review of medical record, consultation with the medical providers and assessment of patient today, with more than 50% of this time spent in counseling, coordination of care, and conversation with dtr for 30 minutes in the room, for an additional 30 minutes in the subramanian, near pt room, and 10 minutes with CM, multiple meetings re: clinical condition, diagnostic results, goals of care, advance directive, surrogate decision maker, prognosis, symptom management, emotional support, risks and benefits of management options, and development of plan of care.     EVA Corbin, FNP-BC, PMHNP-BC  Palliative Care Nurse Practitioner  [facility]  [phone]      During regular M-F work hours -- if you are not sure who specifically to contact -- please contact us by calling [phone].

## 2022-08-17 NOTE — ED NOTES
"[facility] ED Handoff Report    ED Chief Complaint: Fall    ED Diagnosis:  (W19.XXXA) Fall, initial encounter  Comment: Patient with bruising to face  Plan: assist of 2/lift assist    (R09.02) Hypoxia  Comment: Xray showed aspiration pneumonia  Plan: Continue to monitor, on room air    (J69.0) Aspiration pneumonia of both lower lobes, unspecified aspiration pneumonia type (H)  Comment: hypoxic on arrival, weaned to RA with sats in the mid 90's.  Plan:     (S22.31XA) Closed fracture of one rib of right side, initial encounter  Comment: Pain control  Plan:        PMH:    Past Medical History:   Diagnosis Date     Alopecia 8/8/2019     Ankle fracture, left, closed, initial encounter      Anxiety      Back pain 3/4/2016     Diastolic congestive heart failure (H) 2/21/2018     Hyperlipidemia      IBS (irritable bowel syndrome)      Osteoporosis      Other abnormal clinical finding     evidence of old septal scar on EKG     Systolic hypertension         Code Status:  No CPR- Do NOT Intubate     Falls Risk: Yes     Current Living Situation/Residence: lives at home with 24 hour nursing care     Elimination Status: Continent: pure wick in place     Activity Level: 2 assist/lift    Patients Preferred Language:  English     Needed: No    Vital Signs:  /62   Pulse 75   Temp 98.4  F (36.9  C) (Oral)   Resp (!) 40   Ht 1.6 m (5' 3\")   Wt 49.9 kg (110 lb)   SpO2 91%   BMI 19.49 kg/m       Cardiac Rhythm: N/A    Pain Score: Patient is unable to quantify.    Is the Patient Confused:  Yes    Last Food or Drink: NPO for swallow study except for meds with applesauce    Focused Assessment:  Patient alert and oriented x2, confused about time and place. Patient with fall yesterday at home without loss of consciousness, bruising to face and broken rib. Patient has chronic back pain as well as pain from broken rib. Scheduled Tylenol and PRN Dilaudid. When assessing patient's pain, she states \"I don't know\", " but does not appear to be in significant pain.     Tests Performed: Done: Labs and Imaging    Treatments Provided:  Pain meds    Family Dynamics/Concerns: No    Family Updated On Visitor Policy: Yes    Plan of Care Communicated to Family: Yes    Who Was Updated about Plan of Care: Son Camilo at bedside    Medications sent with patient: Yes    Covid: asymptomatic , negative    Additional Information: none    RN: Mray Anne RN   8/17/2022 3:21 PM

## 2022-08-17 NOTE — ED NOTES
Bed: JNED-16  Expected date: 8/17/22  Expected time: 12:10 AM  Means of arrival: Ambulance  Comments:  89F Delio Bass

## 2022-08-17 NOTE — H&P
[facility]    History and Physical - Hospitalist Service       Date of Admission:  8/17/2022    Assessment & Plan      Kristal Cox is a 89 year old female admitted on 8/17/2022. She was brought to the ED by ambulance for evaluation after a fall at home.    1.  Mechanical fall  Radiography only remarkable for a right eighth rib fracture  Supportive management  Pain control    2.  Aspiration pneumonitis  Has had a cough and was prescribed doxycycline on 8/20/2022 for sinusitis  Difficulty swallowing pills and coughs with meals  Nothing by mouth  SLP evaluation and recommendations  Continue Unasyn    3.  Acute respiratory failure with hypoxia  Likely a combination of pneumonitis and acute pain  Oxygen via nasal cannula to keep saturation above 92%    4.  Acute on chronic hyponatremia  Likely related to diuretics  Gentle IV hydration and monitor    5.  Chronic kidney disease stage IIIa  Stable renal function  Avoid nephrotoxins    6.  Chronic diastolic congestive failure  No signs of acute CHF exacerbation  Monitor volume status    7.  Chronic pain, multiple chronic thoracic compression fractures  Patient has not been able to rest in bed and has been sleeping in a recliner  Per daughter, has been restless and anxious due to pain  Pain team consult for further recommendations    8.  Essential hypertension  Reconcile PTA lisinopril, metoprolol    9.  Hypothyroidism  Reconcile PTA levothyroxine    10.  Lymphedema  Velcro wraps in place  On PTA furosemide    11.  Cognitive disorder  At risk for acute delirium  Supportive management, monitor    12.  Radiographic findings  3 cm left adnexal mass, right upper lobe 0.7 cm pulmonary nodule  This was related to daughter at bedside, unlikely to pursue any of these findings       Diet: NPO for Medical/Clinical Reasons Except for: Meds    DVT Prophylaxis: Pneumatic Compression Devices  Kruger Catheter: Not present  Central Lines: None  Cardiac  Monitoring: None  Code Status: No CPR- Do NOT Intubate      Clinically Significant Risk Factors Present on Admission         # Hyponatremia: Na = 128 mmol/L (Ref range: 136 - 145 mmol/L) on admission, will monitor as appropriate         # Hypertension: home medication list includes antihypertensive(s)          Disposition Plan         The patient's care was discussed with the Patient's Family.    Morales Collier MD  Hospitalist Service  [facility]  Securely message with the Vocera Web Console (learn more here)  Text page via [employer] Paging/Directory         ______________________________________________________________________    Chief Complaint   Fall    History is obtained from the patient, electronic health record, emergency department physician and patient's daughter    History of Present Illness   Kristal Cox is a 89 year old female who was brought to the ED by ambulance after falling at home.  Past medical history of hypertension, CHF, PVCs, CKD, hyponatremia, GERD, hyperlipidemia, hypothyroidism, lymphedema.  Patient was with an aide when she was ambulating to the bathroom and fell on her right side.  She hit her head and could not get up due to right-sided body pain.  There was no loss of consciousness.  As per daughter at bedside, she has been somewhat restless and anxious over the last couple weeks due to her back pain.  She has had a cough and was prescribed doxycycline on 8/12/2022.  She has been sleeping in a reclined position that is more comfortable for her back.  She has noted increased edema from resting in a reclined position but denies chest pain.  Patient has been coughing and has had difficulty swallowing pills.  Oxygen saturation was 84% and was placed on oxygen via nasal cannula prior to ED arrival.    Review of Systems    The 10 point Review of Systems is negative other than noted in the HPI or here.     Past Medical History    I have reviewed this patient's  medical history and updated it with pertinent information if needed.   Past Medical History:   Diagnosis Date     Alopecia 2019     Ankle fracture, left, closed, initial encounter      Anxiety      Back pain 3/4/2016     Diastolic congestive heart failure (H) 2018     Hyperlipidemia      IBS (irritable bowel syndrome)      Osteoporosis      Other abnormal clinical finding     evidence of old septal scar on EKG     Systolic hypertension        Past Surgical History   I have reviewed this patient's surgical history and updated it with pertinent information if needed.  Past Surgical History:   Procedure Laterality Date     CATARACT EXTRACTION, BILATERAL        SECTION      X 4     CHOLECYSTECTOMY       TONSILLECTOMY & ADENOIDECTOMY         Social History   I have reviewed this patient's social history and updated it with pertinent information if needed.  Social History     Tobacco Use     Smoking status: Never Smoker     Smokeless tobacco: Never Used   Substance Use Topics     Alcohol use: No     Comment: Alcoholic Drinks/day: rarely drinks wine, and drinks wine watered down with ice.     Drug use: Yes       Family History   I have reviewed this patient's family history and updated it with pertinent information if needed.  Family History   Problem Relation Age of Onset     Cerebrovascular Disease Mother         passed age 75     Crohn's Disease Father         passed in his 20's from bowel obstructions       Prior to Admission Medications   Prior to Admission Medications   Prescriptions Last Dose Informant Patient Reported? Taking?   Acetaminophen (TYLENOL ARTHRITIS PAIN PO)   Yes No   acetaminophen (TYLENOL) 500 MG tablet   No No   Sig: [ACETAMINOPHEN (TYLENOL) 500 MG TABLET] Take 2 tablets (1,000 mg total) by mouth 2 (two) times a day.   albuterol (PROAIR HFA/PROVENTIL HFA/VENTOLIN HFA) 108 (90 Base) MCG/ACT inhaler   No No   Sig: Inhale 2 puffs into the lungs every 6 hours as needed for shortness of  breath / dyspnea or wheezing   artificial tears,hypromellose, (GENTEAL; SYSTANE) 0.3 % Gel   Yes No   Sig: [ARTIFICIAL TEARS,HYPROMELLOSE, (GENTEAL; SYSTANE) 0.3 % GEL] Administer 1 drop to both eyes as needed (dry eyes).   cholecalciferol, vitamin D3, 1,000 unit tablet   Yes No   Sig: [CHOLECALCIFEROL, VITAMIN D3, 1,000 UNIT TABLET] Take 2,000 Units by mouth daily.   docusate sodium (COLACE) 100 MG capsule   No No   Sig: Take 2 capsules (200 mg) by mouth daily   doxycycline hyclate (VIBRAMYCIN) 100 MG capsule   No No   Sig: Take 1 capsule (100 mg) by mouth 2 times daily   ferrous sulfate (FEROSUL) 325 (65 Fe) MG tablet   No No   Sig: Take 1 tablet (325 mg) by mouth 2 times daily Take 2 daily   furosemide (LASIX) 20 MG tablet   No No   Sig: Take 1 tablet (20 mg) by mouth daily   guaiFENesin (MUCINEX) 600 MG 12 hr tablet   Yes No   hydrOXYzine (ATARAX) 10 MG tablet   No No   Sig: Take 1 tablet (10 mg) by mouth nightly as needed for itching   hydrOXYzine (ATARAX) 25 MG tablet   No No   Sig: Take 0.5-1 tablets (12.5-25 mg) by mouth every 8 hours as needed for anxiety   ketotifen (ZADITOR) 0.025 % ophthalmic solution   No No   Sig: Place 1 drop into both eyes 2 times daily   levothyroxine (SYNTHROID/LEVOTHROID) 50 MCG tablet   No No   Sig: Take 1 tablet (50 mcg) by mouth daily   lisinopril (ZESTRIL) 20 MG tablet   No No   Sig: Take 1 tablet (20 mg) by mouth daily   magnesium chloride 535 (64 Mg) MG TBEC CR tablet   Yes No   Sig: Take 64 mg by mouth   melatonin 3 mg Tab tablet   No No   Sig: [MELATONIN 3 MG TAB TABLET] Take 1-2 tablets (3-6 mg total) by mouth at bedtime as needed.   metoprolol succinate ER (TOPROL XL) 25 MG 24 hr tablet   No No   Sig: TAKE 1 TABLET(25 MG) BY MOUTH DAILY   olopatadine (PATANOL) 0.1 % ophthalmic solution   No No   Sig: Place 1 drop into both eyes 2 times daily   omeprazole (PRILOSEC) 20 MG DR capsule   No No   Sig: Take 1 capsule (20 mg) by mouth daily before breakfast   polyethylene  glycol (GLYCOLAX) 17 gram/dose powder   No No   Sig: [POLYETHYLENE GLYCOL (GLYCOLAX) 17 GRAM/DOSE POWDER] Take 17 g by mouth daily as needed.   traMADol (ULTRAM) 50 MG tablet   No No   Sig: Take 0.5-1 tablets (25-50 mg) by mouth every 6 hours as needed for severe pain   trimethoprim-polymyxin b (POLYTRIM) 69860-9.1 UNIT/ML-% ophthalmic solution   No No   Sig: Place 1-2 drops into both eyes every 4 hours   vitamin C (ASCORBIC ACID) 250 MG TABS tablet   No No   Sig: Take 1 tablet (250 mg) by mouth daily      Facility-Administered Medications: None     Allergies   Allergies   Allergen Reactions     Evista [Raloxifene] Unknown     Caused blood clot     Fosamax [Alendronic Acid] Nausea and Vomiting       Physical Exam   Vital Signs: Temp: 98.4  F (36.9  C) Temp src: Oral BP: (!) 160/77 Pulse: 104   Resp: 20 SpO2: 93 % O2 Device: Nasal cannula Oxygen Delivery: 1 LPM  Weight: 110 lbs 0 oz    Constitutional: awake and alert  Respiratory: no increased work of breathing, good air exchange and diminished breath sounds right base and left base  Cardiovascular: regular rate and rhythm and normal S1 and S2  GI: normal bowel sounds and soft  Skin: Bruising over the nose bridge  Musculoskeletal: Legs are wrapped with Velcro  Neurologic: Mental Status Exam:  Orientation:   person  Neuropsychiatric: General: normal and calm    Data   Data reviewed today: I reviewed all medications, new labs and imaging results over the last 24 hours. I personally reviewed the EKG tracing showing Sinus with long first-degree AV block at 109 bpm, unchanged nonspecific ST waves.    Recent Labs   Lab 08/17/22  0127 08/12/22  1600   WBC 12.6*  --    HGB 11.9 11.6*   MCV 97  --      --    * 127*   POTASSIUM 4.2 4.5   CHLORIDE 94* 89*   CO2 26 24   BUN 16 15.3   CR 0.81 0.74   ANIONGAP 8 14   SAMPSON 9.3 9.5    111*     Recent Results (from the past 24 hour(s))   XR Pelvis and Hip Left 2 Views    Narrative    EXAM: XR PELVIS AND HIP LEFT 2  VIEWS  LOCATION: [facility]  DATE/TIME: 8/17/2022 12:46 AM    INDICATION: Fall with injury. Pain.  COMPARISON:   1. X-ray pelvis 3 views 2/27/2020 at 1742 hours.  2. CT abdomen and pelvis with IV contrast 3/19/2020.      Impression    IMPRESSION: Demineralization of the visualized bones. Degenerative changes lower lumbar spine and joints of the pelvis including the left hip joint. No fracture, dislocation or x-ray evidence of avascular necrosis. Coarse calcifications in the left   ovary, unchanged.   Ankle XR, G/E 3 views, right    Narrative    EXAM: XR ANKLE RIGHT G/E 3 VIEWS  LOCATION: [facility]  DATE/TIME: 8/17/2022 12:46 AM    INDICATION: Fall with injury. Pain.  COMPARISON: None.      Impression    IMPRESSION: No fracture or dislocation involving the right ankle. Demineralization of the visualized bones. Ankle mortise is symmetric. Talar dome is smooth. Plantar calcaneal spur. Diffuse soft tissue swelling right leg and the visualized right foot. No   soft tissue gas or opaque foreign body.   XR Shoulder Right G/E 3 Views    Narrative    EXAM: XR SHOULDER RIGHT G/E 3 VIEWS  LOCATION: [facility]  DATE/TIME: 8/17/2022 12:46 AM    INDICATION: Right shoulder pain after fall.  COMPARISON: Right humeral radiographs 07/04/2019.      Impression    IMPRESSION: There is a healed fracture deformity of the proximal right humerus.     Possible additional fracture deformity of the distal right clavicle, presumably chronic, though poorly assessed.    Moderate osteoarthritic changes.    Head CT w/o contrast    Narrative    EXAM: CT HEAD W/O CONTRAST  LOCATION: [facility]  DATE/TIME: 8/17/2022 1:23 AM    INDICATION: Head injury  COMPARISON: None.  TECHNIQUE: Routine CT Head without IV contrast. Multiplanar reformats. Dose reduction techniques were used.    FINDINGS:  INTRACRANIAL CONTENTS: No intracranial hemorrhage, extraaxial  collection, or mass effect.  No CT evidence of acute infarct. Mild to moderate presumed chronic small vessel ischemic changes. Mild to moderate generalized volume loss. No hydrocephalus.     VISUALIZED ORBITS/SINUSES/MASTOIDS: No intraorbital abnormality. No paranasal sinus mucosal disease. No middle ear or mastoid effusion.    BONES/SOFT TISSUES: No acute abnormality.      Impression    IMPRESSION:  1.  No acute intracranial hemorrhage or calvarial fracture.  2.  Mild to moderate volume loss and presumed chronic small vessel ischemic changes.   Cervical spine CT w/o contrast    Narrative    EXAM: CT CERVICAL SPINE W/O CONTRAST, CT LUMBAR SPINE W/O CONTRAST, CT THORACIC SPINE W/O CONTRAST  LOCATION: [facility]  DATE/TIME: 8/17/2022 1:23 AM    INDICATION: Fall, midline C-spine pain, thoracic and lumbar pain.  COMPARISON: CT chest, abdomen and pelvis 11/21/2019; x-ray lumbar spine 2/27/2020  TECHNIQUE:  1) Routine CT Cervical Spine without IV contrast. Multiplanar reformats. Dose reduction techniques were used.   2) Routine CT Thoracic Spine without IV contrast. Multiplanar reformats. Dose reduction techniques were used.   3) Routine CT Lumbar Spine without IV contrast. Multiplanar reformats. Dose reduction techniques were used.     FINDINGS:    CERVICAL SPINE CT:  VERTEBRA: Cervical vertebra are normal in height. Mild alterations in the lateral alignment likely relate to degenerative changes. No fracture or posttraumatic subluxation.     CANAL/FORAMINA: High-grade foraminal stenosis on the right at C5 and on the left at C5 and C5-C6.    PARASPINAL: No extraspinal abnormality.    THORACIC SPINE CT:  VERTEBRA: There are chronic high-grade compression fractures at T7 and T8. Moderate age-indeterminate compression fractures at T6 and T9. Moderate age-indeterminate burst type compression fracture at T11. No subluxation.     CANAL/FORAMINA: High-grade foraminal stenosis bilaterally at T9-T10. No  appreciable central canal stenosis.    PARASPINAL: Bilateral pleural effusions.    LUMBAR SPINE CT:  VERTEBRA: There are chronic compression fractures along the superior and inferior endplates at L1 and the inferior endplate at L4. Additional lumbar vertebra are normal in height. There are mild alterations in the lateral alignment which are presumably   degenerative. No definite acute fracture or posttraumatic subluxation.     CANAL/FORAMINA: No canal or neural foraminal stenosis.    PARASPINAL: Evidence of a 3 cm left adnexal low attenuating mass lesion. Nonspecific central low attenuating changes in the uterus.      Impression    IMPRESSION:  CERVICAL SPINE CT:  1.  No fracture or posttraumatic subluxation.  2.  Degenerative changes as highlighted above.    THORACIC SPINE CT:  1.  Age-indeterminate compression deformities at T6, T9 and T11 are favored to be chronic. If indicated, MRI could be performed to better evaluate the age of these abnormalities.  2.  There are high-grade chronic compression fractures at T7 and T8.  3.  There are bilateral pleural effusions.    LUMBAR SPINE CT:  1.  No acute fracture or posttraumatic subluxation.  2.  No high-grade spinal canal or neural foraminal stenosis.  3.  Left adnexal mass and indeterminate low attenuating changes in the uterus. Pelvic ultrasound is recommended for further evaluation.   Chest CT w/o contrast    Narrative    EXAM: CT CHEST W/O CONTRAST  LOCATION: [facility]  DATE/TIME: 8/17/2022 1:27 AM    INDICATION: fall, anterior rib pain, eval for fx  COMPARISON: Shoulder x-ray performed today  TECHNIQUE: CT chest without IV contrast. Multiplanar reformats were obtained. Dose reduction techniques were used.  CONTRAST: None.    FINDINGS:   LUNGS AND PLEURA: There are scattered groundglass opacities throughout the lungs as well as a 0.7 cm pulmonary nodule in the right upper lobe. Given abrupt groundglass opacities, this likely reflects a  multifocal infectious/inflammatory process.   Scattered pulmonary nodules are present as a sub-4 mm pulmonary nodule in the left upper lobe. There are bibasilar consolidations. There is bronchial wall thickening within the bilateral lower lobes.    MEDIASTINUM/AXILLAE: No lymphadenopathy. No thoracic aortic aneurysm. There appears to be fluid within the esophagus. This predisposes the patient to aspiration.    CORONARY ARTERY CALCIFICATION: None.    UPPER ABDOMEN: No significant finding.    MUSCULOSKELETAL: Old right clavicular fracture. As a faint linear lucency involving the right posterior aspect of the eighth rib. Multiple thoracic compression fractures, better seen on concurrently performed CT thoracic spine.      Impression    IMPRESSION:   1.  Possible nondisplaced fracture involving the posterior aspect of the right eighth rib. Correlation with point tenderness.  2.  Scattered groundglass opacities in the lungs likely reflecting an infectious/inflammatory process. Bibasilar consolidations likely reflecting aspiration.  3.  0.7 cm pulmonary nodule in the right upper lobe.    REFERENCE:  Guidelines for Management of Incidental Pulmonary Nodules Detected on CT Images: From the Fleischner Society 2017.   Guidelines apply to incidental nodules in patients who are 35 years or older.  Guidelines do not apply to lung cancer screening, patients with immunosuppression, or patients with known primary cancer.    MULTIPLE NODULES  Nodule size <6 mm  Low-risk patients: No follow-up needed.  High-risk patients: Optional follow-up at 12 months.    Nodule size 6 mm or larger  Low-risk patients: Follow-up CT at 3-6 months, then consider CT at 18-24 months.  High-risk patients: Follow-up CT at 3-6 months, then at 18-24 months if no change.  -Use most suspicious nodule as guide to management.    Consider referral to lung nodule clinic.  1.       CT Thoracic Spine w/o Contrast    Narrative    EXAM: CT CERVICAL SPINE W/O  CONTRAST, CT LUMBAR SPINE W/O CONTRAST, CT THORACIC SPINE W/O CONTRAST  LOCATION: [facility]  DATE/TIME: 8/17/2022 1:23 AM    INDICATION: Fall, midline C-spine pain, thoracic and lumbar pain.  COMPARISON: CT chest, abdomen and pelvis 11/21/2019; x-ray lumbar spine 2/27/2020  TECHNIQUE:  1) Routine CT Cervical Spine without IV contrast. Multiplanar reformats. Dose reduction techniques were used.   2) Routine CT Thoracic Spine without IV contrast. Multiplanar reformats. Dose reduction techniques were used.   3) Routine CT Lumbar Spine without IV contrast. Multiplanar reformats. Dose reduction techniques were used.     FINDINGS:    CERVICAL SPINE CT:  VERTEBRA: Cervical vertebra are normal in height. Mild alterations in the lateral alignment likely relate to degenerative changes. No fracture or posttraumatic subluxation.     CANAL/FORAMINA: High-grade foraminal stenosis on the right at C5 and on the left at C5 and C5-C6.    PARASPINAL: No extraspinal abnormality.    THORACIC SPINE CT:  VERTEBRA: There are chronic high-grade compression fractures at T7 and T8. Moderate age-indeterminate compression fractures at T6 and T9. Moderate age-indeterminate burst type compression fracture at T11. No subluxation.     CANAL/FORAMINA: High-grade foraminal stenosis bilaterally at T9-T10. No appreciable central canal stenosis.    PARASPINAL: Bilateral pleural effusions.    LUMBAR SPINE CT:  VERTEBRA: There are chronic compression fractures along the superior and inferior endplates at L1 and the inferior endplate at L4. Additional lumbar vertebra are normal in height. There are mild alterations in the lateral alignment which are presumably   degenerative. No definite acute fracture or posttraumatic subluxation.     CANAL/FORAMINA: No canal or neural foraminal stenosis.    PARASPINAL: Evidence of a 3 cm left adnexal low attenuating mass lesion. Nonspecific central low attenuating changes in the uterus.       Impression    IMPRESSION:  CERVICAL SPINE CT:  1.  No fracture or posttraumatic subluxation.  2.  Degenerative changes as highlighted above.    THORACIC SPINE CT:  1.  Age-indeterminate compression deformities at T6, T9 and T11 are favored to be chronic. If indicated, MRI could be performed to better evaluate the age of these abnormalities.  2.  There are high-grade chronic compression fractures at T7 and T8.  3.  There are bilateral pleural effusions.    LUMBAR SPINE CT:  1.  No acute fracture or posttraumatic subluxation.  2.  No high-grade spinal canal or neural foraminal stenosis.  3.  Left adnexal mass and indeterminate low attenuating changes in the uterus. Pelvic ultrasound is recommended for further evaluation.   Lumbar spine CT w/o contrast    Narrative    EXAM: CT CERVICAL SPINE W/O CONTRAST, CT LUMBAR SPINE W/O CONTRAST, CT THORACIC SPINE W/O CONTRAST  LOCATION: [facility]  DATE/TIME: 8/17/2022 1:23 AM    INDICATION: Fall, midline C-spine pain, thoracic and lumbar pain.  COMPARISON: CT chest, abdomen and pelvis 11/21/2019; x-ray lumbar spine 2/27/2020  TECHNIQUE:  1) Routine CT Cervical Spine without IV contrast. Multiplanar reformats. Dose reduction techniques were used.   2) Routine CT Thoracic Spine without IV contrast. Multiplanar reformats. Dose reduction techniques were used.   3) Routine CT Lumbar Spine without IV contrast. Multiplanar reformats. Dose reduction techniques were used.     FINDINGS:    CERVICAL SPINE CT:  VERTEBRA: Cervical vertebra are normal in height. Mild alterations in the lateral alignment likely relate to degenerative changes. No fracture or posttraumatic subluxation.     CANAL/FORAMINA: High-grade foraminal stenosis on the right at C5 and on the left at C5 and C5-C6.    PARASPINAL: No extraspinal abnormality.    THORACIC SPINE CT:  VERTEBRA: There are chronic high-grade compression fractures at T7 and T8. Moderate age-indeterminate compression  fractures at T6 and T9. Moderate age-indeterminate burst type compression fracture at T11. No subluxation.     CANAL/FORAMINA: High-grade foraminal stenosis bilaterally at T9-T10. No appreciable central canal stenosis.    PARASPINAL: Bilateral pleural effusions.    LUMBAR SPINE CT:  VERTEBRA: There are chronic compression fractures along the superior and inferior endplates at L1 and the inferior endplate at L4. Additional lumbar vertebra are normal in height. There are mild alterations in the lateral alignment which are presumably   degenerative. No definite acute fracture or posttraumatic subluxation.     CANAL/FORAMINA: No canal or neural foraminal stenosis.    PARASPINAL: Evidence of a 3 cm left adnexal low attenuating mass lesion. Nonspecific central low attenuating changes in the uterus.      Impression    IMPRESSION:  CERVICAL SPINE CT:  1.  No fracture or posttraumatic subluxation.  2.  Degenerative changes as highlighted above.    THORACIC SPINE CT:  1.  Age-indeterminate compression deformities at T6, T9 and T11 are favored to be chronic. If indicated, MRI could be performed to better evaluate the age of these abnormalities.  2.  There are high-grade chronic compression fractures at T7 and T8.  3.  There are bilateral pleural effusions.    LUMBAR SPINE CT:  1.  No acute fracture or posttraumatic subluxation.  2.  No high-grade spinal canal or neural foraminal stenosis.  3.  Left adnexal mass and indeterminate low attenuating changes in the uterus. Pelvic ultrasound is recommended for further evaluation.

## 2022-08-17 NOTE — ED TRIAGE NOTES
"Pt arrives via EMS from home for a witnessed fall. Pt did not loose consciousness and is not on thinners. Pt is disoriented to time and place but oriented to person. Pt is calm and cooperative. Pt reports pain \"around whole body\". Pt has bruise on face, pt unable to tell if bruise was preexisting prior to the fall. Cut to right cheek.       Triage Assessment     Row Name 08/17/22 0013       Triage Assessment (Adult)    Airway WDL WDL       Respiratory WDL    Respiratory WDL WDL       Skin Circulation/Temperature WDL    Skin Circulation/Temperature WDL WDL       Cardiac WDL    Cardiac WDL WDL       Peripheral/Neurovascular WDL    Peripheral Neurovascular WDL WDL       Cognitive/Neuro/Behavioral WDL    Cognitive/Neuro/Behavioral WDL X;orientation    Level of Consciousness confused    Arousal Level opens eyes spontaneously    Orientation disoriented to;time;place              "

## 2022-08-17 NOTE — ED PROVIDER NOTES
EMERGENCY DEPARTMENT ENCOUNTER      NAME: Kristal Cxo  AGE: 89 year old female  YOB: 1924  MRN: [id number]  EVALUATION DATE & TIME: 8/17/2022 12:07 AM    PCP: Keyla York    ED PROVIDER: Derick Logan M.D.      Chief Complaint   Patient presents with     Fall         FINAL IMPRESSION:  1. Fall, initial encounter    2. Hypoxia    3. Aspiration pneumonia of both lower lobes, unspecified aspiration pneumonia type (H)    4. Closed fracture of one rib of right side, initial encounter          ED COURSE & MEDICAL DECISION MAKING:    Pertinent Labs & Imaging studies reviewed. (See chart for details)  ED Course as of 08/17/22 0243   Wed Aug 17, 2022   0042 Patient is a 89-year-old woman who had witnessed fall, tripping over a slipper that was not fit properly on her foot.  She does not remember the fall mechanics exactly but was unable to get up due to discomfort.  No LOC that she can think of.  She has bruising to her face, tenderness to her C-spine, right shoulder, anterior chest wall, left hip and buttock, right tib-fib area.  No obvious deformity MRI musculoskeletal exam but given her age and fragile state I am concerned about fracture in any of these areas.  Plan to screen for underlying cytosis, urine infection, give a screening EKG as well.  CT scans and x-rays ordered of the other areas of pain.     0100 EKG shows atrial fibrillation with a rate of 109, the bundle branch block.  No acute ischemic ST or T wave morphology.  QTC prolongation of 500 ms, QRS punctation or 40 ms.  When compared to prior EKG November 21, 2019, bigeminy has been replaced by atrial fibrillation with bundle-branch block.  Impression: Atrial fibrillation with rate of 109, left bundle branch block.   0132 XR Pelvis and Hip Left 2 Views  Demineralization of the visualized bones. Degenerative changes lower lumbar spine and joints of the pelvis including the left hip joint. No fracture, dislocation or x-ray  evidence of avascular necrosis. Coarse calcifications in the left   ovary, unchanged.   0133 Ankle XR, G/E 3 views, right  No fracture or dislocation involving the right ankle. Demineralization of the visualized bones. Ankle mortise is symmetric. Talar dome is smooth. Plantar calcaneal spur. Diffuse soft tissue swelling right leg and the visualized right foot. No   soft tissue gas or opaque foreign body.   0133 XR Shoulder Right G/E 3 Views   There is a healed fracture deformity of the proximal right humerus.      Possible additional fracture deformity of the distal right clavicle, presumably chronic, though poorly assessed.     Moderate osteoarthritic changes.    0142 Head CT w/o contrast  1.  No acute intracranial hemorrhage or calvarial fracture.  2.  Mild to moderate volume loss and presumed chronic small vessel ischemic changes.   0205 Chest CT w/o contrast  1.  Possible nondisplaced fracture involving the posterior aspect of the right eighth rib. Correlation with point tenderness.  2.  Scattered groundglass opacities in the lungs likely reflecting an infectious/inflammatory process. Bibasilar consolidations likely reflecting aspiration.  3.  0.7 cm pulmonary nodule in the right upper lobe.   0205 WBC(!): 12.6   0219 Cervical spine CT w/o contrast  CERVICAL SPINE CT:  1.  No fracture or posttraumatic subluxation.  2.  Degenerative changes as highlighted above.     THORACIC SPINE CT:  1.  Age-indeterminate compression deformities at T6, T9 and T11 are favored to be chronic. If indicated, MRI could be performed to better evaluate the age of these abnormalities.  2.  There are high-grade chronic compression fractures at T7 and T8.  3.  There are bilateral pleural effusions.     LUMBAR SPINE CT:  1.  No acute fracture or posttraumatic subluxation.  2.  No high-grade spinal canal or neural foraminal stenosis.  3.  Left adnexal mass and indeterminate low attenuating changes in the uterus. Pelvic ultrasound is  recommended for further evaluation.     Patient was hypoxic for medics in the 84 range, placed on 4 L, wean down to 1 L here at 93%, otherwise she drops into the upper 80s.    After reviewing the chest CT I reexamined the patient's right chest wall.  She has diffuse tenderness along his whole area, I think is reasonable to treat that finding as a rib fracture.  Otherwise there is no other bony fracture found on the imaging.  Patient was given a dose of Unasyn for aspiration pneumonia.  She has been on doxycycline for the past few days for presumed sinusitis.  He is also been having issues coughing up things and trouble swallowing her pills.  She was admitted to [facility].        Additional ED Course Timestamps:  12:38 AM I met with the patient, obtained history, performed an initial exam, and discussed options and plan for diagnostics and treatment here in the ED.  2:25 AM Checked in on and updated patient. Spoke with Dr. Collier who agreed upon admission.         At the conclusion of the encounter I discussed the results of all of the tests and the disposition. The questions were answered. The patient or family acknowledged understanding and was agreeable with the care plan.           MEDICATIONS GIVEN IN THE EMERGENCY:  Medications   ampicillin-sulbactam (UNASYN) 3 g vial to attach to  mL bag (has no administration in time range)   Lidocaine (LIDOCARE) 4 % Patch 2 patch (has no administration in time range)   lidocaine patch in PLACE (has no administration in time range)   0.9% sodium chloride BOLUS (0 mLs Intravenous Stopped 8/17/22 0217)   ketorolac (TORADOL) injection 15 mg (15 mg Intravenous Given 8/17/22 0208)         NEW PRESCRIPTIONS STARTED AT TODAY'S ER VISIT  New Prescriptions    No medications on file          =================================================================    HPI    Patient information was obtained from: Patient and Patient's Son     Use of : N/A        Kristal ALLISON  Brooke is a 89 year old female with a pertinent history of hypertension, hyperlipidemia, osteoporosis, PVC, hyperthyroidism, CKD, who presents to this ED for evaluation of a fall.     Patient arrived via EMS from home for a witnessed fall. The patient was walking from her living room to her bedroom when she slipped on her slipper. Patient does not know if she had a loss of consciousness but does state that she could not get up. Patient endorses feeling pain in her chest wall, while breathing, in her shoulders and neck, hips, and back. Patient denies jaw pain or any other complaints at this time.    Of note, patient denies being on blood thinners.       REVIEW OF SYSTEMS   Review of Systems   Respiratory:        Positive for pain when breathing   Cardiovascular: Positive for chest pain (chest wall).   Musculoskeletal: Positive for back pain and neck pain.        Negative for jaw pain. Positive for bilateral hip pain and shoulder pain.   Neurological: Negative for syncope.   All other systems reviewed and are negative.      PAST MEDICAL HISTORY:  Past Medical History:   Diagnosis Date     Alopecia 2019     Ankle fracture, left, closed, initial encounter      Anxiety      Back pain 3/4/2016     Diastolic congestive heart failure (H) 2018     Hyperlipidemia      IBS (irritable bowel syndrome)      Osteoporosis      Other abnormal clinical finding     evidence of old septal scar on EKG     Systolic hypertension        PAST SURGICAL HISTORY:  Past Surgical History:   Procedure Laterality Date     CATARACT EXTRACTION, BILATERAL        SECTION      X 4     CHOLECYSTECTOMY       TONSILLECTOMY & ADENOIDECTOMY             CURRENT MEDICATIONS:    Current Facility-Administered Medications   Medication     ampicillin-sulbactam (UNASYN) 3 g vial to attach to  mL bag     Lidocaine (LIDOCARE) 4 % Patch 2 patch     lidocaine patch in PLACE     Current Outpatient Medications   Medication     Acetaminophen  (TYLENOL ARTHRITIS PAIN PO)     acetaminophen (TYLENOL) 500 MG tablet     albuterol (PROAIR HFA/PROVENTIL HFA/VENTOLIN HFA) 108 (90 Base) MCG/ACT inhaler     artificial tears,hypromellose, (GENTEAL; SYSTANE) 0.3 % Gel     cholecalciferol, vitamin D3, 1,000 unit tablet     docusate sodium (COLACE) 100 MG capsule     doxycycline hyclate (VIBRAMYCIN) 100 MG capsule     ferrous sulfate (FEROSUL) 325 (65 Fe) MG tablet     furosemide (LASIX) 20 MG tablet     guaiFENesin (MUCINEX) 600 MG 12 hr tablet     hydrOXYzine (ATARAX) 10 MG tablet     hydrOXYzine (ATARAX) 25 MG tablet     ketotifen (ZADITOR) 0.025 % ophthalmic solution     levothyroxine (SYNTHROID/LEVOTHROID) 50 MCG tablet     lisinopril (ZESTRIL) 20 MG tablet     magnesium chloride 535 (64 Mg) MG TBEC CR tablet     melatonin 3 mg Tab tablet     metoprolol succinate ER (TOPROL XL) 25 MG 24 hr tablet     olopatadine (PATANOL) 0.1 % ophthalmic solution     omeprazole (PRILOSEC) 20 MG DR capsule     polyethylene glycol (GLYCOLAX) 17 gram/dose powder     traMADol (ULTRAM) 50 MG tablet     trimethoprim-polymyxin b (POLYTRIM) 94935-3.1 UNIT/ML-% ophthalmic solution     vitamin C (ASCORBIC ACID) 250 MG TABS tablet       ALLERGIES:  Allergies   Allergen Reactions     Evista [Raloxifene] Unknown     Caused blood clot     Fosamax [Alendronic Acid] Nausea and Vomiting       FAMILY HISTORY:  Family History   Problem Relation Age of Onset     Cerebrovascular Disease Mother         passed age 75     Crohn's Disease Father         passed in his 20's from bowel obstructions       SOCIAL HISTORY:   Social History     Socioeconomic History     Marital status:    Tobacco Use     Smoking status: Never Smoker     Smokeless tobacco: Never Used   Substance and Sexual Activity     Alcohol use: No     Comment: Alcoholic Drinks/day: rarely drinks wine, and drinks wine watered down with ice.     Drug use: Yes     Sexual activity: Not Currently   Social History Narrative      "since 2009, has children     since 2009, has children.  Lives in her own home with help.       VITALS:  BP (!) 160/77   Pulse 102   Temp 98.4  F (36.9  C) (Oral)   Resp 29   Ht 1.6 m (5' 3\")   Wt 49.9 kg (110 lb)   SpO2 93%   BMI 19.49 kg/m      PHYSICAL EXAM    Constitutional: Frail and uncomfortable appearing.   HENT: Normocephalic. Ecchymosis over nasal bridge, abrasion to right cheek, midline c-spine tenderness. Neck- Supple, gross ROM intact.   Eyes: Pupils mid-range, conjunctiva without injection, no discharge.   Respiratory: Clear to auscultation bilaterally, no respiratory distress, no wheezing, speaks full sentences easily. No cough. Nasal canula O2 at 1 L  Cardiovascular: Normal heart rate, regular rhythm, no murmurs.   GI: Soft, no tenderness to deep palpation in all quadrants, no masses.  Musculoskeletal: Tenderness to right shoulder with decrease range of motion. Pain to the anterior chest wall without obvious deformities or bruises. Pain to left buttocks when right leg is raised. Pain to bilateral hips with AP compression. Tenderness to right distal tibia. Both fibulas wrapped in compression.     Skin: Warm, dry, no rash.  Neurologic: Alert & oriented x 3, cranial nerves grossly intact.  Psychiatric: Affect normal, cooperative.       LAB:  All pertinent labs reviewed and interpreted.  Labs Ordered and Resulted from Time of ED Arrival to Time of ED Departure   CBC WITH PLATELETS - Abnormal       Result Value    WBC Count 12.6 (*)     RBC Count 3.45 (*)     Hemoglobin 11.9      Hematocrit 33.6 (*)     MCV 97      MCH 34.5 (*)     MCHC 35.4      RDW 12.3      Platelet Count 219     BASIC METABOLIC PANEL - Abnormal    Sodium 128 (*)     Potassium 4.2      Chloride 94 (*)     Carbon Dioxide (CO2) 26      Anion Gap 8      Urea Nitrogen 16      Creatinine 0.81      Calcium 9.3      Glucose 109      GFR Estimate 65     ROUTINE UA WITH MICROSCOPIC REFLEX TO CULTURE - Abnormal    Color Urine Light " Yellow      Appearance Urine Clear      Glucose Urine Negative      Bilirubin Urine Negative      Ketones Urine Negative      Specific Gravity Urine 1.011      Blood Urine Negative      pH Urine 7.0      Protein Albumin Urine Negative      Urobilinogen Urine <2.0      Nitrite Urine Negative      Leukocyte Esterase Urine Negative      Bacteria Urine Few (*)     RBC Urine 2      WBC Urine 1      Squamous Epithelials Urine <1      Hyaline Casts Urine 1     COVID-19 VIRUS (CORONAVIRUS) BY PCR - Normal    SARS CoV2 PCR Negative     PROCALCITONIN       RADIOLOGY:  Reviewed all pertinent imaging. Please see official radiology report.  Lumbar spine CT w/o contrast   Final Result   IMPRESSION:   CERVICAL SPINE CT:   1.  No fracture or posttraumatic subluxation.   2.  Degenerative changes as highlighted above.      THORACIC SPINE CT:   1.  Age-indeterminate compression deformities at T6, T9 and T11 are favored to be chronic. If indicated, MRI could be performed to better evaluate the age of these abnormalities.   2.  There are high-grade chronic compression fractures at T7 and T8.   3.  There are bilateral pleural effusions.      LUMBAR SPINE CT:   1.  No acute fracture or posttraumatic subluxation.   2.  No high-grade spinal canal or neural foraminal stenosis.   3.  Left adnexal mass and indeterminate low attenuating changes in the uterus. Pelvic ultrasound is recommended for further evaluation.      CT Thoracic Spine w/o Contrast   Final Result   IMPRESSION:   CERVICAL SPINE CT:   1.  No fracture or posttraumatic subluxation.   2.  Degenerative changes as highlighted above.      THORACIC SPINE CT:   1.  Age-indeterminate compression deformities at T6, T9 and T11 are favored to be chronic. If indicated, MRI could be performed to better evaluate the age of these abnormalities.   2.  There are high-grade chronic compression fractures at T7 and T8.   3.  There are bilateral pleural effusions.      LUMBAR SPINE CT:   1.  No  acute fracture or posttraumatic subluxation.   2.  No high-grade spinal canal or neural foraminal stenosis.   3.  Left adnexal mass and indeterminate low attenuating changes in the uterus. Pelvic ultrasound is recommended for further evaluation.      Chest CT w/o contrast   Final Result   IMPRESSION:    1.  Possible nondisplaced fracture involving the posterior aspect of the right eighth rib. Correlation with point tenderness.   2.  Scattered groundglass opacities in the lungs likely reflecting an infectious/inflammatory process. Bibasilar consolidations likely reflecting aspiration.   3.  0.7 cm pulmonary nodule in the right upper lobe.      REFERENCE:   Guidelines for Management of Incidental Pulmonary Nodules Detected on CT Images: From the Fleischner Society 2017.    Guidelines apply to incidental nodules in patients who are 35 years or older.   Guidelines do not apply to lung cancer screening, patients with immunosuppression, or patients with known primary cancer.      MULTIPLE NODULES   Nodule size <6 mm   Low-risk patients: No follow-up needed.   High-risk patients: Optional follow-up at 12 months.      Nodule size 6 mm or larger   Low-risk patients: Follow-up CT at 3-6 months, then consider CT at 18-24 months.   High-risk patients: Follow-up CT at 3-6 months, then at 18-24 months if no change.   -Use most suspicious nodule as guide to management.      Consider referral to lung nodule clinic.   1.           Cervical spine CT w/o contrast   Final Result   IMPRESSION:   CERVICAL SPINE CT:   1.  No fracture or posttraumatic subluxation.   2.  Degenerative changes as highlighted above.      THORACIC SPINE CT:   1.  Age-indeterminate compression deformities at T6, T9 and T11 are favored to be chronic. If indicated, MRI could be performed to better evaluate the age of these abnormalities.   2.  There are high-grade chronic compression fractures at T7 and T8.   3.  There are bilateral pleural effusions.      LUMBAR  SPINE CT:   1.  No acute fracture or posttraumatic subluxation.   2.  No high-grade spinal canal or neural foraminal stenosis.   3.  Left adnexal mass and indeterminate low attenuating changes in the uterus. Pelvic ultrasound is recommended for further evaluation.      Head CT w/o contrast   Final Result   IMPRESSION:   1.  No acute intracranial hemorrhage or calvarial fracture.   2.  Mild to moderate volume loss and presumed chronic small vessel ischemic changes.      XR Shoulder Right G/E 3 Views   Final Result   IMPRESSION: There is a healed fracture deformity of the proximal right humerus.       Possible additional fracture deformity of the distal right clavicle, presumably chronic, though poorly assessed.      Moderate osteoarthritic changes.       Ankle XR, G/E 3 views, right   Final Result   IMPRESSION: No fracture or dislocation involving the right ankle. Demineralization of the visualized bones. Ankle mortise is symmetric. Talar dome is smooth. Plantar calcaneal spur. Diffuse soft tissue swelling right leg and the visualized right foot. No    soft tissue gas or opaque foreign body.      XR Pelvis and Hip Left 2 Views   Final Result   IMPRESSION: Demineralization of the visualized bones. Degenerative changes lower lumbar spine and joints of the pelvis including the left hip joint. No fracture, dislocation or x-ray evidence of avascular necrosis. Coarse calcifications in the left    ovary, unchanged.          EKG:    All EKG interpretations will be found in ED course above.      I, Beba Mcdowell am serving as a scribe to document services personally performed by Dr. Derick Logan based on my observation and the provider's statements to me. IDerick MD attest that Beba Mcdowell is acting in a scribe capacity, has observed my performance of the services and has documented them in accordance with my direction.    Derick Logan M.D.  Emergency Medicine  [facility]  [facility] EMERGENCY DEPARTMENT  [facility] [facility] [address]  [phone]  Dept: [phone]     Derick Logan MD  08/17/22 0243

## 2022-08-17 NOTE — PROGRESS NOTES
Speech-Language Pathology: Clinical Swallow Evaluation     08/17/22 1200   General Information   Onset of Illness/Injury or Date of Surgery 08/17/22   Patient/Family Therapy Goal Statement (SLP) Daughter wants pt to not be in pain(back) and rest today.   Pertinent History of Current Problem Per H&P: Kristal Cox is a 89 year old female who was brought to the ED by ambulance after falling at home.  Past medical history of hypertension, CHF, PVCs, CKD, hyponatremia, GERD, hyperlipidemia, hypothyroidism, lymphedema.  Patient was with an aide when she was ambulating to the bathroom and fell on her right side.  She hit her head and could not get up due to right-sided body pain.  There was no loss of consciousness.  As per daughter at bedside, she has been somewhat restless and anxious over the last couple weeks due to her back pain.  She has had a cough and was prescribed doxycycline on 8/12/2022.  She has been sleeping in a reclined position that is more comfortable for her back.  She has noted increased edema from resting in a reclined position but denies chest pain.  Patient has been coughing and has had difficulty swallowing pills.  Oxygen saturation was 84% and was placed on oxygen via nasal cannula prior to ED arrival.   General Observations sleeping but easily wakes and answers questions. Lying down and does not want to sit up d/t back pain.   Past History of Dysphagia has hx GERD   Type of Evaluation   Type of Evaluation Swallow Evaluation   Oral Motor   Oral Musculature generally intact   Dentition (Oral Motor)   Dentition (Oral Motor) adequate dentition   Facial Symmetry (Oral Motor)   Facial Symmetry (Oral Motor) WNL   Lip Function (Oral Motor)   Lip Range of Motion (Oral Motor) WNL   Tongue Function (Oral Motor)   Tongue ROM (Oral Motor) WNL   Jaw Function (Oral Motor)   Jaw Function (Oral Motor) WNL   Cough/Swallow/Gag Reflex (Oral Motor)   Volitional Throat Clear/Cough (Oral Motor) reduced strength    Volitional Swallow (Oral Motor) effortful   Vocal Quality/Secretion Management (Oral Motor)   Vocal Quality (Oral Motor) WNL   General Swallowing Observations   Comment, General Swallowing Observations VFSS was recommended d/t diagnosis of bilateral pneumonia and pt report of difficulty swallowing pills and coughing reported at meals. Currently, pt is not able to sit upright and not able to complete VFSS. Per pt and daughter interview, Bedside Swallow Study completed for now to determine if pt can safely take medications while lying down (about 25 degrees) and not eat meals at this time. They agreed to this test instead of VFSS at this time.   Swallowing Evaluation Clinical swallow evaluation   Clinical Swallow Evaluation   Clinical Swallow Evaluation Textures Trialed thin liquids;pureed   Clinical Swallow Eval: Thin Liquid Texture Trial   Mode of Presentation, Thin Liquids spoon;straw   Volume of Liquid or Food Presented 6 sips from straw   Oral Phase of Swallow WFL   Diagnostic Statement Pt independently took small sips from straw while lying at about 25 degrees. No coughing or throat clearing noted.   Clinical Swallow Evaluation: Puree Solid Texture Trial   Mode of Presentation, Puree spoon   Volume of Puree Presented 3 small bites   Oral Phase, Puree WFL   Pharyngeal Phase, Puree feeling of something stuck in throat   Diagnostic Statement Pt states that she feels that it is stuck in her throat and tried to cough it to clear. Her cough is very weak. She states that it got better when she coughed then swallowed. I suspect she is having some pharyngeal residue.   Esophageal Phase of Swallow   Patient reports or presents with symptoms of esophageal dysphagia Yes   Swallowing Recommendations   Diet Consistency Recommendations NPO;other (see comments)  (except pills with applesauce along with a few sips of water)   Comment, Swallowing Recommendations Once pt can sit fully upright, plan is to reassess and to complete  VFSS in order to make safe nutrition recommendation. Recommend that pt be NPO except for meds whole or crushed with small amount of applesauce and small sips of water.   General Therapy Interventions   Planned Therapy Interventions Dysphagia Treatment   Clinical Impression   Criteria for Skilled Therapeutic Interventions Met (SLP Mya) Yes, treatment indicated   SLP Diagnosis dysphagia   Clinical Impression Comments Pt showing sx's of pharyngeal/esophageal dysphagia per minimal amount of po that was analyzed today. She c/o globus sensation with puree. I am sure her swallow mechanism is greatly compromised given the fact that the assessment was done lying in 25 degree position as pt not able to sit fully upright d/t back and rib pain from fall. Once pt can sit fully upright, plan is to reassess and to complete VFSS in order to make safe nutrition recommendation. Recommend that pt be NPO except for meds whole or crushed with small amount of applesauce and small sips of water.   SLP Discharge Planning   SLP Discharge Recommendation Transitional Care Facility   SLP Rationale for DC Rec below baseline for swallow    Total Evaluation Time   Total Evaluation Time (Minutes) 20

## 2022-08-18 NOTE — PROGRESS NOTES
Care Management Follow Up    Length of Stay (days): 1    Expected Discharge Date: 08/22/2022     Concerns to be Addressed:       Patient plan of care discussed at interdisciplinary rounds: Yes    Anticipated Discharge Disposition:       Anticipated Discharge Services:    Anticipated Discharge DME:      Patient/family educated on Medicare website which has current facility and service quality ratings:    Education Provided on the Discharge Plan:    Patient/Family in Agreement with the Plan:      Referrals Placed by CM/SW:    Private pay costs discussed: Not applicable    Additional Information:  Met with patient and family at bedside. Daughter provided HCD, scanned and sent to [employer], and copy in patients chart.  Discussion about hospice. MD notified family wanting a consult for Hospice.     Leta Ambriz RN

## 2022-08-18 NOTE — PROGRESS NOTES
[facility]  Palliative Care Daily Progress Note      Code status: No CPR- Do NOT Intubate     Impressions, Recommendations & Counseling     SYMPTOM ASSESSMENT:  1. Chronic pain syndrome - back pain 2/2 thoracic compression fractures  - Appreciate pain medicine team assessment.  - Continue scheduled tylenol 975 mg po TID, lidocaine patches to back and miacalcin nasal spray fr compression fractures   - Continue Dilaudid 1 mg po q4h prn.   -Change Dilaudid 0.2 mg IV every 3 hours (from every 12 hours) as needed for pain.  Patient has received 2 doses.    2.  Dysphagia, unspecified type  -Patient declined bedside and video swallow.  -Daughter understands risk of potential aspiration.  Continue with comfort diet.    3.  Generalized weakness  -Patient declined home PT and OT.  - Has been discussion about patient discharging to a transitional care facility; however, daughter does not believe patient would be able to participate in aggressive therapies nor would want to.  -Activity as tolerated.  Reposition for comfort.    ADVANCED CARE PLANNING  -Continue with medication management for pain control.  Goals are for comfort going forward.  - No care escalation.  Order placed to reflect this.  - Hospice consult ordered.  - Care management consult ordered to assist with discharge planning and hospice.  - Family interested in comfort focused care and quality of life.  Would hope to involve hospice at discharge.    GOALS OF CARE DISCUSSION  -CODE STATUS: DNR/I confirmed.   - Copy of healthcare directive placed in chart.  -POLST completed today outlining patient's wishes for DNR/I, comfort focused care, no feeding tube and agreeable to oral antibiotics if needed hospice consulted for potential involvement at discharge.    Palliative care will continue to follow along and offer support.  Please call [phone] with questions or needs.  Thank you.      MARCY          Kristal Cox is a 89 year old female  with a history of hypertension, hyperlipidemia, osteoporosis, PVC, hyperthyroidism, CKD who presented to the ED on 8/17/2022 after sustaining a fall in her home where she does have home instead care.  Head CT negative.  Other imaging shows more chronic changes including  Possible nondisplaced fracture involving the posterior aspect of the right eighth rib, compression fracture T6, T7, T8, T9 and T11 which are likely contributing to increased pain.    Today, the patient was seen for:  Goals of care, family support    Prognosis, Goals, or Advance Care Planning was addressed today with: Yes.  Mood, coping, and/or meaning in the context of serious illness were addressed today: Yes.  Summary/Comments: Met with patient and her daughter Edilia today.  Leonela plata bribes and ongoing physical decline in her mother over the last 2 months including increased weakness and falls, increased pain.  Patient has also had weight loss over the last 6 months to a year.  Appetite okay per daughter.  Daughter acknowledges that patient has been declining and spending more time sleeping in her apartment.  Daughter confirms that patient has refused home physical and Occupational Therapy, swallow test this admission and bedside swallow test and overall just wants to focus on comfort.  She and her siblings all agree with more comfort focused treatment and involvement of hospice should patient meet criteria.  POLST updated to reflect comfort focused care going forward.  Family would want patient to remain in her apartment with home instead services.            Interval History:     Chart review/discussion with unit or clinical team members:   Discussed with Dr. Joss do.  Vital signs stable.  Afebrile.  Moans with repositioning.  Main complaint is weakness and pain.  Is very tired.    Per patient or family/caregivers today:  See above    Key Palliative Symptoms:  # Pain severity the last 12 hours: low  # Dyspnea severity the last 12 hours:  none  # Nausea severity the last 12 hours: none  # Anxiety severity the last 12 hours: none  Weakness: Severe             Review of Systems:     Besides above, an additional system ROS was reviewed and is unremarkable          Medications:     I have reviewed this patient's medication profile and medications during this hospitalization.           Physical Exam:   Temp:  [97.9  F (36.6  C)-99  F (37.2  C)] 98  F (36.7  C)  Pulse:  [] 111  Resp:  [18-24] 18  BP: (163-195)/(70-91) 193/90  SpO2:  [92 %-95 %] 92 %    General appearance: alert, cachectic, cooperative, fatigued and no distress  Head: Normocephalic, without obvious abnormality, atraumatic, temporal wasting noted  Eyes: Lids and lashes normal.  Sclera anicteri    Bruise noted on right eyelid from fall .  Nose: no discharge  Throat: Oral mucosa moist.  Lips without lesions.  Lungs: clear to auscultation bilaterally  Heart: Tachycardic at times.  Regular rhythm  Abdomen: Soft, nontender  Skin: Small abrasion on right cheek and ecchymosis on right eye lid  Neurologic: Sleeping during majority of visit and deferred to daughter.  Oriented to person, situation and place.  Exhausted.             Data Reviewed:     Pertinent Labs  Lab Results: personally reviewed.   Lab Results   Component Value Date     08/17/2022    CO2 26 08/17/2022    BUN 16 08/17/2022     Lab Results   Component Value Date    WBC 12.6 08/17/2022    HGB 11.9 08/17/2022    HCT 33.6 08/17/2022    MCV 97 08/17/2022     08/17/2022     AST   Date Value Ref Range Status   09/14/2021 23 0 - 40 U/L Final     ALT   Date Value Ref Range Status   09/14/2021 12 0 - 45 U/L Final     Alkaline Phosphatase   Date Value Ref Range Status   09/14/2021 119 45 - 120 U/L Final     Albumin   Date Value Ref Range Status   09/14/2021 4.1 3.5 - 5.0 g/dL Final         Radiology Results  XR Pelvis and Hip Left 2 Views    Result Date: 8/17/2022  EXAM: XR PELVIS AND HIP LEFT 2 VIEWS LOCATION: [facility]  [facility] DATE/TIME: 8/17/2022 12:46 AM INDICATION: Fall with injury. Pain. COMPARISON: 1. X-ray pelvis 3 views 2/27/2020 at 1742 hours. 2. CT abdomen and pelvis with IV contrast 3/19/2020.     IMPRESSION: Demineralization of the visualized bones. Degenerative changes lower lumbar spine and joints of the pelvis including the left hip joint. No fracture, dislocation or x-ray evidence of avascular necrosis. Coarse calcifications in the left ovary, unchanged.    XR Shoulder Right G/E 3 Views    Result Date: 8/17/2022  EXAM: XR SHOULDER RIGHT G/E 3 VIEWS LOCATION: [facility] DATE/TIME: 8/17/2022 12:46 AM INDICATION: Right shoulder pain after fall. COMPARISON: Right humeral radiographs 07/04/2019.     IMPRESSION: There is a healed fracture deformity of the proximal right humerus. Possible additional fracture deformity of the distal right clavicle, presumably chronic, though poorly assessed. Moderate osteoarthritic changes.     Ankle XR, G/E 3 views, right    Result Date: 8/17/2022  EXAM: XR ANKLE RIGHT G/E 3 VIEWS LOCATION: [facility] DATE/TIME: 8/17/2022 12:46 AM INDICATION: Fall with injury. Pain. COMPARISON: None.     IMPRESSION: No fracture or dislocation involving the right ankle. Demineralization of the visualized bones. Ankle mortise is symmetric. Talar dome is smooth. Plantar calcaneal spur. Diffuse soft tissue swelling right leg and the visualized right foot. No  soft tissue gas or opaque foreign body.    Cervical spine CT w/o contrast    Result Date: 8/17/2022  EXAM: CT CERVICAL SPINE W/O CONTRAST, CT LUMBAR SPINE W/O CONTRAST, CT THORACIC SPINE W/O CONTRAST LOCATION: [facility] DATE/TIME: 8/17/2022 1:23 AM INDICATION: Fall, midline C-spine pain, thoracic and lumbar pain. COMPARISON: CT chest, abdomen and pelvis 11/21/2019; x-ray lumbar spine 2/27/2020 TECHNIQUE: 1) Routine CT Cervical Spine without IV contrast.  Multiplanar reformats. Dose reduction techniques were used. 2) Routine CT Thoracic Spine without IV contrast. Multiplanar reformats. Dose reduction techniques were used. 3) Routine CT Lumbar Spine without IV contrast. Multiplanar reformats. Dose reduction techniques were used. FINDINGS: CERVICAL SPINE CT: VERTEBRA: Cervical vertebra are normal in height. Mild alterations in the lateral alignment likely relate to degenerative changes. No fracture or posttraumatic subluxation. CANAL/FORAMINA: High-grade foraminal stenosis on the right at C5 and on the left at C5 and C5-C6. PARASPINAL: No extraspinal abnormality. THORACIC SPINE CT: VERTEBRA: There are chronic high-grade compression fractures at T7 and T8. Moderate age-indeterminate compression fractures at T6 and T9. Moderate age-indeterminate burst type compression fracture at T11. No subluxation. CANAL/FORAMINA: High-grade foraminal stenosis bilaterally at T9-T10. No appreciable central canal stenosis. PARASPINAL: Bilateral pleural effusions. LUMBAR SPINE CT: VERTEBRA: There are chronic compression fractures along the superior and inferior endplates at L1 and the inferior endplate at L4. Additional lumbar vertebra are normal in height. There are mild alterations in the lateral alignment which are presumably degenerative. No definite acute fracture or posttraumatic subluxation. CANAL/FORAMINA: No canal or neural foraminal stenosis. PARASPINAL: Evidence of a 3 cm left adnexal low attenuating mass lesion. Nonspecific central low attenuating changes in the uterus.     IMPRESSION: CERVICAL SPINE CT: 1.  No fracture or posttraumatic subluxation. 2.  Degenerative changes as highlighted above. THORACIC SPINE CT: 1.  Age-indeterminate compression deformities at T6, T9 and T11 are favored to be chronic. If indicated, MRI could be performed to better evaluate the age of these abnormalities. 2.  There are high-grade chronic compression fractures at T7 and T8. 3.  There are  bilateral pleural effusions. LUMBAR SPINE CT: 1.  No acute fracture or posttraumatic subluxation. 2.  No high-grade spinal canal or neural foraminal stenosis. 3.  Left adnexal mass and indeterminate low attenuating changes in the uterus. Pelvic ultrasound is recommended for further evaluation.    Chest CT w/o contrast    Result Date: 8/17/2022  EXAM: CT CHEST W/O CONTRAST LOCATION: [facility] DATE/TIME: 8/17/2022 1:27 AM INDICATION: fall, anterior rib pain, eval for fx COMPARISON: Shoulder x-ray performed today TECHNIQUE: CT chest without IV contrast. Multiplanar reformats were obtained. Dose reduction techniques were used. CONTRAST: None. FINDINGS: LUNGS AND PLEURA: There are scattered groundglass opacities throughout the lungs as well as a 0.7 cm pulmonary nodule in the right upper lobe. Given abrupt groundglass opacities, this likely reflects a multifocal infectious/inflammatory process. Scattered pulmonary nodules are present as a sub-4 mm pulmonary nodule in the left upper lobe. There are bibasilar consolidations. There is bronchial wall thickening within the bilateral lower lobes. MEDIASTINUM/AXILLAE: No lymphadenopathy. No thoracic aortic aneurysm. There appears to be fluid within the esophagus. This predisposes the patient to aspiration. CORONARY ARTERY CALCIFICATION: None. UPPER ABDOMEN: No significant finding. MUSCULOSKELETAL: Old right clavicular fracture. As a faint linear lucency involving the right posterior aspect of the eighth rib. Multiple thoracic compression fractures, better seen on concurrently performed CT thoracic spine.     IMPRESSION: 1.  Possible nondisplaced fracture involving the posterior aspect of the right eighth rib. Correlation with point tenderness. 2.  Scattered groundglass opacities in the lungs likely reflecting an infectious/inflammatory process. Bibasilar consolidations likely reflecting aspiration. 3.  0.7 cm pulmonary nodule in the right upper lobe.  REFERENCE: Guidelines for Management of Incidental Pulmonary Nodules Detected on CT Images: From the Fleischner Society 2017. Guidelines apply to incidental nodules in patients who are 35 years or older. Guidelines do not apply to lung cancer screening, patients with immunosuppression, or patients with known primary cancer. MULTIPLE NODULES Nodule size <6 mm Low-risk patients: No follow-up needed. High-risk patients: Optional follow-up at 12 months. Nodule size 6 mm or larger Low-risk patients: Follow-up CT at 3-6 months, then consider CT at 18-24 months. High-risk patients: Follow-up CT at 3-6 months, then at 18-24 months if no change. -Use most suspicious nodule as guide to management. Consider referral to lung nodule clinic. 1.      Head CT w/o contrast    Result Date: 8/17/2022  EXAM: CT HEAD W/O CONTRAST LOCATION: [facility] DATE/TIME: 8/17/2022 1:23 AM INDICATION: Head injury COMPARISON: None. TECHNIQUE: Routine CT Head without IV contrast. Multiplanar reformats. Dose reduction techniques were used. FINDINGS: INTRACRANIAL CONTENTS: No intracranial hemorrhage, extraaxial collection, or mass effect.  No CT evidence of acute infarct. Mild to moderate presumed chronic small vessel ischemic changes. Mild to moderate generalized volume loss. No hydrocephalus. VISUALIZED ORBITS/SINUSES/MASTOIDS: No intraorbital abnormality. No paranasal sinus mucosal disease. No middle ear or mastoid effusion. BONES/SOFT TISSUES: No acute abnormality.     IMPRESSION: 1.  No acute intracranial hemorrhage or calvarial fracture. 2.  Mild to moderate volume loss and presumed chronic small vessel ischemic changes.    Lumbar spine CT w/o contrast    Result Date: 8/17/2022  EXAM: CT CERVICAL SPINE W/O CONTRAST, CT LUMBAR SPINE W/O CONTRAST, CT THORACIC SPINE W/O CONTRAST LOCATION: [facility] DATE/TIME: 8/17/2022 1:23 AM INDICATION: Fall, midline C-spine pain, thoracic and lumbar pain.  COMPARISON: CT chest, abdomen and pelvis 11/21/2019; x-ray lumbar spine 2/27/2020 TECHNIQUE: 1) Routine CT Cervical Spine without IV contrast. Multiplanar reformats. Dose reduction techniques were used. 2) Routine CT Thoracic Spine without IV contrast. Multiplanar reformats. Dose reduction techniques were used. 3) Routine CT Lumbar Spine without IV contrast. Multiplanar reformats. Dose reduction techniques were used. FINDINGS: CERVICAL SPINE CT: VERTEBRA: Cervical vertebra are normal in height. Mild alterations in the lateral alignment likely relate to degenerative changes. No fracture or posttraumatic subluxation. CANAL/FORAMINA: High-grade foraminal stenosis on the right at C5 and on the left at C5 and C5-C6. PARASPINAL: No extraspinal abnormality. THORACIC SPINE CT: VERTEBRA: There are chronic high-grade compression fractures at T7 and T8. Moderate age-indeterminate compression fractures at T6 and T9. Moderate age-indeterminate burst type compression fracture at T11. No subluxation. CANAL/FORAMINA: High-grade foraminal stenosis bilaterally at T9-T10. No appreciable central canal stenosis. PARASPINAL: Bilateral pleural effusions. LUMBAR SPINE CT: VERTEBRA: There are chronic compression fractures along the superior and inferior endplates at L1 and the inferior endplate at L4. Additional lumbar vertebra are normal in height. There are mild alterations in the lateral alignment which are presumably degenerative. No definite acute fracture or posttraumatic subluxation. CANAL/FORAMINA: No canal or neural foraminal stenosis. PARASPINAL: Evidence of a 3 cm left adnexal low attenuating mass lesion. Nonspecific central low attenuating changes in the uterus.     IMPRESSION: CERVICAL SPINE CT: 1.  No fracture or posttraumatic subluxation. 2.  Degenerative changes as highlighted above. THORACIC SPINE CT: 1.  Age-indeterminate compression deformities at T6, T9 and T11 are favored to be chronic. If indicated, MRI could be  performed to better evaluate the age of these abnormalities. 2.  There are high-grade chronic compression fractures at T7 and T8. 3.  There are bilateral pleural effusions. LUMBAR SPINE CT: 1.  No acute fracture or posttraumatic subluxation. 2.  No high-grade spinal canal or neural foraminal stenosis. 3.  Left adnexal mass and indeterminate low attenuating changes in the uterus. Pelvic ultrasound is recommended for further evaluation.    CT Thoracic Spine w/o Contrast    Result Date: 8/17/2022  EXAM: CT CERVICAL SPINE W/O CONTRAST, CT LUMBAR SPINE W/O CONTRAST, CT THORACIC SPINE W/O CONTRAST LOCATION: [facility] DATE/TIME: 8/17/2022 1:23 AM INDICATION: Fall, midline C-spine pain, thoracic and lumbar pain. COMPARISON: CT chest, abdomen and pelvis 11/21/2019; x-ray lumbar spine 2/27/2020 TECHNIQUE: 1) Routine CT Cervical Spine without IV contrast. Multiplanar reformats. Dose reduction techniques were used. 2) Routine CT Thoracic Spine without IV contrast. Multiplanar reformats. Dose reduction techniques were used. 3) Routine CT Lumbar Spine without IV contrast. Multiplanar reformats. Dose reduction techniques were used. FINDINGS: CERVICAL SPINE CT: VERTEBRA: Cervical vertebra are normal in height. Mild alterations in the lateral alignment likely relate to degenerative changes. No fracture or posttraumatic subluxation. CANAL/FORAMINA: High-grade foraminal stenosis on the right at C5 and on the left at C5 and C5-C6. PARASPINAL: No extraspinal abnormality. THORACIC SPINE CT: VERTEBRA: There are chronic high-grade compression fractures at T7 and T8. Moderate age-indeterminate compression fractures at T6 and T9. Moderate age-indeterminate burst type compression fracture at T11. No subluxation. CANAL/FORAMINA: High-grade foraminal stenosis bilaterally at T9-T10. No appreciable central canal stenosis. PARASPINAL: Bilateral pleural effusions. LUMBAR SPINE CT: VERTEBRA: There are chronic compression  fractures along the superior and inferior endplates at L1 and the inferior endplate at L4. Additional lumbar vertebra are normal in height. There are mild alterations in the lateral alignment which are presumably degenerative. No definite acute fracture or posttraumatic subluxation. CANAL/FORAMINA: No canal or neural foraminal stenosis. PARASPINAL: Evidence of a 3 cm left adnexal low attenuating mass lesion. Nonspecific central low attenuating changes in the uterus.     IMPRESSION: CERVICAL SPINE CT: 1.  No fracture or posttraumatic subluxation. 2.  Degenerative changes as highlighted above. THORACIC SPINE CT: 1.  Age-indeterminate compression deformities at T6, T9 and T11 are favored to be chronic. If indicated, MRI could be performed to better evaluate the age of these abnormalities. 2.  There are high-grade chronic compression fractures at T7 and T8. 3.  There are bilateral pleural effusions. LUMBAR SPINE CT: 1.  No acute fracture or posttraumatic subluxation. 2.  No high-grade spinal canal or neural foraminal stenosis. 3.  Left adnexal mass and indeterminate low attenuating changes in the uterus. Pelvic ultrasound is recommended for further evaluation.       TTS: I have personally spent a total of 45 minutes today on unit in review of medical record, consultation with the medical providers and assessment of patient today, with more than 50% of this time spent in counseling, coordination of care, and discussion with patient and family re: diagnostic results, prognosis, symptom management, risks and benefits of management options, and development of plan of care as noted above.    EVA Ibarra, CNS  Palliative Care  [phone]

## 2022-08-18 NOTE — PROGRESS NOTES
[facility]    Medicine Progress Note - Hospitalist Service    Date of Admission:  8/17/2022    Assessment & Plan                Kristal Cox is a 89 year old female admitted on 8/17/2022. She was brought to the ED by ambulance for evaluation after a fall at home and also found to have dysphagia and aspiration pneumonia       8/18 :      BP elevated  On iv hydralazine, clonidine patch, norvasc    Patient not able to participate in video swallow    Palliative care talking to patient and I was in the room too.  Patient's family opting for hospice   Outpatient hospice order was placed     team to work on disposition  Will continue current meds for now          A/p :         Mechanical fall  Radiography only remarkable for a right eighth rib fracture  Supportive management  Pain control       Aspiration pneumonitis  Has had a cough and was prescribed doxycycline on 8/20/2022 for sinusitis  Difficulty swallowing pills and coughs with meals  Nothing by mouth  SLP evaluation and recommendations : significant dysphagia and recommendations to keep NPO, video swallow could not be done due to patient unable to sit up and participate in video swallow study.  Continue Unasyn       Acute respiratory failure with hypoxia  Likely a combination of pneumonitis and acute pain  Oxygen via nasal cannula to keep saturation above 92%        Acute on chronic hyponatremia  Likely related to diuretics  S/p iv fluids, monitor       Chronic kidney disease stage IIIa  Stable renal function  Avoid nephrotoxins       Chronic diastolic congestive failure  No signs of acute CHF exacerbation  Monitor volume status  stable       Chronic pain, multiple chronic thoracic compression fractures  Patient has not been able to rest in bed and has been sleeping in a recliner  Per daughter, has been restless and anxious due to pain  Pain team consult for further recommendations       Essential hypertension  Reconcile PTA  lisinopril, metoprolol       Hypothyroidism  Reconcile PTA levothyroxine        Lymphedema  Velcro wraps in place  On PTA furosemide       Cognitive disorder  At risk for acute delirium  Supportive management, monitor       Radiographic findings  3 cm left adnexal mass, right upper lobe 0.7 cm pulmonary nodule  This was related to daughter at bedside, unlikely to pursue any of these findings            Diet: NPO for Medical/Clinical Reasons Except for: Meds    DVT Prophylaxis: Pneumatic Compression Devices  Kruger Catheter: Not present  Central Lines: None  Cardiac Monitoring: None  Code Status: No CPR- Do NOT Intubate      Disposition Plan      Expected Discharge Date: 08/22/2022                The patient's care was discussed with the Bedside Nurse, Patient and Patient's Family.    Dwight Centeno MD  Hospitalist Service  [facility]  Securely message with the Vocera Web Console (learn more here)  Text page via [employer] Paging/Directory         Clinically Significant Risk Factors Present on Admission                     ______________________________________________________________________        Data reviewed today: I reviewed all medications, new labs and imaging results over the last 24 hours. I personally reviewed no images or EKG's today.    Physical Exam   Vital Signs: Temp: 98  F (36.7  C) Temp src: Oral BP: (!) 193/90 (Nurse notified) Pulse: 111   Resp: 18 SpO2: 92 % O2 Device: None (Room air)    Weight: 110 lbs 0 oz         GENERAL: The patient is not in any acute distressed. Awake and alert.  HEENT: Nonicteric sclerae, PERRLA, EOMI. Oropharynx clear. Moist mucous membranes. Conjunctivae appear well perfused.  HEART: Regular rate and rhythm without murmurs.  LUNGS: Clear to auscultation bilaterally. No wheezing or crackles.  ABDOMEN: Soft, positive bowel sounds, nontender.  SKIN: No rash, no excessive bruising, petechiae, or purpura.  EXTREMITIES : no rashes, no swelling in  legs.  NEUROLOGIC: not oriented, calm  ROS: All other systems negative       Data   Recent Labs   Lab 08/17/22  0127 08/12/22  1600   WBC 12.6*  --    HGB 11.9 11.6*   MCV 97  --      --    * 127*   POTASSIUM 4.2 4.5   CHLORIDE 94* 89*   CO2 26 24   BUN 16 15.3   CR 0.81 0.74   ANIONGAP 8 14   SAMPSON 9.3 9.5    111*

## 2022-08-18 NOTE — PROGRESS NOTES
08/18/22 0929   Quick Adds   Type of Visit Initial PT Evaluation   Living Environment   People in Home alone   Current Living Arrangements other (see comments)  ([facility])   Home Accessibility stairs to enter home;stairs within home   Number of Stairs, Main Entrance 4   Stair Railings, Main Entrance railings safe and in good condition;railing on right side (ascending)   Number of Stairs, Within Home, Primary six   Stair Railings, Within Home, Primary railings safe and in good condition;railing on left side (ascending)   Transportation Anticipated family or friend will provide   Living Environment Comments Patient lives in 2 level [facility], 6 stairs to enter the basement however she has not gone down to the basement for some time, she would like to be able to though   Self-Care   Usual Activity Tolerance moderate   Current Activity Tolerance fair   Equipment Currently Used at Home walker, rolling   Fall history within last six months yes   Number of times patient has fallen within last six months 1   Activity/Exercise/Self-Care Comment Patient has 24 hr assist from home health care, also has someone to come help with cleaning. Reports that she has stand by assist at baseline for mobility.   General Information   Onset of Illness/Injury or Date of Surgery 08/17/22   Referring Physician Morales Collier MD   Patient/Family Therapy Goals Statement (PT) To go home, to be able to go down stairs at home   Pertinent History of Current Problem (include personal factors and/or comorbidities that impact the POC) Kristal Cox is a 89 year old female admitted on 8/17/2022. She was brought to the ED by ambulance for evaluation after a fall at home.   Existing Precautions/Restrictions fall   Cognition   Affect/Mental Status (Cognition) WNL   Orientation Status (Cognition) disoriented to;situation;time;place   Follows Commands (Cognition) WNL   Range of Motion (ROM)   Range of Motion ROM deficits secondary to  weakness   Strength (Manual Muscle Testing)   Strength (Manual Muscle Testing) Deficits observed during functional mobility   Strength Comments 2/5 BLE   Bed Mobility   Bed Mobility scooting/bridging;supine-sit   Scooting/Bridging [place] (Bed Mobility) dependent (less than 25% patient effort);1 person to manage equipment   Supine-Sit [place] (Bed Mobility) dependent (less than 25% patient effort);1 person to manage equipment   Bed Mobility Limitations decreased ability to use arms for pushing/pulling;decreased ability to use legs for bridging/pushing   Impairments Contributing to Impaired Bed Mobility decreased strength;pain   Assistive Device (Bed Mobility) bed rails;draw sheet   Comment, (Bed Mobility) Patient dependent for supine to sit transfer, assistance provided at trunk and BLE. Reported 10/10 R shoulder pain during transfer and while sitting at EOB.   Transfers   Comment, (Transfers) Unable to assess, patient too painful to attempt sit to stand transfer   Sensory Examination   Sensory Perception WNL   Clinical Impression   Criteria for Skilled Therapeutic Intervention Yes, treatment indicated   PT Diagnosis (PT) Impaired functional mobility   Influenced by the following impairments Weakness, pain   Functional limitations due to impairments Bed mobility, transfer, gait, stairs   Clinical Presentation (PT Evaluation Complexity) Evolving/Changing   Clinical Presentation Rationale Patient presents as medically diagnosed   Clinical Decision Making (Complexity) moderate complexity   Planned Therapy Interventions (PT) bed mobility training;gait training;patient/family education;stair training;strengthening;transfer training   Anticipated Equipment Needs at Discharge (PT) walker, rolling   Risk & Benefits of therapy have been explained evaluation/treatment results reviewed;care plan/treatment goals reviewed;participants voiced agreement with care plan;participants included;patient;daughter   PT Discharge  Planning   PT Discharge Recommendation (DC Rec) home with assist;home with home care physical therapy;Transitional Care Facility   PT Rationale for DC Rec Patient currently is dependent for bed mobility and is unable to attempt transfers or gait due to increased pain. Patient uses a walker at baseline and has 24hr assist at home. At baseline she has stand by to contact guard assist for mobility. At this time if patient is to discharge home she would require heavy assist of 1 for bed mobility and assist of 1-2 for functional mobility, 24 hr assist, and use of an assistive device. If this level of assist cannot be provided at home, patient would benefit from discharging to TCU in order to improve strength and independence with functional mobility.   Total Evaluation Time   Total Evaluation Time (Minutes) 20   Physical Therapy Goals   PT Frequency Daily   PT Predicted Duration/Target Date for Goal Attainment 08/25/22   PT Goals Bed Mobility;Transfers   PT: Bed Mobility Moderate assist;Supine to/from sit   PT: Transfers Moderate assist;Sit to/from stand;Assistive device     Catherine No DPT

## 2022-08-18 NOTE — PROGRESS NOTES
Will not see today:  PAIN MANAGEMENT SERVICE CHART CHECK    This patient's chart has been reviewed by the Pain Service. Patient busy with PT at time of visit.  Daughter outside of room.  Reviewed current pain management plan with daughter.  Encouraged ongoing use of the dilaudid low dose to see if this will help patient with her rest and abilty to tolerate activities of daily living.    Palliative Care following for ongoing discussion with patient and family about goals of care.    No change to current pain management plan.  We will continue to follow peripherally for now.  Please contact me via [facility] paging if you would like the patient to be seen or with any questions.    Thank you!    Shana VELASQUEZ, CNS-BC, DNP  Acute Care Pain Management Program  [facility] ([place], [place], [place])   With questions call [phone]  Preference if for [facility] - [place]  Click HERE to page Jeanne

## 2022-08-18 NOTE — PLAN OF CARE
Problem: Plan of Care - These are the overarching goals to be used throughout the patient stay.    Goal: Readiness for Transition of Care  Outcome: Ongoing, Not Progressing     Problem: Risk for Delirium  Goal: Improved Sleep  Outcome: Ongoing, Not Progressing     Problem: Respiratory Compromise (Pneumonia)  Goal: Effective Oxygenation and Ventilation  Outcome: Ongoing, Progressing  Intervention: Promote Airway Secretion Clearance  Recent Flowsheet Documentation  Taken 8/17/2022 1605 by Dhiraj Moyer RN  Cough And Deep Breathing: done with encouragement  Intervention: Optimize Oxygenation and Ventilation  Recent Flowsheet Documentation  Taken 8/17/2022 1605 by Dhiraj Moyer RN  Head of Bed (HOB) Positioning: HOB at 15 degrees     Patient arrived to unit from ED shortly after 4:00 PM, accompanied by son at transport. Patient denies pain upon arrival to unit, PAINAD 0/10. Patient alert to self and intermittently place, extremely forgetful on this shift. Patient BP elevated to 180/80 later in shift, paged MD and obtained order for prn hydralazine, BP remains elevated upon recheck and NOC nurse reaching out to MD. Patient sons at bedside until visiting hours ended, after which patient became increasingly anxious and requesting staff to call her family, firmly convinced she is going to die overnight. Patient became increasingly upset when repositioned to change linens, endorsed severe pain all over body, refusing to have HOB elevated to take PO medications at this time so MD updated and low dose IV dilaudid ordered, admin appears minimally effective at this time.     Dhiraj Moyer RN 11:45 PM 8/17/2022

## 2022-08-18 NOTE — PROVIDER NOTIFICATION
PROVIDER NOTIFICATION    Reason for communication: seeking clarification for amlodipine order for 1400, AM nurse was given verbal order for amlodipine that was administered to pt and then MD entered a second order, unclear at this time if both doses are intended or if one is duplicate. Also informed MD patient flagged sepsis BPA and inquiring on whether protocol should be run since patient has hospice/palliative consult.   Team member name: Dr. Centeno  Team member role: attending hospitalist  Method of Communication: paged via [place] and Voc[employer] messaging   Response: awaiting response   Response time: awaiting response    Dhiraj Moyer RN 4:27 PM 8/18/2022     Response: order to hold additional amlodipine dose and no need to run sepsis protocol as patient is going to be hospice.    Dhiraj Moyer RN 4:28 PM 8/18/2022

## 2022-08-18 NOTE — PLAN OF CARE
Problem: Fluid Imbalance (Pneumonia)  Goal: Fluid Balance  Outcome: Ongoing, Progressing       Problem: Plan of Care - These are the overarching goals to be used throughout the patient stay.    Goal: Optimal Comfort and Wellbeing  Outcome: Ongoing, Progressing          Goal Outcome Evaluation: bp elevated overnight. Patient had already gotten hydralazine so paged dr Collier and he put in prn zyprexa as bp and elevated hr appear related to anxiety and restlessness

## 2022-08-18 NOTE — PLAN OF CARE
Problem: Plan of Care - These are the overarching goals to be used throughout the patient stay.    Goal: Absence of Hospital-Acquired Illness or Injury  Intervention: Identify and Manage Fall Risk  Recent Flowsheet Documentation  Taken 8/18/2022 0834 by Letitia Olmstead, RN  Safety Promotion/Fall Prevention:    activity supervised    assistive device/personal items within reach    bed alarm on    chair alarm on    nonskid shoes/slippers when out of bed    mobility aid in reach    clutter free environment maintained    fall prevention program maintained    safety round/check completed    patient and family education    supervised activity    toileting scheduled  Pt had no safety incidents/ no new injuries.     Problem: Pain Acute  Goal: Acceptable Pain Control and Functional Ability  Outcome: Ongoing, Progressing  Intervention: Prevent or Manage Pain  Recent Flowsheet Documentation  Taken 8/18/2022 0834 by Letitia Olmstead, RN  Medication Review/Management: medications reviewed  PRN Dilaudid given; pt slept.    BP elevated 193/93; verbal order per Dr. Centeno for Amlodipine 5 mg once; given; helpful BP rechecked 158/86.  Pt turned and repositioned Q 2 hours; checked and changed. Purwick in use; urine clear tho color.   Daughter present during this shift.     Goal Outcome Evaluation:

## 2022-08-18 NOTE — CONSULTS
Care Management Initial Consult    General Information  Assessment completed with: Children, Caregiver, dtr Edilia, son, Camilo and caregiver Analia  Type of CM/SW Visit: Initial Assessment    Primary Care Provider verified and updated as needed: Yes   Readmission within the last 30 days: no previous admission in last 30 days   Return Category: Progression of disease  Reason for Consult: discharge planning  Advance Care Planning:            Communication Assessment  Patient's communication style: spoken language (English or Bilingual)             Cognitive  Cognitive/Neuro/Behavioral: .WDL except, orientation  Level of Consciousness: confused, alert  Arousal Level: opens eyes spontaneously  Orientation: disoriented to, time  Mood/Behavior: calm, cooperative, behavior appropriate to situation     Speech: clear, spontaneous, logical    Living Environment:   People in home: alone     Current living Arrangements: house      Able to return to prior arrangements: yes       Family/Social Support:  Care provided by: child(chidi), homecare agency ([facility] 24 hour care givers)  Provides care for: no one, unable/limited ability to care for self  Marital Status: Single  Children          Description of Support System: Supportive, Involved    Support Assessment: Adequate family and caregiver support, Adequate social supports    Current Resources:   Patient receiving home care services: Yes  Skilled Home Care Services: Home Health Aid  Community Resources: Home Care, DME  Equipment currently used at home: walker, rolling  Supplies currently used at home: None    Employment/Financial:  Employment Status:          Financial Concerns: No concerns identified   Referral to Financial Worker: No       Lifestyle & Psychosocial Needs:  Social Determinants of Health     Tobacco Use: Low Risk      Smoking Tobacco Use: Never Smoker     Smokeless Tobacco Use: Never Used   Alcohol Use: Not on file   Financial Resource Strain: Not on file   Food  Insecurity: Not on file   Transportation Needs: Not on file   Physical Activity: Not on file   Stress: Not on file   Social Connections: Not on file   Intimate Partner Violence: Not on file   Depression: At risk     PHQ-2 Score: 4   Housing Stability: Not on file       Functional Status:  Prior to admission patient needed assistance:   Dependent ADLs:: Ambulation-walker, Bathing, Dressing, Grooming  Dependent IADLs:: Cleaning, Cooking, Laundry, Shopping, Meal Preparation, Medication Management, Transportation  Assesssment of Functional Status: Not at baseline with ADL Functioning, Not at baseline with mobility, Not at  functional baseline    Mental Health Status:  Mental Health Status: No Current Concerns       Chemical Dependency Status:                Values/Beliefs:  Spiritual, Cultural Beliefs, Sikhism Practices, Values that affect care:                 Additional Information:  Pt would not want TCU; goal is to go home. Many attempts, busy patient: Chart assessed, chatted w/caregiver from [facility] while visiting, dtr talking to staff, pt lives alone at home w/24 hr/day cares w/[facility]. Family  Can transport. CM planning pending progress.     Spoke to Camilo Enriquez will be  [phone], son Venkat can be reached at [phone] and dtr Edilia at [phone]. They will discuss goals and alert team if and when they are ready for a hospice consult.      Mariana Estrada RN

## 2022-08-19 NOTE — PROGRESS NOTES
Will not see today:  PAIN MANAGEMENT SERVICE CHART CHECK    This patient's chart has been reviewed by the Pain Service. Patient/family still reluctant to take oral pain medication.  Has been receiving IV pain medications 3(0.2) mg IV dilauid over past 24 hours.  Patient has met with hospice, notes reviewed.  Possible admission to home with hospice support on Sunday.  Agree with current pain management plan as currently available.  Pain Service with no additional recommendations at this time.  We will sign off.  Please re consult or call/text via [facility] if we can be of additional assistance.     Thank you!    Shana VELASQUEZ, CNS-BC, DNP  Acute Care Pain Management Program  [facility] ([place], [place], [place])   With questions call [phone]  Preference if for [facility] Paging - [place]  Click HERE to page Jeanne

## 2022-08-19 NOTE — PROGRESS NOTES
"CLINICAL NUTRITION SERVICES - ASSESSMENT NOTE     Nutrition Prescription    RECOMMENDATIONS FOR MDs/PROVIDERS TO ORDER:  None    Malnutrition Status:    Moderate malnutrition  In Context of:  Acute illness or injury    Recommendations already ordered by Registered Dietitian (RD):  Chocolate Ensure w/ lunch    Future/Additional Recommendations:  None     REASON FOR ASSESSMENT  Kristal Cox is a/an 89 year old female assessed by the dietitian for Admission Nutrition Risk Screen for positive \"unsure\" recent weight loss.    Pt admitted for mechanical fall with aspiration pneumonitis acute respiratory failure with hypoxia, acute on chronic hyponatremia, CKD3, CHF, chronic pain, multiple chronic thoracic compression fractures, HTN, hypothyroidism, lymphedema, cognitive diorder     NUTRITION HISTORY  Spoke with pt daughter, Edilia, over via phone. Pt has always consumed small meals throughout the day with encouraging reminders from family. Earlier in the week pt consumed a hotdog with small portions of beans, salad, chips, and lemon meringue pie. Historically pt was committed to 1/4 cup of chocolate Ensure. Pt family supplies dinners for pt regularly. During admission pt consumes \"a few bites\" at a time of foods including chicken noodle soup, ice cream, pudding, and applesauce (with meds). Per pt daughter pt is unable to fully sit upright due to immense pain, contributing to large concerns from pt for choking w/ current aspiration pnuemonia. Pt previously refused bed side swallow d/t pain, and pt daughter does not report a downgraded diet to be necessary. Daughter reports pts hunger increasing recently, as she was not cleared to eat from admission until 8/18 at 1630. Daughter was agreeable to trying chocolate Ensure for pt. Palliative following, focusing on comfort.    CURRENT NUTRITION ORDERS  Diet: Regular  Intake/Tolerance: \"few bites\" of one meal so far during admission. Pt not currently meeting daily " "nutrition goals.    LABS   08/17/22 01:27   Sodium 128 (L)   Potassium 4.2   Chloride 94 (L)   Carbon Dioxide 26   Urea Nitrogen 16   Creatinine 0.81   GFR Estimate 65   Calcium 9.3   Anion Gap 8   Glucose 109   No new labs since 8/17. Hyponatremia believed to be a result of diuretics and is being treated with gentle IV hydration per H&P.    MEDICATIONS  Unasyn, Ferrous Sulfate, Levothyroxine, Dilaudid    ANTHROPOMETRICS  Height: 160 cm (5' 3\")  Most Recent Weight: 49.9 kg (110 lb)    IBW: 52.3 kg  BMI: Normal BMI  Weight History:   Weight   8/18/2021 94 lbs   2/23/2022 116 lbs   5/25/2022 110 lbs 10 oz   8/17/2022 110 lbs     No significant weight loss per ASPEN criteria.    Dosing Weight: 49.9 kg    ASSESSED NUTRITION NEEDS  Estimated Energy Needs: 7424-9814 kcals/day (25 - 30 kcals/kg)  Justification: Maintenance  Estimated Protein Needs: 30-40 grams protein/day (0.6-0.8 grams of pro/kg)  Justification: CKD3 not on dialyis  Estimated Fluid Needs: 1250 mL/day (25 mL/kg)   Justification: >64yo    PHYSICAL FINDINGS  See malnutrition section below.    MALNUTRITION:  % Weight Loss:  None noted  % Intake:  <75% for > 7 days (moderate malnutrition)  Subcutaneous Fat Loss:  Age appropriate  Muscle Loss:  Age appropriate  Fluid Retention: mild-trace BLE edema    Malnutrition Diagnosis: Moderate malnutrition  In Context of:  Acute illness or injury    NUTRITION DIAGNOSIS  Moderate protein-calorie malnutrition related to acute illness as evidenced by <75% energy intake for >7 days and mild fluid resuscitation.    INTERVENTIONS  Supplement meals with 8 oz Ensure Enlive daily to prove an additional 350 kcal and 20 gm protein if all consumed.    Goals  Pt will tolerate nutritional supplement  Pt will meet at least 75% of daily estimated nutrition goals  Pt wt maintenance during admission     Monitoring/Evaluation  Progress toward goals will be monitored and evaluated per protocol.    "

## 2022-08-19 NOTE — PLAN OF CARE
"Problem: Plan of Care - These are the overarching goals to be used throughout the patient stay.    Goal: Optimal Comfort and Wellbeing  Outcome: Ongoing, Progressing  Intervention: Monitor Pain and Promote Comfort  Recent Flowsheet Documentation  Taken 8/19/2022 0347 by Jose Carney, RN  Pain Management Interventions:   medication (see MAR)   quiet environment facilitated   repositioned     Problem: Pain Acute  Goal: Acceptable Pain Control and Functional Ability  Intervention: Prevent or Manage Pain  Recent Flowsheet Documentation  Taken 8/19/2022 0228 by Jose Carney RN  Medication Review/Management: medications reviewed   Goal Outcome Evaluation:        Pt reported 8/10 pain with repositioning. Pt appeared teary and grimacing, stated pain is \"all over\". Gave PRN IV Dilaudid. Pt also reported pain in mouth stated \"my mouth is so dry it hurts\". Gave oral swabs throughout the night, encouraged fluids, and applied moisturizer. Pure wick in place and provided incontinence care.               "

## 2022-08-19 NOTE — PROGRESS NOTES
Care Management Follow Up    Length of Stay (days): 2    Expected Discharge Date: 08/21/2022     Concerns to be Addressed:    Home with Hospice 8/21/22   Patient plan of care discussed at interdisciplinary rounds: Yes    Anticipated Discharge Disposition:  Home with Hospice and [facility] 24[facility]7 [facility].     Anticipated Discharge Services:  [facility], Hospice  Anticipated Discharge DME:  TBD    Patient/family educated on Medicare website which has current facility and service quality ratings:  yes  Education Provided on the Discharge Plan:    Patient/Family in Agreement with the Plan:      Referrals Placed by CM/SW: None at this time   Private pay costs discussed: transportation costs    Additional Information:  CM was notified by hospice team that they can accept pt on Sunday in the community. The goal is for pt to go home with continued care with [facility] 24/7 care and hospice. CM also reached out to [facility] and asked their availability to keep pt on and how much notice they need. They can keep the pt on 24/7 and they would like CM to call them the day before to let them know what time pt will be discharging.  Hospice will reach out to the family to update them on the plan and to see if there are any additional needs. CM to follow for medical progression, recommendation and final discharge plan.    Hue Petersen RN

## 2022-08-19 NOTE — PROGRESS NOTES
[facility]  Palliative Care Daily Progress Note      Impressions, Recommendations & Counseling      Pt plans to discharge to home on [facility] hosp[facility] this Sunday. She is not on full comfort care now/yet.     SYMPTOM ASSESSMENT:  1. Chronic pain syndrome - back pain 2/2 thoracic compression fractures. Pt indicates she does not want pain medication (dtr Edilia mentioned this on Wed too). Has however had IV Dilaudid (0.6 mg) in the last 24 hours. Current plan is to discharge to home on [facility] this Sunday.   - Appreciate pain medicine team assessment.  - Continue scheduled tylenol 975 mg po TID, lidocaine patches to back and miacalcin nasal spray fr compression fractures   -Changed interval Dilaudid 1 mg po to q3h prn.   -[place] IV for 2nd line Dilaudid 0.2 mg IV every 3 hours as needed for pain.      2.  Dysphagia, unspecified type  -Patient declined bedside and video swallow.  -Daughter understands risk of potential aspiration.  Continue with comfort diet.     3.  Generalized weakness  -Patient declined home PT and OT.  -Plan to discharge to home on [facility] and [facility] this Sunday.   -Activity as tolerated.  Reposition for comfort.     ADVANCED CARE PLANNING  -Continue with medication management for pain control.  Goals are for comfort going forward.  - No care escalation.  Order placed to reflect this.  - Plan to enroll in [facility]  - Care management consult ordered to assist with discharge planning and hospice.  - Family interested in comfort focused care and quality of life.       GOALS OF CARE DISCUSSION  -CODE STATUS: DNR/I confirmed.   - Copy of healthcare directive placed in chart.All 4 children are shared health care agents.   -POLST completed yesterday outlining patient's wishes for DNR/I, comfort focused care, no feeding tube and agreeable to oral antibiotics if needed hospice consulted for potential involvement at  discharge.     Palliative care will continue to follow along and offer support.  Please call [phone] with questions or needs.  Thank you.       HPI          Kristal Cox is a 89 year old female with a history of hypertension, hyperlipidemia, osteoporosis, PVC, hyperthyroidism, CKD who presented to the ED on 8/17/2022 after sustaining a fall in her home where she does have home instead care.  Head CT negative.  Other imaging shows more chronic changes including  Possible nondisplaced fracture involving the posterior aspect of the right eighth rib, compression fracture T6, T7, T8, T9 and T11 which are likely contributing to increased pain.     Today, the patient was seen for:  Goals of care, family support     Prognosis, Goals, or Advance Care Planning was addressed today with: Yes.  Mood, coping, and/or meaning in the context of serious illness were addressed today: Yes.  Summary/Comments: Yesterday, my colleague saw patient and her daughter Edilia. Reviewed ongoing physical decline in her mother over the last 2 months including increased weakness and falls, increased pain.  Patient has also had weight loss over the last 6 months to a year.  Appetite okay per daughter.  Daughter acknowledged that patient has been declining and spending more time sleeping in her apartment.  Daughter confirmed that patient has refused home physical and Occupational Therapy, swallow test this admission and bedside swallow test and overall just wants to focus on comfort.  She and her siblings all agree with more comfort focused treatment and involvement of hospice should patient meet criteria.  POLST updated to reflect comfort focused care going forward.  Family would want patient to remain in her apartment with [facility] services.              Interval History:      Chart review/discussion with unit or clinical team members:   Discussed with hospice, son, pt. Vital signs stable.  Afebrile. Conversant, frustrated with  acitvities, and repositioning.  Main complaint is dry mouth and neck pain.      Per patient or family/caregivers today:  See above     Key Palliative Symptoms:  # Pain severity the last 12 hours: Yes, in neck, but does not want pain medications   # Dyspnea severity the last 12 hours: No  # Nausea severity the last 12 hours: None reported  # Anxiety severity the last 12 hours: Frustrated  Weakness: Severe            Review of Systems:     Besides above, an additional ROS system was not done.           Medications:     I have reviewed this patient's medication profile and medications during this hospitalization.           Physical Exam:   Vitals were reviewed  Temp: 98.1  F (36.7  C) Temp src: Oral BP: (!) 140/64 Pulse: 100   Resp: 18 SpO2: 93 % O2 Device: None (Room air)    Constitutional:   Awake, alert, cooperative, no apparent distress, and appears stated age                Data Reviewed:     Reviewed recent pertinent imaging, comments:   EXAM: CT CERVICAL SPINE W/O CONTRAST, CT LUMBAR SPINE W/O CONTRAST, CT THORACIC SPINE W/O CONTRAST  LOCATION: [facility]  DATE/TIME: 8/17/2022 1:23 AM     INDICATION: Fall, midline C-spine pain, thoracic and lumbar pain.  COMPARISON: CT chest, abdomen and pelvis 11/21/2019; x-ray lumbar spine 2/27/2020  TECHNIQUE:  1) Routine CT Cervical Spine without IV contrast. Multiplanar reformats. Dose reduction techniques were used.   2) Routine CT Thoracic Spine without IV contrast. Multiplanar reformats. Dose reduction techniques were used.   3) Routine CT Lumbar Spine without IV contrast. Multiplanar reformats. Dose reduction techniques were used.      FINDINGS:     CERVICAL SPINE CT:  VERTEBRA: Cervical vertebra are normal in height. Mild alterations in the lateral alignment likely relate to degenerative changes. No fracture or posttraumatic subluxation.      CANAL/FORAMINA: High-grade foraminal stenosis on the right at C5 and on the left at C5 and  C5-C6.     PARASPINAL: No extraspinal abnormality.     THORACIC SPINE CT:  VERTEBRA: There are chronic high-grade compression fractures at T7 and T8. Moderate age-indeterminate compression fractures at T6 and T9. Moderate age-indeterminate burst type compression fracture at T11. No subluxation.      CANAL/FORAMINA: High-grade foraminal stenosis bilaterally at T9-T10. No appreciable central canal stenosis.     PARASPINAL: Bilateral pleural effusions.     LUMBAR SPINE CT:  VERTEBRA: There are chronic compression fractures along the superior and inferior endplates at L1 and the inferior endplate at L4. Additional lumbar vertebra are normal in height. There are mild alterations in the lateral alignment which are presumably   degenerative. No definite acute fracture or posttraumatic subluxation.      CANAL/FORAMINA: No canal or neural foraminal stenosis.     PARASPINAL: Evidence of a 3 cm left adnexal low attenuating mass lesion. Nonspecific central low attenuating changes in the uterus.                                                                      IMPRESSION:  CERVICAL SPINE CT:  1.  No fracture or posttraumatic subluxation.  2.  Degenerative changes as highlighted above.     THORACIC SPINE CT:  1.  Age-indeterminate compression deformities at T6, T9 and T11 are favored to be chronic. If indicated, MRI could be performed to better evaluate the age of these abnormalities.  2.  There are high-grade chronic compression fractures at T7 and T8.  3.  There are bilateral pleural effusions.     LUMBAR SPINE CT:  1.  No acute fracture or posttraumatic subluxation.  2.  No high-grade spinal canal or neural foraminal stenosis.  3.  Left adnexal mass and indeterminate low attenuating changes in the uterus. Pelvic ultrasound is recommended for further evaluation.    Reviewed recent labs, comments:   Reviewed      TTS: I have personally spent a total of 35 minutes  today on the unit in review of medical record, consultation  with the medical providers and assessment of patient, with more than 50% of this time spent in counseling, coordination of care, and discussion  in a family meeting (x 15 minutes) re: symptom management/lab (albumin/canceled), risks and benefits of management options (tried to help reposition, does not want pain medications), emotional support and development of plan of care (reviewed plan of care with son Camilo, is eligible for [facility] hospice).    EVA Corbin, FNP-BC, PMHNP-BC  Palliative Care Nurse Practitioner  [facility]  [phone]      During regular M-F work hours -- if you are not sure who specifically to contact -- please contact us by calling [phone].

## 2022-08-19 NOTE — PROGRESS NOTES
[facility]    Medicine Progress Note - Hospitalist Service    Date of Admission:  8/17/2022    Assessment & Plan        Kristal Cox is a 89 year old female admitted on 8/17/2022. She was brought to the ED by ambulance for evaluation after a fall at home and also found to have dysphagia and aspiration pneumonia    Mechanical fall  Radiography only remarkable for a right eighth rib fracture  Supportive management  Pain control    Chronic pain, multiple chronic thoracic compression fractures  Patient has not been able to rest in bed and has been sleeping in a recliner  Per daughter, has been restless and anxious due to pain  Pain team and palliative team on board  Continue pain control with scheduled Tylenol 975 mg p.o. 3 times daily, lidocaine patches, calcitonin spray  And as needed roxanol 5 mg Po. every 4 hours  Has IV Dilaudid for breakthrough pain    Dysphagia  History of GERD  Seen by SLP.  Patient unable to sit up upright/complete video swallow exam  Appreciate dysphagia diet recs from SLP  On regular diet with thin liquids-which is a comfort diet    Aspiration pneumonitis  Has had a cough and was prescribed doxycycline on 8/20/2022 for sinusitis  Difficulty swallowing pills and coughs with meals  SLP evaluation and recommendations : significant dysphagia and recommendations to keep NPO, video swallow could not be done due to patient unable to sit up and participate in video swallow study.  Continue Unasyn     Acute respiratory failure with hypoxia  Likely a combination of pneumonitis and acute pain  Oxygen via nasal cannula to keep saturation above 92%     Acute on chronic hyponatremia  Likely related to diuretics  S/p iv fluids, monitor     Chronic kidney disease stage IIIa  Stable renal function  Avoid nephrotoxins     Chronic diastolic congestive failure  No signs of acute CHF exacerbation  Monitor volume status  stable     Essential hypertension  Reconcile PTA lisinopril,  metoprolol     Hypothyroidism  Reconcile PTA levothyroxine     Lymphedema  Velcro wraps in place  On PTA furosemide     Cognitive disorder  At risk for acute delirium  Supportive management, monitor     Radiographic findings  3 cm left adnexal mass, right upper lobe 0.7 cm pulmonary nodule  This was related to daughter at bedside, unlikely to pursue any of these findings         Diet: Regular Diet Adult Thin Liquids (level 0)  Snacks/Supplements Adult: Ensure Enlive; With Meals    DVT Prophylaxis: SCDz  Kruger Catheter: Not present  Central Lines: None  Cardiac Monitoring: None  Code Status: No CPR- Do NOT Intubate      Disposition Plan .  Plan to discharge on Sunday 8/21, with home hospice being set up.     Expected Discharge Date: 08/21/2022        Discharge Comments: d/c home with [facility]        The patient's care was discussed with the Bedside Nurse, Patient and Patient's Family.    Sherry Francois MD  Hospitalist Service  [facility]  Securely message with the Vocera Web Console (learn more here)  Text page via [employer] Paging/Directory     Clinically Significant Risk Factors Present on Admission                 # Moderate Malnutrition: based on nutrition assessment     ______________________________________________________________________    Interval History   Patient new to me.  Care team notes reviewed.  No acute events overnight.  And examined patient at bedside.  Son in attendance  Patient tells me she is just not comfortable with discomfort and pain from had head neck and back.  No fevers, chest pain or shortness of breath  Feels her mouth is very dry, attempting to drink liquids as able    Data reviewed today: I reviewed all medications, new labs and imaging results over the last 24 hours.Physical Exam   Vital Signs: Temp: 97.6  F (36.4  C) Temp src: Oral BP: 135/65 Pulse: 107   Resp: 16 SpO2: 95 % O2 Device: None (Room air)    Weight: 110 lbs 0 oz    General:  Frail, ill looking, uncomfortable  HEENT: Oral mucosa moist and non-erythematous, PERRLA, EOM intact  CV: RRR, normal S1S2, no murmur, clicks, rubs  Resp: Clear to auscultation bilaterally, no wheezes, rhonchi  Abd: Soft, non-tender, BS+, no masses appreciated  Extremities: Radial and pedal pulses intact and symmetric, no pedal edema  Neuro: No lateralizing symptoms or focal neurologic deficits      Data   Recent Labs   Lab 08/17/22  0127   WBC 12.6*   HGB 11.9   MCV 97      *   POTASSIUM 4.2   CHLORIDE 94*   CO2 26   BUN 16   CR 0.81   ANIONGAP 8   SAMPSON 9.3        No results found for this or any previous visit (from the past 24 hour(s)).  Medications     - MEDICATION INSTRUCTIONS -         acetaminophen  975 mg Oral Q8H     amLODIPine  10 mg Oral Daily     amLODIPine  5 mg Oral Once     ampicillin-sulbactam (UNASYN) IV  3 g Intravenous Q6H     calcitonin (salmon)  1 spray Alternating Nostrils Daily     cloNIDine   Transdermal Q8H     cloNIDine  1 patch Transdermal Weekly     ferrous sulfate  325 mg Oral BID w/meals     levothyroxine  50 mcg Oral QAM AC     lidocaine  2 patch Transdermal Q24H     lidocaine   Transdermal Q8H CLAUDIA     metoprolol succinate ER  25 mg Oral At Bedtime     pantoprazole  40 mg Oral At Bedtime     sodium chloride (PF)  3 mL Intracatheter Q8H

## 2022-08-19 NOTE — PLAN OF CARE
"  Problem: Pain Acute  Goal: Acceptable Pain Control and Functional Ability  Outcome: Ongoing, Progressing  Intervention: Develop Pain Management Plan  Recent Flowsheet Documentation  Taken 8/18/2022 2126 by Dhiraj Moyer RN  Pain Management Interventions:   emotional support   environmental changes   repositioned   rest   medication offered but refused  Taken 8/18/2022 1651 by Dhiraj Moyer RN  Pain Management Interventions:   emotional support   food   distraction   rest  Intervention: Prevent or Manage Pain  Recent Flowsheet Documentation  Taken 8/18/2022 1651 by Dhiraj Moyer RN  Medication Review/Management: medications reviewed     Problem: Risk for Delirium  Goal: Improved Sleep  Outcome: Ongoing, Progressing     Patient oriented to self and place/situation intermittently on this shift, VSS, patient endorses pain with repositioning but when asked if she is having pain replies with \"I don't know\" repeatedly, PAINAD score of 4/10. Daughters present for palliative care consult this afternoon and remained at bedside into the evening; patient endorses feeling anxious after daughters had left for the evening, nursing provided emotional support and reassurance which seems to have been effective. Patient complaining of pain in her back near end of the shift, PAINAD 5-6/10. Patient tolerated sitting up long enough to take one 325 mg tab of APAP and melatonin, also given prn IV dilaudid dose for pain. PAINAD 1/10 upon follow up, patient appears to be sleeping comfortably.     Dhiraj Moyer RN 10:57 PM 8/18/2022   "

## 2022-08-19 NOTE — PROGRESS NOTES
SPIRITUAL HEALTH SERVICES NOTE  [facility]/    SPIRITUAL CARE NOTE  Met with Kristal due to admission screening response. She was accompanied by her son. Kristal appeared uncomfortable. She noted that her breathing is more difficult because of pain and welcomed a prayer. She also welcomed a visit from our hospital  tomorrow. She declined my offer of aromatherapy or music, stating that she just wants it quiet. Her son said that it is difficult for her to find a comfortable position and says that she been reluctant to take medications. Kristal is connected to [facility] and welcomes my offer to notify them of her admission.    Visit Length: 15 minutes    Plan of Care: Will remain available for further support as patient/family needs/desires.    Keely Chirinos M.Div.      Office: [phone] (for non-urgent requests)  Please Vocera or page through [facility] for time-sensitive requests

## 2022-08-19 NOTE — PLAN OF CARE
"  Problem: Plan of Care - These are the overarching goals to be used throughout the patient stay.    Goal: Readiness for Transition of Care  Outcome: Ongoing, Progressing     Problem: Plan of Care - These are the overarching goals to be used throughout the patient stay.    Goal: Optimal Comfort and Wellbeing  Intervention: Monitor Pain and Promote Comfort  Recent Flowsheet Documentation  Taken 8/19/2022 0931 by Kati Rios RN  Pain Management Interventions: medication (see MAR)  Taken 8/19/2022 0713 by Kati Rios RN  Pain Management Interventions:   repositioned   medication (see MAR)   Goal Outcome Evaluation:        Patient has reported discomfort \"all over\" and was grimacing during cares. Given Dilaudid 0.2mg IV x 1. Patient stated \"I don't want any medication. You just wanna put me under so you don't have to deal with me\". Also has Lidoderm patches on. Therapy in room. 2 assist to reposition. Continues on IV antibiotic. Lungs diminished/clear.              "

## 2022-08-19 NOTE — CONSULTS
Hospice:  Met with daughters Edilia and Madina to discuss hospice philosophy, hospice services, teams, benefits and where hospice takes place.   Pt has had 24hr caregivers in her home for about a year now per dtrs. Plan is to discharge her back home with continued caregivers. They are hoping to discharge with hospice services.   Hospice eligibility and criteria will be discussed with hospice medical directory and will report back to Edilia tomorrow.     Shira Maldonado RN  [facility]  [phone]

## 2022-08-19 NOTE — PROGRESS NOTES
08/19/22 1400   Quick Adds   Type of Visit Initial Occupational Therapy Evaluation   Living Environment   People in Home alone   Current Living Arrangements other (see comments)   Home Accessibility stairs to enter home;stairs within home   Number of Stairs, Main Entrance 4   Stair Railings, Main Entrance railings safe and in good condition;railing on right side (ascending)   Number of Stairs, Within Home, Primary six   Stair Railings, Within Home, Primary railings safe and in good condition;railing on left side (ascending)   Transportation Anticipated family or friend will provide   Self-Care   Usual Activity Tolerance moderate   Current Activity Tolerance poor   Equipment Currently Used at Home walker, rolling   Activity/Exercise/Self-Care Comment pt [facility], also has someonme to help with cleaning. Reports stand by assist atbaseline for mobility.   General Information   Onset of Illness/Injury or Date of Surgery 08/17/22   Referring Physician Sherry Minor   Patient/Family Therapy Goal Statement (OT) return home   Cognitive Status Examination   Orientation Status person;place   Range of Motion Comprehensive   Comment, General Range of Motion R shld flexion approx 70 degrees   Bed Mobility   Comment (Bed Mobility) declined due to pain   Transfers   Transfer Comments declined due to pain   Activities of Daily Living   BADL Assessment/Intervention lower body dressing;grooming   Lower Body Dressing Assessment/Training   Comment, (Lower Body Dressing) dependent due to pain limiting movement/activity   Grooming Assessment/Training   Position (Grooming) supine   [place] Level (Grooming) moderate assist (50% patient effort);maximum assist (25% patient effort)   Comment, (Grooming) poor effort to wash face, hands, comb hair   Clinical Impression   Criteria for Skilled Therapeutic Interventions Met (OT) Yes, treatment indicated   OT Diagnosis decreased adl's   OT Problem  List-Impairments impacting ADL activity tolerance impaired;mobility;strength;pain   Assessment of Occupational Performance 3-5 Performance Deficits   Planned Therapy Interventions (OT) ADL retraining;ROM;strengthening;transfer training   Clinical Decision Making Complexity (OT) low complexity   Risk & Benefits of therapy have been explained evaluation/treatment results reviewed   OT Discharge Planning   OT Discharge Recommendation (DC Rec) home with assist   OT Rationale for DC Rec will need 24/7 total cares/assist of 2 for bed mobility, dressing, transfers-per report pt, has this assist at home   Total Evaluation Time (Minutes)   Total Evaluation Time (Minutes) 10   OT Goals   Therapy Frequency (OT) Daily   OT Predicted Duration/Target Date for Goal Attainment 08/26/22   OT Goals Hygiene/Grooming;Lower Body Dressing;Transfers;OT Goal 1   OT: Hygiene/Grooming minimal assist  (sitting)   OT: Lower Body Dressing Moderate assist;using adaptive equipment   OT: Transfer Moderate assist  (of 2)   OT: Goal 1 pt will participate in Ue exercises for 10 minutes.

## 2022-08-19 NOTE — CONSULTS
Spoke to our hospice provider. This patient is eligible for hospice with a diagnosis of dysphagia, with falls, compression fx, and weight loss as contributing factors of decline. Spoke to pt's daughter Edilia. Let her know that our community team would be able to start hospice in the home on Sunday. Edilia is going to check with [facility] to see if they have care givers available to start on Sunday as well. Hospice will order DME including air mattress overlay,  wheelchair, and over the bed table as they already have a hospital bed, walker, and bed side commode in the home.     Recommendations per hospice provider prior to discharge include checking an Albumin and changing pt to oral Morphine to make sure pain is well controlled with oral meds before discharging.    Jill Schoenecker, RN  [facility] hospice   [phone]

## 2022-08-20 NOTE — PLAN OF CARE
Problem: Infection (Pneumonia)  Goal: Resolution of Infection Signs and Symptoms  Outcome: Ongoing, Progressing     Problem: Pain Acute  Goal: Acceptable Pain Control and Functional Ability  Outcome: Ongoing, Progressing  Intervention: Develop Pain Management Plan     Pt alert, orientation fluctuates.  C/o generalized achy pain.  Prn melatonin and tylenol given with no relief, pt declined offer of morphine so order for tramadol obtained.  Received tramadol and was able to sleep after.  Assisted to reposition throughout the night.  Purewick in place.

## 2022-08-20 NOTE — PLAN OF CARE
"Speech Language Therapy Discharge Summary    Reason for therapy discharge:    All goals and outcomes met, no further needs identified.    Progress towards therapy goal(s). See goals on Care Plan in [facility] electronic health record for goal details.  Goals met    Therapy recommendation(s):    No further therapy is recommended.    Goal Outcome Evaluation:  Pt seen during breakfast that she was self feeding drinking in 45 degree position. Pt tolerating eggs, cream of wheat and water with straw with independent eating with no overt sx's aspiration. Pt reassured that her aspiration risk/\"choking\" risk is lowered now that she is sitting more upright for oral intake. Pt agreed. No further ST warranted at this time as pt independently eating regular diet with thin liquids with positioning restrictions d/t pain.                    "

## 2022-08-20 NOTE — PROGRESS NOTES
[facility]    Medicine Progress Note - Hospitalist Service    Date of Admission:  8/17/2022    Assessment & Plan        Kristal Cox is a 89 year old female admitted on 8/17/2022. She was brought to the ED by ambulance for evaluation after a fall at home and also found to have dysphagia and aspiration pneumonia  Some improvement on antibiotics and pain management  Patient has been evaluated by Hospice, eligible for enrollment.  Plan to discharge home 08/21 at 1 PM to home with hospice    Mechanical fall  Radiography only remarkable for a right eighth rib fracture  Supportive management  Pain control    Chronic pain, multiple chronic thoracic compression fractures  Diffuse achiness in shoulder and upper back  Patient has not been able to rest in bed and has been sleeping in a recliner  Per daughter, has been restless and anxious due to pain  Pain team and palliative team on board  Continue pain control with scheduled Tylenol 975 mg p.o. 3 times daily, lidocaine patches, calcitonin spray  Patient prefers tramadol 25-50 mg Q6H, declined oral morphine.  Has IV Dilaudid for breakthrough pain  Bowel regimen  Check ESR/CRP to exclude polymyalgia rheumatica, if high ESR may try small doses of steroids    Dysphagia  History of GERD  Seen by SLP.  Patient unable to sit up upright/complete video swallow exam  Appreciate dysphagia diet recs from SLP  On regular diet with thin liquids-which is a comfort diet    Aspiration pneumonitis  Has had a cough and was prescribed doxycycline on 8/20/2022 for sinusitis  Difficulty swallowing pills and coughs with meals  SLP evaluation and recommendations : significant dysphagia and recommendations to keep NPO, video swallow could not be done due to patient unable to sit up and participate in video swallow study.  Changed IV unasyn to PO augmentin, plan to complete 7 days of antibiotics     Acute respiratory failure with hypoxia, improved  Likely a  combination of pneumonitis and acute pain  Oxygen via nasal cannula to keep saturation above 92%     Acute on chronic hyponatremia, improved  Likely related to diuretics  S/p iv fluids, monitor    Hypokalemia  Replete per protocol    Chronic kidney disease stage IIIa  Stable renal function  Avoid nephrotoxins     Chronic diastolic congestive failure  No signs of acute CHF exacerbation  Monitor volume status  stable     Essential hypertension  Reconcile PTA lisinopril, metoprolol     Hypothyroidism  Reconcile PTA levothyroxine     Lymphedema  Velcro wraps in place  On PTA furosemide     Cognitive disorder  At risk for acute delirium  Supportive management, monitor     Radiographic findings  3 cm left adnexal mass, right upper lobe 0.7 cm pulmonary nodule  This was related to daughter at bedside, unlikely to pursue any of these findings         Diet: Regular Diet Adult Thin Liquids (level 0)  Snacks/Supplements Adult: Ensure Enlive; With Meals    DVT Prophylaxis: SCDz  Kruger Catheter: Not present  Central Lines: None  Cardiac Monitoring: None  Code Status: No CPR- Do NOT Intubate      Disposition Plan .  Plan to discharge on Sunday 8/21, with home hospice being set up.     Expected Discharge Date: 08/21/2022,  1:00 PM      Discharge Comments: d/c home with [facility] at 1300 8/21/22        The patient's care was discussed with the Bedside Nurse, Patient and Patient's Family.    Sherry Francois MD  Hospitalist Service  [facility]  Securely message with the Vocera Web Console (learn more here)  Text page via [employer] Paging/Directory     Clinically Significant Risk Factors Present on Admission       # Moderate Malnutrition: based on nutrition assessment     ______________________________________________________________________    Interval History   No acute events overnight.  Seen at bedside, daughter in attendance  Still with diffuse achiness in her neck  shoulders and upper back, just not comfortable with discomfort and pain from had head neck and back.  No fevers, chest pain or shortness of breath  Feels her mouth is very dry, attempting to drink liquids as able    Data reviewed today: I reviewed all medications, new labs and imaging results over the last 24 hours.Physical Exam   Vital Signs: Temp: 98.1  F (36.7  C) Temp src: Axillary BP: 132/66 Pulse: 96   Resp: 18 SpO2: 96 % O2 Device: None (Room air)    Weight: 110 lbs 0 oz    General: Frail, ill looking, uncomfortable  HEENT: Oral mucosa moist and non-erythematous, PERRLA, EOM intact  CV: RRR, normal S1S2, no murmur, clicks, rubs  Resp: Clear to auscultation bilaterally, no wheezes, rhonchi  Abd: Soft, non-tender, BS+, no masses appreciated  Extremities: Radial and pedal pulses intact and symmetric, no pedal edema  Neuro: No lateralizing symptoms or focal neurologic deficits      Data   Recent Labs   Lab 08/20/22  0600 08/17/22  0127   WBC 8.6 12.6*   HGB 11.6* 11.9   MCV 99 97    219   * 128*   POTASSIUM 3.4* 4.2   CHLORIDE 99 94*   CO2 21* 26   BUN 10 16   CR 0.74 0.81   ANIONGAP 12 8   SAMPSON 9.1 9.3   GLC 91 109   ALBUMIN 3.0*  --      No results found for this or any previous visit (from the past 24 hour(s)).  Medications     - MEDICATION INSTRUCTIONS -         acetaminophen  975 mg Oral Q8H     amLODIPine  10 mg Oral Daily     amLODIPine  5 mg Oral Once     amoxicillin-clavulanate  1 tablet Oral Q12H [facility] (08/20)     calcitonin (salmon)  1 spray Alternating Nostrils Daily     cloNIDine   Transdermal Q8H     cloNIDine  1 patch Transdermal Weekly     [START ON 8/21/2022] ferrous sulfate  325 mg Oral Every Other Day     levothyroxine  50 mcg Oral QAM AC     lidocaine  2 patch Transdermal Q24H     lidocaine   Transdermal Q8H [facility]     metoprolol succinate ER  25 mg Oral At Bedtime     pantoprazole  40 mg Oral At Bedtime     sodium chloride (PF)  3 mL Intracatheter Q8H

## 2022-08-20 NOTE — PROGRESS NOTES
Care Management Follow Up    Length of Stay (days): 3    Expected Discharge Date: 08/21/2022     Concerns to be Addressed:       Patient plan of care discussed at interdisciplinary rounds: Yes    Anticipated Discharge Disposition: Home with hospice      Anticipated Discharge Services:  Hospice  Anticipated Discharge DME:  Hospice to order    Patient/family educated on Medicare website which has current facility and service quality ratings:  yes  Education Provided on the Discharge Plan:  yes  Patient/Family in Agreement with the Plan:  yes    Referrals Placed by CM/SW:  Hospice  Private pay costs discussed: Not applicable    Additional Information:  Plan is for pt to discharge tomorrow 8/21/22 at 1300 via [employer] [facility]ret[facility]. At this time [facility] will meet at pt house to get pt set up which I was told can take a few hours. Then pt has 24 hr/day cares with [facility] and they will be out to the house at 1900. CM confirmed plan with family and all agencies.  CM to follow for final discharge plans.      Hue Petersen RN

## 2022-08-20 NOTE — PLAN OF CARE
Problem: Plan of Care - These are the overarching goals to be used throughout the patient stay.    Goal: Plan of Care Review/Shift Note  Description: The Plan of Care Review/Shift note should be completed every shift.  The Outcome Evaluation is a brief statement about your assessment that the patient is improving, declining, or no change.  This information will be displayed automatically on your shift note.  Outcome: Ongoing, Progressing     Problem: Plan of Care - These are the overarching goals to be used throughout the patient stay.    Goal: Optimal Comfort and Wellbeing  Outcome: Ongoing, Progressing  Intervention: Monitor Pain and Promote Comfort  Recent Flowsheet Documentation  Taken 8/20/2022 0921 by Kati Rios RN  Pain Management Interventions: medication (see MAR)   Goal Outcome Evaluation:        Patient has been anxious at times today, but calmed quickly. Daughter was here and helped feed patient , who was a poor eater. Patient does not take medication easily. Crushed in applesauce might be best. Patient reported pain in right shoulder and ribcage. Lidoderm patches on, plus has had scheduled am Tylenol and Tramadol.

## 2022-08-20 NOTE — CONSULTS
Hospice:  Plan for pt tp discharge home 8/21 at 1pm via [facility] stretcher. Hospice will meet pt and family at home at 1:30 to intake pt onto the hospice program.     Family has 24hr caregiving from [facility] which will restart at 7pm on 8/21.     Hospice ordered DME from [facility] that includes air mattress,  wheelchair and overbed table. This equipment is scheduled to arrive at pts home prior to 1pm on 8/21.    Please sent a 3 day supply of hospice comfort medication home with pt at time of discharge.     Updated dtr Edilia and LUCIA Swann with the above information.     Shira Maldonado RN  [facility]  [phone]

## 2022-08-20 NOTE — PLAN OF CARE
"  Problem: Plan of Care - These are the overarching goals to be used throughout the patient stay.    Goal: Absence of Hospital-Acquired Illness or Injury  Outcome: Ongoing, Progressing  Intervention: Identify and Manage Fall Risk  Recent Flowsheet Documentation  Taken 8/19/2022 3276 by Mary Tovar RN  Safety Promotion/Fall Prevention:   activity supervised   room door open   room organization consistent   room near nurse's station   safety round/check completed   bed alarm on   Goal Outcome Evaluation:     Patient reports that she \"hurts all over\" and has a difficult time finding comfort in repositioning. Writer administered scheduled Tylenol and promoted comfort by adjusting pillows and blankets. Patient c/o dry mouth, writer administered oral swabs and sips of water to promote comfort. PIV in left hand infusing abx. Patient becomes occasionally anxious thinking she is dying or that she will choke on medications. Writer reassured patient of her safety and that is okay right now.This helped to calm her. Call light is within reach.                "

## 2022-08-21 NOTE — PROGRESS NOTES
Care Management Discharge Note    Discharge Date: 08/21/2022       Discharge Disposition:  Home with 24/7 care ([facility]) Hospice ([facility]).    Discharge Services:  Per hospice    Discharge DME:  Per hospice    Discharge Transportation: [facility]ret[facility] at 1300    Private pay costs discussed: transportation costs    PAS Confirmation Code: N/A   Patient/family educated on Medicare website which has current facility and service quality ratings:  N/A    Education Provided on the Discharge Plan:  yes  Persons Notified of Discharge Plans: yes  Patient/Family in Agreement with the Plan:  yes    Handoff Referral Completed: Yes    Additional Information:  Plan is for pt to discharge today 8/21/22 at 1300 via [facility]ret[facility]. At this time [facility] will meet at pt house to get pt set up which I was told can take a few hours. Then pt has 24 hr/day cares with [facility] and they will be out to the house at 1900. CM confirmed plan with family and all agencies.  CM confirmed with pharmacy will deliver comfort meds in time for discharge. CM confirmed with daughter that someone will be at the house to let pt in. PCS faxed. No further CM needs at this time.       Hue Petersen, RN

## 2022-08-21 NOTE — PROGRESS NOTES
General acute hospital    Background: Transitional Care Management program auto-identified and prompting a chart review by General acute hospital team.    Assessment: Upon chart review, [facility] Team member will cancel/close this episode of Transitional Care Management program due to reason below:    Patient has been discharged with Hospice Care    Plan: Transitional Care Management episode closed per reason above.      Lindsey Gamez  Community Health Worker  [facility]  Ph: [phone]      *Connected Care Resource Team does NOT follow patient ongoing. Referrals are identified based on internal discharge reports and the outreach is to ensure patient has an understanding of their discharge instructions.

## 2022-08-21 NOTE — DISCHARGE SUMMARY
[facility] MEDICINE  DISCHARGE SUMMARY     Primary Care Physician: Keyla York  Admission Date: 8/17/2022   Discharge Provider: Jaky Diaz MD Discharge Date: 8/21/2022   Diet:   Active Diet and Nourishment Order   Procedures     Snacks/Supplements Adult: Ensure Enlive; With Meals     Regular Diet Adult Thin Liquids (level 0)     Diet       Code Status: No CPR- Do NOT Intubate   Activity: DCACTIVITY: Activity as tolerated        Condition at Discharge: Stable     REASON FOR PRESENTATION(See Admission Note for Details)   fall    PRINCIPAL & ACTIVE DISCHARGE DIAGNOSES     Principal Problem:    Aspiration pneumonia of both lower lobes, unspecified aspiration pneumonia type (H)  Active Problems:    Hypoxia    Fall, initial encounter    Closed fracture of one rib of right side, initial encounter      PENDING LABS     Unresulted Labs Ordered in the Past 30 Days of this Admission     No orders found from 7/18/2022 to 8/18/2022.          PROCEDURES ( this hospitalization only)          RECOMMENDATIONS TO OUTPATIENT PROVIDER FOR F/U VISIT     Follow-up Appointments     Follow-up and recommended labs and tests      Follow up as needed with primary care provider, Keyla York             DISPOSITION     Home with Hospice    SUMMARY OF HOSPITAL COURSE:      Kristal Cox is a 89 year old female with history of hypertension, CHF, PVCs, CKD, hyponatremia, GERD, hyperlipidemia, hypothyroidism, lymphedema presented for evaluation of a fall at home found to have aspiration pneumonia. Ultimately given advanced age, poor symptom control, declining functional status patient and family elected for Hospice and she is returning home with Hospice today.    #Fall at home  #8th rib fracture  Attributed to acute infection/aspiration pneumonia as well as uncontrolled chronic back pain/gait instability  Sustained R 8th rib fracture  Tylenol, home tramadol, lidocaine patch as needed  with oral morphine for breakthrough    #Aspiration pneumonia/pneumonitis  Recent cough and had been on abx for ?sinusitis  Speech saw here with serious concerns about her swallowing- dysphagia with all textures, no safe diet for her.  Patient will discharge on Hospice. Has been permitted a regular diet as tolerated here. She is DNR/DNI.  Continue oral Augmentin to complete today 7 days treatment.    #Chronic back pain  #Multiple chronic thoracic compression fractures  Has been poorly controlled, contributing to fall and poor quality of life  Pain team and Palliative care evaluated her here  Currently pain is reasonably well controlled with tylenol, tramadol, lidocaine patch. Patient not keen on receiving opioid pain meds but did send liquid morphine for as needed on Hospice.  Received calcitonin spray while here but expect this would not be a covered medication on Hospice.  ESR only very mildly elevated so doubt steroid would help here.    Medications list simplified to continue only medications that directly contribute to comfort, or for which there may be a withdrawal component (like metoprolol).    Discharge Medications with Med changes:     Current Discharge Medication List      START taking these medications    Details   acetaminophen (TYLENOL) 650 MG suppository Place 1 suppository (650 mg) rectally every 4 hours as needed for fever  Qty: 4 suppository, Refills: 0    Associated Diagnoses: Closed fracture of one rib of right side, initial encounter      amoxicillin-clavulanate (AUGMENTIN) 875-125 MG tablet Take 1 tablet by mouth every 12 hours for 3 days  Qty: 6 tablet, Refills: 0    Associated Diagnoses: Aspiration pneumonia of both lower lobes, unspecified aspiration pneumonia type (H)      atropine 1 % ophthalmic solution Take 1-2 drops by mouth, place under tongue or place inside cheek every 4 hours as needed for secretions  Qty: 5 mL, Refills: 0    Associated Diagnoses: Closed fracture of one rib of right  side, initial encounter      bisacodyl (DULCOLAX) 10 MG suppository Place 1 suppository (10 mg) rectally daily as needed for constipation  Qty: 2 suppository, Refills: 0    Associated Diagnoses: Closed fracture of one rib of right side, initial encounter      haloperidol (HALDOL) 2 MG/ML (HIGH CONC) solution Take 0.25-0.5 mLs (0.5-1 mg) by mouth or place under tongue every 6 hours as needed for agitation or other (nausea)  Qty: 10 mL, Refills: 0    Associated Diagnoses: Closed fracture of one rib of right side, initial encounter      Lidocaine (LIDOCARE) 4 % Patch Place 2 patches onto the skin every 24 hours To prevent lidocaine toxicity, patient should be patch free for 12 hrs daily.  Qty: 10 patch, Refills: 0    Associated Diagnoses: Closed fracture of one rib of right side, initial encounter      LORazepam (ATIVAN) 2 MG/ML (HIGH CONC) oral solution Take 0.125-0.25 mLs (0.25-0.5 mg) by mouth or place under tongue every 4 hours as needed for anxiety (restlessness)  Qty: 30 mL, Refills: 0    Associated Diagnoses: Closed fracture of one rib of right side, initial encounter      morphine sulfate (ROXANOL) 20 mg/mL (HIGH CONC) soln Take 0.25 mLs (5 mg) by mouth every 4 hours as needed for moderate to severe pain  Qty: 15 mL, Refills: 0    Associated Diagnoses: Closed fracture of one rib of right side, initial encounter      senna (SENNA LAXATIVE) 8.6 MG tablet Take 1-2 tablets by mouth 2 times daily as needed for constipation  Qty: 100 tablet, Refills: 0    Associated Diagnoses: Closed fracture of one rib of right side, initial encounter         CONTINUE these medications which have CHANGED    Details   levothyroxine (SYNTHROID/LEVOTHROID) 50 MCG tablet Take 1 tablet (50 mcg) by mouth daily before breakfast    Associated Diagnoses: Acquired hypothyroidism      metoprolol succinate ER (TOPROL XL) 25 MG 24 hr tablet Take 1 tablet (25 mg) by mouth At Bedtime    Associated Diagnoses: NSTEMI (non-ST elevated myocardial  infarction) (H)      olopatadine (PATANOL) 0.1 % ophthalmic solution Place 1 drop into both eyes    Associated Diagnoses: Seasonal allergic rhinitis, unspecified trigger      polyethylene glycol (MIRALAX) 17 GM/Dose powder Take 17 g by mouth daily as needed for constipation    Associated Diagnoses: Slow transit constipation         CONTINUE these medications which have NOT CHANGED    Details   acetaminophen (TYLENOL 8 HOUR ARTHRITIS PAIN) 650 MG CR tablet Take 650-1,300 mg by mouth 2 times daily Take 2 tablets by mouth every morning and 1 tablet every afternoon      acetaminophen (TYLENOL) 500 MG tablet [ACETAMINOPHEN (TYLENOL) 500 MG TABLET] Take 2 tablets (1,000 mg total) by mouth 2 (two) times a day.  Refills: 0    Associated Diagnoses: Right-sided chest wall pain      albuterol (PROAIR HFA/PROVENTIL HFA/VENTOLIN HFA) 108 (90 Base) MCG/ACT inhaler Inhale 2 puffs into the lungs every 6 hours as needed for shortness of breath / dyspnea or wheezing  Qty: 18 g, Refills: 1    Comments: Pharmacy may dispense brand covered by insurance (Proair, or proventil or ventolin or generic albuterol inhaler)  Associated Diagnoses: SOB (shortness of breath)      artificial tears,hypromellose, (GENTEAL; SYSTANE) 0.3 % Gel Place 1 drop into both eyes 4 times daily      docusate sodium (COLACE) 100 MG capsule Take 2 capsules (200 mg) by mouth daily  Qty: 180 capsule, Refills: 3    Associated Diagnoses: Slow transit constipation      ferrous sulfate (FEROSUL) 325 (65 Fe) MG tablet Take 1 tablet (325 mg) by mouth 2 times daily Take 2 daily  Qty: 180 tablet, Refills: 3    Comments: Patient is taking Bid.  Associated Diagnoses: Iron deficiency anemia due to chronic blood loss      guaiFENesin (MUCINEX) 600 MG 12 hr tablet Take 600 mg by mouth 2 times daily as needed for congestion      hydrOXYzine (ATARAX) 25 MG tablet Take 0.5-1 tablets (12.5-25 mg) by mouth every 8 hours as needed for anxiety  Qty: 30 tablet, Refills: 0    Associated  Diagnoses: Anxiety      melatonin 3 mg Tab tablet [MELATONIN 3 MG TAB TABLET] Take 1-2 tablets (3-6 mg total) by mouth at bedtime as needed.    Associated Diagnoses: Insomnia, unspecified type      traMADol (ULTRAM) 50 MG tablet Take 0.5-1 tablets (25-50 mg) by mouth every 6 hours as needed for severe pain  Qty: 30 tablet, Refills: 0    Associated Diagnoses: Rib pain; Chronic bilateral thoracic back pain         STOP taking these medications       cholecalciferol, vitamin D3, 1,000 unit tablet Comments:   Reason for Stopping:         doxycycline hyclate (VIBRAMYCIN) 100 MG capsule Comments:   Reason for Stopping:         furosemide (LASIX) 20 MG tablet Comments:   Reason for Stopping:         ketotifen (ZADITOR) 0.025 % ophthalmic solution Comments:   Reason for Stopping:         lisinopril (ZESTRIL) 20 MG tablet Comments:   Reason for Stopping:         magnesium chloride 535 (64 Mg) MG TBEC CR tablet Comments:   Reason for Stopping:         omeprazole (PRILOSEC) 20 MG DR capsule Comments:   Reason for Stopping:         vitamin C (ASCORBIC ACID) 250 MG TABS tablet Comments:   Reason for Stopping:                 Consults     CARE MANAGEMENT / SOCIAL WORK IP CONSULT  SPEECH LANGUAGE PATH ADULT IP CONSULT  PHYSICAL THERAPY ADULT IP CONSULT  OCCUPATIONAL THERAPY ADULT IP CONSULT  PAIN MANAGEMENT ADULT IP CONSULT  PALLIATIVE CARE ADULT IP CONSULT  INPATIENT HOSPICE ADULT CONSULT  CARE MANAGEMENT / SOCIAL WORK IP CONSULT  PALLIATIVE CARE ADULT IP CONSULT    SIGNIFICANT IMAGING FINDINGS     Results for orders placed or performed during the hospital encounter of 08/17/22   Head CT w/o contrast    Impression    IMPRESSION:  1.  No acute intracranial hemorrhage or calvarial fracture.  2.  Mild to moderate volume loss and presumed chronic small vessel ischemic changes.   Cervical spine CT w/o contrast    Impression    IMPRESSION:  CERVICAL SPINE CT:  1.  No fracture or posttraumatic subluxation.  2.  Degenerative changes as  highlighted above.    THORACIC SPINE CT:  1.  Age-indeterminate compression deformities at T6, T9 and T11 are favored to be chronic. If indicated, MRI could be performed to better evaluate the age of these abnormalities.  2.  There are high-grade chronic compression fractures at T7 and T8.  3.  There are bilateral pleural effusions.    LUMBAR SPINE CT:  1.  No acute fracture or posttraumatic subluxation.  2.  No high-grade spinal canal or neural foraminal stenosis.  3.  Left adnexal mass and indeterminate low attenuating changes in the uterus. Pelvic ultrasound is recommended for further evaluation.   CT Thoracic Spine w/o Contrast    Impression    IMPRESSION:  CERVICAL SPINE CT:  1.  No fracture or posttraumatic subluxation.  2.  Degenerative changes as highlighted above.    THORACIC SPINE CT:  1.  Age-indeterminate compression deformities at T6, T9 and T11 are favored to be chronic. If indicated, MRI could be performed to better evaluate the age of these abnormalities.  2.  There are high-grade chronic compression fractures at T7 and T8.  3.  There are bilateral pleural effusions.    LUMBAR SPINE CT:  1.  No acute fracture or posttraumatic subluxation.  2.  No high-grade spinal canal or neural foraminal stenosis.  3.  Left adnexal mass and indeterminate low attenuating changes in the uterus. Pelvic ultrasound is recommended for further evaluation.   Lumbar spine CT w/o contrast    Impression    IMPRESSION:  CERVICAL SPINE CT:  1.  No fracture or posttraumatic subluxation.  2.  Degenerative changes as highlighted above.    THORACIC SPINE CT:  1.  Age-indeterminate compression deformities at T6, T9 and T11 are favored to be chronic. If indicated, MRI could be performed to better evaluate the age of these abnormalities.  2.  There are high-grade chronic compression fractures at T7 and T8.  3.  There are bilateral pleural effusions.    LUMBAR SPINE CT:  1.  No acute fracture or posttraumatic subluxation.  2.  No  high-grade spinal canal or neural foraminal stenosis.  3.  Left adnexal mass and indeterminate low attenuating changes in the uterus. Pelvic ultrasound is recommended for further evaluation.   Chest CT w/o contrast    Impression    IMPRESSION:   1.  Possible nondisplaced fracture involving the posterior aspect of the right eighth rib. Correlation with point tenderness.  2.  Scattered groundglass opacities in the lungs likely reflecting an infectious/inflammatory process. Bibasilar consolidations likely reflecting aspiration.  3.  0.7 cm pulmonary nodule in the right upper lobe.    REFERENCE:  Guidelines for Management of Incidental Pulmonary Nodules Detected on CT Images: From the Fleischner Society 2017.   Guidelines apply to incidental nodules in patients who are 35 years or older.  Guidelines do not apply to lung cancer screening, patients with immunosuppression, or patients with known primary cancer.    MULTIPLE NODULES  Nodule size <6 mm  Low-risk patients: No follow-up needed.  High-risk patients: Optional follow-up at 12 months.    Nodule size 6 mm or larger  Low-risk patients: Follow-up CT at 3-6 months, then consider CT at 18-24 months.  High-risk patients: Follow-up CT at 3-6 months, then at 18-24 months if no change.  -Use most suspicious nodule as guide to management.    Consider referral to lung nodule clinic.  1.       XR Shoulder Right G/E 3 Views    Impression    IMPRESSION: There is a healed fracture deformity of the proximal right humerus.     Possible additional fracture deformity of the distal right clavicle, presumably chronic, though poorly assessed.    Moderate osteoarthritic changes.    XR Pelvis and Hip Left 2 Views    Impression    IMPRESSION: Demineralization of the visualized bones. Degenerative changes lower lumbar spine and joints of the pelvis including the left hip joint. No fracture, dislocation or x-ray evidence of avascular necrosis. Coarse calcifications in the left   ovary,  unchanged.   Ankle XR, G/E 3 views, right    Impression    IMPRESSION: No fracture or dislocation involving the right ankle. Demineralization of the visualized bones. Ankle mortise is symmetric. Talar dome is smooth. Plantar calcaneal spur. Diffuse soft tissue swelling right leg and the visualized right foot. No   soft tissue gas or opaque foreign body.       SIGNIFICANT LABORATORY FINDINGS     See emr    Discharge Orders        Reason for your hospital stay    Fall, broken rib, aspiration pneumonia     Follow-up and recommended labs and tests    Follow up as needed with primary care provider, Keyla York     Activity    Your activity upon discharge: activity as tolerated     Diet    Follow this diet upon discharge: Resume your regular diet       Examination   Physical Exam   Temp:  [98.1  F (36.7  C)-98.7  F (37.1  C)] 98.7  F (37.1  C)  Pulse:  [80-96] 80  Resp:  [16-18] 16  BP: (127-138)/(64-67) 127/67  SpO2:  [93 %-96 %] 94 %  Wt Readings from Last 1 Encounters:   08/17/22 49.9 kg (110 lb)       General: in no apparent distress, non-toxic and alert female lying in hospital bed oriented to person and place  HEENT: Head normocephalic atraumatic, oral mucosa moist. Sclerae anicteric  CV: Regular rhythm, normal rate, no murmurs  Resp: No wheezes, no rales or rhonchi, no focal consolidations  GI: Belly soft, nondistended, nontender, bowel sounds present  Skin: No rashes or lesions  Extremities: Trace ankle edema bilaterally  Psych: Normal affect, mood dysthymic  Neuro: CNII-XII grossly intact, moving all 4 extremities    Please see EMR for more detailed significant labs, imaging, consultant notes etc.    IJaky MD, personally saw the patient today and spent greater than 30 minutes discharging this patient.    Jaky Diaz MD  [facility]    CC:Keyla York

## 2022-08-21 NOTE — PROGRESS NOTES
Physical Therapy Discharge Summary    Reason for therapy discharge:    Discharged to home.   Home on hospice.    Progress towards therapy goal(s). See goals on Care Plan in Epic electronic health record for goal details.  Goals partially met.  Barriers to achieving goals:   discharge from facility.    Therapy recommendation(s):    No further therapy is recommended.

## 2022-08-21 NOTE — PLAN OF CARE
Problem: Plan of Care - These are the overarching goals to be used throughout the patient stay.    Goal: Optimal Comfort and Wellbeing  Outcome: Ongoing, Progressing     Problem: Pain Acute  Goal: Acceptable Pain Control and Functional Ability  Intervention: Prevent or Manage Pain  Recent Flowsheet Documentation  Taken 8/20/2022 1610 by Mary Tovar RN  Medication Review/Management: medications reviewed   Goal Outcome Evaluation:      Denies pain.VSS. Patient taking medication crushed in applesauce as appropriate.Patient is anxious that she will choke on medication. Writer reassured patient of her safety and instructed her in effective swallowing techniques. Writer assisted patient to eat dinner this evening. Edema to bilateral lower extremities. Purewick in place. Call light within reach. Will continue with plan of care.

## 2022-08-21 NOTE — PLAN OF CARE
Problem: Plan of Care - These are the overarching goals to be used throughout the patient stay.    Goal: Readiness for Transition of Care  Outcome: Met   Goal Outcome Evaluation:           Patient is discharging home via transportation @ 1300. Home medications from [facility] being sent. Patient fed herself only bites of food. Took am medications crushed with applesauce.

## 2022-08-21 NOTE — PLAN OF CARE
Problem: Plan of Care   Goal: Optimal Comfort and Wellbeing  Outcome: Ongoing, Progressing  Intervention: Monitor Pain and Promote Comfort     Problem: Risk for Delirium  Goal: Improved Sleep  Outcome: Ongoing, Progressing     Pt slept well overnight.  Had received prn tylenol and melatonin at hs for achy neck/shoulder/back.  Purewick in place.  Takes pills ok crushed in applesauce.

## 2022-09-08 ENCOUNTER — MEDICAL CORRESPONDENCE (OUTPATIENT)
Dept: HEALTH INFORMATION MANAGEMENT | Facility: CLINIC | Age: 87
End: 2022-09-08

## 2022-09-25 ENCOUNTER — HEALTH MAINTENANCE LETTER (OUTPATIENT)
Age: 87
End: 2022-09-25

## 2022-09-26 NOTE — TELEPHONE ENCOUNTER
Called patient's daughter and updated her that her appointment is a phone visit with the [facility] RN to enroll her in [facility].   
Who is calling:  Genaro Saenz  Reason for Call:  My mother got a phone call today from a nurse and has a scheduled appointment on 5/13/20.  Mom does not know what this is about.  Please call to return call.   Date of last appointment with primary care: 4/23/20  Okay to leave a detailed message: Yes      
persistent lack of appetite

## 2023-12-10 NOTE — PROGRESS NOTES
Clinic Care Coordination Contact  Community Health Worker Initial Outreach    CHW Initial Information Gathering:  Preferred Hospital: [facility]  [phone]  Current living arrangement:: I live in a private home    Patient accepts CC: Yes     RN Assessment: 5/13/2020     Initial (On Arrival)